# Patient Record
Sex: FEMALE | NOT HISPANIC OR LATINO | Employment: OTHER | URBAN - METROPOLITAN AREA
[De-identification: names, ages, dates, MRNs, and addresses within clinical notes are randomized per-mention and may not be internally consistent; named-entity substitution may affect disease eponyms.]

---

## 2020-05-29 ENCOUNTER — OFFICE VISIT (OUTPATIENT)
Dept: FAMILY MEDICINE | Facility: CLINIC | Age: 70
End: 2020-05-29
Payer: MEDICARE

## 2020-05-29 VITALS
HEART RATE: 73 BPM | HEIGHT: 62 IN | DIASTOLIC BLOOD PRESSURE: 70 MMHG | SYSTOLIC BLOOD PRESSURE: 126 MMHG | WEIGHT: 159.81 LBS | BODY MASS INDEX: 29.41 KG/M2 | OXYGEN SATURATION: 97 % | TEMPERATURE: 99 F

## 2020-05-29 DIAGNOSIS — E03.9 HYPOTHYROIDISM, UNSPECIFIED TYPE: Primary | ICD-10-CM

## 2020-05-29 DIAGNOSIS — Z00.00 WELL WOMAN EXAM WITHOUT GYNECOLOGICAL EXAM: ICD-10-CM

## 2020-05-29 DIAGNOSIS — R73.09 OTHER ABNORMAL GLUCOSE: ICD-10-CM

## 2020-05-29 DIAGNOSIS — Z12.31 ENCOUNTER FOR SCREENING MAMMOGRAM FOR BREAST CANCER: ICD-10-CM

## 2020-05-29 DIAGNOSIS — L29.9 ITCHING: ICD-10-CM

## 2020-05-29 PROCEDURE — 99499 RISK ADDL DX/OHS AUDIT: ICD-10-PCS | Mod: S$GLB,,, | Performed by: NURSE PRACTITIONER

## 2020-05-29 PROCEDURE — 1126F PR PAIN SEVERITY QUANTIFIED, NO PAIN PRESENT: ICD-10-PCS | Mod: HCNC,S$GLB,, | Performed by: NURSE PRACTITIONER

## 2020-05-29 PROCEDURE — 99202 PR OFFICE/OUTPT VISIT, NEW, LEVL II, 15-29 MIN: ICD-10-PCS | Mod: HCNC,S$GLB,, | Performed by: NURSE PRACTITIONER

## 2020-05-29 PROCEDURE — 1159F PR MEDICATION LIST DOCUMENTED IN MEDICAL RECORD: ICD-10-PCS | Mod: HCNC,S$GLB,, | Performed by: NURSE PRACTITIONER

## 2020-05-29 PROCEDURE — 99999 PR PBB SHADOW E&M-NEW PATIENT-LVL IV: CPT | Mod: PBBFAC,HCNC,, | Performed by: NURSE PRACTITIONER

## 2020-05-29 PROCEDURE — 1159F MED LIST DOCD IN RCRD: CPT | Mod: HCNC,S$GLB,, | Performed by: NURSE PRACTITIONER

## 2020-05-29 PROCEDURE — 99499 UNLISTED E&M SERVICE: CPT | Mod: S$GLB,,, | Performed by: NURSE PRACTITIONER

## 2020-05-29 PROCEDURE — 1101F PT FALLS ASSESS-DOCD LE1/YR: CPT | Mod: HCNC,CPTII,S$GLB, | Performed by: NURSE PRACTITIONER

## 2020-05-29 PROCEDURE — 1126F AMNT PAIN NOTED NONE PRSNT: CPT | Mod: HCNC,S$GLB,, | Performed by: NURSE PRACTITIONER

## 2020-05-29 PROCEDURE — 99202 OFFICE O/P NEW SF 15 MIN: CPT | Mod: HCNC,S$GLB,, | Performed by: NURSE PRACTITIONER

## 2020-05-29 PROCEDURE — 99999 PR PBB SHADOW E&M-NEW PATIENT-LVL IV: ICD-10-PCS | Mod: PBBFAC,HCNC,, | Performed by: NURSE PRACTITIONER

## 2020-05-29 PROCEDURE — 1101F PR PT FALLS ASSESS DOC 0-1 FALLS W/OUT INJ PAST YR: ICD-10-PCS | Mod: HCNC,CPTII,S$GLB, | Performed by: NURSE PRACTITIONER

## 2020-05-29 RX ORDER — HYDROXYZINE HYDROCHLORIDE 25 MG/1
25 TABLET, FILM COATED ORAL 3 TIMES DAILY PRN
Qty: 120 TABLET | Refills: 1 | Status: SHIPPED | OUTPATIENT
Start: 2020-05-29 | End: 2021-09-27 | Stop reason: SDUPTHER

## 2020-05-29 RX ORDER — LEVOTHYROXINE SODIUM 100 UG/1
100 TABLET ORAL
Qty: 90 TABLET | Refills: 1 | Status: SHIPPED | OUTPATIENT
Start: 2020-05-29 | End: 2021-01-20 | Stop reason: SDUPTHER

## 2020-05-29 RX ORDER — HYDROXYZINE HYDROCHLORIDE 25 MG/1
25 TABLET, FILM COATED ORAL 3 TIMES DAILY PRN
COMMUNITY
End: 2020-05-29 | Stop reason: SDUPTHER

## 2020-05-29 RX ORDER — LEVOTHYROXINE SODIUM 100 UG/1
100 TABLET ORAL
COMMUNITY
End: 2020-05-29 | Stop reason: SDUPTHER

## 2020-05-29 NOTE — PROGRESS NOTES
This dictation has been generated using Modal Fluency Dictation some phonetic errors may occur. Please contact author for clarification if needed.     Problem List Items Addressed This Visit     Hypothyroidism - Primary    Relevant Orders    CBC auto differential    Hemoglobin A1C    Lipid Panel    TSH      Other Visit Diagnoses     Itching        Well woman exam without gynecological exam        Relevant Orders    CBC auto differential    Comprehensive metabolic panel    Hemoglobin A1C    Lipid Panel    TSH    Encounter for screening mammogram for breast cancer        Relevant Orders    Mammo Digital Screening Bilat    Other abnormal glucose         Relevant Orders    Hemoglobin A1C        Orders Placed This Encounter    Mammo Digital Screening Bilat    CBC auto differential    Comprehensive metabolic panel    Hemoglobin A1C    Lipid Panel    TSH    hydrOXYzine HCL (ATARAX) 25 MG tablet    levothyroxine (SYNTHROID) 100 MCG tablet     Well-woman without update labs as above  Hypothyroidism update labs as above.  I will review all in refer address accordingly  Update mammogram  Obtain records from prior provider office [place], [place]    No follow-ups on file.    ________________________________________________________________  ________________________________________________________________      Chief Complaint   Patient presents with    Establish Care     History of present illness  This 69 y.o. presents today for complaint of establishing care.  Moved from [place] in December.  Has a history of hypothyroidism and itching.  Takes Synthroid without side effects.  Denies constipation change in hair skin or nails.  Weight is stable.  Notes itching did see dermatology allergist in the past without clear diagnoses.  Atarax is helpful.  Uses medicine as needed up to twice a day.  Discussed health maintenance needs record from prior primary care physician to determine going  forward.  Sounds like she will be due for colonoscopy in about 2022 but do not have report to verify.  Reports mammogram was normal last year.  Complete review of systems negative  Past medical and social history reviewed.  Family history reviewed and updated.    Past Medical History:   Diagnosis Date    Hypothyroidism     Stroke 2017    no residual effects.     Urticaria        History reviewed. No pertinent surgical history.    Family History   Problem Relation Age of Onset    Stroke Father 70       Social History     Socioeconomic History    Marital status:      Spouse name: Not on file    Number of children: Not on file    Years of education: Not on file    Highest education level: Not on file   Occupational History    Not on file   Social Needs    Financial resource strain: Not on file    Food insecurity:     Worry: Not on file     Inability: Not on file    Transportation needs:     Medical: Not on file     Non-medical: Not on file   Tobacco Use    Smoking status: Never Smoker    Smokeless tobacco: Never Used   Substance and Sexual Activity    Alcohol use: Not Currently     Alcohol/week: 5.0 standard drinks     Types: 5 Glasses of wine per week    Drug use: Never    Sexual activity: Not Currently   Lifestyle    Physical activity:     Days per week: Not on file     Minutes per session: Not on file    Stress: Not on file   Relationships    Social connections:     Talks on phone: Not on file     Gets together: Not on file     Attends Jew service: Not on file     Active member of club or organization: Not on file     Attends meetings of clubs or organizations: Not on file     Relationship status: Not on file   Other Topics Concern    Not on file   Social History Narrative    Not on file       Current Outpatient Medications   Medication Sig Dispense Refill    hydrOXYzine HCL (ATARAX) 25 MG tablet Take 1 tablet (25 mg total) by mouth 3 (three) times daily as needed for Itching. 120  tablet 1    levothyroxine (SYNTHROID) 100 MCG tablet Take 1 tablet (100 mcg total) by mouth before breakfast. 90 tablet 1     No current facility-administered medications for this visit.        Review of patient's allergies indicates:  No Known Allergies    Physical examination  Vitals Reviewed  Gen. Well-dressed well-nourished   Skin warm dry and intact.  No rashes noted.  Chest.  Respirations are even unlabored.  Lungs are clear to auscultation.  Cardiac regular rate and rhythm.  No chest wall adenopathy noted.  Abdomen is soft and not distended.  Bowel sounds are present.  No tenderness during palpation of the abdomen.      Neuro. Awake alert oriented x4.  Normal judgment and cognition noted.  Extremities no clubbing cyanosis or edema noted.     Call or return to clinic prn if these symptoms worsen or fail to improve as anticipated.

## 2020-06-10 ENCOUNTER — HOSPITAL ENCOUNTER (OUTPATIENT)
Dept: RADIOLOGY | Facility: CLINIC | Age: 70
Discharge: HOME | End: 2020-06-10
Attending: NURSE PRACTITIONER
Payer: MEDICARE

## 2020-06-10 DIAGNOSIS — Z12.31 ENCOUNTER FOR SCREENING MAMMOGRAM FOR BREAST CANCER: ICD-10-CM

## 2020-06-10 PROCEDURE — 77067 SCR MAMMO BI INCL CAD: CPT | Mod: 26,HCNC,, | Performed by: RADIOLOGY

## 2020-06-10 PROCEDURE — 77067 MAMMO DIGITAL SCREENING BILAT WITH TOMOSYNTHESIS_CAD: ICD-10-PCS | Mod: 26,HCNC,, | Performed by: RADIOLOGY

## 2020-06-10 PROCEDURE — 77063 BREAST TOMOSYNTHESIS BI: CPT | Mod: 26,HCNC,, | Performed by: RADIOLOGY

## 2020-06-10 PROCEDURE — 77063 MAMMO DIGITAL SCREENING BILAT WITH TOMOSYNTHESIS_CAD: ICD-10-PCS | Mod: 26,HCNC,, | Performed by: RADIOLOGY

## 2020-06-10 PROCEDURE — 77067 SCR MAMMO BI INCL CAD: CPT | Mod: TC,HCNC,PO

## 2020-06-11 DIAGNOSIS — R92.8 ABNORMAL MAMMOGRAM: Primary | ICD-10-CM

## 2020-06-15 DIAGNOSIS — N28.9 RENAL INSUFFICIENCY: Primary | ICD-10-CM

## 2020-07-01 ENCOUNTER — HOSPITAL ENCOUNTER (OUTPATIENT)
Dept: RADIOLOGY | Facility: HOSPITAL | Age: 70
Discharge: HOME | End: 2020-07-01
Attending: FAMILY MEDICINE
Payer: MEDICARE

## 2020-07-01 ENCOUNTER — TELEPHONE (OUTPATIENT)
Dept: FAMILY MEDICINE | Facility: CLINIC | Age: 70
End: 2020-07-01

## 2020-07-01 DIAGNOSIS — N28.9 RENAL INSUFFICIENCY: Primary | ICD-10-CM

## 2020-07-01 DIAGNOSIS — R92.8 ABNORMAL MAMMOGRAM: ICD-10-CM

## 2020-07-01 PROCEDURE — 76642 US BREAST RIGHT LIMITED: ICD-10-PCS | Mod: 26,HCNC,RT, | Performed by: RADIOLOGY

## 2020-07-01 PROCEDURE — 77065 DX MAMMO INCL CAD UNI: CPT | Mod: 26,HCNC,, | Performed by: RADIOLOGY

## 2020-07-01 PROCEDURE — 77061 BREAST TOMOSYNTHESIS UNI: CPT | Mod: 26,HCNC,, | Performed by: RADIOLOGY

## 2020-07-01 PROCEDURE — 77061 BREAST TOMOSYNTHESIS UNI: CPT | Mod: TC,HCNC

## 2020-07-01 PROCEDURE — 76642 ULTRASOUND BREAST LIMITED: CPT | Mod: TC,HCNC,RT

## 2020-07-01 PROCEDURE — 77061 MAMMO DIGITAL DIAGNOSTIC RIGHT WITH TOMOSYNTHESIS_CAD: ICD-10-PCS | Mod: 26,HCNC,, | Performed by: RADIOLOGY

## 2020-07-01 PROCEDURE — 77065 DX MAMMO INCL CAD UNI: CPT | Mod: TC,HCNC

## 2020-07-01 PROCEDURE — 76642 ULTRASOUND BREAST LIMITED: CPT | Mod: 26,HCNC,RT, | Performed by: RADIOLOGY

## 2020-07-01 PROCEDURE — 77065 MAMMO DIGITAL DIAGNOSTIC RIGHT WITH TOMOSYNTHESIS_CAD: ICD-10-PCS | Mod: 26,HCNC,, | Performed by: RADIOLOGY

## 2020-07-02 ENCOUNTER — TELEPHONE (OUTPATIENT)
Dept: FAMILY MEDICINE | Facility: CLINIC | Age: 70
End: 2020-07-02

## 2020-07-02 NOTE — TELEPHONE ENCOUNTER
Left voicemail asking patient to [facility]. These meds were refilled 5/29 for 90 day with one additional refill.

## 2020-07-02 NOTE — TELEPHONE ENCOUNTER
CONTINUES to have a slide in her renal function. I don't see any meds prescribed that would be a problem. Double check that she is not taking over the counter pain med such as advil, ibuprofen, aleve, naproxen, aspirin. Is she taking vitamins or anything else?    She can take tylenol if she needed it.

## 2020-07-02 NOTE — TELEPHONE ENCOUNTER
----- Message from Reuben Titus sent at 7/2/2020  9:06 AM CDT -----  Regarding: Delayed refill  Contact: Pt  Type:  Patient Returning Call    Who Called:  pt  Does the patient know what this is regarding?:  pt follow up with pt for refills for levothyroxine (SYNTHROID) 100 MCG tablet and hydrOXYzine HCL (ATARAX) 25 MG tablet   [facility]  Best Call Back Number:  [phone]  Additional Information:  Please follow up. Thank you

## 2020-07-07 NOTE — TELEPHONE ENCOUNTER
Spoke with patient.  Takes biotin, fish oil, vit d, multi vitamin.  States that she took ibuprofen for several days prior to visit but does not take all the time.

## 2020-07-08 NOTE — TELEPHONE ENCOUNTER
Tell her do not take any over-the-counter pain medications such as ibuprofen.  She needs to increase her water intake a little bit and repeat her labs in 2 weeks.  BMP 2 weeks

## 2021-01-25 RX ORDER — LEVOTHYROXINE SODIUM 100 UG/1
100 TABLET ORAL
Qty: 90 TABLET | Refills: 1 | Status: SHIPPED | OUTPATIENT
Start: 2021-01-25 | End: 2021-09-27 | Stop reason: SDUPTHER

## 2021-02-04 ENCOUNTER — OFFICE VISIT (OUTPATIENT)
Dept: FAMILY MEDICINE | Facility: CLINIC | Age: 71
End: 2021-02-04
Payer: MEDICARE

## 2021-02-04 ENCOUNTER — TELEPHONE (OUTPATIENT)
Dept: FAMILY MEDICINE | Facility: CLINIC | Age: 71
End: 2021-02-04

## 2021-02-04 VITALS
SYSTOLIC BLOOD PRESSURE: 126 MMHG | TEMPERATURE: 97 F | HEART RATE: 62 BPM | DIASTOLIC BLOOD PRESSURE: 72 MMHG | HEIGHT: 62 IN | WEIGHT: 168 LBS | BODY MASS INDEX: 30.91 KG/M2

## 2021-02-04 DIAGNOSIS — E03.9 HYPOTHYROIDISM, UNSPECIFIED TYPE: Primary | ICD-10-CM

## 2021-02-04 DIAGNOSIS — L29.9 ITCHING: ICD-10-CM

## 2021-02-04 PROCEDURE — 99999 PR PBB SHADOW E&M-EST. PATIENT-LVL III: CPT | Mod: PBBFAC,,, | Performed by: NURSE PRACTITIONER

## 2021-02-04 PROCEDURE — 1159F PR MEDICATION LIST DOCUMENTED IN MEDICAL RECORD: ICD-10-PCS | Mod: S$GLB,,, | Performed by: NURSE PRACTITIONER

## 2021-02-04 PROCEDURE — 3288F PR FALLS RISK ASSESSMENT DOCUMENTED: ICD-10-PCS | Mod: CPTII,S$GLB,, | Performed by: NURSE PRACTITIONER

## 2021-02-04 PROCEDURE — 3008F PR BODY MASS INDEX (BMI) DOCUMENTED: ICD-10-PCS | Mod: CPTII,S$GLB,, | Performed by: NURSE PRACTITIONER

## 2021-02-04 PROCEDURE — 99213 OFFICE O/P EST LOW 20 MIN: CPT | Mod: S$GLB,,, | Performed by: NURSE PRACTITIONER

## 2021-02-04 PROCEDURE — 3008F BODY MASS INDEX DOCD: CPT | Mod: CPTII,S$GLB,, | Performed by: NURSE PRACTITIONER

## 2021-02-04 PROCEDURE — 99213 PR OFFICE/OUTPT VISIT, EST, LEVL III, 20-29 MIN: ICD-10-PCS | Mod: S$GLB,,, | Performed by: NURSE PRACTITIONER

## 2021-02-04 PROCEDURE — 1101F PT FALLS ASSESS-DOCD LE1/YR: CPT | Mod: CPTII,S$GLB,, | Performed by: NURSE PRACTITIONER

## 2021-02-04 PROCEDURE — 1101F PR PT FALLS ASSESS DOC 0-1 FALLS W/OUT INJ PAST YR: ICD-10-PCS | Mod: CPTII,S$GLB,, | Performed by: NURSE PRACTITIONER

## 2021-02-04 PROCEDURE — 1159F MED LIST DOCD IN RCRD: CPT | Mod: S$GLB,,, | Performed by: NURSE PRACTITIONER

## 2021-02-04 PROCEDURE — 99999 PR PBB SHADOW E&M-EST. PATIENT-LVL III: ICD-10-PCS | Mod: PBBFAC,,, | Performed by: NURSE PRACTITIONER

## 2021-02-04 PROCEDURE — 1126F AMNT PAIN NOTED NONE PRSNT: CPT | Mod: S$GLB,,, | Performed by: NURSE PRACTITIONER

## 2021-02-04 PROCEDURE — 3288F FALL RISK ASSESSMENT DOCD: CPT | Mod: CPTII,S$GLB,, | Performed by: NURSE PRACTITIONER

## 2021-02-04 PROCEDURE — 1126F PR PAIN SEVERITY QUANTIFIED, NO PAIN PRESENT: ICD-10-PCS | Mod: S$GLB,,, | Performed by: NURSE PRACTITIONER

## 2021-02-19 ENCOUNTER — IMMUNIZATION (OUTPATIENT)
Dept: FAMILY MEDICINE | Facility: CLINIC | Age: 71
End: 2021-02-19
Payer: MEDICARE

## 2021-02-19 DIAGNOSIS — Z23 NEED FOR VACCINATION: Primary | ICD-10-CM

## 2021-02-19 PROCEDURE — 0001A COVID-19, MRNA, LNP-S, PF, 30 MCG/0.3 ML DOSE VACCINE: ICD-10-PCS | Mod: CV19,,, | Performed by: FAMILY MEDICINE

## 2021-02-19 PROCEDURE — 91300 COVID-19, MRNA, LNP-S, PF, 30 MCG/0.3 ML DOSE VACCINE: CPT | Mod: ,,, | Performed by: FAMILY MEDICINE

## 2021-02-19 PROCEDURE — 0001A COVID-19, MRNA, LNP-S, PF, 30 MCG/0.3 ML DOSE VACCINE: CPT | Mod: CV19,,, | Performed by: FAMILY MEDICINE

## 2021-02-19 PROCEDURE — 91300 COVID-19, MRNA, LNP-S, PF, 30 MCG/0.3 ML DOSE VACCINE: ICD-10-PCS | Mod: ,,, | Performed by: FAMILY MEDICINE

## 2021-03-12 ENCOUNTER — IMMUNIZATION (OUTPATIENT)
Dept: FAMILY MEDICINE | Facility: CLINIC | Age: 71
End: 2021-03-12
Payer: MEDICARE

## 2021-03-12 DIAGNOSIS — Z23 NEED FOR VACCINATION: Primary | ICD-10-CM

## 2021-03-12 PROCEDURE — 0002A COVID-19, MRNA, LNP-S, PF, 30 MCG/0.3 ML DOSE VACCINE: ICD-10-PCS | Mod: CV19,S$GLB,, | Performed by: FAMILY MEDICINE

## 2021-03-12 PROCEDURE — 0002A COVID-19, MRNA, LNP-S, PF, 30 MCG/0.3 ML DOSE VACCINE: CPT | Mod: CV19,S$GLB,, | Performed by: FAMILY MEDICINE

## 2021-03-12 PROCEDURE — 91300 COVID-19, MRNA, LNP-S, PF, 30 MCG/0.3 ML DOSE VACCINE: CPT | Mod: S$GLB,,, | Performed by: FAMILY MEDICINE

## 2021-03-12 PROCEDURE — 91300 COVID-19, MRNA, LNP-S, PF, 30 MCG/0.3 ML DOSE VACCINE: ICD-10-PCS | Mod: S$GLB,,, | Performed by: FAMILY MEDICINE

## 2021-04-18 ENCOUNTER — ANESTHESIA EVENT (OUTPATIENT)
Dept: ENDOSCOPY | Facility: HOSPITAL | Age: 71
DRG: 378 | End: 2021-04-18
Payer: MEDICARE

## 2021-04-18 ENCOUNTER — ANESTHESIA (OUTPATIENT)
Dept: ENDOSCOPY | Facility: HOSPITAL | Age: 71
DRG: 378 | End: 2021-04-18
Payer: MEDICARE

## 2021-04-18 ENCOUNTER — HOSPITAL ENCOUNTER (INPATIENT)
Facility: HOSPITAL | Age: 71
LOS: 3 days | Discharge: HOME | DRG: 378 | End: 2021-04-21
Attending: EMERGENCY MEDICINE | Admitting: STUDENT IN AN ORGANIZED HEALTH CARE EDUCATION/TRAINING PROGRAM
Payer: MEDICARE

## 2021-04-18 DIAGNOSIS — D62 ACUTE BLOOD LOSS ANEMIA: ICD-10-CM

## 2021-04-18 DIAGNOSIS — K62.5 RECTAL BLEEDING: ICD-10-CM

## 2021-04-18 DIAGNOSIS — K92.1 HEMATOCHEZIA: ICD-10-CM

## 2021-04-18 DIAGNOSIS — K27.9 PEPTIC ULCER: Primary | ICD-10-CM

## 2021-04-18 PROBLEM — K92.2 LOWER GI BLEED: Status: ACTIVE | Noted: 2021-04-18

## 2021-04-18 PROBLEM — K92.2 LOWER GI BLEED: Status: RESOLVED | Noted: 2021-04-18 | Resolved: 2021-04-18

## 2021-04-18 LAB
ABO + RH BLD: NORMAL
ALBUMIN SERPL BCP-MCNC: 3.6 G/DL (ref 3.5–5.2)
ALP SERPL-CCNC: 68 U/L (ref 55–135)
ALT SERPL W/O P-5'-P-CCNC: 16 U/L (ref 10–44)
ANION GAP SERPL CALC-SCNC: 15 MMOL/L (ref 8–16)
AST SERPL-CCNC: 22 U/L (ref 10–40)
BASOPHILS # BLD AUTO: 0.01 K/UL (ref 0–0.2)
BASOPHILS # BLD AUTO: 0.02 K/UL (ref 0–0.2)
BASOPHILS # BLD AUTO: 0.03 K/UL (ref 0–0.2)
BASOPHILS # BLD AUTO: 0.05 K/UL (ref 0–0.2)
BASOPHILS NFR BLD: 0.2 % (ref 0–1.9)
BASOPHILS NFR BLD: 0.2 % (ref 0–1.9)
BASOPHILS NFR BLD: 0.5 % (ref 0–1.9)
BASOPHILS NFR BLD: 0.6 % (ref 0–1.9)
BILIRUB SERPL-MCNC: 0.7 MG/DL (ref 0.1–1)
BLD GP AB SCN CELLS X3 SERPL QL: NORMAL
BUN SERPL-MCNC: 62 MG/DL (ref 8–23)
CALCIUM SERPL-MCNC: 9 MG/DL (ref 8.7–10.5)
CHLORIDE SERPL-SCNC: 98 MMOL/L (ref 95–110)
CO2 SERPL-SCNC: 21 MMOL/L (ref 23–29)
CREAT SERPL-MCNC: 1.4 MG/DL (ref 0.5–1.4)
DIFFERENTIAL METHOD: ABNORMAL
EOSINOPHIL # BLD AUTO: 0 K/UL (ref 0–0.5)
EOSINOPHIL # BLD AUTO: 0.2 K/UL (ref 0–0.5)
EOSINOPHIL NFR BLD: 0.2 % (ref 0–8)
EOSINOPHIL NFR BLD: 0.6 % (ref 0–8)
EOSINOPHIL NFR BLD: 0.7 % (ref 0–8)
EOSINOPHIL NFR BLD: 1.8 % (ref 0–8)
ERYTHROCYTE [DISTWIDTH] IN BLOOD BY AUTOMATED COUNT: 16.6 % (ref 11.5–14.5)
ERYTHROCYTE [DISTWIDTH] IN BLOOD BY AUTOMATED COUNT: 16.8 % (ref 11.5–14.5)
ERYTHROCYTE [DISTWIDTH] IN BLOOD BY AUTOMATED COUNT: 16.9 % (ref 11.5–14.5)
ERYTHROCYTE [DISTWIDTH] IN BLOOD BY AUTOMATED COUNT: 17.2 % (ref 11.5–14.5)
EST. GFR  (AFRICAN AMERICAN): 43 ML/MIN/1.73 M^2
EST. GFR  (NON AFRICAN AMERICAN): 38 ML/MIN/1.73 M^2
FERRITIN SERPL-MCNC: 106 NG/ML (ref 20–300)
GLUCOSE SERPL-MCNC: 149 MG/DL (ref 70–110)
HCT VFR BLD AUTO: 20.8 % (ref 37–48.5)
HCT VFR BLD AUTO: 24 % (ref 37–48.5)
HCT VFR BLD AUTO: 24.5 % (ref 37–48.5)
HCT VFR BLD AUTO: 27.4 % (ref 37–48.5)
HGB BLD-MCNC: 7.1 G/DL (ref 12–16)
HGB BLD-MCNC: 8.3 G/DL (ref 12–16)
HGB BLD-MCNC: 8.3 G/DL (ref 12–16)
HGB BLD-MCNC: 9.2 G/DL (ref 12–16)
IMM GRANULOCYTES # BLD AUTO: 0.02 K/UL (ref 0–0.04)
IMM GRANULOCYTES # BLD AUTO: 0.02 K/UL (ref 0–0.04)
IMM GRANULOCYTES # BLD AUTO: 0.03 K/UL (ref 0–0.04)
IMM GRANULOCYTES # BLD AUTO: 0.06 K/UL (ref 0–0.04)
IMM GRANULOCYTES NFR BLD AUTO: 0.2 % (ref 0–0.5)
IMM GRANULOCYTES NFR BLD AUTO: 0.3 % (ref 0–0.5)
IMM GRANULOCYTES NFR BLD AUTO: 0.5 % (ref 0–0.5)
IMM GRANULOCYTES NFR BLD AUTO: 0.7 % (ref 0–0.5)
IRON SERPL-MCNC: 137 UG/DL (ref 30–160)
LYMPHOCYTES # BLD AUTO: 1.1 K/UL (ref 1–4.8)
LYMPHOCYTES # BLD AUTO: 1.1 K/UL (ref 1–4.8)
LYMPHOCYTES # BLD AUTO: 1.6 K/UL (ref 1–4.8)
LYMPHOCYTES # BLD AUTO: 1.7 K/UL (ref 1–4.8)
LYMPHOCYTES NFR BLD: 11.6 % (ref 18–48)
LYMPHOCYTES NFR BLD: 17 % (ref 18–48)
LYMPHOCYTES NFR BLD: 18.6 % (ref 18–48)
LYMPHOCYTES NFR BLD: 27 % (ref 18–48)
MCH RBC QN AUTO: 28 PG (ref 27–31)
MCH RBC QN AUTO: 28.2 PG (ref 27–31)
MCH RBC QN AUTO: 28.5 PG (ref 27–31)
MCH RBC QN AUTO: 32.5 PG (ref 27–31)
MCHC RBC AUTO-ENTMCNC: 33.6 G/DL (ref 32–36)
MCHC RBC AUTO-ENTMCNC: 33.9 G/DL (ref 32–36)
MCHC RBC AUTO-ENTMCNC: 34.1 G/DL (ref 32–36)
MCHC RBC AUTO-ENTMCNC: 34.6 G/DL (ref 32–36)
MCV RBC AUTO: 83 FL (ref 82–98)
MCV RBC AUTO: 97 FL (ref 82–98)
MONOCYTES # BLD AUTO: 0.3 K/UL (ref 0.3–1)
MONOCYTES # BLD AUTO: 0.3 K/UL (ref 0.3–1)
MONOCYTES # BLD AUTO: 0.5 K/UL (ref 0.3–1)
MONOCYTES # BLD AUTO: 0.9 K/UL (ref 0.3–1)
MONOCYTES NFR BLD: 5.1 % (ref 4–15)
MONOCYTES NFR BLD: 5.7 % (ref 4–15)
MONOCYTES NFR BLD: 5.7 % (ref 4–15)
MONOCYTES NFR BLD: 9.5 % (ref 4–15)
NEUTROPHILS # BLD AUTO: 3.8 K/UL (ref 1.8–7.7)
NEUTROPHILS # BLD AUTO: 5.1 K/UL (ref 1.8–7.7)
NEUTROPHILS # BLD AUTO: 6.9 K/UL (ref 1.8–7.7)
NEUTROPHILS # BLD AUTO: 6.9 K/UL (ref 1.8–7.7)
NEUTROPHILS NFR BLD: 66.1 % (ref 38–73)
NEUTROPHILS NFR BLD: 75.1 % (ref 38–73)
NEUTROPHILS NFR BLD: 75.8 % (ref 38–73)
NEUTROPHILS NFR BLD: 76.3 % (ref 38–73)
NRBC BLD-RTO: 0 /100 WBC
PLATELET # BLD AUTO: 101 K/UL (ref 150–450)
PLATELET # BLD AUTO: 105 K/UL (ref 150–450)
PLATELET # BLD AUTO: 230 K/UL (ref 150–450)
PLATELET # BLD AUTO: 88 K/UL (ref 150–450)
PLATELET BLD QL SMEAR: ABNORMAL
PMV BLD AUTO: 10.3 FL (ref 9.2–12.9)
PMV BLD AUTO: 11 FL (ref 9.2–12.9)
PMV BLD AUTO: 11.2 FL (ref 9.2–12.9)
PMV BLD AUTO: 11.6 FL (ref 9.2–12.9)
POTASSIUM SERPL-SCNC: 4.1 MMOL/L (ref 3.5–5.1)
PROT SERPL-MCNC: 6.6 G/DL (ref 6–8.4)
RBC # BLD AUTO: 2.52 M/UL (ref 4–5.4)
RBC # BLD AUTO: 2.83 M/UL (ref 4–5.4)
RBC # BLD AUTO: 2.91 M/UL (ref 4–5.4)
RBC # BLD AUTO: 2.96 M/UL (ref 4–5.4)
SARS-COV-2 RDRP RESP QL NAA+PROBE: NEGATIVE
SATURATED IRON: 38 % (ref 20–50)
SODIUM SERPL-SCNC: 134 MMOL/L (ref 136–145)
TOTAL IRON BINDING CAPACITY: 358 UG/DL (ref 250–450)
TRANSFERRIN SERPL-MCNC: 242 MG/DL (ref 200–375)
WBC # BLD AUTO: 5.77 K/UL (ref 3.9–12.7)
WBC # BLD AUTO: 6.64 K/UL (ref 3.9–12.7)
WBC # BLD AUTO: 9.08 K/UL (ref 3.9–12.7)
WBC # BLD AUTO: 9.15 K/UL (ref 3.9–12.7)

## 2021-04-18 PROCEDURE — 25000003 PHARM REV CODE 250: Performed by: INTERNAL MEDICINE

## 2021-04-18 PROCEDURE — 80053 COMPREHEN METABOLIC PANEL: CPT | Performed by: EMERGENCY MEDICINE

## 2021-04-18 PROCEDURE — 37000009 HC ANESTHESIA EA ADD 15 MINS: Performed by: INTERNAL MEDICINE

## 2021-04-18 PROCEDURE — D9220A PRA ANESTHESIA: ICD-10-PCS | Mod: ANES,,, | Performed by: ANESTHESIOLOGY

## 2021-04-18 PROCEDURE — 43236 UPPR GI SCOPE W/SUBMUC INJ: CPT | Performed by: INTERNAL MEDICINE

## 2021-04-18 PROCEDURE — 83540 ASSAY OF IRON: CPT | Performed by: INTERNAL MEDICINE

## 2021-04-18 PROCEDURE — 63600175 PHARM REV CODE 636 W HCPCS: Performed by: INTERNAL MEDICINE

## 2021-04-18 PROCEDURE — D9220A PRA ANESTHESIA: ICD-10-PCS | Mod: CRNA,,, | Performed by: NURSE ANESTHETIST, CERTIFIED REGISTERED

## 2021-04-18 PROCEDURE — D9220A PRA ANESTHESIA: Mod: ANES,,, | Performed by: ANESTHESIOLOGY

## 2021-04-18 PROCEDURE — 99285 EMERGENCY DEPT VISIT HI MDM: CPT | Mod: 25

## 2021-04-18 PROCEDURE — 99222 1ST HOSP IP/OBS MODERATE 55: CPT | Mod: ,,, | Performed by: INTERNAL MEDICINE

## 2021-04-18 PROCEDURE — 99222 PR INITIAL HOSPITAL CARE,LEVL II: ICD-10-PCS | Mod: ,,, | Performed by: INTERNAL MEDICINE

## 2021-04-18 PROCEDURE — 43255 EGD CONTROL BLEEDING ANY: CPT | Performed by: INTERNAL MEDICINE

## 2021-04-18 PROCEDURE — 36415 COLL VENOUS BLD VENIPUNCTURE: CPT | Performed by: NURSE PRACTITIONER

## 2021-04-18 PROCEDURE — 36415 COLL VENOUS BLD VENIPUNCTURE: CPT | Performed by: EMERGENCY MEDICINE

## 2021-04-18 PROCEDURE — 43255 PR EGD, FLEX, W/CTRL BLEED, ANY METHOD: ICD-10-PCS | Mod: 22,,, | Performed by: INTERNAL MEDICINE

## 2021-04-18 PROCEDURE — 27201038 HC PROBE, BI-POLAR: Performed by: INTERNAL MEDICINE

## 2021-04-18 PROCEDURE — U0002 COVID-19 LAB TEST NON-CDC: HCPCS | Performed by: EMERGENCY MEDICINE

## 2021-04-18 PROCEDURE — 85025 COMPLETE CBC W/AUTO DIFF WBC: CPT | Performed by: EMERGENCY MEDICINE

## 2021-04-18 PROCEDURE — 86920 COMPATIBILITY TEST SPIN: CPT | Performed by: NURSE PRACTITIONER

## 2021-04-18 PROCEDURE — 43255 EGD CONTROL BLEEDING ANY: CPT | Mod: 22,,, | Performed by: INTERNAL MEDICINE

## 2021-04-18 PROCEDURE — 63600175 PHARM REV CODE 636 W HCPCS: Performed by: NURSE ANESTHETIST, CERTIFIED REGISTERED

## 2021-04-18 PROCEDURE — 25000003 PHARM REV CODE 250: Performed by: EMERGENCY MEDICINE

## 2021-04-18 PROCEDURE — 37000008 HC ANESTHESIA 1ST 15 MINUTES: Performed by: INTERNAL MEDICINE

## 2021-04-18 PROCEDURE — 82728 ASSAY OF FERRITIN: CPT | Performed by: INTERNAL MEDICINE

## 2021-04-18 PROCEDURE — 11000001 HC ACUTE MED/SURG PRIVATE ROOM

## 2021-04-18 PROCEDURE — 25000003 PHARM REV CODE 250: Performed by: NURSE PRACTITIONER

## 2021-04-18 PROCEDURE — 25000003 PHARM REV CODE 250: Performed by: NURSE ANESTHETIST, CERTIFIED REGISTERED

## 2021-04-18 PROCEDURE — 85025 COMPLETE CBC W/AUTO DIFF WBC: CPT | Mod: 91 | Performed by: NURSE PRACTITIONER

## 2021-04-18 PROCEDURE — C9113 INJ PANTOPRAZOLE SODIUM, VIA: HCPCS | Performed by: INTERNAL MEDICINE

## 2021-04-18 PROCEDURE — 63600175 PHARM REV CODE 636 W HCPCS: Performed by: NURSE PRACTITIONER

## 2021-04-18 PROCEDURE — D9220A PRA ANESTHESIA: Mod: CRNA,,, | Performed by: NURSE ANESTHETIST, CERTIFIED REGISTERED

## 2021-04-18 PROCEDURE — 86900 BLOOD TYPING SEROLOGIC ABO: CPT | Performed by: EMERGENCY MEDICINE

## 2021-04-18 PROCEDURE — 27201012 HC FORCEPS, HOT/COLD, DISP: Performed by: INTERNAL MEDICINE

## 2021-04-18 RX ORDER — LEVOTHYROXINE SODIUM 100 UG/1
100 TABLET ORAL
Status: DISCONTINUED | OUTPATIENT
Start: 2021-04-18 | End: 2021-04-21 | Stop reason: HOSPADM

## 2021-04-18 RX ORDER — SUCRALFATE 1 G/1
1 TABLET ORAL
Status: DISCONTINUED | OUTPATIENT
Start: 2021-04-18 | End: 2021-04-21 | Stop reason: HOSPADM

## 2021-04-18 RX ORDER — LIDOCAINE HCL/PF 100 MG/5ML
SYRINGE (ML) INTRAVENOUS
Status: DISCONTINUED | OUTPATIENT
Start: 2021-04-18 | End: 2021-04-18

## 2021-04-18 RX ORDER — CHOLECALCIFEROL (VITAMIN D3) 125 MCG
1 CAPSULE ORAL DAILY
COMMUNITY

## 2021-04-18 RX ORDER — SODIUM CHLORIDE 9 MG/ML
INJECTION, SOLUTION INTRAVENOUS ONCE
Status: DISCONTINUED | OUTPATIENT
Start: 2021-04-18 | End: 2021-04-21 | Stop reason: HOSPADM

## 2021-04-18 RX ORDER — ACETAMINOPHEN 500 MG
1000 TABLET ORAL EVERY 6 HOURS PRN
Status: DISCONTINUED | OUTPATIENT
Start: 2021-04-18 | End: 2021-04-21 | Stop reason: HOSPADM

## 2021-04-18 RX ORDER — ONDANSETRON 2 MG/ML
8 INJECTION INTRAMUSCULAR; INTRAVENOUS EVERY 8 HOURS PRN
Status: DISCONTINUED | OUTPATIENT
Start: 2021-04-18 | End: 2021-04-21 | Stop reason: HOSPADM

## 2021-04-18 RX ORDER — TALC
6 POWDER (GRAM) TOPICAL NIGHTLY PRN
Status: DISCONTINUED | OUTPATIENT
Start: 2021-04-18 | End: 2021-04-21 | Stop reason: HOSPADM

## 2021-04-18 RX ORDER — AMOXICILLIN 500 MG
1 CAPSULE ORAL DAILY
COMMUNITY

## 2021-04-18 RX ORDER — PANTOPRAZOLE SODIUM 40 MG/10ML
40 INJECTION, POWDER, LYOPHILIZED, FOR SOLUTION INTRAVENOUS 2 TIMES DAILY
Status: DISCONTINUED | OUTPATIENT
Start: 2021-04-18 | End: 2021-04-18

## 2021-04-18 RX ORDER — PROPOFOL 10 MG/ML
VIAL (ML) INTRAVENOUS
Status: DISCONTINUED | OUTPATIENT
Start: 2021-04-18 | End: 2021-04-18

## 2021-04-18 RX ADMIN — SUCRALFATE 1 G: 1 TABLET ORAL at 04:04

## 2021-04-18 RX ADMIN — GLYCOPYRROLATE 0.2 MG: 0.2 INJECTION, SOLUTION INTRAMUSCULAR; INTRAVITREAL at 09:04

## 2021-04-18 RX ADMIN — PANTOPRAZOLE SODIUM 8 MG/HR: 40 INJECTION, POWDER, FOR SOLUTION INTRAVENOUS at 03:04

## 2021-04-18 RX ADMIN — SUCRALFATE 1 G: 1 TABLET ORAL at 11:04

## 2021-04-18 RX ADMIN — PROPOFOL 50 MG: 10 INJECTION, EMULSION INTRAVENOUS at 09:04

## 2021-04-18 RX ADMIN — ONDANSETRON 8 MG: 2 INJECTION INTRAMUSCULAR; INTRAVENOUS at 11:04

## 2021-04-18 RX ADMIN — LIDOCAINE HYDROCHLORIDE 100 MG: 20 INJECTION INTRAVENOUS at 09:04

## 2021-04-18 RX ADMIN — PANTOPRAZOLE SODIUM 8 MG/HR: 40 INJECTION, POWDER, FOR SOLUTION INTRAVENOUS at 10:04

## 2021-04-18 RX ADMIN — LEVOTHYROXINE SODIUM 100 MCG: 0.1 TABLET ORAL at 11:04

## 2021-04-18 RX ADMIN — SUCRALFATE 1 G: 1 TABLET ORAL at 08:04

## 2021-04-18 RX ADMIN — PROPOFOL 100 MG: 10 INJECTION, EMULSION INTRAVENOUS at 09:04

## 2021-04-18 RX ADMIN — PANTOPRAZOLE SODIUM 8 MG/HR: 40 INJECTION, POWDER, FOR SOLUTION INTRAVENOUS at 08:04

## 2021-04-18 RX ADMIN — SODIUM CHLORIDE 1000 ML: 0.9 INJECTION, SOLUTION INTRAVENOUS at 05:04

## 2021-04-19 LAB
BASOPHILS # BLD AUTO: 0.01 K/UL (ref 0–0.2)
BASOPHILS # BLD AUTO: 0.02 K/UL (ref 0–0.2)
BASOPHILS NFR BLD: 0.2 % (ref 0–1.9)
BASOPHILS NFR BLD: 0.3 % (ref 0–1.9)
BLD PROD TYP BPU: NORMAL
BLD PROD TYP BPU: NORMAL
BLOOD UNIT EXPIRATION DATE: NORMAL
BLOOD UNIT EXPIRATION DATE: NORMAL
BLOOD UNIT TYPE CODE: 9500
BLOOD UNIT TYPE CODE: 9500
BLOOD UNIT TYPE: NORMAL
BLOOD UNIT TYPE: NORMAL
CODING SYSTEM: NORMAL
CODING SYSTEM: NORMAL
DIFFERENTIAL METHOD: ABNORMAL
DIFFERENTIAL METHOD: ABNORMAL
DISPENSE STATUS: NORMAL
DISPENSE STATUS: NORMAL
EOSINOPHIL # BLD AUTO: 0.1 K/UL (ref 0–0.5)
EOSINOPHIL # BLD AUTO: 0.1 K/UL (ref 0–0.5)
EOSINOPHIL NFR BLD: 2.1 % (ref 0–8)
EOSINOPHIL NFR BLD: 3 % (ref 0–8)
ERYTHROCYTE [DISTWIDTH] IN BLOOD BY AUTOMATED COUNT: 15.9 % (ref 11.5–14.5)
ERYTHROCYTE [DISTWIDTH] IN BLOOD BY AUTOMATED COUNT: 17.2 % (ref 11.5–14.5)
HCT VFR BLD AUTO: 19.2 % (ref 37–48.5)
HCT VFR BLD AUTO: 30.5 % (ref 37–48.5)
HGB BLD-MCNC: 10 G/DL (ref 12–16)
HGB BLD-MCNC: 6.5 G/DL (ref 12–16)
IMM GRANULOCYTES # BLD AUTO: 0.01 K/UL (ref 0–0.04)
IMM GRANULOCYTES # BLD AUTO: 0.01 K/UL (ref 0–0.04)
IMM GRANULOCYTES NFR BLD AUTO: 0.2 % (ref 0–0.5)
IMM GRANULOCYTES NFR BLD AUTO: 0.2 % (ref 0–0.5)
LYMPHOCYTES # BLD AUTO: 1.3 K/UL (ref 1–4.8)
LYMPHOCYTES # BLD AUTO: 1.4 K/UL (ref 1–4.8)
LYMPHOCYTES NFR BLD: 23.5 % (ref 18–48)
LYMPHOCYTES NFR BLD: 30.1 % (ref 18–48)
MCH RBC QN AUTO: 28.3 PG (ref 27–31)
MCH RBC QN AUTO: 29 PG (ref 27–31)
MCHC RBC AUTO-ENTMCNC: 32.8 G/DL (ref 32–36)
MCHC RBC AUTO-ENTMCNC: 33.9 G/DL (ref 32–36)
MCV RBC AUTO: 84 FL (ref 82–98)
MCV RBC AUTO: 88 FL (ref 82–98)
MONOCYTES # BLD AUTO: 0.3 K/UL (ref 0.3–1)
MONOCYTES # BLD AUTO: 0.4 K/UL (ref 0.3–1)
MONOCYTES NFR BLD: 7.1 % (ref 4–15)
MONOCYTES NFR BLD: 7.3 % (ref 4–15)
NEUTROPHILS # BLD AUTO: 2.6 K/UL (ref 1.8–7.7)
NEUTROPHILS # BLD AUTO: 3.8 K/UL (ref 1.8–7.7)
NEUTROPHILS NFR BLD: 59.4 % (ref 38–73)
NEUTROPHILS NFR BLD: 66.6 % (ref 38–73)
NRBC BLD-RTO: 0 /100 WBC
NRBC BLD-RTO: 0 /100 WBC
NUM UNITS TRANS PACKED RBC: NORMAL
NUM UNITS TRANS PACKED RBC: NORMAL
PLATELET # BLD AUTO: 80 K/UL (ref 150–450)
PLATELET # BLD AUTO: 82 K/UL (ref 150–450)
PLATELET BLD QL SMEAR: ABNORMAL
PMV BLD AUTO: 11.2 FL (ref 9.2–12.9)
PMV BLD AUTO: 11.4 FL (ref 9.2–12.9)
RBC # BLD AUTO: 2.3 M/UL (ref 4–5.4)
RBC # BLD AUTO: 3.45 M/UL (ref 4–5.4)
WBC # BLD AUTO: 4.38 K/UL (ref 3.9–12.7)
WBC # BLD AUTO: 5.75 K/UL (ref 3.9–12.7)

## 2021-04-19 PROCEDURE — 99232 SBSQ HOSP IP/OBS MODERATE 35: CPT | Mod: ,,, | Performed by: INTERNAL MEDICINE

## 2021-04-19 PROCEDURE — 85025 COMPLETE CBC W/AUTO DIFF WBC: CPT | Performed by: STUDENT IN AN ORGANIZED HEALTH CARE EDUCATION/TRAINING PROGRAM

## 2021-04-19 PROCEDURE — C9113 INJ PANTOPRAZOLE SODIUM, VIA: HCPCS | Performed by: INTERNAL MEDICINE

## 2021-04-19 PROCEDURE — 36430 TRANSFUSION BLD/BLD COMPNT: CPT

## 2021-04-19 PROCEDURE — 11000001 HC ACUTE MED/SURG PRIVATE ROOM

## 2021-04-19 PROCEDURE — 25000003 PHARM REV CODE 250: Performed by: NURSE PRACTITIONER

## 2021-04-19 PROCEDURE — 36415 COLL VENOUS BLD VENIPUNCTURE: CPT | Performed by: STUDENT IN AN ORGANIZED HEALTH CARE EDUCATION/TRAINING PROGRAM

## 2021-04-19 PROCEDURE — 36415 COLL VENOUS BLD VENIPUNCTURE: CPT | Performed by: NURSE PRACTITIONER

## 2021-04-19 PROCEDURE — 99232 PR SUBSEQUENT HOSPITAL CARE,LEVL II: ICD-10-PCS | Mod: ,,, | Performed by: INTERNAL MEDICINE

## 2021-04-19 PROCEDURE — 85025 COMPLETE CBC W/AUTO DIFF WBC: CPT | Mod: 91 | Performed by: NURSE PRACTITIONER

## 2021-04-19 PROCEDURE — P9016 RBC LEUKOCYTES REDUCED: HCPCS | Performed by: NURSE PRACTITIONER

## 2021-04-19 PROCEDURE — 25000003 PHARM REV CODE 250: Performed by: INTERNAL MEDICINE

## 2021-04-19 PROCEDURE — 63600175 PHARM REV CODE 636 W HCPCS: Performed by: INTERNAL MEDICINE

## 2021-04-19 RX ORDER — HYDROCODONE BITARTRATE AND ACETAMINOPHEN 500; 5 MG/1; MG/1
TABLET ORAL
Status: DISCONTINUED | OUTPATIENT
Start: 2021-04-19 | End: 2021-04-21 | Stop reason: HOSPADM

## 2021-04-19 RX ADMIN — PANTOPRAZOLE SODIUM 8 MG/HR: 40 INJECTION, POWDER, FOR SOLUTION INTRAVENOUS at 10:04

## 2021-04-19 RX ADMIN — SUCRALFATE 1 G: 1 TABLET ORAL at 03:04

## 2021-04-19 RX ADMIN — SUCRALFATE 1 G: 1 TABLET ORAL at 11:04

## 2021-04-19 RX ADMIN — SUCRALFATE 1 G: 1 TABLET ORAL at 05:04

## 2021-04-19 RX ADMIN — PANTOPRAZOLE SODIUM 8 MG/HR: 40 INJECTION, POWDER, FOR SOLUTION INTRAVENOUS at 05:04

## 2021-04-19 RX ADMIN — PANTOPRAZOLE SODIUM 8 MG/HR: 40 INJECTION, POWDER, FOR SOLUTION INTRAVENOUS at 01:04

## 2021-04-19 RX ADMIN — LEVOTHYROXINE SODIUM 100 MCG: 0.1 TABLET ORAL at 05:04

## 2021-04-19 RX ADMIN — SUCRALFATE 1 G: 1 TABLET ORAL at 08:04

## 2021-04-20 LAB
ALBUMIN SERPL BCP-MCNC: 3.2 G/DL (ref 3.5–5.2)
ALP SERPL-CCNC: 61 U/L (ref 55–135)
ALT SERPL W/O P-5'-P-CCNC: 13 U/L (ref 10–44)
ANION GAP SERPL CALC-SCNC: 4 MMOL/L (ref 8–16)
AST SERPL-CCNC: 19 U/L (ref 10–40)
BASOPHILS # BLD AUTO: 0.02 K/UL (ref 0–0.2)
BASOPHILS # BLD AUTO: 0.02 K/UL (ref 0–0.2)
BASOPHILS NFR BLD: 0.5 % (ref 0–1.9)
BASOPHILS NFR BLD: 0.5 % (ref 0–1.9)
BILIRUB SERPL-MCNC: 0.5 MG/DL (ref 0.1–1)
BUN SERPL-MCNC: 12 MG/DL (ref 8–23)
CALCIUM SERPL-MCNC: 8.5 MG/DL (ref 8.7–10.5)
CHLORIDE SERPL-SCNC: 110 MMOL/L (ref 95–110)
CO2 SERPL-SCNC: 26 MMOL/L (ref 23–29)
CREAT SERPL-MCNC: 1.2 MG/DL (ref 0.5–1.4)
DIFFERENTIAL METHOD: ABNORMAL
DIFFERENTIAL METHOD: ABNORMAL
EOSINOPHIL # BLD AUTO: 0.1 K/UL (ref 0–0.5)
EOSINOPHIL # BLD AUTO: 0.1 K/UL (ref 0–0.5)
EOSINOPHIL NFR BLD: 1.6 % (ref 0–8)
EOSINOPHIL NFR BLD: 2.5 % (ref 0–8)
ERYTHROCYTE [DISTWIDTH] IN BLOOD BY AUTOMATED COUNT: 16 % (ref 11.5–14.5)
ERYTHROCYTE [DISTWIDTH] IN BLOOD BY AUTOMATED COUNT: 16.3 % (ref 11.5–14.5)
EST. GFR  (AFRICAN AMERICAN): 52 ML/MIN/1.73 M^2
EST. GFR  (NON AFRICAN AMERICAN): 45 ML/MIN/1.73 M^2
GLUCOSE SERPL-MCNC: 108 MG/DL (ref 70–110)
HCT VFR BLD AUTO: 26.8 % (ref 37–48.5)
HCT VFR BLD AUTO: 28.3 % (ref 37–48.5)
HGB BLD-MCNC: 9.2 G/DL (ref 12–16)
HGB BLD-MCNC: 9.7 G/DL (ref 12–16)
IMM GRANULOCYTES # BLD AUTO: 0.01 K/UL (ref 0–0.04)
IMM GRANULOCYTES # BLD AUTO: 0.01 K/UL (ref 0–0.04)
IMM GRANULOCYTES NFR BLD AUTO: 0.2 % (ref 0–0.5)
IMM GRANULOCYTES NFR BLD AUTO: 0.2 % (ref 0–0.5)
LYMPHOCYTES # BLD AUTO: 1 K/UL (ref 1–4.8)
LYMPHOCYTES # BLD AUTO: 1.3 K/UL (ref 1–4.8)
LYMPHOCYTES NFR BLD: 22.8 % (ref 18–48)
LYMPHOCYTES NFR BLD: 29.8 % (ref 18–48)
MCH RBC QN AUTO: 28.8 PG (ref 27–31)
MCH RBC QN AUTO: 29 PG (ref 27–31)
MCHC RBC AUTO-ENTMCNC: 34.3 G/DL (ref 32–36)
MCHC RBC AUTO-ENTMCNC: 34.3 G/DL (ref 32–36)
MCV RBC AUTO: 84 FL (ref 82–98)
MCV RBC AUTO: 85 FL (ref 82–98)
MONOCYTES # BLD AUTO: 0.3 K/UL (ref 0.3–1)
MONOCYTES # BLD AUTO: 0.3 K/UL (ref 0.3–1)
MONOCYTES NFR BLD: 7.3 % (ref 4–15)
MONOCYTES NFR BLD: 7.5 % (ref 4–15)
NEUTROPHILS # BLD AUTO: 2.6 K/UL (ref 1.8–7.7)
NEUTROPHILS # BLD AUTO: 3 K/UL (ref 1.8–7.7)
NEUTROPHILS NFR BLD: 59.5 % (ref 38–73)
NEUTROPHILS NFR BLD: 67.6 % (ref 38–73)
NRBC BLD-RTO: 0 /100 WBC
NRBC BLD-RTO: 0 /100 WBC
PLATELET # BLD AUTO: 74 K/UL (ref 150–450)
PLATELET # BLD AUTO: 87 K/UL (ref 150–450)
PMV BLD AUTO: 10.3 FL (ref 9.2–12.9)
PMV BLD AUTO: 10.9 FL (ref 9.2–12.9)
POTASSIUM SERPL-SCNC: 3.7 MMOL/L (ref 3.5–5.1)
PROT SERPL-MCNC: 6 G/DL (ref 6–8.4)
RBC # BLD AUTO: 3.19 M/UL (ref 4–5.4)
RBC # BLD AUTO: 3.34 M/UL (ref 4–5.4)
SODIUM SERPL-SCNC: 140 MMOL/L (ref 136–145)
WBC # BLD AUTO: 4.38 K/UL (ref 3.9–12.7)
WBC # BLD AUTO: 4.39 K/UL (ref 3.9–12.7)

## 2021-04-20 PROCEDURE — 80053 COMPREHEN METABOLIC PANEL: CPT | Performed by: NURSE PRACTITIONER

## 2021-04-20 PROCEDURE — 85025 COMPLETE CBC W/AUTO DIFF WBC: CPT | Performed by: NURSE PRACTITIONER

## 2021-04-20 PROCEDURE — 99232 PR SUBSEQUENT HOSPITAL CARE,LEVL II: ICD-10-PCS | Mod: ,,, | Performed by: INTERNAL MEDICINE

## 2021-04-20 PROCEDURE — 36415 COLL VENOUS BLD VENIPUNCTURE: CPT | Performed by: INTERNAL MEDICINE

## 2021-04-20 PROCEDURE — 63600175 PHARM REV CODE 636 W HCPCS: Performed by: INTERNAL MEDICINE

## 2021-04-20 PROCEDURE — C9113 INJ PANTOPRAZOLE SODIUM, VIA: HCPCS | Performed by: INTERNAL MEDICINE

## 2021-04-20 PROCEDURE — 11000001 HC ACUTE MED/SURG PRIVATE ROOM

## 2021-04-20 PROCEDURE — 36415 COLL VENOUS BLD VENIPUNCTURE: CPT | Performed by: NURSE PRACTITIONER

## 2021-04-20 PROCEDURE — 99232 SBSQ HOSP IP/OBS MODERATE 35: CPT | Mod: ,,, | Performed by: INTERNAL MEDICINE

## 2021-04-20 PROCEDURE — 86677 HELICOBACTER PYLORI ANTIBODY: CPT | Performed by: INTERNAL MEDICINE

## 2021-04-20 PROCEDURE — 25000003 PHARM REV CODE 250: Performed by: NURSE PRACTITIONER

## 2021-04-20 PROCEDURE — 25000003 PHARM REV CODE 250: Performed by: INTERNAL MEDICINE

## 2021-04-20 RX ORDER — PANTOPRAZOLE SODIUM 40 MG/1
40 TABLET, DELAYED RELEASE ORAL 2 TIMES DAILY
Qty: 180 TABLET | Refills: 0 | Status: ON HOLD | OUTPATIENT
Start: 2021-04-20 | End: 2021-09-02 | Stop reason: SDUPTHER

## 2021-04-20 RX ORDER — SUCRALFATE 1 G/1
1 TABLET ORAL 4 TIMES DAILY
Qty: 32 TABLET | Refills: 0 | Status: SHIPPED | OUTPATIENT
Start: 2021-04-20 | End: 2021-04-28

## 2021-04-20 RX ADMIN — PANTOPRAZOLE SODIUM 8 MG/HR: 40 INJECTION, POWDER, FOR SOLUTION INTRAVENOUS at 09:04

## 2021-04-20 RX ADMIN — PANTOPRAZOLE SODIUM 8 MG/HR: 40 INJECTION, POWDER, FOR SOLUTION INTRAVENOUS at 03:04

## 2021-04-20 RX ADMIN — SUCRALFATE 1 G: 1 TABLET ORAL at 10:04

## 2021-04-20 RX ADMIN — SUCRALFATE 1 G: 1 TABLET ORAL at 09:04

## 2021-04-20 RX ADMIN — LEVOTHYROXINE SODIUM 100 MCG: 0.1 TABLET ORAL at 06:04

## 2021-04-20 RX ADMIN — SUCRALFATE 1 G: 1 TABLET ORAL at 06:04

## 2021-04-20 RX ADMIN — SUCRALFATE 1 G: 1 TABLET ORAL at 05:04

## 2021-04-21 VITALS
OXYGEN SATURATION: 99 % | HEART RATE: 68 BPM | RESPIRATION RATE: 18 BRPM | BODY MASS INDEX: 30.91 KG/M2 | DIASTOLIC BLOOD PRESSURE: 76 MMHG | HEIGHT: 62 IN | SYSTOLIC BLOOD PRESSURE: 176 MMHG | TEMPERATURE: 98 F | WEIGHT: 168 LBS

## 2021-04-21 LAB
ALBUMIN SERPL BCP-MCNC: 3 G/DL (ref 3.5–5.2)
ALP SERPL-CCNC: 60 U/L (ref 55–135)
ALT SERPL W/O P-5'-P-CCNC: 14 U/L (ref 10–44)
ANION GAP SERPL CALC-SCNC: 8 MMOL/L (ref 8–16)
AST SERPL-CCNC: 17 U/L (ref 10–40)
BASOPHILS # BLD AUTO: 0.01 K/UL (ref 0–0.2)
BASOPHILS NFR BLD: 0.2 % (ref 0–1.9)
BILIRUB SERPL-MCNC: 0.4 MG/DL (ref 0.1–1)
BUN SERPL-MCNC: 11 MG/DL (ref 8–23)
CALCIUM SERPL-MCNC: 8.5 MG/DL (ref 8.7–10.5)
CHLORIDE SERPL-SCNC: 108 MMOL/L (ref 95–110)
CO2 SERPL-SCNC: 24 MMOL/L (ref 23–29)
CREAT SERPL-MCNC: 1.1 MG/DL (ref 0.5–1.4)
DIFFERENTIAL METHOD: ABNORMAL
EOSINOPHIL # BLD AUTO: 0.1 K/UL (ref 0–0.5)
EOSINOPHIL NFR BLD: 1.7 % (ref 0–8)
ERYTHROCYTE [DISTWIDTH] IN BLOOD BY AUTOMATED COUNT: 16.5 % (ref 11.5–14.5)
EST. GFR  (AFRICAN AMERICAN): 58 ML/MIN/1.73 M^2
EST. GFR  (NON AFRICAN AMERICAN): 50 ML/MIN/1.73 M^2
GLUCOSE SERPL-MCNC: 100 MG/DL (ref 70–110)
H PYLORI IGG SERPL QL IA: NEGATIVE
HCT VFR BLD AUTO: 28.4 % (ref 37–48.5)
HGB BLD-MCNC: 9.7 G/DL (ref 12–16)
IMM GRANULOCYTES # BLD AUTO: 0.01 K/UL (ref 0–0.04)
IMM GRANULOCYTES NFR BLD AUTO: 0.2 % (ref 0–0.5)
LYMPHOCYTES # BLD AUTO: 1 K/UL (ref 1–4.8)
LYMPHOCYTES NFR BLD: 23.3 % (ref 18–48)
MCH RBC QN AUTO: 29.1 PG (ref 27–31)
MCHC RBC AUTO-ENTMCNC: 34.2 G/DL (ref 32–36)
MCV RBC AUTO: 85 FL (ref 82–98)
MONOCYTES # BLD AUTO: 0.3 K/UL (ref 0.3–1)
MONOCYTES NFR BLD: 7.6 % (ref 4–15)
NEUTROPHILS # BLD AUTO: 2.7 K/UL (ref 1.8–7.7)
NEUTROPHILS NFR BLD: 67 % (ref 38–73)
NRBC BLD-RTO: 0 /100 WBC
PLATELET # BLD AUTO: 117 K/UL (ref 150–450)
PMV BLD AUTO: 10.8 FL (ref 9.2–12.9)
POTASSIUM SERPL-SCNC: 3.6 MMOL/L (ref 3.5–5.1)
PROT SERPL-MCNC: 5.6 G/DL (ref 6–8.4)
RBC # BLD AUTO: 3.33 M/UL (ref 4–5.4)
SODIUM SERPL-SCNC: 140 MMOL/L (ref 136–145)
WBC # BLD AUTO: 4.08 K/UL (ref 3.9–12.7)

## 2021-04-21 PROCEDURE — 85025 COMPLETE CBC W/AUTO DIFF WBC: CPT | Performed by: NURSE PRACTITIONER

## 2021-04-21 PROCEDURE — 25000003 PHARM REV CODE 250: Performed by: NURSE PRACTITIONER

## 2021-04-21 PROCEDURE — C9113 INJ PANTOPRAZOLE SODIUM, VIA: HCPCS | Performed by: INTERNAL MEDICINE

## 2021-04-21 PROCEDURE — 63600175 PHARM REV CODE 636 W HCPCS: Performed by: INTERNAL MEDICINE

## 2021-04-21 PROCEDURE — 80053 COMPREHEN METABOLIC PANEL: CPT | Performed by: NURSE PRACTITIONER

## 2021-04-21 PROCEDURE — 25000003 PHARM REV CODE 250: Performed by: INTERNAL MEDICINE

## 2021-04-21 PROCEDURE — 36415 COLL VENOUS BLD VENIPUNCTURE: CPT | Performed by: NURSE PRACTITIONER

## 2021-04-21 RX ADMIN — SUCRALFATE 1 G: 1 TABLET ORAL at 10:04

## 2021-04-21 RX ADMIN — PANTOPRAZOLE SODIUM 8 MG/HR: 40 INJECTION, POWDER, FOR SOLUTION INTRAVENOUS at 07:04

## 2021-04-21 RX ADMIN — LEVOTHYROXINE SODIUM 100 MCG: 0.1 TABLET ORAL at 05:04

## 2021-04-21 RX ADMIN — SUCRALFATE 1 G: 1 TABLET ORAL at 05:04

## 2021-04-21 RX ADMIN — PANTOPRAZOLE SODIUM 8 MG/HR: 40 INJECTION, POWDER, FOR SOLUTION INTRAVENOUS at 02:04

## 2021-04-22 ENCOUNTER — TELEPHONE (OUTPATIENT)
Dept: MEDSURG UNIT | Facility: HOSPITAL | Age: 71
End: 2021-04-22

## 2021-04-22 ENCOUNTER — OFFICE VISIT (OUTPATIENT)
Dept: FAMILY MEDICINE | Facility: CLINIC | Age: 71
End: 2021-04-22
Payer: MEDICARE

## 2021-04-22 VITALS
TEMPERATURE: 98 F | WEIGHT: 169.06 LBS | OXYGEN SATURATION: 98 % | SYSTOLIC BLOOD PRESSURE: 128 MMHG | HEIGHT: 62 IN | HEART RATE: 84 BPM | DIASTOLIC BLOOD PRESSURE: 70 MMHG | BODY MASS INDEX: 31.11 KG/M2

## 2021-04-22 DIAGNOSIS — K25.9 GASTRIC ULCER, UNSPECIFIED CHRONICITY, UNSPECIFIED WHETHER GASTRIC ULCER HEMORRHAGE OR PERFORATION PRESENT: Primary | ICD-10-CM

## 2021-04-22 PROCEDURE — 1101F PR PT FALLS ASSESS DOC 0-1 FALLS W/OUT INJ PAST YR: ICD-10-PCS | Mod: CPTII,S$GLB,, | Performed by: NURSE PRACTITIONER

## 2021-04-22 PROCEDURE — 99999 PR PBB SHADOW E&M-EST. PATIENT-LVL III: ICD-10-PCS | Mod: PBBFAC,,, | Performed by: NURSE PRACTITIONER

## 2021-04-22 PROCEDURE — 1159F PR MEDICATION LIST DOCUMENTED IN MEDICAL RECORD: ICD-10-PCS | Mod: S$GLB,,, | Performed by: NURSE PRACTITIONER

## 2021-04-22 PROCEDURE — 99214 OFFICE O/P EST MOD 30 MIN: CPT | Mod: S$GLB,,, | Performed by: NURSE PRACTITIONER

## 2021-04-22 PROCEDURE — 99214 PR OFFICE/OUTPT VISIT, EST, LEVL IV, 30-39 MIN: ICD-10-PCS | Mod: S$GLB,,, | Performed by: NURSE PRACTITIONER

## 2021-04-22 PROCEDURE — 3288F FALL RISK ASSESSMENT DOCD: CPT | Mod: CPTII,S$GLB,, | Performed by: NURSE PRACTITIONER

## 2021-04-22 PROCEDURE — 1126F AMNT PAIN NOTED NONE PRSNT: CPT | Mod: S$GLB,,, | Performed by: NURSE PRACTITIONER

## 2021-04-22 PROCEDURE — 3008F BODY MASS INDEX DOCD: CPT | Mod: CPTII,S$GLB,, | Performed by: NURSE PRACTITIONER

## 2021-04-22 PROCEDURE — 3008F PR BODY MASS INDEX (BMI) DOCUMENTED: ICD-10-PCS | Mod: CPTII,S$GLB,, | Performed by: NURSE PRACTITIONER

## 2021-04-22 PROCEDURE — 3288F PR FALLS RISK ASSESSMENT DOCUMENTED: ICD-10-PCS | Mod: CPTII,S$GLB,, | Performed by: NURSE PRACTITIONER

## 2021-04-22 PROCEDURE — 1126F PR PAIN SEVERITY QUANTIFIED, NO PAIN PRESENT: ICD-10-PCS | Mod: S$GLB,,, | Performed by: NURSE PRACTITIONER

## 2021-04-22 PROCEDURE — 99999 PR PBB SHADOW E&M-EST. PATIENT-LVL III: CPT | Mod: PBBFAC,,, | Performed by: NURSE PRACTITIONER

## 2021-04-22 PROCEDURE — 1159F MED LIST DOCD IN RCRD: CPT | Mod: S$GLB,,, | Performed by: NURSE PRACTITIONER

## 2021-04-22 PROCEDURE — 1101F PT FALLS ASSESS-DOCD LE1/YR: CPT | Mod: CPTII,S$GLB,, | Performed by: NURSE PRACTITIONER

## 2021-04-23 ENCOUNTER — PATIENT OUTREACH (OUTPATIENT)
Dept: ADMINISTRATIVE | Facility: CLINIC | Age: 71
End: 2021-04-23

## 2021-08-30 ENCOUNTER — HOSPITAL ENCOUNTER (INPATIENT)
Facility: HOSPITAL | Age: 71
LOS: 3 days | Discharge: HOME | DRG: 378 | End: 2021-09-02
Attending: EMERGENCY MEDICINE | Admitting: HOSPITALIST
Payer: MEDICARE

## 2021-08-30 DIAGNOSIS — D64.9 ANEMIA, UNSPECIFIED TYPE: ICD-10-CM

## 2021-08-30 DIAGNOSIS — N18.31 STAGE 3A CHRONIC KIDNEY DISEASE: ICD-10-CM

## 2021-08-30 DIAGNOSIS — K27.9 PEPTIC ULCER DISEASE: ICD-10-CM

## 2021-08-30 DIAGNOSIS — K92.2 UGIB (UPPER GASTROINTESTINAL BLEED): Primary | ICD-10-CM

## 2021-08-30 DIAGNOSIS — K92.0 HEMATEMESIS WITH NAUSEA: ICD-10-CM

## 2021-08-30 LAB
ALBUMIN SERPL BCP-MCNC: 4.2 G/DL (ref 3.5–5.2)
ALP SERPL-CCNC: 81 U/L (ref 55–135)
ALT SERPL W/O P-5'-P-CCNC: 21 U/L (ref 10–44)
ANION GAP SERPL CALC-SCNC: 19 MMOL/L (ref 8–16)
AST SERPL-CCNC: 30 U/L (ref 10–40)
BACTERIA #/AREA URNS HPF: ABNORMAL /HPF
BASOPHILS # BLD AUTO: 0.05 K/UL (ref 0–0.2)
BASOPHILS NFR BLD: 0.7 % (ref 0–1.9)
BILIRUB SERPL-MCNC: 0.6 MG/DL (ref 0.1–1)
BILIRUB UR QL STRIP: NEGATIVE
BUN SERPL-MCNC: 27 MG/DL (ref 8–23)
CALCIUM SERPL-MCNC: 9.5 MG/DL (ref 8.7–10.5)
CHLORIDE SERPL-SCNC: 94 MMOL/L (ref 95–110)
CLARITY UR: CLEAR
CO2 SERPL-SCNC: 19 MMOL/L (ref 23–29)
COLOR UR: YELLOW
CREAT SERPL-MCNC: 1.1 MG/DL (ref 0.5–1.4)
DIFFERENTIAL METHOD: ABNORMAL
EOSINOPHIL # BLD AUTO: 0 K/UL (ref 0–0.5)
EOSINOPHIL NFR BLD: 0.3 % (ref 0–8)
ERYTHROCYTE [DISTWIDTH] IN BLOOD BY AUTOMATED COUNT: 16 % (ref 11.5–14.5)
EST. GFR  (AFRICAN AMERICAN): 58 ML/MIN/1.73 M^2
EST. GFR  (NON AFRICAN AMERICAN): 50 ML/MIN/1.73 M^2
GLUCOSE SERPL-MCNC: 80 MG/DL (ref 70–110)
GLUCOSE UR QL STRIP: NEGATIVE
HCT VFR BLD AUTO: 25.7 % (ref 37–48.5)
HGB BLD-MCNC: 9.1 G/DL (ref 12–16)
HGB UR QL STRIP: NEGATIVE
IMM GRANULOCYTES # BLD AUTO: 0.03 K/UL (ref 0–0.04)
IMM GRANULOCYTES NFR BLD AUTO: 0.4 % (ref 0–0.5)
KETONES UR QL STRIP: ABNORMAL
LEUKOCYTE ESTERASE UR QL STRIP: ABNORMAL
LIPASE SERPL-CCNC: 47 U/L (ref 4–60)
LYMPHOCYTES # BLD AUTO: 2.3 K/UL (ref 1–4.8)
LYMPHOCYTES NFR BLD: 30.7 % (ref 18–48)
MCH RBC QN AUTO: 27.8 PG (ref 27–31)
MCHC RBC AUTO-ENTMCNC: 35.4 G/DL (ref 32–36)
MCV RBC AUTO: 79 FL (ref 82–98)
MICROSCOPIC COMMENT: ABNORMAL
MONOCYTES # BLD AUTO: 0.4 K/UL (ref 0.3–1)
MONOCYTES NFR BLD: 5.5 % (ref 4–15)
NEUTROPHILS # BLD AUTO: 4.6 K/UL (ref 1.8–7.7)
NEUTROPHILS NFR BLD: 62.4 % (ref 38–73)
NITRITE UR QL STRIP: NEGATIVE
NRBC BLD-RTO: 0 /100 WBC
PH UR STRIP: 6 [PH] (ref 5–8)
PLATELET # BLD AUTO: 119 K/UL (ref 150–450)
PMV BLD AUTO: 10.7 FL (ref 9.2–12.9)
POTASSIUM SERPL-SCNC: 4.6 MMOL/L (ref 3.5–5.1)
PROT SERPL-MCNC: 7.2 G/DL (ref 6–8.4)
PROT UR QL STRIP: NEGATIVE
RBC # BLD AUTO: 3.27 M/UL (ref 4–5.4)
RBC #/AREA URNS HPF: 5 /HPF (ref 0–4)
SARS-COV-2 RDRP RESP QL NAA+PROBE: NEGATIVE
SODIUM SERPL-SCNC: 132 MMOL/L (ref 136–145)
SP GR UR STRIP: 1.02 (ref 1–1.03)
SQUAMOUS #/AREA URNS HPF: 9 /HPF
URATE CRY URNS QL MICRO: ABNORMAL
URN SPEC COLLECT METH UR: ABNORMAL
UROBILINOGEN UR STRIP-ACNC: NEGATIVE EU/DL
WBC # BLD AUTO: 7.42 K/UL (ref 3.9–12.7)
WBC #/AREA URNS HPF: >100 /HPF (ref 0–5)

## 2021-08-30 PROCEDURE — 83690 ASSAY OF LIPASE: CPT | Performed by: EMERGENCY MEDICINE

## 2021-08-30 PROCEDURE — 96374 THER/PROPH/DIAG INJ IV PUSH: CPT

## 2021-08-30 PROCEDURE — 81000 URINALYSIS NONAUTO W/SCOPE: CPT | Performed by: PHYSICIAN ASSISTANT

## 2021-08-30 PROCEDURE — U0002 COVID-19 LAB TEST NON-CDC: HCPCS | Performed by: EMERGENCY MEDICINE

## 2021-08-30 PROCEDURE — 86900 BLOOD TYPING SEROLOGIC ABO: CPT | Performed by: NURSE PRACTITIONER

## 2021-08-30 PROCEDURE — 63600175 PHARM REV CODE 636 W HCPCS: Performed by: EMERGENCY MEDICINE

## 2021-08-30 PROCEDURE — 87086 URINE CULTURE/COLONY COUNT: CPT | Performed by: PHYSICIAN ASSISTANT

## 2021-08-30 PROCEDURE — 36415 COLL VENOUS BLD VENIPUNCTURE: CPT | Performed by: EMERGENCY MEDICINE

## 2021-08-30 PROCEDURE — 25000003 PHARM REV CODE 250: Performed by: PHYSICIAN ASSISTANT

## 2021-08-30 PROCEDURE — 96361 HYDRATE IV INFUSION ADD-ON: CPT

## 2021-08-30 PROCEDURE — 80053 COMPREHEN METABOLIC PANEL: CPT | Performed by: EMERGENCY MEDICINE

## 2021-08-30 PROCEDURE — 12000002 HC ACUTE/MED SURGE SEMI-PRIVATE ROOM

## 2021-08-30 PROCEDURE — 85025 COMPLETE CBC W/AUTO DIFF WBC: CPT | Performed by: EMERGENCY MEDICINE

## 2021-08-30 PROCEDURE — 99285 EMERGENCY DEPT VISIT HI MDM: CPT | Mod: 25

## 2021-08-30 PROCEDURE — C9113 INJ PANTOPRAZOLE SODIUM, VIA: HCPCS | Performed by: EMERGENCY MEDICINE

## 2021-08-30 RX ORDER — PANTOPRAZOLE SODIUM 40 MG/10ML
80 INJECTION, POWDER, LYOPHILIZED, FOR SOLUTION INTRAVENOUS
Status: COMPLETED | OUTPATIENT
Start: 2021-08-30 | End: 2021-08-30

## 2021-08-30 RX ADMIN — SODIUM CHLORIDE 1000 ML: 0.9 INJECTION, SOLUTION INTRAVENOUS at 10:08

## 2021-08-30 RX ADMIN — PANTOPRAZOLE SODIUM 80 MG: 40 INJECTION, POWDER, FOR SOLUTION INTRAVENOUS at 10:08

## 2021-08-31 ENCOUNTER — ANESTHESIA (OUTPATIENT)
Dept: ENDOSCOPY | Facility: HOSPITAL | Age: 71
DRG: 378 | End: 2021-08-31
Payer: MEDICARE

## 2021-08-31 ENCOUNTER — ANESTHESIA EVENT (OUTPATIENT)
Dept: ENDOSCOPY | Facility: HOSPITAL | Age: 71
DRG: 378 | End: 2021-08-31
Payer: MEDICARE

## 2021-08-31 PROBLEM — N39.0 UTI (URINARY TRACT INFECTION): Status: ACTIVE | Noted: 2021-08-31

## 2021-08-31 PROBLEM — F10.90 ALCOHOL USE: Status: ACTIVE | Noted: 2021-08-31

## 2021-08-31 PROBLEM — Z78.9 ALCOHOL USE: Status: ACTIVE | Noted: 2021-08-31

## 2021-08-31 PROBLEM — N18.30 STAGE 3 CHRONIC KIDNEY DISEASE: Status: ACTIVE | Noted: 2021-08-31

## 2021-08-31 PROBLEM — E87.1 HYPONATREMIA: Status: ACTIVE | Noted: 2021-08-31

## 2021-08-31 LAB
ABO + RH BLD: NORMAL
ANION GAP SERPL CALC-SCNC: 18 MMOL/L (ref 8–16)
BASOPHILS # BLD AUTO: 0.02 K/UL (ref 0–0.2)
BASOPHILS # BLD AUTO: 0.03 K/UL (ref 0–0.2)
BASOPHILS NFR BLD: 0.4 % (ref 0–1.9)
BASOPHILS NFR BLD: 0.5 % (ref 0–1.9)
BASOPHILS NFR BLD: 0.5 % (ref 0–1.9)
BASOPHILS NFR BLD: 0.6 % (ref 0–1.9)
BLD GP AB SCN CELLS X3 SERPL QL: NORMAL
BUN SERPL-MCNC: 25 MG/DL (ref 8–23)
CALCIUM SERPL-MCNC: 8.9 MG/DL (ref 8.7–10.5)
CHLORIDE SERPL-SCNC: 98 MMOL/L (ref 95–110)
CO2 SERPL-SCNC: 20 MMOL/L (ref 23–29)
CREAT SERPL-MCNC: 1.2 MG/DL (ref 0.5–1.4)
DIFFERENTIAL METHOD: ABNORMAL
EOSINOPHIL # BLD AUTO: 0 K/UL (ref 0–0.5)
EOSINOPHIL NFR BLD: 0.6 % (ref 0–8)
EOSINOPHIL NFR BLD: 0.7 % (ref 0–8)
ERYTHROCYTE [DISTWIDTH] IN BLOOD BY AUTOMATED COUNT: 16 % (ref 11.5–14.5)
ERYTHROCYTE [DISTWIDTH] IN BLOOD BY AUTOMATED COUNT: 16.2 % (ref 11.5–14.5)
ERYTHROCYTE [DISTWIDTH] IN BLOOD BY AUTOMATED COUNT: 16.2 % (ref 11.5–14.5)
ERYTHROCYTE [DISTWIDTH] IN BLOOD BY AUTOMATED COUNT: 16.5 % (ref 11.5–14.5)
EST. GFR  (AFRICAN AMERICAN): 52 ML/MIN/1.73 M^2
EST. GFR  (NON AFRICAN AMERICAN): 45 ML/MIN/1.73 M^2
FERRITIN SERPL-MCNC: 96 NG/ML (ref 20–300)
FOLATE SERPL-MCNC: 13.9 NG/ML (ref 4–24)
GLUCOSE SERPL-MCNC: 101 MG/DL (ref 70–110)
HCT VFR BLD AUTO: 23.9 % (ref 37–48.5)
HCT VFR BLD AUTO: 24 % (ref 37–48.5)
HCT VFR BLD AUTO: 24.3 % (ref 37–48.5)
HCT VFR BLD AUTO: 24.3 % (ref 37–48.5)
HGB BLD-MCNC: 8.2 G/DL (ref 12–16)
HGB BLD-MCNC: 8.3 G/DL (ref 12–16)
HGB BLD-MCNC: 8.4 G/DL (ref 12–16)
HGB BLD-MCNC: 8.4 G/DL (ref 12–16)
IMM GRANULOCYTES # BLD AUTO: 0.01 K/UL (ref 0–0.04)
IMM GRANULOCYTES # BLD AUTO: 0.02 K/UL (ref 0–0.04)
IMM GRANULOCYTES NFR BLD AUTO: 0.2 % (ref 0–0.5)
IMM GRANULOCYTES NFR BLD AUTO: 0.4 % (ref 0–0.5)
LYMPHOCYTES # BLD AUTO: 1 K/UL (ref 1–4.8)
LYMPHOCYTES # BLD AUTO: 1.3 K/UL (ref 1–4.8)
LYMPHOCYTES # BLD AUTO: 1.3 K/UL (ref 1–4.8)
LYMPHOCYTES # BLD AUTO: 1.7 K/UL (ref 1–4.8)
LYMPHOCYTES NFR BLD: 19.8 % (ref 18–48)
LYMPHOCYTES NFR BLD: 23.6 % (ref 18–48)
LYMPHOCYTES NFR BLD: 24.4 % (ref 18–48)
LYMPHOCYTES NFR BLD: 25.9 % (ref 18–48)
MAGNESIUM SERPL-MCNC: 2 MG/DL (ref 1.6–2.6)
MCH RBC QN AUTO: 27.4 PG (ref 27–31)
MCH RBC QN AUTO: 27.7 PG (ref 27–31)
MCH RBC QN AUTO: 27.8 PG (ref 27–31)
MCH RBC QN AUTO: 27.8 PG (ref 27–31)
MCHC RBC AUTO-ENTMCNC: 34.2 G/DL (ref 32–36)
MCHC RBC AUTO-ENTMCNC: 34.3 G/DL (ref 32–36)
MCHC RBC AUTO-ENTMCNC: 34.6 G/DL (ref 32–36)
MCHC RBC AUTO-ENTMCNC: 35 G/DL (ref 32–36)
MCV RBC AUTO: 80 FL (ref 82–98)
MCV RBC AUTO: 81 FL (ref 82–98)
MONOCYTES # BLD AUTO: 0.3 K/UL (ref 0.3–1)
MONOCYTES # BLD AUTO: 0.4 K/UL (ref 0.3–1)
MONOCYTES NFR BLD: 6 % (ref 4–15)
MONOCYTES NFR BLD: 6.3 % (ref 4–15)
MONOCYTES NFR BLD: 7.6 % (ref 4–15)
MONOCYTES NFR BLD: 7.6 % (ref 4–15)
NEUTROPHILS # BLD AUTO: 3.5 K/UL (ref 1.8–7.7)
NEUTROPHILS # BLD AUTO: 3.6 K/UL (ref 1.8–7.7)
NEUTROPHILS # BLD AUTO: 3.7 K/UL (ref 1.8–7.7)
NEUTROPHILS # BLD AUTO: 4.3 K/UL (ref 1.8–7.7)
NEUTROPHILS NFR BLD: 66.6 % (ref 38–73)
NEUTROPHILS NFR BLD: 66.8 % (ref 38–73)
NEUTROPHILS NFR BLD: 67.2 % (ref 38–73)
NEUTROPHILS NFR BLD: 72.3 % (ref 38–73)
NRBC BLD-RTO: 0 /100 WBC
PLATELET # BLD AUTO: 109 K/UL (ref 150–450)
PLATELET # BLD AUTO: 81 K/UL (ref 150–450)
PLATELET # BLD AUTO: 89 K/UL (ref 150–450)
PLATELET # BLD AUTO: 94 K/UL (ref 150–450)
PMV BLD AUTO: 10.7 FL (ref 9.2–12.9)
PMV BLD AUTO: 11.6 FL (ref 9.2–12.9)
PMV BLD AUTO: 11.8 FL (ref 9.2–12.9)
PMV BLD AUTO: 12.2 FL (ref 9.2–12.9)
POTASSIUM SERPL-SCNC: 4.4 MMOL/L (ref 3.5–5.1)
RBC # BLD AUTO: 2.99 M/UL (ref 4–5.4)
RBC # BLD AUTO: 2.99 M/UL (ref 4–5.4)
RBC # BLD AUTO: 3.02 M/UL (ref 4–5.4)
RBC # BLD AUTO: 3.03 M/UL (ref 4–5.4)
SODIUM SERPL-SCNC: 136 MMOL/L (ref 136–145)
T4 FREE SERPL-MCNC: 0.54 NG/DL (ref 0.71–1.51)
TSH SERPL DL<=0.005 MIU/L-ACNC: 9.57 UIU/ML (ref 0.4–4)
WBC # BLD AUTO: 4.94 K/UL (ref 3.9–12.7)
WBC # BLD AUTO: 5.29 K/UL (ref 3.9–12.7)
WBC # BLD AUTO: 5.51 K/UL (ref 3.9–12.7)
WBC # BLD AUTO: 6.48 K/UL (ref 3.9–12.7)

## 2021-08-31 PROCEDURE — 25000003 PHARM REV CODE 250: Performed by: HOSPITALIST

## 2021-08-31 PROCEDURE — 85025 COMPLETE CBC W/AUTO DIFF WBC: CPT | Mod: 91 | Performed by: NURSE PRACTITIONER

## 2021-08-31 PROCEDURE — D9220A PRA ANESTHESIA: ICD-10-PCS | Mod: ,,, | Performed by: ANESTHESIOLOGY

## 2021-08-31 PROCEDURE — 99284 EMERGENCY DEPT VISIT MOD MDM: CPT | Mod: ,,, | Performed by: INTERNAL MEDICINE

## 2021-08-31 PROCEDURE — 37000008 HC ANESTHESIA 1ST 15 MINUTES: Performed by: INTERNAL MEDICINE

## 2021-08-31 PROCEDURE — 43236 UPPR GI SCOPE W/SUBMUC INJ: CPT | Performed by: INTERNAL MEDICINE

## 2021-08-31 PROCEDURE — 63600175 PHARM REV CODE 636 W HCPCS: Performed by: ANESTHESIOLOGY

## 2021-08-31 PROCEDURE — 63600175 PHARM REV CODE 636 W HCPCS: Performed by: INTERNAL MEDICINE

## 2021-08-31 PROCEDURE — 43255 EGD CONTROL BLEEDING ANY: CPT | Performed by: INTERNAL MEDICINE

## 2021-08-31 PROCEDURE — 25000003 PHARM REV CODE 250: Performed by: NURSE PRACTITIONER

## 2021-08-31 PROCEDURE — 83735 ASSAY OF MAGNESIUM: CPT | Performed by: NURSE PRACTITIONER

## 2021-08-31 PROCEDURE — 27201038 HC PROBE, BI-POLAR: Performed by: INTERNAL MEDICINE

## 2021-08-31 PROCEDURE — 36415 COLL VENOUS BLD VENIPUNCTURE: CPT | Performed by: NURSE PRACTITIONER

## 2021-08-31 PROCEDURE — 84466 ASSAY OF TRANSFERRIN: CPT | Performed by: NURSE PRACTITIONER

## 2021-08-31 PROCEDURE — 63600175 PHARM REV CODE 636 W HCPCS: Performed by: HOSPITALIST

## 2021-08-31 PROCEDURE — 84443 ASSAY THYROID STIM HORMONE: CPT | Performed by: NURSE PRACTITIONER

## 2021-08-31 PROCEDURE — 12000002 HC ACUTE/MED SURGE SEMI-PRIVATE ROOM

## 2021-08-31 PROCEDURE — 99284 PR EMERGENCY DEPT VISIT,LEVEL IV: ICD-10-PCS | Mod: ,,, | Performed by: INTERNAL MEDICINE

## 2021-08-31 PROCEDURE — 37000009 HC ANESTHESIA EA ADD 15 MINS: Performed by: INTERNAL MEDICINE

## 2021-08-31 PROCEDURE — 25000003 PHARM REV CODE 250: Performed by: INTERNAL MEDICINE

## 2021-08-31 PROCEDURE — D9220A PRA ANESTHESIA: Mod: ,,, | Performed by: ANESTHESIOLOGY

## 2021-08-31 PROCEDURE — 63600175 PHARM REV CODE 636 W HCPCS: Performed by: NURSE PRACTITIONER

## 2021-08-31 PROCEDURE — 25000003 PHARM REV CODE 250: Performed by: ANESTHESIOLOGY

## 2021-08-31 PROCEDURE — C9113 INJ PANTOPRAZOLE SODIUM, VIA: HCPCS | Performed by: INTERNAL MEDICINE

## 2021-08-31 PROCEDURE — 80048 BASIC METABOLIC PNL TOTAL CA: CPT | Performed by: NURSE PRACTITIONER

## 2021-08-31 PROCEDURE — 82746 ASSAY OF FOLIC ACID SERUM: CPT | Performed by: NURSE PRACTITIONER

## 2021-08-31 PROCEDURE — 82728 ASSAY OF FERRITIN: CPT | Performed by: NURSE PRACTITIONER

## 2021-08-31 PROCEDURE — 84439 ASSAY OF FREE THYROXINE: CPT | Performed by: NURSE PRACTITIONER

## 2021-08-31 PROCEDURE — C9113 INJ PANTOPRAZOLE SODIUM, VIA: HCPCS | Performed by: HOSPITALIST

## 2021-08-31 RX ORDER — SODIUM CHLORIDE 9 MG/ML
INJECTION, SOLUTION INTRAVENOUS CONTINUOUS
Status: DISCONTINUED | OUTPATIENT
Start: 2021-08-31 | End: 2021-09-01

## 2021-08-31 RX ORDER — ACETAMINOPHEN 325 MG/1
650 TABLET ORAL EVERY 6 HOURS PRN
Status: DISCONTINUED | OUTPATIENT
Start: 2021-08-31 | End: 2021-09-02 | Stop reason: HOSPADM

## 2021-08-31 RX ORDER — PANTOPRAZOLE SODIUM 40 MG/10ML
40 INJECTION, POWDER, LYOPHILIZED, FOR SOLUTION INTRAVENOUS 2 TIMES DAILY
Status: DISCONTINUED | OUTPATIENT
Start: 2021-08-31 | End: 2021-08-31

## 2021-08-31 RX ORDER — SODIUM CHLORIDE 9 MG/ML
INJECTION, SOLUTION INTRAVENOUS CONTINUOUS
Status: DISCONTINUED | OUTPATIENT
Start: 2021-08-31 | End: 2021-09-02 | Stop reason: HOSPADM

## 2021-08-31 RX ORDER — HYDROXYZINE HYDROCHLORIDE 25 MG/1
25 TABLET, FILM COATED ORAL 3 TIMES DAILY PRN
Status: DISCONTINUED | OUTPATIENT
Start: 2021-08-31 | End: 2021-09-02 | Stop reason: HOSPADM

## 2021-08-31 RX ORDER — LIDOCAINE HYDROCHLORIDE 20 MG/ML
INJECTION INTRAVENOUS
Status: DISCONTINUED | OUTPATIENT
Start: 2021-08-31 | End: 2021-08-31

## 2021-08-31 RX ORDER — PROPOFOL 10 MG/ML
VIAL (ML) INTRAVENOUS
Status: DISCONTINUED | OUTPATIENT
Start: 2021-08-31 | End: 2021-08-31

## 2021-08-31 RX ORDER — ONDANSETRON 2 MG/ML
4 INJECTION INTRAMUSCULAR; INTRAVENOUS EVERY 8 HOURS PRN
Status: DISCONTINUED | OUTPATIENT
Start: 2021-08-31 | End: 2021-09-02 | Stop reason: HOSPADM

## 2021-08-31 RX ORDER — SUCRALFATE 1 G/10ML
1 SUSPENSION ORAL EVERY 6 HOURS
Status: DISCONTINUED | OUTPATIENT
Start: 2021-08-31 | End: 2021-09-02 | Stop reason: HOSPADM

## 2021-08-31 RX ORDER — LEVOTHYROXINE SODIUM 100 UG/1
100 TABLET ORAL
Status: DISCONTINUED | OUTPATIENT
Start: 2021-08-31 | End: 2021-08-31

## 2021-08-31 RX ADMIN — PANTOPRAZOLE SODIUM 8 MG/HR: 40 INJECTION, POWDER, FOR SOLUTION INTRAVENOUS at 02:08

## 2021-08-31 RX ADMIN — SUCRALFATE 1 G: 1 SUSPENSION ORAL at 07:08

## 2021-08-31 RX ADMIN — LEVOTHYROXINE SODIUM 100 MCG: 0.1 TABLET ORAL at 07:08

## 2021-08-31 RX ADMIN — ONDANSETRON 4 MG: 2 INJECTION INTRAMUSCULAR; INTRAVENOUS at 05:08

## 2021-08-31 RX ADMIN — PROPOFOL 50 MG: 10 INJECTION, EMULSION INTRAVENOUS at 11:08

## 2021-08-31 RX ADMIN — CEFTRIAXONE 1 G: 1 INJECTION, SOLUTION INTRAVENOUS at 04:08

## 2021-08-31 RX ADMIN — THERA TABS 1 TABLET: TAB at 09:08

## 2021-08-31 RX ADMIN — SUCRALFATE 1 G: 1 SUSPENSION ORAL at 02:08

## 2021-08-31 RX ADMIN — LIDOCAINE HYDROCHLORIDE 100 MG: 20 INJECTION, SOLUTION INTRAVENOUS at 11:08

## 2021-08-31 RX ADMIN — SODIUM CHLORIDE: 0.9 INJECTION, SOLUTION INTRAVENOUS at 04:08

## 2021-08-31 RX ADMIN — PROPOFOL 100 MG: 10 INJECTION, EMULSION INTRAVENOUS at 11:08

## 2021-08-31 RX ADMIN — PANTOPRAZOLE SODIUM 8 MG/HR: 40 INJECTION, POWDER, FOR SOLUTION INTRAVENOUS at 07:08

## 2021-08-31 RX ADMIN — ONDANSETRON 4 MG: 2 INJECTION INTRAMUSCULAR; INTRAVENOUS at 11:08

## 2021-08-31 RX ADMIN — HYDROXYZINE HYDROCHLORIDE 25 MG: 25 TABLET, FILM COATED ORAL at 09:08

## 2021-08-31 RX ADMIN — PANTOPRAZOLE SODIUM 40 MG: 40 INJECTION, POWDER, FOR SOLUTION INTRAVENOUS at 09:08

## 2021-09-01 LAB
ANION GAP SERPL CALC-SCNC: 13 MMOL/L (ref 8–16)
BASOPHILS # BLD AUTO: 0.03 K/UL (ref 0–0.2)
BASOPHILS NFR BLD: 0.7 % (ref 0–1.9)
BUN SERPL-MCNC: 13 MG/DL (ref 8–23)
CALCIUM SERPL-MCNC: 9 MG/DL (ref 8.7–10.5)
CHLORIDE SERPL-SCNC: 103 MMOL/L (ref 95–110)
CO2 SERPL-SCNC: 23 MMOL/L (ref 23–29)
CREAT SERPL-MCNC: 1.2 MG/DL (ref 0.5–1.4)
DIFFERENTIAL METHOD: ABNORMAL
EOSINOPHIL # BLD AUTO: 0.1 K/UL (ref 0–0.5)
EOSINOPHIL NFR BLD: 2.3 % (ref 0–8)
ERYTHROCYTE [DISTWIDTH] IN BLOOD BY AUTOMATED COUNT: 16.4 % (ref 11.5–14.5)
EST. GFR  (AFRICAN AMERICAN): 52 ML/MIN/1.73 M^2
EST. GFR  (NON AFRICAN AMERICAN): 45 ML/MIN/1.73 M^2
GLUCOSE SERPL-MCNC: 96 MG/DL (ref 70–110)
HCT VFR BLD AUTO: 25.3 % (ref 37–48.5)
HGB BLD-MCNC: 8.6 G/DL (ref 12–16)
IMM GRANULOCYTES # BLD AUTO: 0.02 K/UL (ref 0–0.04)
IMM GRANULOCYTES NFR BLD AUTO: 0.5 % (ref 0–0.5)
IRON SERPL-MCNC: 139 UG/DL (ref 30–160)
LYMPHOCYTES # BLD AUTO: 1.4 K/UL (ref 1–4.8)
LYMPHOCYTES NFR BLD: 32.2 % (ref 18–48)
MAGNESIUM SERPL-MCNC: 2 MG/DL (ref 1.6–2.6)
MCH RBC QN AUTO: 27.8 PG (ref 27–31)
MCHC RBC AUTO-ENTMCNC: 34 G/DL (ref 32–36)
MCV RBC AUTO: 82 FL (ref 82–98)
MONOCYTES # BLD AUTO: 0.3 K/UL (ref 0.3–1)
MONOCYTES NFR BLD: 6.1 % (ref 4–15)
NEUTROPHILS # BLD AUTO: 2.5 K/UL (ref 1.8–7.7)
NEUTROPHILS NFR BLD: 58.2 % (ref 38–73)
NRBC BLD-RTO: 0 /100 WBC
PLATELET # BLD AUTO: 86 K/UL (ref 150–450)
PMV BLD AUTO: 11.7 FL (ref 9.2–12.9)
POTASSIUM SERPL-SCNC: 4 MMOL/L (ref 3.5–5.1)
RBC # BLD AUTO: 3.09 M/UL (ref 4–5.4)
SATURATED IRON: 44 % (ref 20–50)
SODIUM SERPL-SCNC: 139 MMOL/L (ref 136–145)
T4 FREE SERPL-MCNC: 0.73 NG/DL (ref 0.71–1.51)
TOTAL IRON BINDING CAPACITY: 318 UG/DL (ref 250–450)
TRANSFERRIN SERPL-MCNC: 215 MG/DL (ref 200–375)
TSH SERPL DL<=0.005 MIU/L-ACNC: 17.22 UIU/ML (ref 0.4–4)
WBC # BLD AUTO: 4.29 K/UL (ref 3.9–12.7)

## 2021-09-01 PROCEDURE — 84443 ASSAY THYROID STIM HORMONE: CPT | Performed by: FAMILY MEDICINE

## 2021-09-01 PROCEDURE — 25000003 PHARM REV CODE 250: Performed by: INTERNAL MEDICINE

## 2021-09-01 PROCEDURE — 25000003 PHARM REV CODE 250: Performed by: HOSPITALIST

## 2021-09-01 PROCEDURE — 83735 ASSAY OF MAGNESIUM: CPT | Performed by: NURSE PRACTITIONER

## 2021-09-01 PROCEDURE — 12000002 HC ACUTE/MED SURGE SEMI-PRIVATE ROOM

## 2021-09-01 PROCEDURE — 84439 ASSAY OF FREE THYROXINE: CPT | Performed by: FAMILY MEDICINE

## 2021-09-01 PROCEDURE — 36415 COLL VENOUS BLD VENIPUNCTURE: CPT | Performed by: NURSE PRACTITIONER

## 2021-09-01 PROCEDURE — 80048 BASIC METABOLIC PNL TOTAL CA: CPT | Performed by: NURSE PRACTITIONER

## 2021-09-01 PROCEDURE — 63600175 PHARM REV CODE 636 W HCPCS: Performed by: HOSPITALIST

## 2021-09-01 PROCEDURE — 25000003 PHARM REV CODE 250: Performed by: NURSE PRACTITIONER

## 2021-09-01 PROCEDURE — C9113 INJ PANTOPRAZOLE SODIUM, VIA: HCPCS | Performed by: HOSPITALIST

## 2021-09-01 PROCEDURE — 36415 COLL VENOUS BLD VENIPUNCTURE: CPT | Performed by: FAMILY MEDICINE

## 2021-09-01 PROCEDURE — 85025 COMPLETE CBC W/AUTO DIFF WBC: CPT | Performed by: NURSE PRACTITIONER

## 2021-09-01 PROCEDURE — 99232 PR SUBSEQUENT HOSPITAL CARE,LEVL II: ICD-10-PCS | Mod: ,,, | Performed by: INTERNAL MEDICINE

## 2021-09-01 PROCEDURE — 99232 SBSQ HOSP IP/OBS MODERATE 35: CPT | Mod: ,,, | Performed by: INTERNAL MEDICINE

## 2021-09-01 PROCEDURE — 63600175 PHARM REV CODE 636 W HCPCS: Performed by: NURSE PRACTITIONER

## 2021-09-01 RX ADMIN — PANTOPRAZOLE SODIUM 8 MG/HR: 40 INJECTION, POWDER, FOR SOLUTION INTRAVENOUS at 09:09

## 2021-09-01 RX ADMIN — SUCRALFATE 1 G: 1 SUSPENSION ORAL at 05:09

## 2021-09-01 RX ADMIN — SODIUM CHLORIDE: 0.9 INJECTION, SOLUTION INTRAVENOUS at 02:09

## 2021-09-01 RX ADMIN — SUCRALFATE 1 G: 1 SUSPENSION ORAL at 11:09

## 2021-09-01 RX ADMIN — SUCRALFATE 1 G: 1 SUSPENSION ORAL at 04:09

## 2021-09-01 RX ADMIN — THERA TABS 1 TABLET: TAB at 09:09

## 2021-09-01 RX ADMIN — PANTOPRAZOLE SODIUM 8 MG/HR: 40 INJECTION, POWDER, FOR SOLUTION INTRAVENOUS at 04:09

## 2021-09-01 RX ADMIN — CEFTRIAXONE 1 G: 1 INJECTION, SOLUTION INTRAVENOUS at 05:09

## 2021-09-01 RX ADMIN — SUCRALFATE 1 G: 1 SUSPENSION ORAL at 02:09

## 2021-09-01 RX ADMIN — LEVOTHYROXINE SODIUM 125 MCG: 0.03 TABLET ORAL at 05:09

## 2021-09-01 RX ADMIN — PANTOPRAZOLE SODIUM 8 MG/HR: 40 INJECTION, POWDER, FOR SOLUTION INTRAVENOUS at 12:09

## 2021-09-02 ENCOUNTER — TELEPHONE (OUTPATIENT)
Dept: GASTROENTEROLOGY | Facility: CLINIC | Age: 71
End: 2021-09-02

## 2021-09-02 VITALS
RESPIRATION RATE: 18 BRPM | HEIGHT: 62 IN | TEMPERATURE: 99 F | HEART RATE: 83 BPM | SYSTOLIC BLOOD PRESSURE: 120 MMHG | OXYGEN SATURATION: 100 % | BODY MASS INDEX: 28.52 KG/M2 | DIASTOLIC BLOOD PRESSURE: 67 MMHG | WEIGHT: 155 LBS

## 2021-09-02 LAB
ANION GAP SERPL CALC-SCNC: 10 MMOL/L (ref 8–16)
BACTERIA UR CULT: NORMAL
BACTERIA UR CULT: NORMAL
BASOPHILS # BLD AUTO: 0.02 K/UL (ref 0–0.2)
BASOPHILS NFR BLD: 0.6 % (ref 0–1.9)
BUN SERPL-MCNC: 11 MG/DL (ref 8–23)
CALCIUM SERPL-MCNC: 8.8 MG/DL (ref 8.7–10.5)
CHLORIDE SERPL-SCNC: 106 MMOL/L (ref 95–110)
CO2 SERPL-SCNC: 23 MMOL/L (ref 23–29)
CREAT SERPL-MCNC: 1.3 MG/DL (ref 0.5–1.4)
DIFFERENTIAL METHOD: ABNORMAL
EOSINOPHIL # BLD AUTO: 0.1 K/UL (ref 0–0.5)
EOSINOPHIL NFR BLD: 2.1 % (ref 0–8)
ERYTHROCYTE [DISTWIDTH] IN BLOOD BY AUTOMATED COUNT: 16.7 % (ref 11.5–14.5)
EST. GFR  (AFRICAN AMERICAN): 47 ML/MIN/1.73 M^2
EST. GFR  (NON AFRICAN AMERICAN): 41 ML/MIN/1.73 M^2
GLUCOSE SERPL-MCNC: 104 MG/DL (ref 70–110)
HCT VFR BLD AUTO: 23.5 % (ref 37–48.5)
HGB BLD-MCNC: 7.9 G/DL (ref 12–16)
IMM GRANULOCYTES # BLD AUTO: 0.01 K/UL (ref 0–0.04)
IMM GRANULOCYTES NFR BLD AUTO: 0.3 % (ref 0–0.5)
LYMPHOCYTES # BLD AUTO: 1.1 K/UL (ref 1–4.8)
LYMPHOCYTES NFR BLD: 31.7 % (ref 18–48)
MAGNESIUM SERPL-MCNC: 1.8 MG/DL (ref 1.6–2.6)
MCH RBC QN AUTO: 27.9 PG (ref 27–31)
MCHC RBC AUTO-ENTMCNC: 33.6 G/DL (ref 32–36)
MCV RBC AUTO: 83 FL (ref 82–98)
MONOCYTES # BLD AUTO: 0.2 K/UL (ref 0.3–1)
MONOCYTES NFR BLD: 6.5 % (ref 4–15)
NEUTROPHILS # BLD AUTO: 2 K/UL (ref 1.8–7.7)
NEUTROPHILS NFR BLD: 58.8 % (ref 38–73)
NRBC BLD-RTO: 0 /100 WBC
PLATELET # BLD AUTO: 89 K/UL (ref 150–450)
PMV BLD AUTO: 12 FL (ref 9.2–12.9)
POCT GLUCOSE: 110 MG/DL (ref 70–110)
POTASSIUM SERPL-SCNC: 3.8 MMOL/L (ref 3.5–5.1)
RBC # BLD AUTO: 2.83 M/UL (ref 4–5.4)
SODIUM SERPL-SCNC: 139 MMOL/L (ref 136–145)
WBC # BLD AUTO: 3.41 K/UL (ref 3.9–12.7)

## 2021-09-02 PROCEDURE — 63600175 PHARM REV CODE 636 W HCPCS: Performed by: NURSE PRACTITIONER

## 2021-09-02 PROCEDURE — 36415 COLL VENOUS BLD VENIPUNCTURE: CPT | Performed by: NURSE PRACTITIONER

## 2021-09-02 PROCEDURE — 99232 PR SUBSEQUENT HOSPITAL CARE,LEVL II: ICD-10-PCS | Mod: ,,, | Performed by: INTERNAL MEDICINE

## 2021-09-02 PROCEDURE — 25000003 PHARM REV CODE 250: Performed by: FAMILY MEDICINE

## 2021-09-02 PROCEDURE — 25000003 PHARM REV CODE 250: Performed by: INTERNAL MEDICINE

## 2021-09-02 PROCEDURE — 85025 COMPLETE CBC W/AUTO DIFF WBC: CPT | Performed by: NURSE PRACTITIONER

## 2021-09-02 PROCEDURE — 80048 BASIC METABOLIC PNL TOTAL CA: CPT | Performed by: NURSE PRACTITIONER

## 2021-09-02 PROCEDURE — C9113 INJ PANTOPRAZOLE SODIUM, VIA: HCPCS | Performed by: HOSPITALIST

## 2021-09-02 PROCEDURE — 63600175 PHARM REV CODE 636 W HCPCS: Performed by: HOSPITALIST

## 2021-09-02 PROCEDURE — 25000003 PHARM REV CODE 250: Performed by: NURSE PRACTITIONER

## 2021-09-02 PROCEDURE — 99232 SBSQ HOSP IP/OBS MODERATE 35: CPT | Mod: ,,, | Performed by: INTERNAL MEDICINE

## 2021-09-02 PROCEDURE — 83735 ASSAY OF MAGNESIUM: CPT | Performed by: NURSE PRACTITIONER

## 2021-09-02 PROCEDURE — 25000003 PHARM REV CODE 250: Performed by: HOSPITALIST

## 2021-09-02 RX ORDER — SUCRALFATE 1 G/10ML
1 SUSPENSION ORAL
Qty: 400 ML | Refills: 0 | Status: SHIPPED | OUTPATIENT
Start: 2021-09-02 | End: 2021-09-12

## 2021-09-02 RX ORDER — PANTOPRAZOLE SODIUM 40 MG/1
40 TABLET, DELAYED RELEASE ORAL 2 TIMES DAILY
Status: DISCONTINUED | OUTPATIENT
Start: 2021-09-02 | End: 2021-09-02 | Stop reason: HOSPADM

## 2021-09-02 RX ORDER — PANTOPRAZOLE SODIUM 40 MG/1
40 TABLET, DELAYED RELEASE ORAL 2 TIMES DAILY
Qty: 60 TABLET | Refills: 0 | Status: SHIPPED | OUTPATIENT
Start: 2021-09-02 | End: 2021-10-02

## 2021-09-02 RX ORDER — ONDANSETRON 2 MG/ML
INJECTION INTRAMUSCULAR; INTRAVENOUS
Status: DISPENSED
Start: 2021-09-02 | End: 2021-09-03

## 2021-09-02 RX ADMIN — PANTOPRAZOLE SODIUM 40 MG: 40 TABLET, DELAYED RELEASE ORAL at 11:09

## 2021-09-02 RX ADMIN — THERA TABS 1 TABLET: TAB at 08:09

## 2021-09-02 RX ADMIN — CEFTRIAXONE 1 G: 1 INJECTION, SOLUTION INTRAVENOUS at 05:09

## 2021-09-02 RX ADMIN — LEVOTHYROXINE SODIUM 125 MCG: 0.03 TABLET ORAL at 05:09

## 2021-09-02 RX ADMIN — SUCRALFATE 1 G: 1 SUSPENSION ORAL at 06:09

## 2021-09-02 RX ADMIN — SUCRALFATE 1 G: 1 SUSPENSION ORAL at 12:09

## 2021-09-02 RX ADMIN — PANTOPRAZOLE SODIUM 8 MG/HR: 40 INJECTION, POWDER, FOR SOLUTION INTRAVENOUS at 01:09

## 2021-09-02 RX ADMIN — PANTOPRAZOLE SODIUM 8 MG/HR: 40 INJECTION, POWDER, FOR SOLUTION INTRAVENOUS at 06:09

## 2021-09-02 RX ADMIN — SUCRALFATE 1 G: 1 SUSPENSION ORAL at 11:09

## 2021-09-07 ENCOUNTER — TELEPHONE (OUTPATIENT)
Dept: MEDSURG UNIT | Facility: HOSPITAL | Age: 71
End: 2021-09-07

## 2021-09-27 RX ORDER — LEVOTHYROXINE SODIUM 100 UG/1
100 TABLET ORAL
Qty: 90 TABLET | Refills: 1 | Status: SHIPPED | OUTPATIENT
Start: 2021-09-27 | End: 2022-05-23 | Stop reason: SDUPTHER

## 2021-09-27 RX ORDER — HYDROXYZINE HYDROCHLORIDE 25 MG/1
25 TABLET, FILM COATED ORAL 3 TIMES DAILY PRN
Qty: 120 TABLET | Refills: 1 | Status: SHIPPED | OUTPATIENT
Start: 2021-09-27 | End: 2021-12-28 | Stop reason: SDUPTHER

## 2021-12-28 RX ORDER — HYDROXYZINE HYDROCHLORIDE 25 MG/1
25 TABLET, FILM COATED ORAL 3 TIMES DAILY PRN
Qty: 120 TABLET | Refills: 1 | Status: SHIPPED | OUTPATIENT
Start: 2021-12-28 | End: 2023-05-19

## 2022-05-23 NOTE — TELEPHONE ENCOUNTER
----- Message from Steffi Hagen sent at 5/23/2022 11:29 AM CDT -----  Type:  RX Refill Request    Who Called:  Nicolasa  Refill or New Rx:  refill  RX Name and Strength:  levothyroxine (SYNTHROID) 100 MCG tablet  How is the patient currently taking it? (ex. 1XDay):  once in the morning  Is this a 30 day or 90 day RX:  90  Preferred Pharmacy with phone number:     [email or web address] telephone# [phone] option 1, fax# [phone]    Local or Mail Order:  mail order  Ordering Provider:  Previously Dr Luis Manuel Pimentel Call Back Number:  [phone]  Additional Information:  Thank you     Area M Indication Text: Tumors in this location are included in Area M (cheek, forehead, scalp, neck, jawline and pretibial skin).  Mohs surgery is indicated for tumors in these anatomic locations.

## 2022-05-26 RX ORDER — LEVOTHYROXINE SODIUM 100 UG/1
100 TABLET ORAL
Qty: 90 TABLET | Refills: 1 | Status: SHIPPED | OUTPATIENT
Start: 2022-05-26 | End: 2023-09-14 | Stop reason: SDUPTHER

## 2022-06-06 ENCOUNTER — TELEPHONE (OUTPATIENT)
Dept: FAMILY MEDICINE | Facility: CLINIC | Age: 72
End: 2022-06-06

## 2022-06-06 NOTE — TELEPHONE ENCOUNTER
Attempted to contact patient in reference to her appointment today to advise that Bettie is out of the office today, he is sick.

## 2023-05-19 RX ORDER — HYDROXYZINE HYDROCHLORIDE 25 MG/1
TABLET, FILM COATED ORAL
Qty: 120 TABLET | Refills: 1 | Status: SHIPPED | OUTPATIENT
Start: 2023-05-19 | End: 2023-09-14 | Stop reason: SDUPTHER

## 2023-09-14 ENCOUNTER — HOSPITAL ENCOUNTER (OUTPATIENT)
Dept: RADIOLOGY | Facility: CLINIC | Age: 73
Discharge: HOME | End: 2023-09-14
Attending: NURSE PRACTITIONER
Payer: MEDICARE

## 2023-09-14 ENCOUNTER — OFFICE VISIT (OUTPATIENT)
Dept: FAMILY MEDICINE | Facility: CLINIC | Age: 73
End: 2023-09-14
Payer: MEDICARE

## 2023-09-14 VITALS
WEIGHT: 170.19 LBS | TEMPERATURE: 99 F | HEIGHT: 62 IN | SYSTOLIC BLOOD PRESSURE: 136 MMHG | DIASTOLIC BLOOD PRESSURE: 82 MMHG | HEART RATE: 83 BPM | OXYGEN SATURATION: 100 % | BODY MASS INDEX: 31.32 KG/M2

## 2023-09-14 DIAGNOSIS — Z78.0 POST-MENOPAUSAL: ICD-10-CM

## 2023-09-14 DIAGNOSIS — Z00.00 ROUTINE MEDICAL EXAM: Primary | ICD-10-CM

## 2023-09-14 DIAGNOSIS — Z12.11 COLON CANCER SCREENING: ICD-10-CM

## 2023-09-14 DIAGNOSIS — Z12.31 ENCOUNTER FOR SCREENING MAMMOGRAM FOR BREAST CANCER: ICD-10-CM

## 2023-09-14 DIAGNOSIS — E03.9 HYPOTHYROIDISM, UNSPECIFIED TYPE: ICD-10-CM

## 2023-09-14 DIAGNOSIS — N18.31 STAGE 3A CHRONIC KIDNEY DISEASE: ICD-10-CM

## 2023-09-14 DIAGNOSIS — B35.1 FUNGAL NAIL INFECTION: ICD-10-CM

## 2023-09-14 PROCEDURE — 99214 OFFICE O/P EST MOD 30 MIN: CPT | Mod: S$GLB,,, | Performed by: NURSE PRACTITIONER

## 2023-09-14 PROCEDURE — 1101F PT FALLS ASSESS-DOCD LE1/YR: CPT | Mod: CPTII,S$GLB,, | Performed by: NURSE PRACTITIONER

## 2023-09-14 PROCEDURE — 99999 PR PBB SHADOW E&M-EST. PATIENT-LVL IV: ICD-10-PCS | Mod: PBBFAC,,, | Performed by: NURSE PRACTITIONER

## 2023-09-14 PROCEDURE — 3079F PR MOST RECENT DIASTOLIC BLOOD PRESSURE 80-89 MM HG: ICD-10-PCS | Mod: CPTII,S$GLB,, | Performed by: NURSE PRACTITIONER

## 2023-09-14 PROCEDURE — 1101F PR PT FALLS ASSESS DOC 0-1 FALLS W/OUT INJ PAST YR: ICD-10-PCS | Mod: CPTII,S$GLB,, | Performed by: NURSE PRACTITIONER

## 2023-09-14 PROCEDURE — 77067 SCR MAMMO BI INCL CAD: CPT | Mod: 26,,, | Performed by: RADIOLOGY

## 2023-09-14 PROCEDURE — 1126F PR PAIN SEVERITY QUANTIFIED, NO PAIN PRESENT: ICD-10-PCS | Mod: CPTII,S$GLB,, | Performed by: NURSE PRACTITIONER

## 2023-09-14 PROCEDURE — 77080 DXA BONE DENSITY AXIAL SKELETON 1 OR MORE SITES: ICD-10-PCS | Mod: 26,,, | Performed by: RADIOLOGY

## 2023-09-14 PROCEDURE — 77067 MAMMO DIGITAL SCREENING BILAT WITH TOMO: ICD-10-PCS | Mod: 26,,, | Performed by: RADIOLOGY

## 2023-09-14 PROCEDURE — 3288F PR FALLS RISK ASSESSMENT DOCUMENTED: ICD-10-PCS | Mod: CPTII,S$GLB,, | Performed by: NURSE PRACTITIONER

## 2023-09-14 PROCEDURE — 77067 SCR MAMMO BI INCL CAD: CPT | Mod: TC,PO

## 2023-09-14 PROCEDURE — 99999 PR PBB SHADOW E&M-EST. PATIENT-LVL IV: CPT | Mod: PBBFAC,,, | Performed by: NURSE PRACTITIONER

## 2023-09-14 PROCEDURE — 77080 DXA BONE DENSITY AXIAL: CPT | Mod: TC,PO

## 2023-09-14 PROCEDURE — 3008F BODY MASS INDEX DOCD: CPT | Mod: CPTII,S$GLB,, | Performed by: NURSE PRACTITIONER

## 2023-09-14 PROCEDURE — 77080 DXA BONE DENSITY AXIAL: CPT | Mod: 26,,, | Performed by: RADIOLOGY

## 2023-09-14 PROCEDURE — 3075F PR MOST RECENT SYSTOLIC BLOOD PRESS GE 130-139MM HG: ICD-10-PCS | Mod: CPTII,S$GLB,, | Performed by: NURSE PRACTITIONER

## 2023-09-14 PROCEDURE — 77063 BREAST TOMOSYNTHESIS BI: CPT | Mod: 26,,, | Performed by: RADIOLOGY

## 2023-09-14 PROCEDURE — 3075F SYST BP GE 130 - 139MM HG: CPT | Mod: CPTII,S$GLB,, | Performed by: NURSE PRACTITIONER

## 2023-09-14 PROCEDURE — 3008F PR BODY MASS INDEX (BMI) DOCUMENTED: ICD-10-PCS | Mod: CPTII,S$GLB,, | Performed by: NURSE PRACTITIONER

## 2023-09-14 PROCEDURE — 3079F DIAST BP 80-89 MM HG: CPT | Mod: CPTII,S$GLB,, | Performed by: NURSE PRACTITIONER

## 2023-09-14 PROCEDURE — 99214 PR OFFICE/OUTPT VISIT, EST, LEVL IV, 30-39 MIN: ICD-10-PCS | Mod: S$GLB,,, | Performed by: NURSE PRACTITIONER

## 2023-09-14 PROCEDURE — 1126F AMNT PAIN NOTED NONE PRSNT: CPT | Mod: CPTII,S$GLB,, | Performed by: NURSE PRACTITIONER

## 2023-09-14 PROCEDURE — 1159F MED LIST DOCD IN RCRD: CPT | Mod: CPTII,S$GLB,, | Performed by: NURSE PRACTITIONER

## 2023-09-14 PROCEDURE — 1159F PR MEDICATION LIST DOCUMENTED IN MEDICAL RECORD: ICD-10-PCS | Mod: CPTII,S$GLB,, | Performed by: NURSE PRACTITIONER

## 2023-09-14 PROCEDURE — 3288F FALL RISK ASSESSMENT DOCD: CPT | Mod: CPTII,S$GLB,, | Performed by: NURSE PRACTITIONER

## 2023-09-14 PROCEDURE — 77063 MAMMO DIGITAL SCREENING BILAT WITH TOMO: ICD-10-PCS | Mod: 26,,, | Performed by: RADIOLOGY

## 2023-09-14 RX ORDER — LEVOTHYROXINE SODIUM 100 UG/1
100 TABLET ORAL
Qty: 90 TABLET | Refills: 3 | Status: SHIPPED | OUTPATIENT
Start: 2023-09-14

## 2023-09-14 RX ORDER — HYDROXYZINE HYDROCHLORIDE 25 MG/1
TABLET, FILM COATED ORAL
Qty: 120 TABLET | Refills: 1 | Status: SHIPPED | OUTPATIENT
Start: 2023-09-14 | End: 2024-03-27

## 2023-09-14 NOTE — PROGRESS NOTES
This dictation has been generated using Modal Fluency Dictation some phonetic errors may occur. Please contact author for clarification if needed.     Problem List Items Addressed This Visit       Hypothyroidism    Relevant Orders    TSH    Stage 3 chronic kidney disease    Relevant Orders    CBC Auto Differential    Lipid Panel     Other Visit Diagnoses       Routine medical exam    -  Primary    Relevant Orders    TSH    CBC Auto Differential    Lipid Panel    Colon cancer screening        Relevant Orders    Case Request Endoscopy: COLONOSCOPY (Completed)    Encounter for screening mammogram for breast cancer        Relevant Orders    Mammo Digital Screening Bilat w/ Jordon    Post-menopausal        Relevant Orders    DXA Bone Density Axial Skeleton 1 or more sites    Fungal nail infection        Relevant Orders    Ambulatory referral/consult to Podiatry            Orders Placed This Encounter    DXA Bone Density Axial Skeleton 1 or more sites    Mammo Digital Screening Bilat w/ Jordon    TSH    CBC Auto Differential    Lipid Panel    Ambulatory referral/consult to Podiatry    levothyroxine (SYNTHROID) 100 MCG tablet    hydrOXYzine HCL (ATARAX) 25 MG tablet    Case Request Endoscopy: COLONOSCOPY     Hypothyroidism update TSH as above.  Out of meds for a year.  Medical noncompliance.     CKD 3 check CBC as above and monitor H&H   Routine medical examination overdue for exam.  Health maintenance discussed shared decision making and patient will move forward with Update of mammogram and colonoscopy.  Fungal nail infection with discomfort and nail trauma.  Refer to Podiatry.      Follow up in about 1 month (around 10/14/2023).    ________________________________________________________________  ________________________________________________________________      Chief Complaint   Patient presents with    Annual Exam     History of present illness  This 72 y.o. presents today for complaint of routine medical examination.   Patient has not been seen in the clinic since 2021.  She notes she is been out of her thyroid medicine for over a year.  She has CKD 3.  Reviewed prior labs and medication doses.  Discussed with her and her friend that we will resume med at prior dosing and reassess labs to ensure adequate response.    Past Medical History:   Diagnosis Date    Encounter for blood transfusion     Hypothyroidism     Peptic ulcer with hemorrhage     Peptic ulcer, site unspecified, unspecified as acute or chronic, without hemorrhage or perforation     Peptic ulcer disease    Stroke 2017    no residual effects.     Urticaria        Past Surgical History:   Procedure Laterality Date    ESOPHAGOGASTRODUODENOSCOPY N/A 4/18/2021    Procedure: EGD (ESOPHAGOGASTRODUODENOSCOPY);  Surgeon: Crow Vigil MD;  Location: [facility] ENDO;  Service: Endoscopy;  Laterality: N/A;    ESOPHAGOGASTRODUODENOSCOPY N/A 8/31/2021    Procedure: EGD (ESOPHAGOGASTRODUODENOSCOPY);  Surgeon: Crow Vigil MD;  Location: [facility] ENDO;  Service: Endoscopy;  Laterality: N/A;       Family History   Problem Relation Age of Onset    Stroke Father 70    Breast cancer Paternal Aunt     Breast cancer Paternal Grandmother        Social History     Socioeconomic History    Marital status:    Tobacco Use    Smoking status: Never    Smokeless tobacco: Never   Substance and Sexual Activity    Alcohol use: Yes     Alcohol/week: 5.0 standard drinks of alcohol     Types: 5 Glasses of wine per week    Drug use: Never    Sexual activity: Not Currently       Current Outpatient Medications   Medication Sig Dispense Refill    biotin 5,000 mcg TbDL Take 1 tablet by mouth once daily.      multivit-min/iron/folic/lutein (CENTRUM SILVER WOMEN ORAL) Take 1 tablet by mouth once daily.      vit B complex no.12/niacin,B3, (VITAMIN B COMPLEX NO.12-NIACIN ORAL) Take 1 tablet by mouth.      hydrOXYzine HCL (ATARAX) 25 MG tablet TAKE 1 TABLET 3 (THREE) TIMES DAILY AS NEEDED FOR ITCHING. 120  tablet 1    levothyroxine (SYNTHROID) 100 MCG tablet Take 1 tablet (100 mcg total) by mouth before breakfast. 90 tablet 3    omega-3 fatty acids/fish oil (FISH OIL-OMEGA-3 FATTY ACIDS) 300-1,000 mg capsule Take 1 capsule by mouth once daily.      pantoprazole (PROTONIX) 40 MG tablet Take 1 tablet (40 mg total) by mouth 2 (two) times daily. 60 tablet 0     No current facility-administered medications for this visit.       Review of patient's allergies indicates:  No Known Allergies    Physical examination  Vitals Reviewed\  Vitals:    09/14/23 0914   BP: 136/82   Pulse: 83   Temp: 98.6 °F (37 °C)     Body mass index is 31.13 kg/m².   Gen. Well-dressed well-nourished   Skin warm dry and intact.  No rashes noted.  Chest.  Respirations are even unlabored.   Neuro. Awake alert oriented x4.  Normal judgment and cognition noted.  Extremities no clubbing cyanosis or edema noted.     Call or return to clinic prn if these symptoms worsen or fail to improve as anticipated.

## 2023-10-02 ENCOUNTER — TELEPHONE (OUTPATIENT)
Dept: GASTROENTEROLOGY | Facility: CLINIC | Age: 73
End: 2023-10-02
Payer: MEDICARE

## 2023-10-10 ENCOUNTER — OFFICE VISIT (OUTPATIENT)
Dept: PODIATRY | Facility: CLINIC | Age: 73
End: 2023-10-10
Payer: MEDICARE

## 2023-10-10 VITALS — WEIGHT: 168.88 LBS | HEIGHT: 62 IN | BODY MASS INDEX: 31.08 KG/M2

## 2023-10-10 DIAGNOSIS — B35.1 FUNGAL NAIL INFECTION: ICD-10-CM

## 2023-10-10 DIAGNOSIS — B35.1 ONYCHOMYCOSIS DUE TO DERMATOPHYTE: Primary | ICD-10-CM

## 2023-10-10 DIAGNOSIS — M79.675 PAIN OF TOE OF LEFT FOOT: ICD-10-CM

## 2023-10-10 PROCEDURE — 99999 PR PBB SHADOW E&M-EST. PATIENT-LVL III: CPT | Mod: PBBFAC,,, | Performed by: PODIATRIST

## 2023-10-10 PROCEDURE — 3288F FALL RISK ASSESSMENT DOCD: CPT | Mod: CPTII,S$GLB,, | Performed by: PODIATRIST

## 2023-10-10 PROCEDURE — 1101F PR PT FALLS ASSESS DOC 0-1 FALLS W/OUT INJ PAST YR: ICD-10-PCS | Mod: CPTII,S$GLB,, | Performed by: PODIATRIST

## 2023-10-10 PROCEDURE — 1160F PR REVIEW ALL MEDS BY PRESCRIBER/CLIN PHARMACIST DOCUMENTED: ICD-10-PCS | Mod: CPTII,S$GLB,, | Performed by: PODIATRIST

## 2023-10-10 PROCEDURE — 1160F RVW MEDS BY RX/DR IN RCRD: CPT | Mod: CPTII,S$GLB,, | Performed by: PODIATRIST

## 2023-10-10 PROCEDURE — 3288F PR FALLS RISK ASSESSMENT DOCUMENTED: ICD-10-PCS | Mod: CPTII,S$GLB,, | Performed by: PODIATRIST

## 2023-10-10 PROCEDURE — 1126F PR PAIN SEVERITY QUANTIFIED, NO PAIN PRESENT: ICD-10-PCS | Mod: CPTII,S$GLB,, | Performed by: PODIATRIST

## 2023-10-10 PROCEDURE — 99203 PR OFFICE/OUTPT VISIT, NEW, LEVL III, 30-44 MIN: ICD-10-PCS | Mod: S$GLB,,, | Performed by: PODIATRIST

## 2023-10-10 PROCEDURE — 1159F MED LIST DOCD IN RCRD: CPT | Mod: CPTII,S$GLB,, | Performed by: PODIATRIST

## 2023-10-10 PROCEDURE — 1101F PT FALLS ASSESS-DOCD LE1/YR: CPT | Mod: CPTII,S$GLB,, | Performed by: PODIATRIST

## 2023-10-10 PROCEDURE — 99203 OFFICE O/P NEW LOW 30 MIN: CPT | Mod: S$GLB,,, | Performed by: PODIATRIST

## 2023-10-10 PROCEDURE — 99999 PR PBB SHADOW E&M-EST. PATIENT-LVL III: ICD-10-PCS | Mod: PBBFAC,,, | Performed by: PODIATRIST

## 2023-10-10 PROCEDURE — 1126F AMNT PAIN NOTED NONE PRSNT: CPT | Mod: CPTII,S$GLB,, | Performed by: PODIATRIST

## 2023-10-10 PROCEDURE — 1159F PR MEDICATION LIST DOCUMENTED IN MEDICAL RECORD: ICD-10-PCS | Mod: CPTII,S$GLB,, | Performed by: PODIATRIST

## 2023-10-10 RX ORDER — FLUCONAZOLE 200 MG/1
200 TABLET ORAL WEEKLY
Qty: 28 TABLET | Refills: 0 | Status: SHIPPED | OUTPATIENT
Start: 2023-10-10 | End: 2024-04-17

## 2023-10-10 NOTE — PROGRESS NOTES
"  [address] [facility] [place] [address]  Phone: [phone]   [phone]    Patient's PCP:Cayla Issa MD  Referring Provider: Norman Moran    Subjective:      Chief Complaint:: Nail Problem (Big toe nail on left foot is cracking and discolored)    Nail Problem  Pertinent negatives include no abdominal pain, arthralgias, chest pain, chills, coughing, fatigue, fever, headaches, joint swelling, myalgias, nausea, neck pain, numbness, rash or weakness.       Nicolasa Madera is a 73 y.o. female who presents today with a complaint of left great toenail cracking and discolored. The current episode started couple of months ago.  The symptoms include had bruising around the nail bed and had shooting pain up to the ankle as well as some itching . Probable cause of complaint possible spider bite .  The symptoms are aggravated by none. The problem has improved. Treatment to date have included bandaids and some neosporin  which provided no relief.         Vitals:    10/10/23 0942   Weight: 76.6 kg (168 lb 14 oz)   Height: 5' 2" (1.575 m)   PainSc: 0-No pain      Shoe Size: 9.5    Past Surgical History:   Procedure Laterality Date    ESOPHAGOGASTRODUODENOSCOPY N/A 4/18/2021    Procedure: EGD (ESOPHAGOGASTRODUODENOSCOPY);  Surgeon: Crow Vigil MD;  Location: [facility];  Service: Endoscopy;  Laterality: N/A;    ESOPHAGOGASTRODUODENOSCOPY N/A 8/31/2021    Procedure: EGD (ESOPHAGOGASTRODUODENOSCOPY);  Surgeon: Crow Vigil MD;  Location: [facility];  Service: Endoscopy;  Laterality: N/A;     Past Medical History:   Diagnosis Date    Encounter for blood transfusion     Hypothyroidism     Peptic ulcer with hemorrhage     Peptic ulcer, site unspecified, unspecified as acute or chronic, without hemorrhage or perforation     Peptic ulcer disease    Stroke 2017    no residual effects.     Urticaria      Family History   Problem Relation Age of Onset    Stroke Father 70    Breast cancer Paternal " Aunt     Breast cancer Paternal Grandmother         Social History:   Marital Status:   Alcohol History:  reports current alcohol use of about 5.0 standard drinks of alcohol per week.  Tobacco History:  reports that she has never smoked. She has never used smokeless tobacco.  Drug History:  reports no history of drug use.    Review of patient's allergies indicates:  No Known Allergies    Current Outpatient Medications   Medication Sig Dispense Refill    biotin 5,000 mcg TbDL Take 1 tablet by mouth once daily.      fluconazole (DIFLUCAN) 200 MG Tab Take 1 tablet (200 mg total) by mouth once a week. for 28 doses 28 tablet 0    hydrOXYzine HCL (ATARAX) 25 MG tablet TAKE 1 TABLET 3 (THREE) TIMES DAILY AS NEEDED FOR ITCHING. 120 tablet 1    levothyroxine (SYNTHROID) 100 MCG tablet Take 1 tablet (100 mcg total) by mouth before breakfast. 90 tablet 3    multivit-min/iron/folic/lutein (CENTRUM SILVER WOMEN ORAL) Take 1 tablet by mouth once daily.      omega-3 fatty acids/fish oil (FISH OIL-OMEGA-3 FATTY ACIDS) 300-1,000 mg capsule Take 1 capsule by mouth once daily.      pantoprazole (PROTONIX) 40 MG tablet Take 1 tablet (40 mg total) by mouth 2 (two) times daily. 60 tablet 0    vit B complex no.12/niacin,B3, (VITAMIN B COMPLEX NO.12-NIACIN ORAL) Take 1 tablet by mouth.       No current facility-administered medications for this visit.       Review of Systems   Constitutional:  Negative for chills, fatigue, fever and unexpected weight change.   HENT:  Negative for hearing loss and trouble swallowing.    Eyes:  Negative for photophobia and visual disturbance.   Respiratory:  Negative for cough, shortness of breath and wheezing.    Cardiovascular:  Negative for chest pain, palpitations and leg swelling.   Gastrointestinal:  Negative for abdominal pain and nausea.   Genitourinary:  Negative for dysuria and frequency.   Musculoskeletal:  Negative for arthralgias, back pain, gait problem, joint swelling, myalgias and neck  pain.   Skin:  Positive for color change. Negative for rash and wound.   Neurological:  Negative for tremors, seizures, weakness, numbness and headaches.   Hematological:  Does not bruise/bleed easily.         Objective:        Physical Exam:   Foot Exam    General  General Appearance: appears stated age and healthy   Orientation: alert and oriented to person, place, and time   Affect: appropriate   Gait: unimpaired       Left Foot/Ankle      Inspection and Palpation  Ecchymosis: none  Tenderness: (Minimal tenderness great toenail)  Swelling: none   Arch: normal  Hammertoes: absent  Claw toes: absent  Hallux valgus: no  Hallux limitus: no  Skin Exam: dry skin; no drainage, no ulcer and no erythema   Fungus Toenails: present    Neurovascular  Dorsalis pedis: 1+  Posterior tibial: 1+  Capillary refill: 3+  Varicose veins: not present  Saphenous nerve sensation: normal  Tibial nerve sensation: normal  Superficial peroneal nerve sensation: normal  Deep peroneal nerve sensation: normal  Sural nerve sensation: normal    Comments  Thickening discoloration and dystrophy of the great toenail.  Majority of the nail has previously avulsed.    Physical Exam  Cardiovascular:      Pulses:           Dorsalis pedis pulses are 1+ on the left side.        Posterior tibial pulses are 1+ on the left side.   Musculoskeletal:      Left foot: No bunion.   Feet:      Left foot:      Skin integrity: Dry skin present. No ulcer or erythema.      Toenail Condition: Fungal disease present.              Left Ankle/Foot Exam     Comments:  Thickening discoloration and dystrophy of the great toenail.  Majority of the nail has previously avulsed.      Vascular Exam       Left Pulses  Dorsalis Pedis:      1+  Posterior Tibial:      1+           Imaging:            Assessment:       1. Onychomycosis due to dermatophyte    2. Fungal nail infection    3. Pain of toe of left foot      Plan:   Onychomycosis due to dermatophyte  -     fluconazole  (DIFLUCAN) 200 MG Tab; Take 1 tablet (200 mg total) by mouth once a week. for 28 doses  Dispense: 28 tablet; Refill: 0    Fungal nail infection  -     Ambulatory referral/consult to Podiatry    Pain of toe of left foot      Follow up if symptoms worsen or fail to improve.    Procedures        Fungal infection of toenails explained. Treatment options including no treatment, periodic debridement, topical medications, oral medications, and removal of the nail were discussed, as well as success rates and risks of recurrence.  I am going to send in oral Diflucan for her.  I also trimmed away the remaining lifted portions of the nail.  Patient tolerated well.    Counseling:     I provided patient education verbally regarding:   Patient diagnosis, treatment options, as well as alternatives, risks, and benefits.     This note was created using Dragon voice recognition software that occasionally misinterpreted phrases or words.

## 2023-10-10 NOTE — PATIENT INSTRUCTIONS
Nail Fungal Infection  A nail fungal infection changes the way fingernails and toenails look. They may thicken, discolor, change shape, or split. This condition is hard to treat because nails grow slowly and have limited blood supply. The infection often comes back after treatment.  There are 2 types of medicines used to treat this condition:  Topical anti-fungal medicines. These are applied to the surface of the skin and nail area. These medicines are not very effective because they cant get deep into the nail.  Oral antifungal medicines. These medicines work better because they go into the nail from the inside out. But the infection may still come back. It may take 9 to 12 months for your nail to look normal again. This means you are cured. You can repeat treatment if needed. Most people take these medicines without any problems. It is rare to stop therapy because of side effects. But your healthcare provider may give you some monitoring tests. Talk about possible side effects with your provider before starting treatment.  If medicines fail, the nail can be removed surgically or chemically. These methods physically remove the fungus from the body. This helps medical treatment be more effective.  Home care  Use medicines exactly as directed for as long as directed. Treating a fungal infection can take longer than other kinds of infections.  Smoking is a risk factor for fungal infection. This is one more reason to quit.  Wear absorbent socks, and shoes that let your feet breathe. Sweaty feet increase your risk of fungal infection. They also make an existing infection harder to treat.  Use footwear when in damp public places like swimming pools, gyms, and shower rooms. This will help you avoid the fungus that grows there.  Don't share nail clippers or scissors with others.  Follow-up care  Follow up with your healthcare provider, or as advised.  When to seek medical advice  Call your healthcare provider right away  if any of these occur:  Skin by the nail becomes red, swollen, painful, or drains pus (a creamy yellow or white liquid)  Side effects from oral anti-fungal medicines  Date Last Reviewed: 8/1/2016  © 7020-1362 [employer]. [address] [place] [address]. All rights reserved. This information is not intended as a substitute for professional medical care. Always follow your healthcare professional's instructions.

## 2023-10-11 ENCOUNTER — LAB VISIT (OUTPATIENT)
Dept: LAB | Facility: HOSPITAL | Age: 73
End: 2023-10-11
Attending: NURSE PRACTITIONER
Payer: MEDICARE

## 2023-10-11 DIAGNOSIS — Z00.00 ROUTINE MEDICAL EXAM: ICD-10-CM

## 2023-10-11 DIAGNOSIS — E03.9 HYPOTHYROIDISM, UNSPECIFIED TYPE: ICD-10-CM

## 2023-10-11 DIAGNOSIS — N18.31 STAGE 3A CHRONIC KIDNEY DISEASE: ICD-10-CM

## 2023-10-11 LAB
CHOLEST SERPL-MCNC: 278 MG/DL (ref 120–199)
CHOLEST/HDLC SERPL: ABNORMAL {RATIO} (ref 2–5)
HDLC SERPL-MCNC: >140 MG/DL (ref 40–75)
HDLC SERPL: ABNORMAL % (ref 20–50)
LDLC SERPL CALC-MCNC: ABNORMAL MG/DL (ref 63–159)
NONHDLC SERPL-MCNC: ABNORMAL MG/DL
TRIGL SERPL-MCNC: 60 MG/DL (ref 30–150)
TSH SERPL DL<=0.005 MIU/L-ACNC: 1.53 UIU/ML (ref 0.4–4)

## 2023-10-11 PROCEDURE — 84443 ASSAY THYROID STIM HORMONE: CPT | Performed by: NURSE PRACTITIONER

## 2023-10-11 PROCEDURE — 36415 COLL VENOUS BLD VENIPUNCTURE: CPT | Mod: PO | Performed by: NURSE PRACTITIONER

## 2023-10-11 PROCEDURE — 85025 COMPLETE CBC W/AUTO DIFF WBC: CPT | Performed by: NURSE PRACTITIONER

## 2023-10-11 PROCEDURE — 80061 LIPID PANEL: CPT | Performed by: NURSE PRACTITIONER

## 2023-10-12 LAB
BASOPHILS # BLD AUTO: 0.01 K/UL (ref 0–0.2)
BASOPHILS NFR BLD: 0.4 % (ref 0–1.9)
DIFFERENTIAL METHOD: ABNORMAL
EOSINOPHIL # BLD AUTO: 0 K/UL (ref 0–0.5)
EOSINOPHIL NFR BLD: 1.1 % (ref 0–8)
ERYTHROCYTE [DISTWIDTH] IN BLOOD BY AUTOMATED COUNT: 14.9 % (ref 11.5–14.5)
HCT VFR BLD AUTO: 32.3 % (ref 37–48.5)
HGB BLD-MCNC: 10.7 G/DL (ref 12–16)
IMM GRANULOCYTES # BLD AUTO: 0.01 K/UL (ref 0–0.04)
IMM GRANULOCYTES NFR BLD AUTO: 0.4 % (ref 0–0.5)
LYMPHOCYTES # BLD AUTO: 0.7 K/UL (ref 1–4.8)
LYMPHOCYTES NFR BLD: 24.5 % (ref 18–48)
MCH RBC QN AUTO: 29.4 PG (ref 27–31)
MCHC RBC AUTO-ENTMCNC: 33.1 G/DL (ref 32–36)
MCV RBC AUTO: 89 FL (ref 82–98)
MONOCYTES # BLD AUTO: 0.2 K/UL (ref 0.3–1)
MONOCYTES NFR BLD: 8.2 % (ref 4–15)
NEUTROPHILS # BLD AUTO: 1.8 K/UL (ref 1.8–7.7)
NEUTROPHILS NFR BLD: 65.4 % (ref 38–73)
NRBC BLD-RTO: 0 /100 WBC
PLATELET # BLD AUTO: 90 K/UL (ref 150–450)
PMV BLD AUTO: 12.6 FL (ref 9.2–12.9)
RBC # BLD AUTO: 3.64 M/UL (ref 4–5.4)
WBC # BLD AUTO: 2.69 K/UL (ref 3.9–12.7)

## 2023-10-13 ENCOUNTER — TELEPHONE (OUTPATIENT)
Dept: FAMILY MEDICINE | Facility: CLINIC | Age: 73
End: 2023-10-13
Payer: MEDICARE

## 2024-01-04 ENCOUNTER — OFFICE VISIT (OUTPATIENT)
Dept: UROLOGY | Facility: CLINIC | Age: 74
End: 2024-01-04
Payer: MEDICARE

## 2024-01-04 VITALS — WEIGHT: 168 LBS | HEIGHT: 62 IN | BODY MASS INDEX: 30.91 KG/M2

## 2024-01-04 DIAGNOSIS — R30.0 DYSURIA: Primary | ICD-10-CM

## 2024-01-04 LAB
BACTERIA #/AREA URNS AUTO: ABNORMAL /HPF
MICROSCOPIC COMMENT: ABNORMAL
RBC #/AREA URNS AUTO: 74 /HPF (ref 0–4)
WBC #/AREA URNS AUTO: >100 /HPF (ref 0–5)
WBC CLUMPS UR QL AUTO: ABNORMAL

## 2024-01-04 PROCEDURE — 3288F FALL RISK ASSESSMENT DOCD: CPT | Mod: CPTII,S$GLB,, | Performed by: NURSE PRACTITIONER

## 2024-01-04 PROCEDURE — 51701 INSERT BLADDER CATHETER: CPT | Mod: S$GLB,,, | Performed by: NURSE PRACTITIONER

## 2024-01-04 PROCEDURE — 1160F RVW MEDS BY RX/DR IN RCRD: CPT | Mod: CPTII,S$GLB,, | Performed by: NURSE PRACTITIONER

## 2024-01-04 PROCEDURE — 99214 OFFICE O/P EST MOD 30 MIN: CPT | Mod: 25,S$GLB,, | Performed by: NURSE PRACTITIONER

## 2024-01-04 PROCEDURE — 1159F MED LIST DOCD IN RCRD: CPT | Mod: CPTII,S$GLB,, | Performed by: NURSE PRACTITIONER

## 2024-01-04 PROCEDURE — 1126F AMNT PAIN NOTED NONE PRSNT: CPT | Mod: CPTII,S$GLB,, | Performed by: NURSE PRACTITIONER

## 2024-01-04 PROCEDURE — 81001 URINALYSIS AUTO W/SCOPE: CPT | Performed by: NURSE PRACTITIONER

## 2024-01-04 PROCEDURE — 87086 URINE CULTURE/COLONY COUNT: CPT | Performed by: NURSE PRACTITIONER

## 2024-01-04 PROCEDURE — 1101F PT FALLS ASSESS-DOCD LE1/YR: CPT | Mod: CPTII,S$GLB,, | Performed by: NURSE PRACTITIONER

## 2024-01-04 PROCEDURE — 87088 URINE BACTERIA CULTURE: CPT | Performed by: NURSE PRACTITIONER

## 2024-01-04 PROCEDURE — 99999 PR PBB SHADOW E&M-EST. PATIENT-LVL III: CPT | Mod: PBBFAC,,, | Performed by: NURSE PRACTITIONER

## 2024-01-04 PROCEDURE — 87147 CULTURE TYPE IMMUNOLOGIC: CPT | Performed by: NURSE PRACTITIONER

## 2024-01-04 RX ORDER — AMOXICILLIN AND CLAVULANATE POTASSIUM 500; 125 MG/1; MG/1
1 TABLET, FILM COATED ORAL 2 TIMES DAILY
Qty: 14 TABLET | Refills: 0 | Status: SHIPPED | OUTPATIENT
Start: 2024-01-04 | End: 2024-01-11

## 2024-01-04 NOTE — PATIENT INSTRUCTIONS
UTI precautions:  Avoid constipation.  Drink lots of water  Void every 2-3 hrs regardless of urge  Void soon after urge arises. Do not hold urine once urge occurs.    OVER THE COUNTER:  Utiva Cranberry supplement- need 36mg of proanthocyanidins (PAC) in supplement- helps to block bacteria from attaching to bladder wall.  D-mannose- 2 grams daily- blocks bacteria receptors  Probiotic with Lactobacillus

## 2024-01-04 NOTE — PROGRESS NOTES
[facility]  Group: Inna/Ro/Sury/Phuc  NPs: Pamela Chappell/Melida Gan    Note today written by:Melida Gan  Date of Service: 01/04/2024      CHIEF COMPLAINT: UTI.    PRESENTING ILLNESS:    Nicolasa Madera is a 73 y.o. female who presents for UTI. This is her initial clinic visit    1/4/24  Pt presents today for UTI. C/o dysuria for the past few days  She has been taking AZO x 2 days with relief.   She does NOT have recurrent UTI. Last UTI >20 years ago  Denies gross hematuria, flank pain, fever, chills, nausea or vomiting associated with UTI.  UA deferred.    No issues voiding as baseline  Voids every 2 hours.    She did have diarrhea recently which she feels caused her UTI.    History of kidney stones: denies  History of recurrent UTI: denies  Personal or family hx of  malignancy: denies  History of  trauma: denies  Smoking history: never smoked    Urine cultures:   Lab Results   Component Value Date    LABURIN  08/30/2021     Multiple organisms isolated. None in predominance.  Repeat if    LABURIN clinically necessary. 08/30/2021         REVIEW OF SYSTEMS: neg unless stated above in hpi    PATIENT HISTORY:  Past Medical History:   Diagnosis Date    Encounter for blood transfusion     Hypothyroidism     Peptic ulcer with hemorrhage     Peptic ulcer, site unspecified, unspecified as acute or chronic, without hemorrhage or perforation     Peptic ulcer disease    Stroke 2017    no residual effects.     Urticaria        Past Surgical History:   Procedure Laterality Date    ESOPHAGOGASTRODUODENOSCOPY N/A 4/18/2021    Procedure: EGD (ESOPHAGOGASTRODUODENOSCOPY);  Surgeon: Crow Vigil MD;  Location: [facility];  Service: Endoscopy;  Laterality: N/A;    ESOPHAGOGASTRODUODENOSCOPY N/A 8/31/2021    Procedure: EGD (ESOPHAGOGASTRODUODENOSCOPY);  Surgeon: Crow Vigil MD;  Location: [facility];  Service: Endoscopy;  Laterality: N/A;        Allergies:  Patient has no known allergies.      PHYSICAL EXAMINATION:  Constitutional: She is oriented to person, place, and time. She appears well-developed and well-nourished.  She is in no apparent distress.  Abdominal:  She exhibits no distension.  There is no CVA tenderness.   Neurological: She is alert and oriented to person, place, and time.   Psych: Cooperative with normal affect.    Genitourinary:  Normal external female genitalia  Urethral meatus is normal  Consent verbally obtained.  Betadine prep was applied to the urethral meatus. An in and out cath was performed after voiding.  The PVR was 90 ml.      Physical Exam    LABS:    Lab Results   Component Value Date    CREATININE 1.3 09/02/2021     Lab Results   Component Value Date    HGBA1C 5.0 06/10/2020         IMPRESSION:    Encounter Diagnoses   Name Primary?    Dysuria Yes       PLAN:  -Catheterized urine sent for micro UA/culture  Start Augmentin based on symptoms  Discussed side effects and indications for Augmentin. Prescription sent to the pharmacy. Pt verbalized understanding.  No issues in the past with Augmentin or PCN  Will adjust antibiotics based on culture results if needed  Will call with results    -no hx of recurrent UTI; no imaging needed at this time    -UTI precautions:  Avoid constipation.  Drink lots of water  Void every 2-3 hrs regardless of urge  Void soon after urge arises. Do not hold urine once urge occurs.    OVER THE COUNTER:  Utiva Cranberry supplement- need 36mg of proanthocyanidins (PAC) in supplement- helps to block bacteria from attaching to bladder wall.  D-mannose- 2 grams daily- blocks bacteria receptors  Probiotic with Lactobacillus    -RTC if symptoms do not improve    I encouraged her or any of her family members to call or email me with questions and/or concerns.      45 minutes of total time spent on the encounter, which includes face to face time and non-face to face time preparing to see the patient  (eg, review of tests), Obtaining and/or reviewing separately obtained history, Documenting clinical information in the electronic or other health record, Independently interpreting results (not separately reported) and communicating results to the patient/family/caregiver, or Care coordination (not separately reported).

## 2024-01-05 LAB — BACTERIA UR CULT: ABNORMAL

## 2024-01-12 ENCOUNTER — OFFICE VISIT (OUTPATIENT)
Dept: UROLOGY | Facility: CLINIC | Age: 74
End: 2024-01-12
Payer: MEDICARE

## 2024-01-12 VITALS — BODY MASS INDEX: 30.91 KG/M2 | WEIGHT: 168 LBS | HEIGHT: 62 IN

## 2024-01-12 DIAGNOSIS — N39.0 BACTERIAL UTI: Primary | ICD-10-CM

## 2024-01-12 DIAGNOSIS — A49.9 BACTERIAL UTI: Primary | ICD-10-CM

## 2024-01-12 LAB
BACTERIA #/AREA URNS AUTO: NORMAL /HPF
HYALINE CASTS UR QL AUTO: 1 /LPF
MICROSCOPIC COMMENT: NORMAL
RBC #/AREA URNS AUTO: 0 /HPF (ref 0–4)
SQUAMOUS #/AREA URNS AUTO: 0 /HPF
WBC #/AREA URNS AUTO: 2 /HPF (ref 0–5)

## 2024-01-12 PROCEDURE — 87086 URINE CULTURE/COLONY COUNT: CPT | Performed by: NURSE PRACTITIONER

## 2024-01-12 PROCEDURE — 99999 PR PBB SHADOW E&M-EST. PATIENT-LVL III: CPT | Mod: PBBFAC,,, | Performed by: NURSE PRACTITIONER

## 2024-01-12 PROCEDURE — 81001 URINALYSIS AUTO W/SCOPE: CPT | Performed by: NURSE PRACTITIONER

## 2024-01-12 PROCEDURE — 99499 UNLISTED E&M SERVICE: CPT | Mod: S$GLB,,, | Performed by: NURSE PRACTITIONER

## 2024-01-12 NOTE — PROGRESS NOTES
Pt presents for nurse visit for IO cath   In and out catheter performed by Sadaf DORADO  Pt tolerated well  Catheterized specimen sent to lab for micro UA/culture  Will call pt with results

## 2024-01-13 LAB — BACTERIA UR CULT: NO GROWTH

## 2024-03-27 RX ORDER — HYDROXYZINE HYDROCHLORIDE 25 MG/1
TABLET, FILM COATED ORAL
Qty: 120 TABLET | Refills: 1 | Status: SHIPPED | OUTPATIENT
Start: 2024-03-27

## 2024-04-12 ENCOUNTER — TELEPHONE (OUTPATIENT)
Dept: FAMILY MEDICINE | Facility: CLINIC | Age: 74
End: 2024-04-12
Payer: MEDICARE

## 2024-04-12 NOTE — TELEPHONE ENCOUNTER
Called patient in regards to needing to schedule office visit  to establish care. No answer, left voicemail to return call.

## 2024-04-12 NOTE — TELEPHONE ENCOUNTER
----- Message from Elisa Bautista sent at 4/12/2024 12:16 PM CDT -----  Regarding: appt request  Contact: [phone]  Type: Needs Medical Advice    Who Called:  Nicolasa Pimentel Call Back Number: [phone]    Additional Information: patient was seeing Norman Moran, wants to get an appt and establish care with new provider but does not want to wait until June because she is out of town for a month. Please call to advise if you can find any earlier appointments.

## 2024-04-15 ENCOUNTER — OFFICE VISIT (OUTPATIENT)
Dept: FAMILY MEDICINE | Facility: CLINIC | Age: 74
End: 2024-04-15
Payer: MEDICARE

## 2024-04-15 VITALS
BODY MASS INDEX: 30.95 KG/M2 | HEART RATE: 90 BPM | HEIGHT: 62 IN | WEIGHT: 168.19 LBS | DIASTOLIC BLOOD PRESSURE: 78 MMHG | OXYGEN SATURATION: 99 % | SYSTOLIC BLOOD PRESSURE: 140 MMHG | TEMPERATURE: 98 F

## 2024-04-15 DIAGNOSIS — R26.89 BALANCE PROBLEM: ICD-10-CM

## 2024-04-15 DIAGNOSIS — Z86.73 HISTORY OF STROKE: ICD-10-CM

## 2024-04-15 DIAGNOSIS — M79.672 LEFT FOOT PAIN: ICD-10-CM

## 2024-04-15 DIAGNOSIS — R42 VERTIGO: Primary | ICD-10-CM

## 2024-04-15 PROCEDURE — 1101F PT FALLS ASSESS-DOCD LE1/YR: CPT | Mod: CPTII,S$GLB,,

## 2024-04-15 PROCEDURE — 99214 OFFICE O/P EST MOD 30 MIN: CPT | Mod: S$GLB,,,

## 2024-04-15 PROCEDURE — 1160F RVW MEDS BY RX/DR IN RCRD: CPT | Mod: CPTII,S$GLB,,

## 2024-04-15 PROCEDURE — 3077F SYST BP >= 140 MM HG: CPT | Mod: CPTII,S$GLB,,

## 2024-04-15 PROCEDURE — 3288F FALL RISK ASSESSMENT DOCD: CPT | Mod: CPTII,S$GLB,,

## 2024-04-15 PROCEDURE — 99999 PR PBB SHADOW E&M-EST. PATIENT-LVL IV: CPT | Mod: PBBFAC,,,

## 2024-04-15 PROCEDURE — 1159F MED LIST DOCD IN RCRD: CPT | Mod: CPTII,S$GLB,,

## 2024-04-15 PROCEDURE — 1125F AMNT PAIN NOTED PAIN PRSNT: CPT | Mod: CPTII,S$GLB,,

## 2024-04-15 PROCEDURE — 3078F DIAST BP <80 MM HG: CPT | Mod: CPTII,S$GLB,,

## 2024-04-15 NOTE — PATIENT INSTRUCTIONS
Inder Baldwin,     If you are due for any health screening(s) below please notify me so we can arrange them to be ordered and scheduled. Most healthy patients at your age complete them, but you are free to accept or refuse.     If you can't do it, I'll definitely understand. If you can, I'd certainly appreciate it!    Tests to Keep You Healthy    Colon Cancer Screening: DUE      Its time for your colon cancer screening     Colorectal cancer is one of the leading causes of cancer death for men and women but it doesnt have to be. Screenings can prevent colorectal cancer or find it early enough to treat and cure the disease.     Our records indicate that you may be overdue for colon cancer screening. A colonoscopy or stool screening test can help identify patients at risk for developing colon cancer. Cancer screenings save lives, so schedule yours today to stay healthy.     A colonoscopy is the preferred test for detecting colon cancer. It is needed only once every 10 years if results are negative. While you are sedated, a flexible, lighted tube with a tiny camera is inserted into the rectum and advanced through the colon to look for cancers.     An alternative screening test that is used at home and returned to the lab may also be used. It detects hidden blood in bowel movements which could indicate cancer in the colon. If results are positive, you will need a colonoscopy to determine if the blood is a sign of cancer. This type of follow up (diagnostic) colonoscopy usually requires additional copays as required by your insurance provider.     If you recently had your colon cancer screening performed outside of [facility], please let your Health care team know so that they can update your health record. Please contact your PCP if you have any questions.    Lets manage your high blood pressure     Your blood pressure was above 140/90 today during your visit. We recommend that you schedule a nurse visit in two  weeks to check your blood pressure and discuss ways to support your health goals.     You can also manage your health and record your blood pressure from the comfort of home by keeping a daily blood pressure log. These results are shared with and reviewed by your provider. Please print this form (Daily Blood Pressure Log) to assist you in keeping track of your blood pressure at home.     Schedule your nurse visit in two weeks to learn more about how to track and manage high blood pressure.    Daily Blood Pressure Log    Name:__________________________________                  Date of Birth:_________    Average Blood Pressure:  __________      Date: Time  (a.m.) Blood  Pressure: Pulse  Rate: Time  (p.m.) Blood  Pressure : Pulse  Rate:   Sample 8:37 127/83 84

## 2024-04-15 NOTE — PROGRESS NOTES
"Subjective:       Patient ID: Nicolasa Madera is a 73 y.o. female.    Chief Complaint: left foot pain, dizziness      Nicolasa Madera is a 73 y.o. female with history of hypothyroidism, stroke who presents to clinic for evaluation of dizziness ongoing for 1.5-2 weeks. Reports dizziness occurs when looking up to apply eye drops, when she is reaching upwards, or when she arises from bed. States it "feels like the room flips." Dizziness lasts for less than 1 minute and only occurs with positional changes. Reports history of vertigo, which was treated by balance specialist per patient. States she has had history with balance difficulties for several years, even prior to her stroke. States it feels like she is on a ship.     Also reports left foot pain ongoing for several years. Reports the pain originates in the dorsum of the foot and radiates down to the toes. Denies injury. Pain is worse with wearing shoes with heel.         Review of Systems   Eyes:  Negative for visual disturbance.   Respiratory:  Negative for shortness of breath.    Cardiovascular:  Negative for chest pain and palpitations.   Musculoskeletal:         Left foot pain   Neurological:  Positive for dizziness. Negative for light-headedness and headaches.         Past Medical History:   Diagnosis Date    Encounter for blood transfusion     Hypothyroidism     Peptic ulcer with hemorrhage     Peptic ulcer, site unspecified, unspecified as acute or chronic, without hemorrhage or perforation     Peptic ulcer disease    Stroke 2017    no residual effects.     Urticaria        Review of patient's allergies indicates:  No Known Allergies      Current Outpatient Medications:     biotin 5,000 mcg TbDL, Take 1 tablet by mouth once daily., Disp: , Rfl:     fluconazole (DIFLUCAN) 200 MG Tab, Take 1 tablet (200 mg total) by mouth once a week. for 28 doses, Disp: 28 tablet, Rfl: 0    hydrOXYzine HCL (ATARAX) 25 MG tablet, TAKE 1 TABLET THREE TIMES DAILY AS " "NEEDED FOR ITCHING, Disp: 120 tablet, Rfl: 1    levothyroxine (SYNTHROID) 100 MCG tablet, Take 1 tablet (100 mcg total) by mouth before breakfast., Disp: 90 tablet, Rfl: 3    multivit-min/iron/folic/lutein (CENTRUM SILVER WOMEN ORAL), Take 1 tablet by mouth once daily., Disp: , Rfl:     omega-3 fatty acids/fish oil (FISH OIL-OMEGA-3 FATTY ACIDS) 300-1,000 mg capsule, Take 1 capsule by mouth once daily., Disp: , Rfl:     vit B complex no.12/niacin,B3, (VITAMIN B COMPLEX NO.12-NIACIN ORAL), Take 1 tablet by mouth., Disp: , Rfl:     pantoprazole (PROTONIX) 40 MG tablet, Take 1 tablet (40 mg total) by mouth 2 (two) times daily., Disp: 60 tablet, Rfl: 0    Objective:        Physical Exam  Vitals reviewed.   Constitutional:       Appearance: Normal appearance.   HENT:      Head: Normocephalic and atraumatic.   Eyes:      Extraocular Movements: Extraocular movements intact.      Conjunctiva/sclera: Conjunctivae normal.      Pupils: Pupils are equal, round, and reactive to light.   Cardiovascular:      Rate and Rhythm: Normal rate and regular rhythm.      Heart sounds: Normal heart sounds.   Pulmonary:      Effort: Pulmonary effort is normal.      Breath sounds: Normal breath sounds.   Musculoskeletal:         General: Normal range of motion.      Cervical back: Normal range of motion and neck supple.        Feet:    Feet:      Comments: Reported area of pt's pain. Non-tender to palpation. 2+ PT pulse.   Skin:     General: Skin is warm and dry.   Neurological:      Mental Status: She is alert and oriented to person, place, and time.      Cranial Nerves: No cranial nerve deficit.      Coordination: Finger-Nose-Finger Test normal.           Visit Vitals  BP (!) 140/78 (BP Location: Right arm, Patient Position: Sitting, BP Method: Small (Manual))   Pulse 90   Temp 98.2 °F (36.8 °C) (Oral)   Ht 5' 2" (1.575 m)   Wt 76.3 kg (168 lb 3.4 oz)   SpO2 99%   BMI 30.77 kg/m²      Assessment:         1. Vertigo    2. History of stroke  "   3. Balance problem    4. Left foot pain        Plan:         Diagnoses and all orders for this visit:    Vertigo      -    Recommend Epley maneuver. Exercises printed and given to patient.       -    Consider follow up with ENT if symptoms do not improve.    History of stroke       -    Consider follow up with neurology pending above.     Balance problem        -    Recommend PT. Pt states she would like to hold off for now, as she is about to leave for a trip.     Left foot pain  -     X-Ray Foot Complete Left; Future       Follow up if symptoms worsen or fail to improve as anticipated.          Family Medicine Physician Assistant            Tests to Keep You Healthy    Colon Cancer Screening: DUE       I spent a total of 30 minutes on the day of the visit.This includes face to face time and non-face to face time preparing to see the patient (eg, review of tests), obtaining and/or reviewing separately obtained history, documenting clinical information in the electronic or other health record, independently interpreting results and communicating results to the patient/family/caregiver, or care coordinator.    We have addressed [3] Low: 2 or more self-limited or minor problems / 1 stable chronic illness / 1 acute, uncomplicated illness or injury  The complexity of the data reviewed and analyzed for this visit was [3] Limited (Reviewed prior external note, ordered unique testing or reviewed the results of each unique test)   The risk of complications and/or morbidity or mortality are [3] Low risk   The level of Medical Decision Making for this visit is [3] Low

## 2024-04-16 ENCOUNTER — HOSPITAL ENCOUNTER (OUTPATIENT)
Dept: RADIOLOGY | Facility: CLINIC | Age: 74
Discharge: HOME | End: 2024-04-16
Payer: MEDICARE

## 2024-04-16 DIAGNOSIS — M79.672 LEFT FOOT PAIN: ICD-10-CM

## 2024-04-16 PROCEDURE — 73630 X-RAY EXAM OF FOOT: CPT | Mod: 26,LT,S$GLB, | Performed by: RADIOLOGY

## 2024-04-16 PROCEDURE — 73630 X-RAY EXAM OF FOOT: CPT | Mod: TC,FY,PO,LT

## 2024-04-17 ENCOUNTER — TELEPHONE (OUTPATIENT)
Dept: FAMILY MEDICINE | Facility: CLINIC | Age: 74
End: 2024-04-17
Payer: MEDICARE

## 2024-04-17 DIAGNOSIS — M79.672 LEFT FOOT PAIN: Primary | ICD-10-CM

## 2024-04-22 ENCOUNTER — TELEPHONE (OUTPATIENT)
Dept: FAMILY MEDICINE | Facility: CLINIC | Age: 74
End: 2024-04-22
Payer: MEDICARE

## 2024-04-22 NOTE — TELEPHONE ENCOUNTER
----- Message from Loly Narayan sent at 4/22/2024  2:29 PM CDT -----  Regarding: advise  Contact: pt  Type: Needs Medical Advice  Who Called:  patient   Symptoms (please be specific):    How long has patient had these symptoms:    Pharmacy name and phone #:    Best Call Back Number: [phone]    Additional Information:  hey pt is returning ur call are u available MRN: [id number] MAT MARTIN

## 2024-10-13 ENCOUNTER — HOSPITAL ENCOUNTER (INPATIENT)
Facility: HOSPITAL | Age: 74
LOS: 2 days | Discharge: HOME HEALTH | DRG: 641 | End: 2024-10-16
Attending: EMERGENCY MEDICINE | Admitting: HOSPITALIST
Payer: MEDICARE
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
## 2024-10-13 DIAGNOSIS — E87.29 ALCOHOLIC KETOACIDOSIS: Primary | ICD-10-CM

## 2024-10-13 PROBLEM — D69.6 THROMBOCYTOPENIA: Status: ACTIVE | Noted: 2024-10-13

## 2024-10-13 LAB
ALBUMIN SERPL BCP-MCNC: 4.7 G/DL (ref 3.5–5.2)
ALLENS TEST: ABNORMAL
ALP SERPL-CCNC: 84 U/L (ref 55–135)
ALT SERPL W/O P-5'-P-CCNC: 44 U/L (ref 10–44)
ANION GAP SERPL CALC-SCNC: 27 MMOL/L (ref 8–16)
ANISOCYTOSIS BLD QL SMEAR: SLIGHT
AST SERPL-CCNC: 76 U/L (ref 10–40)
B-OH-BUTYR BLD STRIP-SCNC: 5.9 MMOL/L (ref 0–0.5)
BASOPHILS # BLD AUTO: 0.03 K/UL (ref 0–0.2)
BASOPHILS NFR BLD: 1.1 % (ref 0–1.9)
BILIRUB SERPL-MCNC: 0.6 MG/DL (ref 0.1–1)
BILIRUB UR QL STRIP: NEGATIVE
BUN SERPL-MCNC: 14 MG/DL (ref 8–23)
CALCIUM SERPL-MCNC: 9.9 MG/DL (ref 8.7–10.5)
CHLORIDE SERPL-SCNC: 87 MMOL/L (ref 95–110)
CLARITY UR: CLEAR
CO2 SERPL-SCNC: 15 MMOL/L (ref 23–29)
COLOR UR: COLORLESS
CREAT SERPL-MCNC: 1.2 MG/DL (ref 0.5–1.4)
DELSYS: ABNORMAL
DIFFERENTIAL METHOD BLD: ABNORMAL
EOSINOPHIL # BLD AUTO: 0 K/UL (ref 0–0.5)
EOSINOPHIL NFR BLD: 1.4 % (ref 0–8)
ERYTHROCYTE [DISTWIDTH] IN BLOOD BY AUTOMATED COUNT: 24.2 % (ref 11.5–14.5)
ERYTHROCYTE [SEDIMENTATION RATE] IN BLOOD BY WESTERGREN METHOD: 18 MM/H
EST. GFR  (NO RACE VARIABLE): 47 ML/MIN/1.73 M^2
ETHANOL SERPL-MCNC: 276 MG/DL
GLUCOSE SERPL-MCNC: 66 MG/DL (ref 70–110)
GLUCOSE UR QL STRIP: NEGATIVE
HCO3 UR-SCNC: 19.1 MMOL/L (ref 24–28)
HCT VFR BLD AUTO: 32.3 % (ref 37–48.5)
HGB BLD-MCNC: 11.3 G/DL (ref 12–16)
HGB UR QL STRIP: NEGATIVE
HYPOCHROMIA BLD QL SMEAR: ABNORMAL
IMM GRANULOCYTES # BLD AUTO: 0.01 K/UL (ref 0–0.04)
IMM GRANULOCYTES NFR BLD AUTO: 0.4 % (ref 0–0.5)
KETONES UR QL STRIP: ABNORMAL
LEUKOCYTE ESTERASE UR QL STRIP: NEGATIVE
LYMPHOCYTES # BLD AUTO: 0.6 K/UL (ref 1–4.8)
LYMPHOCYTES NFR BLD: 20.6 % (ref 18–48)
MAGNESIUM SERPL-MCNC: 2 MG/DL (ref 1.6–2.6)
MCH RBC QN AUTO: 27.9 PG (ref 27–31)
MCHC RBC AUTO-ENTMCNC: 35 G/DL (ref 32–36)
MCV RBC AUTO: 80 FL (ref 82–98)
MODE: ABNORMAL
MONOCYTES # BLD AUTO: 0.3 K/UL (ref 0.3–1)
MONOCYTES NFR BLD: 11.7 % (ref 4–15)
NEUTROPHILS # BLD AUTO: 1.8 K/UL (ref 1.8–7.7)
NEUTROPHILS NFR BLD: 64.8 % (ref 38–73)
NITRITE UR QL STRIP: NEGATIVE
NRBC BLD-RTO: 0 /100 WBC
PCO2 BLDA: 35.4 MMHG (ref 35–45)
PH SMN: 7.34 [PH] (ref 7.35–7.45)
PH UR STRIP: 5 [PH] (ref 5–8)
PLATELET # BLD AUTO: 95 K/UL (ref 150–450)
PLATELET BLD QL SMEAR: ABNORMAL
PMV BLD AUTO: 9.9 FL (ref 9.2–12.9)
PO2 BLDA: 33 MMHG (ref 40–60)
POC BE: -7 MMOL/L
POC SATURATED O2: 60 % (ref 95–100)
POC TCO2: 20 MMOL/L (ref 24–29)
POCT GLUCOSE: 182 MG/DL (ref 70–110)
POCT GLUCOSE: 53 MG/DL (ref 70–110)
POTASSIUM SERPL-SCNC: 5.1 MMOL/L (ref 3.5–5.1)
PROT SERPL-MCNC: 8.3 G/DL (ref 6–8.4)
PROT UR QL STRIP: NEGATIVE
RBC # BLD AUTO: 4.05 M/UL (ref 4–5.4)
SAMPLE: ABNORMAL
SITE: ABNORMAL
SODIUM SERPL-SCNC: 129 MMOL/L (ref 136–145)
SP GR UR STRIP: 1.01 (ref 1–1.03)
SP02: 99
TROPONIN I SERPL DL<=0.01 NG/ML-MCNC: 0.01 NG/ML (ref 0–0.03)
URN SPEC COLLECT METH UR: ABNORMAL
UROBILINOGEN UR STRIP-ACNC: NEGATIVE EU/DL
WBC # BLD AUTO: 2.82 K/UL (ref 3.9–12.7)

## 2024-10-13 PROCEDURE — 63600175 PHARM REV CODE 636 W HCPCS

## 2024-10-13 PROCEDURE — 25000003 PHARM REV CODE 250

## 2024-10-13 PROCEDURE — G0378 HOSPITAL OBSERVATION PER HR: HCPCS

## 2024-10-13 PROCEDURE — 96375 TX/PRO/DX INJ NEW DRUG ADDON: CPT

## 2024-10-13 PROCEDURE — 93010 ELECTROCARDIOGRAM REPORT: CPT | Mod: ,,, | Performed by: INTERNAL MEDICINE

## 2024-10-13 PROCEDURE — 96361 HYDRATE IV INFUSION ADD-ON: CPT

## 2024-10-13 PROCEDURE — 85025 COMPLETE CBC W/AUTO DIFF WBC: CPT | Performed by: EMERGENCY MEDICINE

## 2024-10-13 PROCEDURE — 99900035 HC TECH TIME PER 15 MIN (STAT)

## 2024-10-13 PROCEDURE — 94760 N-INVAS EAR/PLS OXIMETRY 1: CPT | Mod: XB

## 2024-10-13 PROCEDURE — 93005 ELECTROCARDIOGRAM TRACING: CPT

## 2024-10-13 PROCEDURE — 63600175 PHARM REV CODE 636 W HCPCS: Performed by: EMERGENCY MEDICINE

## 2024-10-13 PROCEDURE — 99291 CRITICAL CARE FIRST HOUR: CPT

## 2024-10-13 PROCEDURE — 96367 TX/PROPH/DG ADDL SEQ IV INF: CPT

## 2024-10-13 PROCEDURE — 82077 ASSAY SPEC XCP UR&BREATH IA: CPT | Performed by: EMERGENCY MEDICINE

## 2024-10-13 PROCEDURE — 96365 THER/PROPH/DIAG IV INF INIT: CPT

## 2024-10-13 PROCEDURE — 99285 EMERGENCY DEPT VISIT HI MDM: CPT | Mod: 25

## 2024-10-13 PROCEDURE — 82010 KETONE BODYS QUAN: CPT | Performed by: EMERGENCY MEDICINE

## 2024-10-13 PROCEDURE — 82803 BLOOD GASES ANY COMBINATION: CPT

## 2024-10-13 PROCEDURE — 83735 ASSAY OF MAGNESIUM: CPT | Performed by: EMERGENCY MEDICINE

## 2024-10-13 PROCEDURE — 36415 COLL VENOUS BLD VENIPUNCTURE: CPT | Performed by: EMERGENCY MEDICINE

## 2024-10-13 PROCEDURE — 80053 COMPREHEN METABOLIC PANEL: CPT | Performed by: EMERGENCY MEDICINE

## 2024-10-13 PROCEDURE — 25000003 PHARM REV CODE 250: Performed by: EMERGENCY MEDICINE

## 2024-10-13 PROCEDURE — 81003 URINALYSIS AUTO W/O SCOPE: CPT | Performed by: EMERGENCY MEDICINE

## 2024-10-13 PROCEDURE — 82962 GLUCOSE BLOOD TEST: CPT

## 2024-10-13 PROCEDURE — 84484 ASSAY OF TROPONIN QUANT: CPT | Performed by: EMERGENCY MEDICINE

## 2024-10-13 RX ORDER — GLUCAGON 1 MG
1 KIT INJECTION
Status: DISCONTINUED | OUTPATIENT
Start: 2024-10-13 | End: 2024-10-16 | Stop reason: HOSPADM

## 2024-10-13 RX ORDER — NALOXONE HCL 0.4 MG/ML
0.02 VIAL (ML) INJECTION
Status: DISCONTINUED | OUTPATIENT
Start: 2024-10-13 | End: 2024-10-16 | Stop reason: HOSPADM

## 2024-10-13 RX ORDER — ACETAMINOPHEN 325 MG/1
650 TABLET ORAL EVERY 4 HOURS PRN
Status: DISCONTINUED | OUTPATIENT
Start: 2024-10-13 | End: 2024-10-16 | Stop reason: HOSPADM

## 2024-10-13 RX ORDER — LEVOTHYROXINE SODIUM 100 UG/1
100 TABLET ORAL
Status: DISCONTINUED | OUTPATIENT
Start: 2024-10-14 | End: 2024-10-16 | Stop reason: HOSPADM

## 2024-10-13 RX ORDER — PROCHLORPERAZINE EDISYLATE 5 MG/ML
5 INJECTION INTRAMUSCULAR; INTRAVENOUS EVERY 6 HOURS PRN
Status: DISCONTINUED | OUTPATIENT
Start: 2024-10-13 | End: 2024-10-16 | Stop reason: HOSPADM

## 2024-10-13 RX ORDER — ONDANSETRON HYDROCHLORIDE 2 MG/ML
4 INJECTION, SOLUTION INTRAVENOUS
Status: COMPLETED | OUTPATIENT
Start: 2024-10-13 | End: 2024-10-13

## 2024-10-13 RX ORDER — PANTOPRAZOLE SODIUM 40 MG/10ML
40 INJECTION, POWDER, LYOPHILIZED, FOR SOLUTION INTRAVENOUS 2 TIMES DAILY
Status: DISCONTINUED | OUTPATIENT
Start: 2024-10-13 | End: 2024-10-15

## 2024-10-13 RX ORDER — ALUMINUM HYDROXIDE, MAGNESIUM HYDROXIDE, AND SIMETHICONE 1200; 120; 1200 MG/30ML; MG/30ML; MG/30ML
30 SUSPENSION ORAL 4 TIMES DAILY PRN
Status: DISCONTINUED | OUTPATIENT
Start: 2024-10-13 | End: 2024-10-16 | Stop reason: HOSPADM

## 2024-10-13 RX ORDER — IBUPROFEN 200 MG
24 TABLET ORAL
Status: DISCONTINUED | OUTPATIENT
Start: 2024-10-13 | End: 2024-10-16 | Stop reason: HOSPADM

## 2024-10-13 RX ORDER — AMOXICILLIN 250 MG
1 CAPSULE ORAL 2 TIMES DAILY PRN
Status: DISCONTINUED | OUTPATIENT
Start: 2024-10-13 | End: 2024-10-16 | Stop reason: HOSPADM

## 2024-10-13 RX ORDER — SODIUM CHLORIDE 0.9 % (FLUSH) 0.9 %
10 SYRINGE (ML) INJECTION EVERY 12 HOURS PRN
Status: DISCONTINUED | OUTPATIENT
Start: 2024-10-13 | End: 2024-10-16 | Stop reason: HOSPADM

## 2024-10-13 RX ORDER — DEXTROSE MONOHYDRATE AND SODIUM CHLORIDE 5; .9 G/100ML; G/100ML
INJECTION, SOLUTION INTRAVENOUS CONTINUOUS
Status: DISCONTINUED | OUTPATIENT
Start: 2024-10-13 | End: 2024-10-15

## 2024-10-13 RX ORDER — LORAZEPAM 2 MG/ML
1 INJECTION INTRAMUSCULAR
Status: DISCONTINUED | OUTPATIENT
Start: 2024-10-13 | End: 2024-10-16 | Stop reason: HOSPADM

## 2024-10-13 RX ORDER — THIAMINE HCL 100 MG
100 TABLET ORAL 3 TIMES DAILY
Status: DISCONTINUED | OUTPATIENT
Start: 2024-10-14 | End: 2024-10-16 | Stop reason: HOSPADM

## 2024-10-13 RX ORDER — FOLIC ACID 1 MG/1
1 TABLET ORAL DAILY
Status: DISCONTINUED | OUTPATIENT
Start: 2024-10-14 | End: 2024-10-16 | Stop reason: HOSPADM

## 2024-10-13 RX ORDER — MORPHINE SULFATE 2 MG/ML
2 INJECTION, SOLUTION INTRAMUSCULAR; INTRAVENOUS EVERY 4 HOURS PRN
Status: DISCONTINUED | OUTPATIENT
Start: 2024-10-13 | End: 2024-10-16 | Stop reason: HOSPADM

## 2024-10-13 RX ORDER — IBUPROFEN 200 MG
16 TABLET ORAL
Status: DISCONTINUED | OUTPATIENT
Start: 2024-10-13 | End: 2024-10-16 | Stop reason: HOSPADM

## 2024-10-13 RX ORDER — LANOLIN ALCOHOL/MO/W.PET/CERES
800 CREAM (GRAM) TOPICAL
Status: DISCONTINUED | OUTPATIENT
Start: 2024-10-13 | End: 2024-10-16 | Stop reason: HOSPADM

## 2024-10-13 RX ORDER — TALC
9 POWDER (GRAM) TOPICAL NIGHTLY PRN
Status: DISCONTINUED | OUTPATIENT
Start: 2024-10-13 | End: 2024-10-16 | Stop reason: HOSPADM

## 2024-10-13 RX ORDER — ONDANSETRON HYDROCHLORIDE 2 MG/ML
4 INJECTION, SOLUTION INTRAVENOUS EVERY 6 HOURS PRN
Status: DISCONTINUED | OUTPATIENT
Start: 2024-10-13 | End: 2024-10-16 | Stop reason: HOSPADM

## 2024-10-13 RX ORDER — CHLORDIAZEPOXIDE HYDROCHLORIDE 25 MG/1
25 CAPSULE, GELATIN COATED ORAL 3 TIMES DAILY
Status: DISCONTINUED | OUTPATIENT
Start: 2024-10-13 | End: 2024-10-16 | Stop reason: HOSPADM

## 2024-10-13 RX ADMIN — DEXTROSE AND SODIUM CHLORIDE: 5; 900 INJECTION, SOLUTION INTRAVENOUS at 11:10

## 2024-10-13 RX ADMIN — SODIUM CHLORIDE 1000 ML: 9 INJECTION, SOLUTION INTRAVENOUS at 09:10

## 2024-10-13 RX ADMIN — CHLORDIAZEPOXIDE HYDROCHLORIDE 25 MG: 25 CAPSULE ORAL at 11:10

## 2024-10-13 RX ADMIN — DEXTROSE MONOHYDRATE 250 ML: 100 INJECTION, SOLUTION INTRAVENOUS at 10:10

## 2024-10-13 RX ADMIN — PANTOPRAZOLE SODIUM 40 MG: 40 INJECTION, POWDER, LYOPHILIZED, FOR SOLUTION INTRAVENOUS at 11:10

## 2024-10-13 RX ADMIN — THIAMINE HYDROCHLORIDE 500 MG: 100 INJECTION, SOLUTION INTRAMUSCULAR; INTRAVENOUS at 09:10

## 2024-10-13 RX ADMIN — ONDANSETRON 4 MG: 2 INJECTION INTRAMUSCULAR; INTRAVENOUS at 10:10

## 2024-10-13 RX ADMIN — FOLIC ACID 1 MG: 5 INJECTION, SOLUTION INTRAMUSCULAR; INTRAVENOUS; SUBCUTANEOUS at 10:10

## 2024-10-14 LAB
ALBUMIN SERPL BCP-MCNC: 4.2 G/DL (ref 3.5–5.2)
ALP SERPL-CCNC: 78 U/L (ref 55–135)
ALT SERPL W/O P-5'-P-CCNC: 38 U/L (ref 10–44)
ANION GAP SERPL CALC-SCNC: 22 MMOL/L (ref 8–16)
AST SERPL-CCNC: 62 U/L (ref 10–40)
BILIRUB DIRECT SERPL-MCNC: 0.3 MG/DL (ref 0.1–0.3)
BILIRUB SERPL-MCNC: 0.7 MG/DL (ref 0.1–1)
BUN SERPL-MCNC: 11 MG/DL (ref 8–23)
C DIFF GDH STL QL: NEGATIVE
C DIFF TOX A+B STL QL IA: NEGATIVE
CALCIUM SERPL-MCNC: 9.4 MG/DL (ref 8.7–10.5)
CHLORIDE SERPL-SCNC: 94 MMOL/L (ref 95–110)
CO2 SERPL-SCNC: 17 MMOL/L (ref 23–29)
CREAT SERPL-MCNC: 1.1 MG/DL (ref 0.5–1.4)
EST. GFR  (NO RACE VARIABLE): 52 ML/MIN/1.73 M^2
GLUCOSE SERPL-MCNC: 74 MG/DL (ref 70–110)
MAGNESIUM SERPL-MCNC: 1.7 MG/DL (ref 1.6–2.6)
PHOSPHATE SERPL-MCNC: 2.9 MG/DL (ref 2.7–4.5)
POCT GLUCOSE: 116 MG/DL (ref 70–110)
POCT GLUCOSE: 140 MG/DL (ref 70–110)
POCT GLUCOSE: 161 MG/DL (ref 70–110)
POTASSIUM SERPL-SCNC: 4.8 MMOL/L (ref 3.5–5.1)
PROT SERPL-MCNC: 7.5 G/DL (ref 6–8.4)
SODIUM SERPL-SCNC: 133 MMOL/L (ref 136–145)

## 2024-10-14 PROCEDURE — 11000001 HC ACUTE MED/SURG PRIVATE ROOM

## 2024-10-14 PROCEDURE — 96361 HYDRATE IV INFUSION ADD-ON: CPT

## 2024-10-14 PROCEDURE — 25000003 PHARM REV CODE 250

## 2024-10-14 PROCEDURE — 97161 PT EVAL LOW COMPLEX 20 MIN: CPT

## 2024-10-14 PROCEDURE — 84100 ASSAY OF PHOSPHORUS: CPT

## 2024-10-14 PROCEDURE — 80053 COMPREHEN METABOLIC PANEL: CPT

## 2024-10-14 PROCEDURE — 83735 ASSAY OF MAGNESIUM: CPT

## 2024-10-14 PROCEDURE — 82248 BILIRUBIN DIRECT: CPT

## 2024-10-14 PROCEDURE — 63600175 PHARM REV CODE 636 W HCPCS

## 2024-10-14 PROCEDURE — 87324 CLOSTRIDIUM AG IA: CPT | Performed by: HOSPITALIST

## 2024-10-14 PROCEDURE — 36415 COLL VENOUS BLD VENIPUNCTURE: CPT

## 2024-10-14 PROCEDURE — 97116 GAIT TRAINING THERAPY: CPT

## 2024-10-14 PROCEDURE — 96375 TX/PRO/DX INJ NEW DRUG ADDON: CPT

## 2024-10-14 PROCEDURE — 27000207 HC ISOLATION

## 2024-10-14 PROCEDURE — 87449 NOS EACH ORGANISM AG IA: CPT | Performed by: HOSPITALIST

## 2024-10-14 PROCEDURE — 96376 TX/PRO/DX INJ SAME DRUG ADON: CPT

## 2024-10-14 RX ADMIN — CHLORDIAZEPOXIDE HYDROCHLORIDE 25 MG: 25 CAPSULE ORAL at 08:10

## 2024-10-14 RX ADMIN — PANTOPRAZOLE SODIUM 40 MG: 40 INJECTION, POWDER, LYOPHILIZED, FOR SOLUTION INTRAVENOUS at 08:10

## 2024-10-14 RX ADMIN — ONDANSETRON 4 MG: 2 INJECTION INTRAMUSCULAR; INTRAVENOUS at 08:10

## 2024-10-14 RX ADMIN — ONDANSETRON 4 MG: 2 INJECTION INTRAMUSCULAR; INTRAVENOUS at 07:10

## 2024-10-14 RX ADMIN — THIAMINE HCL TAB 100 MG 100 MG: 100 TAB at 10:10

## 2024-10-14 RX ADMIN — THIAMINE HCL TAB 100 MG 100 MG: 100 TAB at 03:10

## 2024-10-14 RX ADMIN — THERA TABS 1 TABLET: TAB at 10:10

## 2024-10-14 RX ADMIN — PANTOPRAZOLE SODIUM 40 MG: 40 INJECTION, POWDER, LYOPHILIZED, FOR SOLUTION INTRAVENOUS at 09:10

## 2024-10-14 RX ADMIN — LEVOTHYROXINE SODIUM 100 MCG: 0.1 TABLET ORAL at 05:10

## 2024-10-14 RX ADMIN — FOLIC ACID 1 MG: 1 TABLET ORAL at 10:10

## 2024-10-14 RX ADMIN — DEXTROSE AND SODIUM CHLORIDE: 5; 900 INJECTION, SOLUTION INTRAVENOUS at 10:10

## 2024-10-14 RX ADMIN — CHLORDIAZEPOXIDE HYDROCHLORIDE 25 MG: 25 CAPSULE ORAL at 03:10

## 2024-10-14 RX ADMIN — DEXTROSE AND SODIUM CHLORIDE: 5; 900 INJECTION, SOLUTION INTRAVENOUS at 07:10

## 2024-10-14 RX ADMIN — MELATONIN TAB 3 MG 9 MG: 3 TAB at 08:10

## 2024-10-14 RX ADMIN — CHLORDIAZEPOXIDE HYDROCHLORIDE 25 MG: 25 CAPSULE ORAL at 10:10

## 2024-10-14 RX ADMIN — PROCHLORPERAZINE EDISYLATE 5 MG: 5 INJECTION INTRAMUSCULAR; INTRAVENOUS at 12:10

## 2024-10-14 RX ADMIN — THIAMINE HCL TAB 100 MG 100 MG: 100 TAB at 08:10

## 2024-10-14 NOTE — MEDICAL/APP STUDENT
Progress Note  Hospital Medicine                                                              Team: Networked reference to record PCT      Patient Name: Nicolasa Madera   YOB: 1950 Age 74 y.o.  Location: [address] [place] [address] A    Admit Date: 10/13/2024                     LOS: 0    SUBJECTIVE:     HPI/Interval hx: 75 y/o F with a hx of alcohol abuse, hypothyroidism, and stroke was seen in the ED s/p fall. Patient felt imbalance after stepping out of her car and fell and hit her head. She endorsed binge drinking earlier in the day. Denies any LOC, hallucinations, anxiety, vomiting, and HA. CBC consistent with anemia. Hyponatremia and hypochloremia on CMP.  Initial anion gap of 27. LFT consistent with alcohol abuse. Metabolic acidosis confirmed on ABG.      Patient reports she is starting to feel better since being admitted. However, she has been very nauseous and had 1 episode of foul smelling diarrhea.    Hospital Course: 75 y/o admitted for alcoholic ketoacidosis. Patient was given NaCl bolus. 1 time dose of IV thiamine and folic acid. Started on continuous dextrose NaCl, daily thiamine, daily, folic acid. Stool culture ordered.          FOLLOW-UP FOR: Alcoholic ketoacidosis      MEDICATIONS:  Scheduled:   chlordiazepoxide  25 mg Oral TID    folic acid  1 mg Oral Daily    levothyroxine  100 mcg Oral Before breakfast    multivitamin  1 tablet Oral Daily    pantoprazole  40 mg Intravenous BID    thiamine  100 mg Oral TID     Continuous:   D5 and 0.9% NaCl   Intravenous Continuous 125 mL/hr at 10/14/24 1021 New Bag at 10/14/24 1021     PRN:    Current Facility-Administered Medications:     acetaminophen, 650 mg, Oral, Q4H PRN    aluminum-magnesium hydroxide-simethicone, 30 mL, Oral, QID PRN    dextrose 10%, 12.5 g, Intravenous, PRN    dextrose 10%, 25 g, Intravenous, PRN    glucagon (human recombinant), 1 mg, Intramuscular, PRN    glucose, 16 g, Oral, PRN    glucose, 24 g, Oral, PRN    lorazepam, 1 mg,  "Intravenous, Q2H PRN    magnesium oxide, 800 mg, Oral, PRN    magnesium oxide, 800 mg, Oral, PRN    melatonin, 9 mg, Oral, Nightly PRN    morphine, 2 mg, Intravenous, Q4H PRN    naloxone, 0.02 mg, Intravenous, PRN    ondansetron, 4 mg, Intravenous, Q6H PRN    potassium bicarbonate, 35 mEq, Oral, PRN    potassium bicarbonate, 50 mEq, Oral, PRN    potassium bicarbonate, 60 mEq, Oral, PRN    prochlorperazine, 5 mg, Intravenous, Q6H PRN    senna-docusate 8.6-50 mg, 1 tablet, Oral, BID PRN    sodium chloride 0.9%, 10 mL, Intravenous, Q12H PRN    ALLERGIES:  Review of patient's allergies indicates:  No Known Allergies    Review of Systems   Constitutional:  Negative for chills and fever.   Respiratory:  Negative for shortness of breath.    Cardiovascular:  Negative for chest pain.   Gastrointestinal:  Positive for nausea. Negative for vomiting.   Neurological:  Positive for tremors and weakness. Negative for dizziness and headaches.   Psychiatric/Behavioral:  Negative for hallucinations. The patient is not nervous/anxious.    All other systems reviewed and are negative.      OBJECTIVE:     VITALS  Most Recent Range (Last 24H)   /76 (BP Location: Right arm, Patient Position: Lying)   Pulse 103   Temp 98.1 °F (36.7 °C) (Oral)   Resp 18   Ht 5' 2" (1.575 m)   Wt 74.6 kg (164 lb 7.4 oz)   SpO2 98%   BMI 30.08 kg/m²   Intake and Output     Intake/Output Summary (Last 24 hours) at 10/14/2024 1035  Last data filed at 10/13/2024 2347  Gross per 24 hour   Intake 1450 ml   Output --   Net 1450 ml    Temp:  [97.9 °F (36.6 °C)-98.4 °F (36.9 °C)]   Pulse:  []   Resp:  [18-20]   BP: (107-158)/()   SpO2:  [98 %-100 %]      Physical Exam  Vitals and nursing note reviewed.   Constitutional:       General: She is not in acute distress.  Eyes:      Conjunctiva/sclera: Conjunctivae normal.      Pupils: Pupils are equal, round, and reactive to light.   Cardiovascular:      Rate and Rhythm: Normal rate and regular " rhythm.      Pulses: Normal pulses.      Heart sounds: Normal heart sounds.   Pulmonary:      Effort: Pulmonary effort is normal.      Breath sounds: Normal breath sounds.   Abdominal:      General: Abdomen is flat. Bowel sounds are normal.   Musculoskeletal:      Cervical back: Normal range of motion.   Skin:     Capillary Refill: Capillary refill takes less than 2 seconds.   Neurological:      General: No focal deficit present.      Mental Status: She is alert and oriented to person, place, and time. Mental status is at baseline.      Motor: Tremor present.   Psychiatric:         Mood and Affect: Mood normal.         Behavior: Behavior normal.         Thought Content: Thought content normal.         Judgment: Judgment normal.          LABS  CBC CMP   Recent Labs   Lab 10/13/24  1950   WBC 2.82*   HGB 11.3*   HCT 32.3*   MCV 80*   PLT 95*   RDW 24.2*    Recent Labs   Lab 10/13/24  1950 10/14/24  0420   * 133*   K 5.1 4.8   CL 87* 94*   CO2 15* 17*   BUN 14 11   CREATININE 1.2 1.1   GLU 66* 74   CALCIUM 9.9 9.4          LFTs  Recent Labs   Lab 10/13/24  1950 10/14/24  0420   PROT 8.3 7.5   BILITOT 0.6 0.7   ALKPHOS 84 78   AST 76* 62*   ALT 44 38      Urine  Recent Labs   Lab 10/13/24  2257   COLORU Colorless*   SPECGRAV 1.010   PHUR 5.0   PROTEINUA Negative   NITRITE Negative   LEUKOCYTESUR Negative   UROBILINOGEN Negative         Mag & Phos  Recent Labs   Lab 10/13/24  1950 10/14/24  0420   MG 2.0 1.7   PHOS  --  2.9        Cardiac Enzyme  Recent Labs   Lab 10/13/24  1950   TROPONINI 0.015     ABG  Recent Labs   Lab 10/13/24  2110   PH 7.339*   PO2 33*   PCO2 35.4   HCO3 19.1*   BE -7*       Radiology:      Imaging Results              CT Cervical Spine Without Contrast (Final result)  Result time 10/13/24 20:55:15      Final result by Avril Leary MD (10/13/24 20:55:15)                   Impression:      There is no acute fracture.    Retrolisthesis of C5 relative to C6 is likely related to  spondylosis.    Spondylosis and stenosis as detailed above.      Electronically signed by: Avril Leary  Date:    10/13/2024  Time:    20:55               Narrative:    EXAMINATION:  CT CERVICAL SPINE WITHOUT CONTRAST    CLINICAL HISTORY:  Neck trauma, intoxicated or obtunded (Age >= 16y);    TECHNIQUE:  Low dose axial images, sagittal and coronal reformations were performed though the cervical spine.  Contrast was not administered.    COMPARISON:  None    FINDINGS:  There is retrolisthesis of C5 relative to C6.  There is no acute fracture.    C2-C3:There is no stenosis.  Small disc protrusion noted.    C3-C4:There is no disc protrusion or canal or neural foraminal stenosis.    C4-C5:Dorsal disc osteophyte complex contributes to moderate canal stenosis.  Uncovertebral joint facet hypertrophic changes contribute to moderate bilateral neural foraminal stenosis.    C5-C6:Dorsal disc osteophyte complex formation contributes to mild canal stenosis and moderate bilateral neural foraminal stenosis.    C6-C7:There is a small central disc protrusion.  Uncovertebral joint facet hypertrophic changes contribute to severe right neural foraminal stenosis.    C7-T1:There is no canal or neural foraminal stenosis.    The soft tissue structures visualized in the neck are unremarkable.    The airway is patent and the lung apices are unremarkable.                                       CT Head Without Contrast (Final result)  Result time 10/13/24 20:41:13      Final result by Avril Leary MD (10/13/24 20:41:13)                   Impression:      No acute intracranial abnormality or fracture.    Microvascular chronic ischemic changes.      Electronically signed by: Avril Leary  Date:    10/13/2024  Time:    20:41               Narrative:    EXAMINATION:  CT HEAD WITHOUT CONTRAST    CLINICAL HISTORY:  Head trauma, moderate-severe;    TECHNIQUE:  Low dose axial images were obtained through the head.  Coronal and sagittal  reformations were also performed. Contrast was not administered.  Rapid AI software was utilized to evaluate for intracranial hemorrhage.    COMPARISON:  None.    FINDINGS:  There is no acute intracranial hemorrhage.  The gray-white matter junction differentiation is intact.  There is prominence of the ventricles, cisterns and sulci.  Patchy deep white matter low density as seen with microvascular chronic ischemic changes.  There is no mass or mass effect.  Posterior fossa structures pituitary gland are unremarkable as imaged.  Paranasal sinuses, mastoid air cells and middle ears are clear.  The bony calvarium is intact.                                     CT HEAD WITHOUT CONTRAST  CT CERVICAL SPINE WITHOUT CONTRAST    Microbiology:    Microbiology Results (last 7 days)       Procedure Component Value Units Date/Time    Clostridium difficile EIA [[id number]]     Order Status: No result Specimen: Stool             ASSESSMENT/PLAN:     Current Problems List:  Active Hospital Problems    Diagnosis  POA    *Alcoholic ketoacidosis [E87.29]  Yes    Thrombocytopenia [D69.6]  Yes    Stage 3 chronic kidney disease [N18.30]  Yes    Hyponatremia [E87.1]  Yes      Resolved Hospital Problems   No resolved problems to display.       ASSESSMENT/PLAN:  Alcoholic ketoacidosis  -Daily CMP  -Continuous 5% dextrose and .9% NaCl infusion  -Folic acid 1mg TID  -Thiamine 100 mg TID  -chlordiazepoxide 25 mg TID  -lorazepam injection PRN     Thrombocytopenia    Stage 3 chronic kidney disease    Hyponatremia        Diet: Regular Diet  DVT Prophylaxis:SCD placed  Dispo: Pending hospital course   Code Status: Full code     Babak JACKSON

## 2024-10-14 NOTE — SUBJECTIVE & OBJECTIVE
Past Medical History:   Diagnosis Date    Encounter for blood transfusion     Hypothyroidism     Peptic ulcer with hemorrhage     Peptic ulcer, site unspecified, unspecified as acute or chronic, without hemorrhage or perforation     Peptic ulcer disease    Stroke 2017    no residual effects.     Urticaria        Past Surgical History:   Procedure Laterality Date    ESOPHAGOGASTRODUODENOSCOPY N/A 4/18/2021    Procedure: EGD (ESOPHAGOGASTRODUODENOSCOPY);  Surgeon: Crow Vigil MD;  Location: [facility] ENDO;  Service: Endoscopy;  Laterality: N/A;    ESOPHAGOGASTRODUODENOSCOPY N/A 8/31/2021    Procedure: EGD (ESOPHAGOGASTRODUODENOSCOPY);  Surgeon: Crow Vigil MD;  Location: [facility] ENDO;  Service: Endoscopy;  Laterality: N/A;       Review of patient's allergies indicates:  No Known Allergies    No current facility-administered medications on file prior to encounter.     Current Outpatient Medications on File Prior to Encounter   Medication Sig    biotin 5,000 mcg TbDL Take 1 tablet by mouth once daily.    hydrOXYzine HCL (ATARAX) 25 MG tablet TAKE 1 TABLET THREE TIMES DAILY AS NEEDED FOR ITCHING    levothyroxine (SYNTHROID) 100 MCG tablet Take 1 tablet (100 mcg total) by mouth before breakfast.    multivit-min/iron/folic/lutein (CENTRUM SILVER WOMEN ORAL) Take 1 tablet by mouth once daily.    omega-3 fatty acids/fish oil (FISH OIL-OMEGA-3 FATTY ACIDS) 300-1,000 mg capsule Take 1 capsule by mouth once daily.    pantoprazole (PROTONIX) 40 MG tablet Take 1 tablet (40 mg total) by mouth 2 (two) times daily.    vit B complex no.12/niacin,B3, (VITAMIN B COMPLEX NO.12-NIACIN ORAL) Take 1 tablet by mouth.     Family History       Problem Relation (Age of Onset)    Breast cancer Paternal Aunt, Paternal Grandmother    Stroke Father (70)          Tobacco Use    Smoking status: Never    Smokeless tobacco: Never   Substance and Sexual Activity    Alcohol use: Yes     Alcohol/week: 5.0 standard drinks of alcohol     Types: 5  Glasses of wine per week    Drug use: Never    Sexual activity: Not Currently     Review of Systems   Constitutional:  Positive for activity change. Negative for appetite change, chills, diaphoresis, fatigue and fever.   Eyes:  Negative for visual disturbance.   Respiratory:  Negative for cough and shortness of breath.    Cardiovascular:  Negative for chest pain and leg swelling.   Gastrointestinal:  Negative for abdominal distention, abdominal pain, blood in stool, nausea and vomiting.   Genitourinary:  Negative for difficulty urinating and hematuria.   Musculoskeletal:  Negative for back pain.   Neurological:  Positive for weakness. Negative for dizziness, syncope, facial asymmetry, speech difficulty, light-headedness, numbness and headaches.     Objective:     Vital Signs (Most Recent):  Temp: 97.9 °F (36.6 °C) (10/13/24 1915)  Pulse: 95 (10/13/24 2300)  Resp: 18 (10/13/24 2300)  BP: 139/74 (10/13/24 2300)  SpO2: 99 % (10/13/24 2300) Vital Signs (24h Range):  Temp:  [97.9 °F (36.6 °C)] 97.9 °F (36.6 °C)  Pulse:  [80-95] 95  Resp:  [18-20] 18  SpO2:  [98 %-100 %] 99 %  BP: (139-158)/() 139/74     Weight: 74.8 kg (165 lb)  Body mass index is 30.18 kg/m².     Physical Exam  Vitals and nursing note reviewed.   Constitutional:       Appearance: She is not ill-appearing.   Eyes:      Pupils: Pupils are equal, round, and reactive to light.   Cardiovascular:      Rate and Rhythm: Normal rate and regular rhythm.      Pulses: Normal pulses.      Heart sounds: Normal heart sounds.   Pulmonary:      Effort: Pulmonary effort is normal. No respiratory distress.      Breath sounds: Normal breath sounds. No wheezing.   Abdominal:      General: Bowel sounds are normal. There is no distension.      Palpations: Abdomen is soft.      Tenderness: There is no abdominal tenderness.   Musculoskeletal:      Right lower leg: No edema.      Left lower leg: No edema.   Skin:     General: Skin is warm and dry.      Capillary Refill:  Capillary refill takes less than 2 seconds.   Neurological:      Mental Status: She is alert and oriented to person, place, and time.      GCS: GCS eye subscore is 4. GCS verbal subscore is 5. GCS motor subscore is 6.   Psychiatric:         Mood and Affect: Mood normal.              CRANIAL NERVES     CN III, IV, VI   Pupils are equal, round, and reactive to light.       Significant Labs: All pertinent labs within the past 24 hours have been reviewed.    ABGs:   Recent Labs   Lab 10/13/24  2110   PH 7.339*   PCO2 35.4   HCO3 19.1*   POCSATURATED 60   BE -7*   PO2 33*     Bilirubin:   Recent Labs   Lab 10/13/24  1950   BILITOT 0.6       BMP:   Recent Labs   Lab 10/13/24  1950   GLU 66*   *   K 5.1   CL 87*   CO2 15*   BUN 14   CREATININE 1.2   CALCIUM 9.9   MG 2.0     CBC:   Recent Labs   Lab 10/13/24  1950   WBC 2.82*   HGB 11.3*   HCT 32.3*   PLT 95*     CMP:   Recent Labs   Lab 10/13/24  1950   *   K 5.1   CL 87*   CO2 15*   GLU 66*   BUN 14   CREATININE 1.2   CALCIUM 9.9   PROT 8.3   ALBUMIN 4.7   BILITOT 0.6   ALKPHOS 84   AST 76*   ALT 44   ANIONGAP 27*       Magnesium:   Recent Labs   Lab 10/13/24  1950   MG 2.0     POCT Glucose:   Recent Labs   Lab 10/13/24  2144 10/13/24  2301   POCTGLUCOSE 53* 182*       Troponin:   Recent Labs   Lab 10/13/24  1950   TROPONINI 0.015       Urine Studies:   Recent Labs   Lab 10/13/24  2257   COLORU Colorless*   APPEARANCEUA Clear   PHUR 5.0   SPECGRAV 1.010   PROTEINUA Negative   GLUCUA Negative   KETONESU 1+*   BILIRUBINUA Negative   OCCULTUA Negative   NITRITE Negative   UROBILINOGEN Negative   LEUKOCYTESUR Negative       Significant Imaging: I have reviewed all pertinent imaging results/findings within the past 24 hours.    CT Cervical Spine Without Contrast  Order: [id number]  Status: Final result       Visible to patient: No (inaccessible in MyChart)       Next appt: None    0 Result Notes  Details    Reading Physician Reading Date Result Priority    Avril Leary MD  [phone] 10/13/2024 STAT     Narrative & Impression  EXAMINATION:  CT CERVICAL SPINE WITHOUT CONTRAST     CLINICAL HISTORY:  Neck trauma, intoxicated or obtunded (Age >= 16y);     TECHNIQUE:  Low dose axial images, sagittal and coronal reformations were performed though the cervical spine.  Contrast was not administered.     COMPARISON:  None     FINDINGS:  There is retrolisthesis of C5 relative to C6.  There is no acute fracture.     C2-C3:There is no stenosis.  Small disc protrusion noted.     C3-C4:There is no disc protrusion or canal or neural foraminal stenosis.     C4-C5:Dorsal disc osteophyte complex contributes to moderate canal stenosis.  Uncovertebral joint facet hypertrophic changes contribute to moderate bilateral neural foraminal stenosis.     C5-C6:Dorsal disc osteophyte complex formation contributes to mild canal stenosis and moderate bilateral neural foraminal stenosis.     C6-C7:There is a small central disc protrusion.  Uncovertebral joint facet hypertrophic changes contribute to severe right neural foraminal stenosis.     C7-T1:There is no canal or neural foraminal stenosis.     The soft tissue structures visualized in the neck are unremarkable.     The airway is patent and the lung apices are unremarkable.     Impression:     There is no acute fracture.     Retrolisthesis of C5 relative to C6 is likely related to spondylosis.     Spondylosis and stenosis as detailed above.        Electronically signed by:Avril Leary  Date:                                            10/13/2024  Time:                                           20:55        Exam Ended: 10/13/24 20:29 CDT Last Resulted: 10/13/24 20:55 CDT     CT Head Without Contrast  Order: [id number]  Status: Final result       Visible to patient: No (inaccessible in MyChart)       Next appt: None    0 Result Notes  Details    Reading Physician Reading Date Result Priority   Avril Leary MD  [phone]  10/13/2024 STAT     Narrative & Impression  EXAMINATION:  CT HEAD WITHOUT CONTRAST     CLINICAL HISTORY:  Head trauma, moderate-severe;     TECHNIQUE:  Low dose axial images were obtained through the head.  Coronal and sagittal reformations were also performed. Contrast was not administered.  Rapid [employer] software was utilized to evaluate for intracranial hemorrhage.     COMPARISON:  None.     FINDINGS:  There is no acute intracranial hemorrhage.  The gray-white matter junction differentiation is intact.  There is prominence of the ventricles, cisterns and sulci.  Patchy deep white matter low density as seen with microvascular chronic ischemic changes.  There is no mass or mass effect.  Posterior fossa structures pituitary gland are unremarkable as imaged.  Paranasal sinuses, mastoid air cells and middle ears are clear.  The bony calvarium is intact.     Impression:     No acute intracranial abnormality or fracture.     Microvascular chronic ischemic changes.        Electronically signed by:Avril Leary  Date:                                            10/13/2024  Time:                                           20:41        Exam Ended: 10/13/24 20:28 CDT Last Resulted: 10/13/24 20:41 CDT

## 2024-10-14 NOTE — HPI
"Nicolasa Madera is a 74 year old female with a past medical history of EtOH abuse, peptic ulcer disease, GI bleed, hypothyroidism, stroke, and CKD 3 presents with complaints of falling in a convenience store and hitting head several hours prior to arrival.  She reports getting out of the car, tripping, fell, and hit the back of her head.  She reports a history of drinking glasses of wine "more than she should." She reports binge drinking earlier today.  She denies chest pain, shortness of breath, fever, chills, dizziness, lightheadedness, headaches, vision changes, numbness/tingling, abdominal pain, nausea, or vomiting.  ED workup reveals:  CT head with no acute intracranial abnormality or fracture.  CT cervical spine with no acute fractures but with retrolisthesis of C5 relative to C6 of chronic spondylosis and stenosis.  CBC with chronic white count 2.8, thrombocytopenia 95, and stable H&H.  BMP reflective of acidosis, hyponatremia 129, hypochloremia 87, bicarb 15, anion gap 27, creatinine 1.2, and glucose 66.  Elevated Beta hydroxybutyrate 5.9, and elevated alcohol levels.  LFTs unremarkable other than AST 76.  VBG with pH 7.33/pCO2 35.2/PO2 33/bicarb 19.1 and base excess -7.  She received amp of D50, folic and thiamine p.o., and fluids in ED. Started on CIWA precautions.  Admitted to hospital medicine for further management and treatment.                 "

## 2024-10-14 NOTE — HOSPITAL COURSE
74 year old female admitted for alcohol ketoacidosis.  She was started on IV fluids.  CT head and CT c-spine negative for acute findings.  Patient placed on CIWA precautions with scheduled librium and PRN ativan for alcohol withdrawal symptoms.  Labs were trended.  PT/OT consulted. C diff stool studies were obtained and were negative. Patient with improvement of ion gap.  IV fluids transitioned to oral hydration.  CBC and BMP trended.  She was noted to have pancytopenia which has been seen on labs since 2021.  Patient tolerated diet and CO2 and anion gap normalized.  Electrolytes were replaced. The patient was seen on day of discharge.  Patient was eager for discharge.  Librium taper was ordered on discharge and instructed importance of alcohol cessation with the patient was voiced understanding.  Outpatient referral to addiction medicine was sent.  Patient to follow up with PCP.  Home health was ordered at discharge.  Return precautions discussed and patient voiced understanding.

## 2024-10-14 NOTE — NURSING
Awake, alert and oriented x4. Reviewed room/unit assigned. Questions answered and encouraged. 20g to lt upper arm with site free of s/s of infiltration. /66, 91 HR, 99% RA.

## 2024-10-14 NOTE — ASSESSMENT & PLAN NOTE
-continuous IV fluids  -cardiac monitoring  -CIWA precaution  -electrolyte replacements p.r.n.  -fall precautions  -schedule and p.r.n. benzos  -PPI b.i.d.  -antiemetics p.r.n.  -administer mvi, folic acid, and thiamine  -hepatic panel pending

## 2024-10-14 NOTE — PLAN OF CARE
Problem: Physical Therapy  Goal: Physical Therapy Goal  Description: Goals to be met by: 10/28/24     Patient will increase functional independence with mobility by performin. Supine to sit with [place]  2. Sit to supine with [place]  3. Sit to stand transfer with Stand-by Assistance  4. Bed to chair transfer with Stand-by Assistance using No Assistive Device  5. Gait  x 150 feet with Stand-by Assistance using No Assistive Device.     Outcome: Progressing

## 2024-10-14 NOTE — PROGRESS NOTES
"[facility] Medicine  Progress Note    Patient Name: Nicolasa Madera  MRN: [id number]  Patient Class: IP- Inpatient   Admission Date: 10/13/2024  Length of Stay: 0 days  Attending Physician: Anel Marina MD  Primary Care Provider: Page Garcia PA-C        Subjective:     Principal Problem:Alcoholic ketoacidosis        HPI:  Nicolasa Madera is a 74 year old female with a past medical history of EtOH abuse, peptic ulcer disease, GI bleed, hypothyroidism, stroke, and CKD 3 presents with complaints of falling in a convenience store and hitting head several hours prior to arrival.  She reports getting out of the car, tripping, fell, and hit the back of her head.  She reports a history of drinking glasses of wine "more than she should." She reports binge drinking earlier today.  She denies chest pain, shortness of breath, fever, chills, dizziness, lightheadedness, headaches, vision changes, numbness/tingling, abdominal pain, nausea, or vomiting.  ED workup reveals:  CT head with no acute intracranial abnormality or fracture.  CT cervical spine with no acute fractures but with retrolisthesis of C5 relative to C6 of chronic spondylosis and stenosis.  CBC with chronic white count 2.8, thrombocytopenia 95, and stable H&H.  BMP reflective of acidosis, hyponatremia 129, hypochloremia 87, bicarb 15, anion gap 27, creatinine 1.2, and glucose 66.  Elevated Beta hydroxybutyrate 5.9, and elevated alcohol levels.  LFTs unremarkable other than AST 76.  VBG with pH 7.33/pCO2 35.2/PO2 33/bicarb 19.1 and base excess -7.  She received amp of D50, folic and thiamine p.o., and fluids in ED. Started on CIWA precautions.  Admitted to hospital medicine for further management and treatment.                   Overview/Hospital Course:  74 year old female admitted for alcohol ketoacidosis.  She was started on IV fluids.  CT head and CT c-spine negative for acute findings.  Patient placed on CIWA " "precautions with scheduled librium and PRN ativan for alcohol withdrawal symptoms.  Labs were trended.  PT/OT consulted.     Interval History: Patient seen in bed.  NAD.  Alert and oriented.  Complains of nausea, no abd pain or vomiting.  Denies further complaints.    Acidosis slowly improving, continue fluids.      Review of Systems   Respiratory:  Negative for shortness of breath.    Cardiovascular:  Negative for chest pain.   Gastrointestinal:  Positive for nausea. Negative for abdominal pain and vomiting.   Neurological:  Positive for weakness (generalized).     Objective:     Vital Signs (Most Recent):  Temp: 98.3 °F (36.8 °C) (10/14/24 1132)  Pulse: 95 (10/14/24 1132)  Resp: 18 (10/14/24 1132)  BP: (!) 146/65 (10/14/24 1132)  SpO2: 97 % (10/14/24 1132) Vital Signs (24h Range):  Temp:  [97.9 °F (36.6 °C)-98.4 °F (36.9 °C)] 98.3 °F (36.8 °C)  Pulse:  [] 95  Resp:  [18-20] 18  SpO2:  [97 %-100 %] 97 %  BP: (107-158)/() 146/65     Weight: 74.6 kg (164 lb 7.4 oz)  Body mass index is 30.08 kg/m².    Intake/Output Summary (Last 24 hours) at 10/14/2024 1640  Last data filed at 10/13/2024 2347  Gross per 24 hour   Intake 1450 ml   Output --   Net 1450 ml         Physical Exam  Vitals and nursing note reviewed.   Constitutional:       General: She is not in acute distress.  Cardiovascular:      Rate and Rhythm: Normal rate and regular rhythm.   Pulmonary:      Effort: Pulmonary effort is normal. No respiratory distress.      Breath sounds: Normal breath sounds.   Abdominal:      Palpations: Abdomen is soft.      Tenderness: There is no abdominal tenderness.   Skin:     General: Skin is warm and dry.   Neurological:      Mental Status: She is alert and oriented to person, place, and time.             Significant Labs: All pertinent labs within the past 24 hours have been reviewed.  Blood Culture: No results for input(s): "LABBLOO" in the last 48 hours.  CBC:   Recent Labs   Lab 10/13/24  1950   WBC 2.82* "   HGB 11.3*   HCT 32.3*   PLT 95*     CMP:   Recent Labs   Lab 10/13/24  1950 10/14/24  0420   * 133*   K 5.1 4.8   CL 87* 94*   CO2 15* 17*   GLU 66* 74   BUN 14 11   CREATININE 1.2 1.1   CALCIUM 9.9 9.4   PROT 8.3 7.5   ALBUMIN 4.7 4.2   BILITOT 0.6 0.7   ALKPHOS 84 78   AST 76* 62*   ALT 44 38   ANIONGAP 27* 22*     Magnesium:   Recent Labs   Lab 10/13/24  1950 10/14/24  0420   MG 2.0 1.7     Urine Studies:   Recent Labs   Lab 10/13/24  2257   COLORU Colorless*   APPEARANCEUA Clear   PHUR 5.0   SPECGRAV 1.010   PROTEINUA Negative   GLUCUA Negative   KETONESU 1+*   BILIRUBINUA Negative   OCCULTUA Negative   NITRITE Negative   UROBILINOGEN Negative   LEUKOCYTESUR Negative       Significant Imaging: I have reviewed all pertinent imaging results/findings within the past 24 hours.    Assessment/Plan:      * Alcoholic ketoacidosis  -continuous IV fluids  -cardiac monitoring  -CIWA precaution  -electrolyte replacements p.r.n.  -fall precautions  -schedule and p.r.n. benzos  -PPI b.i.d.  -antiemetics p.r.n.  -administer mvi, folic acid, and thiamine  -hepatic panel pending      Thrombocytopenia  The likely etiology of thrombocytopenia is  unknown . The patients 3 most recent labs are listed below.  Recent Labs     10/13/24  1950   PLT 95*       Plan  - Will transfuse if platelet count is <50k (if undergoing surgical procedure or have active bleeding).        Hyponatremia  Hyponatremia is likely due to Dehydration/hypovolemia. The patient's most recent sodium results are listed below.  Recent Labs     10/13/24  1950 10/14/24  0420   * 133*       Plan  - Correct the sodium by 4-6mEq in 24 hours.   - Obtain the following studies: Urine sodium, urine osmolality, serum osmolality.  - Will treat the hyponatremia with IV fluids as follows:  D5 normal saline  - Monitor sodium Daily.   - Patient hyponatremia is stable      Stage 3 chronic kidney disease  Creatine stable for now. BMP reviewed- noted Estimated  Creatinine Clearance: 41.1 mL/min (based on SCr of 1.1 mg/dL). according to latest data. Based on current GFR, CKD stage is stage 3 - GFR 30-59.  Monitor UOP and serial BMP and adjust therapy as needed. Renally dose meds. Avoid nephrotoxic medications and procedures.      VTE Risk Mitigation (From admission, onward)           Ordered     IP VTE HIGH RISK PATIENT  Once         10/13/24 2252     Place sequential compression device  Until discontinued         10/13/24 2252                    Discharge Planning   SYLWIA: 10/16/2024     Code Status: Full Code   Is the patient medically ready for discharge?:     Reason for patient still in hospital (select all that apply): Patient trending condition, Laboratory test, and Treatment  Discharge Plan A: Home                  Elisa Nicholas NP  Department of Hospital Medicine   [facility]/Surg

## 2024-10-14 NOTE — PT/OT/SLP PROGRESS
Occupational Therapy      Patient Name:  Nicolasa Madera   MRN:  [id number]    Patient not seen today secondary to patient was unavailable and was with PT at 11:00 and patient declined at 1425 due to fatigue. Patient stated she did not sleep well last night. Will follow-up 10/15/2024.    10/14/2024

## 2024-10-14 NOTE — ASSESSMENT & PLAN NOTE
The likely etiology of thrombocytopenia is  unknown . The patients 3 most recent labs are listed below.  Recent Labs     10/13/24  1950   PLT 95*       Plan  - Will transfuse if platelet count is <50k (if undergoing surgical procedure or have active bleeding).

## 2024-10-14 NOTE — SUBJECTIVE & OBJECTIVE
"Interval History: Patient seen in bed.  NAD.  Alert and oriented.  Complains of nausea, no abd pain or vomiting.  Denies further complaints.    Acidosis slowly improving, continue fluids.      Review of Systems   Respiratory:  Negative for shortness of breath.    Cardiovascular:  Negative for chest pain.   Gastrointestinal:  Positive for nausea. Negative for abdominal pain and vomiting.   Neurological:  Positive for weakness (generalized).     Objective:     Vital Signs (Most Recent):  Temp: 98.3 °F (36.8 °C) (10/14/24 1132)  Pulse: 95 (10/14/24 1132)  Resp: 18 (10/14/24 1132)  BP: (!) 146/65 (10/14/24 1132)  SpO2: 97 % (10/14/24 1132) Vital Signs (24h Range):  Temp:  [97.9 °F (36.6 °C)-98.4 °F (36.9 °C)] 98.3 °F (36.8 °C)  Pulse:  [] 95  Resp:  [18-20] 18  SpO2:  [97 %-100 %] 97 %  BP: (107-158)/() 146/65     Weight: 74.6 kg (164 lb 7.4 oz)  Body mass index is 30.08 kg/m².    Intake/Output Summary (Last 24 hours) at 10/14/2024 1640  Last data filed at 10/13/2024 2347  Gross per 24 hour   Intake 1450 ml   Output --   Net 1450 ml         Physical Exam  Vitals and nursing note reviewed.   Constitutional:       General: She is not in acute distress.  Cardiovascular:      Rate and Rhythm: Normal rate and regular rhythm.   Pulmonary:      Effort: Pulmonary effort is normal. No respiratory distress.      Breath sounds: Normal breath sounds.   Abdominal:      Palpations: Abdomen is soft.      Tenderness: There is no abdominal tenderness.   Skin:     General: Skin is warm and dry.   Neurological:      Mental Status: She is alert and oriented to person, place, and time.             Significant Labs: All pertinent labs within the past 24 hours have been reviewed.  Blood Culture: No results for input(s): "LABBLOO" in the last 48 hours.  CBC:   Recent Labs   Lab 10/13/24  1950   WBC 2.82*   HGB 11.3*   HCT 32.3*   PLT 95*     CMP:   Recent Labs   Lab 10/13/24  1950 10/14/24  0420   * 133*   K 5.1 4.8   CL 87* " 94*   CO2 15* 17*   GLU 66* 74   BUN 14 11   CREATININE 1.2 1.1   CALCIUM 9.9 9.4   PROT 8.3 7.5   ALBUMIN 4.7 4.2   BILITOT 0.6 0.7   ALKPHOS 84 78   AST 76* 62*   ALT 44 38   ANIONGAP 27* 22*     Magnesium:   Recent Labs   Lab 10/13/24  1950 10/14/24  0420   MG 2.0 1.7     Urine Studies:   Recent Labs   Lab 10/13/24  2257   COLORU Colorless*   APPEARANCEUA Clear   PHUR 5.0   SPECGRAV 1.010   PROTEINUA Negative   GLUCUA Negative   KETONESU 1+*   BILIRUBINUA Negative   OCCULTUA Negative   NITRITE Negative   UROBILINOGEN Negative   LEUKOCYTESUR Negative       Significant Imaging: I have reviewed all pertinent imaging results/findings within the past 24 hours.

## 2024-10-14 NOTE — ASSESSMENT & PLAN NOTE
Hyponatremia is likely due to Dehydration/hypovolemia. The patient's most recent sodium results are listed below.  Recent Labs     10/13/24  1950   *     Plan  - Correct the sodium by 4-6mEq in 24 hours.   - Obtain the following studies: Urine sodium, urine osmolality, serum osmolality.  - Will treat the hyponatremia with IV fluids as follows:  D5 normal saline  - Monitor sodium Daily.   - Patient hyponatremia is stable

## 2024-10-14 NOTE — PLAN OF CARE
"Plan of care review with pt, pt states "understanding," IVF infusing without difficulty, no redness/swelling noted to IV site, place on contact isolation to r/o c.diff, d/t multiple diarrhea episodes, pt noted to have light tremors and slight agitation, will continue to monitor, observe and note any changes, safety maintain    "

## 2024-10-14 NOTE — ED PROVIDER NOTES
Encounter Date: 10/13/2024       History     Chief Complaint   Patient presents with    Alcohol Intoxication     Fell at home, no c/o pain       74-year-old female brought in by EMS after a witnessed fall at a local convenience store.  Per EMS, the fall was witnessed by staff, says that she lost her balance and fell backwards, hitting her head.  There was no reported LOC. patient has a large occipital hematoma but denies any pain anywhere.  She does admit to heavy alcohol use the last several days.  She lives by herself, had taken an Uber to the store.    The history is provided by the patient.     Review of patient's allergies indicates:  No Known Allergies  Past Medical History:   Diagnosis Date    Encounter for blood transfusion     Hypothyroidism     Peptic ulcer with hemorrhage     Peptic ulcer, site unspecified, unspecified as acute or chronic, without hemorrhage or perforation     Peptic ulcer disease    Stroke 2017    no residual effects.     Urticaria      Past Surgical History:   Procedure Laterality Date    ESOPHAGOGASTRODUODENOSCOPY N/A 4/18/2021    Procedure: EGD (ESOPHAGOGASTRODUODENOSCOPY);  Surgeon: Crow Vigil MD;  Location: [facility];  Service: Endoscopy;  Laterality: N/A;    ESOPHAGOGASTRODUODENOSCOPY N/A 8/31/2021    Procedure: EGD (ESOPHAGOGASTRODUODENOSCOPY);  Surgeon: Crow Vigil MD;  Location: [facility];  Service: Endoscopy;  Laterality: N/A;     Family History   Problem Relation Name Age of Onset    Stroke Father  70    Breast cancer Paternal Aunt      Breast cancer Paternal Grandmother       Social History     Tobacco Use    Smoking status: Never    Smokeless tobacco: Never   Substance Use Topics    Alcohol use: Yes     Alcohol/week: 5.0 standard drinks of alcohol     Types: 5 Glasses of wine per week    Drug use: Never     Review of Systems   Neurological:  Negative for syncope.   All other systems reviewed and are negative.      Physical Exam     Initial Vitals [10/13/24 1915]    BP Pulse Resp Temp SpO2   (!) 158/100 88 20 97.9 °F (36.6 °C) 98 %      MAP       --         Physical Exam    Nursing note and vitals reviewed.  Constitutional: She appears well-developed and well-nourished. She is not diaphoretic. No distress.   HENT:   Head: Normocephalic.   Occipital hematoma   Eyes: Conjunctivae are normal.   Neck: Neck supple.   No bony cervical tenderness   Normal range of motion.  Cardiovascular:  Normal rate.           Pulmonary/Chest: No respiratory distress.   Abdominal: She exhibits no distension.   Musculoskeletal:         General: No edema.      Cervical back: Normal range of motion and neck supple.     Neurological: She is alert. She has normal strength.   Oriented to person and place, slurred speech, moves all extremities, no focal deficits   Skin: Skin is warm and dry.   Psychiatric: She has a normal mood and affect.         ED Course   Procedures  Labs Reviewed   CBC W/ AUTO DIFFERENTIAL - Abnormal       Result Value    WBC 2.82 (*)     RBC 4.05      Hemoglobin 11.3 (*)     Hematocrit 32.3 (*)     MCV 80 (*)     MCH 27.9      MCHC 35.0      RDW 24.2 (*)     Platelets 95 (*)     MPV 9.9      Immature Granulocytes 0.4      Gran # (ANC) 1.8      Immature Grans (Abs) 0.01      Lymph # 0.6 (*)     Mono # 0.3      Eos # 0.0      Baso # 0.03      nRBC 0      Gran % 64.8      Lymph % 20.6      Mono % 11.7      Eosinophil % 1.4      Basophil % 1.1      Platelet Estimate Decreased (*)     Aniso Slight      Hypo Occasional      Differential Method Automated     COMPREHENSIVE METABOLIC PANEL - Abnormal    Sodium 129 (*)     Potassium 5.1      Chloride 87 (*)     CO2 15 (*)     Glucose 66 (*)     BUN 14      Creatinine 1.2      Calcium 9.9      Total Protein 8.3      Albumin 4.7      Total Bilirubin 0.6      Alkaline Phosphatase 84      AST 76 (*)     ALT 44      eGFR 47 (*)     Anion Gap 27 (*)    ALCOHOL,MEDICAL (ETHANOL) - Abnormal    Alcohol, Serum 276 (*)    BETA - HYDROXYBUTYRATE, SERUM  - Abnormal    Beta-Hydroxybutyrate 5.9 (*)    ISTAT PROCEDURE - Abnormal    POC PH 7.339 (*)     POC PCO2 35.4      POC PO2 33 (*)     POC HCO3 19.1 (*)     POC BE -7 (*)     POC SATURATED O2 60      POC TCO2 20 (*)     Rate 18      Sample VENOUS      Site Other      Allens Test N/A      DelSys Room Air      Mode SPONT      Sp02 99     POCT GLUCOSE - Abnormal    POCT Glucose 53 (*)    POCT GLUCOSE - Abnormal    POCT Glucose 182 (*)    TROPONIN I    Troponin I 0.015     MAGNESIUM    Magnesium 2.0     URINALYSIS, REFLEX TO URINE CULTURE          Imaging Results              CT Cervical Spine Without Contrast (Final result)  Result time 10/13/24 20:55:15      Final result by Avril Leary MD (10/13/24 20:55:15)                   Impression:      There is no acute fracture.    Retrolisthesis of C5 relative to C6 is likely related to spondylosis.    Spondylosis and stenosis as detailed above.      Electronically signed by: Avril Leary  Date:    10/13/2024  Time:    20:55               Narrative:    EXAMINATION:  CT CERVICAL SPINE WITHOUT CONTRAST    CLINICAL HISTORY:  Neck trauma, intoxicated or obtunded (Age >= 16y);    TECHNIQUE:  Low dose axial images, sagittal and coronal reformations were performed though the cervical spine.  Contrast was not administered.    COMPARISON:  None    FINDINGS:  There is retrolisthesis of C5 relative to C6.  There is no acute fracture.    C2-C3:There is no stenosis.  Small disc protrusion noted.    C3-C4:There is no disc protrusion or canal or neural foraminal stenosis.    C4-C5:Dorsal disc osteophyte complex contributes to moderate canal stenosis.  Uncovertebral joint facet hypertrophic changes contribute to moderate bilateral neural foraminal stenosis.    C5-C6:Dorsal disc osteophyte complex formation contributes to mild canal stenosis and moderate bilateral neural foraminal stenosis.    C6-C7:There is a small central disc protrusion.  Uncovertebral joint facet hypertrophic  changes contribute to severe right neural foraminal stenosis.    C7-T1:There is no canal or neural foraminal stenosis.    The soft tissue structures visualized in the neck are unremarkable.    The airway is patent and the lung apices are unremarkable.                                       CT Head Without Contrast (Final result)  Result time 10/13/24 20:41:13      Final result by Avril Leary MD (10/13/24 20:41:13)                   Impression:      No acute intracranial abnormality or fracture.    Microvascular chronic ischemic changes.      Electronically signed by: Avril Leary  Date:    10/13/2024  Time:    20:41               Narrative:    EXAMINATION:  CT HEAD WITHOUT CONTRAST    CLINICAL HISTORY:  Head trauma, moderate-severe;    TECHNIQUE:  Low dose axial images were obtained through the head.  Coronal and sagittal reformations were also performed. Contrast was not administered.  Rapid AI software was utilized to evaluate for intracranial hemorrhage.    COMPARISON:  None.    FINDINGS:  There is no acute intracranial hemorrhage.  The gray-white matter junction differentiation is intact.  There is prominence of the ventricles, cisterns and sulci.  Patchy deep white matter low density as seen with microvascular chronic ischemic changes.  There is no mass or mass effect.  Posterior fossa structures pituitary gland are unremarkable as imaged.  Paranasal sinuses, mastoid air cells and middle ears are clear.  The bony calvarium is intact.                                       Medications   levothyroxine tablet 100 mcg (has no administration in time range)   sodium chloride 0.9% flush 10 mL (has no administration in time range)   melatonin tablet 9 mg (has no administration in time range)   senna-docusate 8.6-50 mg per tablet 1 tablet (has no administration in time range)   naloxone 0.4 mg/mL injection 0.02 mg (has no administration in time range)   potassium bicarbonate disintegrating tablet 50 mEq (has  no administration in time range)   potassium bicarbonate disintegrating tablet 35 mEq (has no administration in time range)   potassium bicarbonate disintegrating tablet 60 mEq (has no administration in time range)   magnesium oxide tablet 800 mg (has no administration in time range)   magnesium oxide tablet 800 mg (has no administration in time range)   glucose chewable tablet 16 g (has no administration in time range)   glucose chewable tablet 24 g (has no administration in time range)   glucagon (human recombinant) injection 1 mg (has no administration in time range)   acetaminophen tablet 650 mg (has no administration in time range)   morphine injection 2 mg (has no administration in time range)   ondansetron injection 4 mg (has no administration in time range)   prochlorperazine injection Soln 5 mg (has no administration in time range)   aluminum-magnesium hydroxide-simethicone 200-200-20 mg/5 mL suspension 30 mL (has no administration in time range)   dextrose 10% bolus 125 mL 125 mL (has no administration in time range)   dextrose 10% bolus 250 mL 250 mL (has no administration in time range)   LORazepam injection 1 mg (has no administration in time range)   chlordiazepoxide capsule 25 mg (25 mg Oral Given 10/13/24 2303)   dextrose 5 % and 0.9 % NaCl infusion (has no administration in time range)   pantoprazole injection 40 mg (40 mg Intravenous Given 10/13/24 2340)   thiamine (B-1) 500 mg in D5W 100 mL IVPB (0 mg Intravenous Stopped 10/13/24 2219)   folic acid 1 mg in D5W 100 mL IVPB (1 mg Intravenous New Bag 10/13/24 2234)   sodium chloride 0.9% bolus 1,000 mL 1,000 mL (0 mLs Intravenous Stopped 10/13/24 2219)   dextrose 10% bolus 250 mL 250 mL (0 mLs Intravenous Stopped 10/13/24 2233)   ondansetron injection 4 mg (4 mg Intravenous Given 10/13/24 2213)     Medical Decision Making  74-year-old female with alcohol intoxication and head injury after a fall.  Patient has no focal neurologic deficits, it was  awake and alert and oriented.  CT head is negative for intracranial hemorrhage.  Leukopenia appears chronic.  CO2 is significantly decreased at 15 with elevated anion gap.  Hypoglycemic with glucose of 66.  Alcohol level is 270.  Elevated serum came tones, mildly decreased pH, all consistent with alcoholic ketoacidosis.  Patient was given IV thiamine and folate and IV fluid bolus was started.  She was given oral glucose however repeat Accu-Chek has decreased into the 50s.  She was then given an amp of D50.  She remained awake and alert.  She is agreeable to admission for further IV hydration.      Amount and/or Complexity of Data Reviewed  Labs: ordered.  Radiology: ordered.    Risk  Prescription drug management.              Attending Attestation:         Attending Critical Care:   Critical Care Times:   Direct Patient Care (initial evaluation, reassessments, and time considering the case)................................................................16 minutes.   Additional History from reviewing old medical records or taking additional history from the family, EMS, PCP, etc.......................5 minutes.   Ordering, Reviewing, and Interpreting Diagnostic Studies...............................................................................................................5 minutes.   Documentation..................................................................................................................................................................................5 minutes.   Consultation with other Physicians. .................................................................................................................................................5 minutes.   ==============================================================  Total Critical Care Time - exclusive of procedural time: 36 minutes.  ==============================================================  Critical care was necessary to treat or  prevent imminent or life-threatening deterioration of the following conditions: endocrine crisis.   Critical care was time spent personally by me on the following activities: obtaining history from patient or relative, examination of patient, review of old charts, ordering lab, x-rays, and/or EKG, development of treatment plan with patient or relative, ordering and performing treatments and interventions, evaluation of patient's response to treatment, discussion with consultants and re-evaluation of patient's conition.   Critical Care Condition: potentially life-threatening                                  Clinical Impression:  Final diagnoses:  [W19.XXXA] Fall          ED Disposition Condition    Observation Stable                Jv Matamoros MD  10/13/24 [id number]

## 2024-10-14 NOTE — H&P
"[facility]  History & Physical    Patient Name: Nicolasa Madera  MRN: [id number]  Patient Class: OP- Observation  Admission Date: 10/13/2024  Attending Physician: Anel Marina MD   Primary Care Provider: Cayla Issa MD         Patient information was obtained from patient, past medical records, and ER records.     Subjective:     Principal Problem:Alcoholic ketoacidosis    Chief Complaint:   Chief Complaint   Patient presents with    Alcohol Intoxication     Fell at home, no c/o pain          HPI: Nicolasa Madera is a 74 year old female with a past medical history of EtOH abuse, peptic ulcer disease, GI bleed, hypothyroidism, stroke, and CKD 3 presents with complaints of falling in a convenience store and hitting head several hours prior to arrival.  She reports getting out of the car, tripping, fell, and hit the back of her head.  She reports a history of drinking glasses of wine "more than she should." She reports binge drinking earlier today.  She denies chest pain, shortness of breath, fever, chills, dizziness, lightheadedness, headaches, vision changes, numbness/tingling, abdominal pain, nausea, or vomiting.  ED workup reveals:  CT head with no acute intracranial abnormality or fracture.  CT cervical spine with no acute fractures but with retrolisthesis of C5 relative to C6 of chronic spondylosis and stenosis.  CBC with chronic white count 2.8, thrombocytopenia 95, and stable H&H.  BMP reflective of acidosis, hyponatremia 129, hypochloremia 87, bicarb 15, anion gap 27, creatinine 1.2, and glucose 66.  Elevated Beta hydroxybutyrate 5.9, and elevated alcohol levels.  LFTs unremarkable other than AST 76.  VBG with pH 7.33/pCO2 35.2/PO2 33/bicarb 19.1 and base excess -7.  She received amp of D50, folic and thiamine p.o., and fluids in ED. Started on CIWA precautions.  Admitted to hospital medicine for further management and treatment.                   Past " Medical History:   Diagnosis Date    Encounter for blood transfusion     Hypothyroidism     Peptic ulcer with hemorrhage     Peptic ulcer, site unspecified, unspecified as acute or chronic, without hemorrhage or perforation     Peptic ulcer disease    Stroke 2017    no residual effects.     Urticaria        Past Surgical History:   Procedure Laterality Date    ESOPHAGOGASTRODUODENOSCOPY N/A 4/18/2021    Procedure: EGD (ESOPHAGOGASTRODUODENOSCOPY);  Surgeon: Crow Vigil MD;  Location: [facility] ENDO;  Service: Endoscopy;  Laterality: N/A;    ESOPHAGOGASTRODUODENOSCOPY N/A 8/31/2021    Procedure: EGD (ESOPHAGOGASTRODUODENOSCOPY);  Surgeon: Crow Vigil MD;  Location: [facility] ENDO;  Service: Endoscopy;  Laterality: N/A;       Review of patient's allergies indicates:  No Known Allergies    No current facility-administered medications on file prior to encounter.     Current Outpatient Medications on File Prior to Encounter   Medication Sig    biotin 5,000 mcg TbDL Take 1 tablet by mouth once daily.    hydrOXYzine HCL (ATARAX) 25 MG tablet TAKE 1 TABLET THREE TIMES DAILY AS NEEDED FOR ITCHING    levothyroxine (SYNTHROID) 100 MCG tablet Take 1 tablet (100 mcg total) by mouth before breakfast.    multivit-min/iron/folic/lutein (CENTRUM SILVER WOMEN ORAL) Take 1 tablet by mouth once daily.    omega-3 fatty acids/fish oil (FISH OIL-OMEGA-3 FATTY ACIDS) 300-1,000 mg capsule Take 1 capsule by mouth once daily.    pantoprazole (PROTONIX) 40 MG tablet Take 1 tablet (40 mg total) by mouth 2 (two) times daily.    vit B complex no.12/niacin,B3, (VITAMIN B COMPLEX NO.12-NIACIN ORAL) Take 1 tablet by mouth.     Family History       Problem Relation (Age of Onset)    Breast cancer Paternal Aunt, Paternal Grandmother    Stroke Father (70)          Tobacco Use    Smoking status: Never    Smokeless tobacco: Never   Substance and Sexual Activity    Alcohol use: Yes     Alcohol/week: 5.0 standard drinks of alcohol     Types: 5 Glasses of  wine per week    Drug use: Never    Sexual activity: Not Currently     Review of Systems   Constitutional:  Positive for activity change. Negative for appetite change, chills, diaphoresis, fatigue and fever.   Eyes:  Negative for visual disturbance.   Respiratory:  Negative for cough and shortness of breath.    Cardiovascular:  Negative for chest pain and leg swelling.   Gastrointestinal:  Negative for abdominal distention, abdominal pain, blood in stool, nausea and vomiting.   Genitourinary:  Negative for difficulty urinating and hematuria.   Musculoskeletal:  Negative for back pain.   Neurological:  Positive for weakness. Negative for dizziness, syncope, facial asymmetry, speech difficulty, light-headedness, numbness and headaches.     Objective:     Vital Signs (Most Recent):  Temp: 97.9 °F (36.6 °C) (10/13/24 1915)  Pulse: 95 (10/13/24 2300)  Resp: 18 (10/13/24 2300)  BP: 139/74 (10/13/24 2300)  SpO2: 99 % (10/13/24 2300) Vital Signs (24h Range):  Temp:  [97.9 °F (36.6 °C)] 97.9 °F (36.6 °C)  Pulse:  [80-95] 95  Resp:  [18-20] 18  SpO2:  [98 %-100 %] 99 %  BP: (139-158)/() 139/74     Weight: 74.8 kg (165 lb)  Body mass index is 30.18 kg/m².     Physical Exam  Vitals and nursing note reviewed.   Constitutional:       Appearance: She is not ill-appearing.   Eyes:      Pupils: Pupils are equal, round, and reactive to light.   Cardiovascular:      Rate and Rhythm: Normal rate and regular rhythm.      Pulses: Normal pulses.      Heart sounds: Normal heart sounds.   Pulmonary:      Effort: Pulmonary effort is normal. No respiratory distress.      Breath sounds: Normal breath sounds. No wheezing.   Abdominal:      General: Bowel sounds are normal. There is no distension.      Palpations: Abdomen is soft.      Tenderness: There is no abdominal tenderness.   Musculoskeletal:      Right lower leg: No edema.      Left lower leg: No edema.   Skin:     General: Skin is warm and dry.      Capillary Refill: Capillary  refill takes less than 2 seconds.   Neurological:      Mental Status: She is alert and oriented to person, place, and time.      GCS: GCS eye subscore is 4. GCS verbal subscore is 5. GCS motor subscore is 6.   Psychiatric:         Mood and Affect: Mood normal.              CRANIAL NERVES     CN III, IV, VI   Pupils are equal, round, and reactive to light.       Significant Labs: All pertinent labs within the past 24 hours have been reviewed.    ABGs:   Recent Labs   Lab 10/13/24  2110   PH 7.339*   PCO2 35.4   HCO3 19.1*   POCSATURATED 60   BE -7*   PO2 33*     Bilirubin:   Recent Labs   Lab 10/13/24  1950   BILITOT 0.6       BMP:   Recent Labs   Lab 10/13/24  1950   GLU 66*   *   K 5.1   CL 87*   CO2 15*   BUN 14   CREATININE 1.2   CALCIUM 9.9   MG 2.0     CBC:   Recent Labs   Lab 10/13/24  1950   WBC 2.82*   HGB 11.3*   HCT 32.3*   PLT 95*     CMP:   Recent Labs   Lab 10/13/24  1950   *   K 5.1   CL 87*   CO2 15*   GLU 66*   BUN 14   CREATININE 1.2   CALCIUM 9.9   PROT 8.3   ALBUMIN 4.7   BILITOT 0.6   ALKPHOS 84   AST 76*   ALT 44   ANIONGAP 27*       Magnesium:   Recent Labs   Lab 10/13/24  1950   MG 2.0     POCT Glucose:   Recent Labs   Lab 10/13/24  2144 10/13/24  2301   POCTGLUCOSE 53* 182*       Troponin:   Recent Labs   Lab 10/13/24  1950   TROPONINI 0.015       Urine Studies:   Recent Labs   Lab 10/13/24  2257   COLORU Colorless*   APPEARANCEUA Clear   PHUR 5.0   SPECGRAV 1.010   PROTEINUA Negative   GLUCUA Negative   KETONESU 1+*   BILIRUBINUA Negative   OCCULTUA Negative   NITRITE Negative   UROBILINOGEN Negative   LEUKOCYTESUR Negative       Significant Imaging: I have reviewed all pertinent imaging results/findings within the past 24 hours.    CT Cervical Spine Without Contrast  Order: [id number]  Status: Final result       Visible to patient: No (inaccessible in MyChart)       Next appt: None    0 Result Notes  Details    Reading Physician Reading Date Result Priority   Avril Leary  MD OMI  [phone] 10/13/2024 STAT     Narrative & Impression  EXAMINATION:  CT CERVICAL SPINE WITHOUT CONTRAST     CLINICAL HISTORY:  Neck trauma, intoxicated or obtunded (Age >= 16y);     TECHNIQUE:  Low dose axial images, sagittal and coronal reformations were performed though the cervical spine.  Contrast was not administered.     COMPARISON:  None     FINDINGS:  There is retrolisthesis of C5 relative to C6.  There is no acute fracture.     C2-C3:There is no stenosis.  Small disc protrusion noted.     C3-C4:There is no disc protrusion or canal or neural foraminal stenosis.     C4-C5:Dorsal disc osteophyte complex contributes to moderate canal stenosis.  Uncovertebral joint facet hypertrophic changes contribute to moderate bilateral neural foraminal stenosis.     C5-C6:Dorsal disc osteophyte complex formation contributes to mild canal stenosis and moderate bilateral neural foraminal stenosis.     C6-C7:There is a small central disc protrusion.  Uncovertebral joint facet hypertrophic changes contribute to severe right neural foraminal stenosis.     C7-T1:There is no canal or neural foraminal stenosis.     The soft tissue structures visualized in the neck are unremarkable.     The airway is patent and the lung apices are unremarkable.     Impression:     There is no acute fracture.     Retrolisthesis of C5 relative to C6 is likely related to spondylosis.     Spondylosis and stenosis as detailed above.        Electronically signed by:Avril Leary  Date:                                            10/13/2024  Time:                                           20:55        Exam Ended: 10/13/24 20:29 CDT Last Resulted: 10/13/24 20:55 CDT     CT Head Without Contrast  Order: [id number]  Status: Final result       Visible to patient: No (inaccessible in MyChart)       Next appt: None    0 Result Notes  Details    Reading Physician Reading Date Result Priority   Avril Leary MD  [phone] 10/13/2024 STAT      Narrative & Impression  EXAMINATION:  CT HEAD WITHOUT CONTRAST     CLINICAL HISTORY:  Head trauma, moderate-severe;     TECHNIQUE:  Low dose axial images were obtained through the head.  Coronal and sagittal reformations were also performed. Contrast was not administered.  Rapid AI software was utilized to evaluate for intracranial hemorrhage.     COMPARISON:  None.     FINDINGS:  There is no acute intracranial hemorrhage.  The gray-white matter junction differentiation is intact.  There is prominence of the ventricles, cisterns and sulci.  Patchy deep white matter low density as seen with microvascular chronic ischemic changes.  There is no mass or mass effect.  Posterior fossa structures pituitary gland are unremarkable as imaged.  Paranasal sinuses, mastoid air cells and middle ears are clear.  The bony calvarium is intact.     Impression:     No acute intracranial abnormality or fracture.     Microvascular chronic ischemic changes.        Electronically signed by:Avril Leary  Date:                                            10/13/2024  Time:                                           20:41        Exam Ended: 10/13/24 20:28 CDT Last Resulted: 10/13/24 20:41 CDT     Assessment/Plan:     * Alcoholic ketoacidosis  -continuous IV fluids  -cardiac monitoring  -CIWA precaution  -electrolyte replacements p.r.n.  -fall precautions  -schedule and p.r.n. benzos  -PPI b.i.d.  -antiemetics p.r.n.  -administer mvi, folic acid, and thiamine  -hepatic panel pending      Thrombocytopenia  The likely etiology of thrombocytopenia is  unknown . The patients 3 most recent labs are listed below.  Recent Labs     10/13/24  1950   PLT 95*     Plan  - Will transfuse if platelet count is <50k (if undergoing surgical procedure or have active bleeding).        Hyponatremia  Hyponatremia is likely due to Dehydration/hypovolemia. The patient's most recent sodium results are listed below.  Recent Labs     10/13/24  1950   *      Plan  - Correct the sodium by 4-6mEq in 24 hours.   - Obtain the following studies: Urine sodium, urine osmolality, serum osmolality.  - Will treat the hyponatremia with IV fluids as follows:  D5 normal saline  - Monitor sodium Daily.   - Patient hyponatremia is stable      Stage 3 chronic kidney disease  Creatine stable for now. BMP reviewed- noted Estimated Creatinine Clearance: 37.8 mL/min (based on SCr of 1.2 mg/dL). according to latest data. Based on current GFR, CKD stage is stage 3 - GFR 30-59.  Monitor UOP and serial BMP and adjust therapy as needed. Renally dose meds. Avoid nephrotoxic medications and procedures.      VTE Risk Mitigation (From admission, onward)           Ordered     IP VTE HIGH RISK PATIENT  Once         10/13/24 2252     Place sequential compression device  Until discontinued         10/13/24 2252                                  Susan Mariscal DNP  Department of Hospital Medicine  [facility]

## 2024-10-14 NOTE — MED STUDENT ASSESSMENT & PLAN
Alcoholic ketoacidosis  -Daily CMP  -Continuous 5% dextrose and .9% NaCl infusion  -Folic acid 1mg TID  -Thiamine 100 mg TID  -chlordiazepoxide 25 mg TID  -lorazepam injection PRN     Thrombocytopenia    Stage 3 chronic kidney disease    Hyponatremia

## 2024-10-14 NOTE — PT/OT/SLP EVAL
Physical Therapy Evaluation    Patient Name:  Nicolasa Madera   MRN:  [id number]    Recommendations:     Discharge Recommendations: Low Intensity Therapy   Discharge Equipment Recommendations: none   Barriers to discharge: None    Assessment:     Nicolasa Madera is a 74 y.o. female admitted with a medical diagnosis of Alcoholic ketoacidosis.  She presents with the following impairments/functional limitations: weakness, impaired endurance, impaired functional mobility, gait instability, impaired balance, decreased lower extremity function, decreased ROM, edema .  Patient agreeable to PT evaluation this morning.  Patient presented supine in bed and was able to transfer to sitting with SBA and stood with CGA.  Patient then ambulated x 40 feet RW CGA and x 40 feet no AD CGA.      Rehab Prognosis: Good; patient would benefit from acute skilled PT services to address these deficits and reach maximum level of function.    Recent Surgery: * No surgery found *      Plan:     During this hospitalization, patient to be seen 6 x/week to address the identified rehab impairments via gait training, therapeutic activities, therapeutic exercises and progress toward the following goals:    Plan of Care Expires:  11/11/24    Subjective     Chief Complaint: fatigue  Patient/Family Comments/goals: get stronger  Pain/Comfort:       Patients cultural, spiritual, Advent conflicts given the current situation:      Living Environment:  Currently lives alone in 2 story home.  Prior to admission, patients level of function was independent.  Equipment used at home: none.  DME owned (not currently used): none.  Upon discharge, patient will have assistance from unclear.    Objective:     Communicated with nurse prior to session.  Patient found supine with bed alarm  upon PT entry to room.    General Precautions: Standard, special contact (ruling out D-diff)  Orthopedic Precautions:N/A   Braces:    Respiratory Status: Room  air    Exams:  RLE ROM: WFL  RLE Strength: Deficits: 4/5 overall  LLE ROM: WFL  LLE Strength: WNL    Functional Mobility:  Bed Mobility:     Supine to Sit: stand by assistance  Transfers:     Sit to Stand:  contact guard assistance with no AD  Gait: x 40 feet no aD CGA, x 40 feet rw CGA      AM-PAC 6 CLICK MOBILITY  Total Score:20       Treatment & Education:  Gait training x 40 feet RW CGA, x 40 feet no AD CGA    Patient left up in chair with call button in reach, chair alarm on, and nurse notified.    GOALS:   Multidisciplinary Problems       Physical Therapy Goals          Problem: Physical Therapy    Goal Priority Disciplines Outcome Interventions   Physical Therapy Goal     PT, PT/OT Progressing    Description: Goals to be met by: 10/28/24     Patient will increase functional independence with mobility by performin. Supine to sit with Pooler  2. Sit to supine with Pooler  3. Sit to stand transfer with Stand-by Assistance  4. Bed to chair transfer with Stand-by Assistance using No Assistive Device  5. Gait  x 150 feet with Stand-by Assistance using No Assistive Device.                          History:     Past Medical History:   Diagnosis Date    Encounter for blood transfusion     Hypothyroidism     Peptic ulcer with hemorrhage     Peptic ulcer, site unspecified, unspecified as acute or chronic, without hemorrhage or perforation     Peptic ulcer disease    Stroke 2017    no residual effects.     Urticaria        Past Surgical History:   Procedure Laterality Date    ESOPHAGOGASTRODUODENOSCOPY N/A 2021    Procedure: EGD (ESOPHAGOGASTRODUODENOSCOPY);  Surgeon: Crow Vigil MD;  Location: [facility];  Service: Endoscopy;  Laterality: N/A;    ESOPHAGOGASTRODUODENOSCOPY N/A 2021    Procedure: EGD (ESOPHAGOGASTRODUODENOSCOPY);  Surgeon: Crow Vigil MD;  Location: [facility];  Service: Endoscopy;  Laterality: N/A;       Time Tracking:     PT Received On: 10/14/24  PT Start Time:  1100     PT Stop Time: 1125  PT Total Time (min): 25 min     Billable Minutes: Evaluation 15 and Gait Training 10      10/14/2024

## 2024-10-14 NOTE — ASSESSMENT & PLAN NOTE
Hyponatremia is likely due to Dehydration/hypovolemia. The patient's most recent sodium results are listed below.  Recent Labs     10/13/24  1950 10/14/24  0420   * 133*       Plan  - Correct the sodium by 4-6mEq in 24 hours.   - Obtain the following studies: Urine sodium, urine osmolality, serum osmolality.  - Will treat the hyponatremia with IV fluids as follows:  D5 normal saline  - Monitor sodium Daily.   - Patient hyponatremia is stable

## 2024-10-14 NOTE — PLAN OF CARE
Problem: Adult Inpatient Plan of Care  Goal: Plan of Care Review  Outcome: Progressing  Goal: Patient-Specific Goal (Individualized)  Outcome: Progressing  Goal: Absence of Hospital-Acquired Illness or Injury  Outcome: Progressing  Goal: Optimal Comfort and Wellbeing  Outcome: Progressing  Goal: Readiness for Transition of Care  Outcome: Progressing   Plan of care reviewed with patient. Patient verbalized understanding. All fall precautions maintained. Bed in lowest position, call light within reach, slip resistant socks maintained. Bed alarm on.

## 2024-10-14 NOTE — PLAN OF CARE
Nicolasa Kamlaes has warranted treatment spanning two or more midnights of hospital level care for the management of  Alcoholic ketoacidosis, Thrombocytopenia, Hyponatremia . She continues to require IV fluids, daily labs, monitoring of vital signs, and IV antiemetics . Her condition is also complicated by the following comorbidities:  EtOH abuse, peptic ulcer disease, GI bleed, hypothyroidism, stroke, and CKD 3  .

## 2024-10-14 NOTE — PLAN OF CARE
10/14/24 1230   BAGLEY Message   Medicare Outpatient and Observation Notification regarding financial responsibility Explained to patient/caregiver;Signed/date by patient/caregiver   Date BAGLEY was signed 10/14/24   Time BAGLEY was signed 1005     Form given to April to scan.

## 2024-10-14 NOTE — ASSESSMENT & PLAN NOTE
Creatine stable for now. BMP reviewed- noted Estimated Creatinine Clearance: 37.8 mL/min (based on SCr of 1.2 mg/dL). according to latest data. Based on current GFR, CKD stage is stage 3 - GFR 30-59.  Monitor UOP and serial BMP and adjust therapy as needed. Renally dose meds. Avoid nephrotoxic medications and procedures.

## 2024-10-14 NOTE — PLAN OF CARE
[facility] Med/Surg  Initial Discharge Assessment       Primary Care Provider: Cayla Issa MD    Admission Diagnosis: Fall [W19.XXXA]    Admission Date: 10/13/2024  Expected Discharge Date: 10/16/2024    DC assessment completed with patient at bedside. Verified information on facesheet as correct. Pt lives at listed address alone.  Reports she doesn't really have help, has been fully independent.  Reports she has a living will, but no copy on file.  TEJAL , has 2 daughters but neither live close, they are out of state. Dwain Holcomb is one of her daughters but she doesn't have her phone available to give me her number.   PCP was Cayla Issa, but states she will need another one, because she is no longer in the area. Reports last apt was 2 months ago, but unsure who it was with. [facility], [facility] any discharge meds she would need. Denies hh/hd/outpt services or DME. Reports being independent with activities.  Doesn't Drives, is working on getting her license, but has several people to take her places.  Reports her brother is her 1st option for home transport. She has friends she can call if he is unavailable. Reports taking home medications as prescribed and can currently afford them. Admits she didn't take her thyroid medication yesterday.  Verified insurance on file. Denies recent inpt stay in last 30 days.  DC plan is DC home.         Transition of Care Barriers: None    Payor: [employer] MANAGED MEDICARE / Plan: [employer] MEDICARE PPO / Product Type: Medicare Advantage /     Extended Emergency Contact Information  Primary Emergency Contact: Nora Valente  Mobile Phone: [phone]  Relation: Friend   needed? No    Discharge Plan A: Home  Discharge Plan B: Home with [facility] - [place], [place] - [address]  [address] [address]  Phone: [phone] Fax:  [phone]    [id number] - [place] - [address]  [address]  [place] [address]  Phone: [phone] Fax: [phone]    [facility] - [place] - [address]  [address]  [place] [address]  Phone: [phone] Fax: [phone]      Initial Assessment (most recent)       Adult Discharge Assessment - 10/14/24 1231          Discharge Assessment    Assessment Type Discharge Planning Assessment     Confirmed/corrected address, phone number and insurance Yes     Confirmed Demographics Correct on Facesheet     Source of Information patient     When was your last doctors appointment? --   approx 2 mo ago, but unsure who it was with.    Does patient/caregiver understand observation status Yes     Communicated SYLWIA with patient/caregiver Yes     Reason For Admission fall, alcoholic ketoacidosis     People in Home alone     Do you expect to return to your current living situation? Yes     Do you have help at home or someone to help you manage your care at home? No     Prior to hospitilization cognitive status: Alert/Oriented     Current cognitive status: Alert/Oriented     Walking or Climbing Stairs Difficulty no     Dressing/Bathing Difficulty no     Home Accessibility not wheelchair accessible     Home Layout Able to live on 1st floor     Equipment Currently Used at Home none     Readmission within 30 days? No     Patient currently being followed by outpatient case management? No     Do you currently have service(s) that help you manage your care at home? No     Do you take prescription medications? Yes     Do you have prescription coverage? Yes     Coverage pt doesn't know the name of the plan, she said she gets her medciation for free.     Do you have any problems affording any of your prescribed medications? No     Is the patient taking medications as prescribed? no     If no, which medications is patient not taking? States she missed her thyroid  medciation yesterday. But tries to take them as scheduled.     Who is going to help you get home at discharge? Brother, Ephraim Valente [phone]     How do you get to doctors appointments? family or friend will provide     Are you on dialysis? No     Do you take coumadin? No     Discharge Plan A Home     Discharge Plan B Home with family     DME Needed Upon Discharge  none     Discharge Plan discussed with: Patient     Transition of Care Barriers None        Physical Activity    On average, how many days per week do you engage in moderate to strenuous exercise (like a brisk walk)? 6 days     On average, how many minutes do you engage in exercise at this level? 60 min        Financial Resource Strain    How hard is it for you to pay for the very basics like food, housing, medical care, and heating? Not hard at all        Housing Stability    In the last 12 months, was there a time when you were not able to pay the mortgage or rent on time? No     At any time in the past 12 months, were you homeless or living in a shelter (including now)? No        Transportation Needs    Has the lack of transportation kept you from medical appointments, meetings, work or from getting things needed for daily living? No        Food Insecurity    Within the past 12 months, you worried that your food would run out before you got the money to buy more. Never true     Within the past 12 months, the food you bought just didn't last and you didn't have money to get more. Never true        Stress    Do you feel stress - tense, restless, nervous, or anxious, or unable to sleep at night because your mind is troubled all the time - these days? To some extent        Social Isolation    How often do you feel lonely or isolated from those around you?  Never        Alcohol Use    Q1: How often do you have a drink containing alcohol? 2-4 times a month     Q2: How many drinks containing alcohol do you have on a typical day when you are drinking? --   " " it depends" States she doesn't drink routinely but when she does she binges.    Q3: How often do you have six or more drinks on one occasion? Monthly   States she doesn't drink consisantly, but occiaonally she will and has several.       Utilities    In the past 12 months has the electric, gas, oil, or water company threatened to shut off services in your home? No        Health Literacy    How often do you need to have someone help you when you read instructions, pamphlets, or other written material from your doctor or pharmacy? Never                                "

## 2024-10-14 NOTE — ASSESSMENT & PLAN NOTE
Creatine stable for now. BMP reviewed- noted Estimated Creatinine Clearance: 41.1 mL/min (based on SCr of 1.1 mg/dL). according to latest data. Based on current GFR, CKD stage is stage 3 - GFR 30-59.  Monitor UOP and serial BMP and adjust therapy as needed. Renally dose meds. Avoid nephrotoxic medications and procedures.

## 2024-10-15 PROBLEM — E83.42 HYPOMAGNESEMIA: Status: ACTIVE | Noted: 2024-10-15

## 2024-10-15 PROBLEM — F10.24 ALCOHOL DEPENDENCE WITH ALCOHOL-INDUCED MOOD DISORDER: Status: ACTIVE | Noted: 2024-10-15

## 2024-10-15 LAB
ALBUMIN SERPL BCP-MCNC: 3.4 G/DL (ref 3.5–5.2)
ALP SERPL-CCNC: 60 U/L (ref 55–135)
ALT SERPL W/O P-5'-P-CCNC: 27 U/L (ref 10–44)
ANION GAP SERPL CALC-SCNC: 10 MMOL/L (ref 8–16)
ANISOCYTOSIS BLD QL SMEAR: SLIGHT
AST SERPL-CCNC: 37 U/L (ref 10–40)
BASOPHILS NFR BLD: 0 % (ref 0–1.9)
BILIRUB SERPL-MCNC: 0.6 MG/DL (ref 0.1–1)
BUN SERPL-MCNC: 8 MG/DL (ref 8–23)
CALCIUM SERPL-MCNC: 8.9 MG/DL (ref 8.7–10.5)
CHLORIDE SERPL-SCNC: 104 MMOL/L (ref 95–110)
CO2 SERPL-SCNC: 22 MMOL/L (ref 23–29)
CREAT SERPL-MCNC: 1.3 MG/DL (ref 0.5–1.4)
DIFFERENTIAL METHOD BLD: ABNORMAL
EOSINOPHIL NFR BLD: 4 % (ref 0–8)
ERYTHROCYTE [DISTWIDTH] IN BLOOD BY AUTOMATED COUNT: 24.5 % (ref 11.5–14.5)
EST. GFR  (NO RACE VARIABLE): 43 ML/MIN/1.73 M^2
GLUCOSE SERPL-MCNC: 130 MG/DL (ref 70–110)
HCT VFR BLD AUTO: 27.2 % (ref 37–48.5)
HGB BLD-MCNC: 9.5 G/DL (ref 12–16)
HYPOCHROMIA BLD QL SMEAR: ABNORMAL
IMM GRANULOCYTES # BLD AUTO: ABNORMAL K/UL
IMM GRANULOCYTES NFR BLD AUTO: ABNORMAL %
LYMPHOCYTES NFR BLD: 32 % (ref 18–48)
MAGNESIUM SERPL-MCNC: 1.4 MG/DL (ref 1.6–2.6)
MCH RBC QN AUTO: 28.5 PG (ref 27–31)
MCHC RBC AUTO-ENTMCNC: 34.9 G/DL (ref 32–36)
MCV RBC AUTO: 82 FL (ref 82–98)
MONOCYTES NFR BLD: 11 % (ref 4–15)
NEUTROPHILS NFR BLD: 53 % (ref 38–73)
NRBC BLD-RTO: 0 /100 WBC
PLATELET # BLD AUTO: 57 K/UL (ref 150–450)
PLATELET BLD QL SMEAR: ABNORMAL
PMV BLD AUTO: ABNORMAL FL (ref 9.2–12.9)
POCT GLUCOSE: 114 MG/DL (ref 70–110)
POCT GLUCOSE: 120 MG/DL (ref 70–110)
POCT GLUCOSE: 131 MG/DL (ref 70–110)
POCT GLUCOSE: 138 MG/DL (ref 70–110)
POTASSIUM SERPL-SCNC: 3.6 MMOL/L (ref 3.5–5.1)
PROT SERPL-MCNC: 6 G/DL (ref 6–8.4)
RBC # BLD AUTO: 3.33 M/UL (ref 4–5.4)
SODIUM SERPL-SCNC: 136 MMOL/L (ref 136–145)
WBC # BLD AUTO: 1.69 K/UL (ref 3.9–12.7)

## 2024-10-15 PROCEDURE — 36415 COLL VENOUS BLD VENIPUNCTURE: CPT | Performed by: NURSE PRACTITIONER

## 2024-10-15 PROCEDURE — 85007 BL SMEAR W/DIFF WBC COUNT: CPT | Performed by: NURSE PRACTITIONER

## 2024-10-15 PROCEDURE — 80053 COMPREHEN METABOLIC PANEL: CPT

## 2024-10-15 PROCEDURE — 97165 OT EVAL LOW COMPLEX 30 MIN: CPT

## 2024-10-15 PROCEDURE — 25000003 PHARM REV CODE 250: Performed by: NURSE PRACTITIONER

## 2024-10-15 PROCEDURE — 63600175 PHARM REV CODE 636 W HCPCS

## 2024-10-15 PROCEDURE — 25000003 PHARM REV CODE 250

## 2024-10-15 PROCEDURE — 11000001 HC ACUTE MED/SURG PRIVATE ROOM

## 2024-10-15 PROCEDURE — 83735 ASSAY OF MAGNESIUM: CPT | Performed by: NURSE PRACTITIONER

## 2024-10-15 PROCEDURE — 85027 COMPLETE CBC AUTOMATED: CPT | Performed by: NURSE PRACTITIONER

## 2024-10-15 PROCEDURE — 25000003 PHARM REV CODE 250: Performed by: HOSPITALIST

## 2024-10-15 PROCEDURE — 97116 GAIT TRAINING THERAPY: CPT

## 2024-10-15 RX ORDER — LANOLIN ALCOHOL/MO/W.PET/CERES
400 CREAM (GRAM) TOPICAL ONCE
Status: DISCONTINUED | OUTPATIENT
Start: 2024-10-15 | End: 2024-10-15

## 2024-10-15 RX ORDER — DOCUSATE SODIUM 100 MG
300 CAPSULE ORAL
Status: DISCONTINUED | OUTPATIENT
Start: 2024-10-15 | End: 2024-10-16 | Stop reason: HOSPADM

## 2024-10-15 RX ORDER — PANTOPRAZOLE SODIUM 40 MG/1
40 TABLET, DELAYED RELEASE ORAL 2 TIMES DAILY
Status: DISCONTINUED | OUTPATIENT
Start: 2024-10-15 | End: 2024-10-16 | Stop reason: HOSPADM

## 2024-10-15 RX ORDER — LANOLIN ALCOHOL/MO/W.PET/CERES
800 CREAM (GRAM) TOPICAL ONCE
Status: COMPLETED | OUTPATIENT
Start: 2024-10-15 | End: 2024-10-15

## 2024-10-15 RX ORDER — LANOLIN ALCOHOL/MO/W.PET/CERES
400 CREAM (GRAM) TOPICAL ONCE
Status: DISCONTINUED | OUTPATIENT
Start: 2024-10-15 | End: 2024-10-16 | Stop reason: HOSPADM

## 2024-10-15 RX ORDER — LOPERAMIDE HYDROCHLORIDE 2 MG/1
2 CAPSULE ORAL ONCE
Status: COMPLETED | OUTPATIENT
Start: 2024-10-15 | End: 2024-10-15

## 2024-10-15 RX ADMIN — THIAMINE HCL TAB 100 MG 100 MG: 100 TAB at 08:10

## 2024-10-15 RX ADMIN — LOPERAMIDE HYDROCHLORIDE 2 MG: 2 CAPSULE ORAL at 07:10

## 2024-10-15 RX ADMIN — FOLIC ACID 1 MG: 1 TABLET ORAL at 08:10

## 2024-10-15 RX ADMIN — THERA TABS 1 TABLET: TAB at 08:10

## 2024-10-15 RX ADMIN — Medication 800 MG: at 10:10

## 2024-10-15 RX ADMIN — CHLORDIAZEPOXIDE HYDROCHLORIDE 25 MG: 25 CAPSULE ORAL at 08:10

## 2024-10-15 RX ADMIN — Medication 800 MG: at 02:10

## 2024-10-15 RX ADMIN — Medication 800 MG: at 06:10

## 2024-10-15 RX ADMIN — PANTOPRAZOLE SODIUM 40 MG: 40 INJECTION, POWDER, LYOPHILIZED, FOR SOLUTION INTRAVENOUS at 08:10

## 2024-10-15 RX ADMIN — THIAMINE HCL TAB 100 MG 100 MG: 100 TAB at 02:10

## 2024-10-15 RX ADMIN — CHLORDIAZEPOXIDE HYDROCHLORIDE 25 MG: 25 CAPSULE ORAL at 02:10

## 2024-10-15 RX ADMIN — LEVOTHYROXINE SODIUM 100 MCG: 0.1 TABLET ORAL at 06:10

## 2024-10-15 RX ADMIN — PANTOPRAZOLE SODIUM 40 MG: 40 TABLET, DELAYED RELEASE ORAL at 08:10

## 2024-10-15 RX ADMIN — ACETAMINOPHEN 650 MG: 325 TABLET ORAL at 08:10

## 2024-10-15 NOTE — ASSESSMENT & PLAN NOTE
Educated on alcohol sensation.  Patient states she is willing to decrease the amount of alcohol intake daily.  Refer to Psychiatry for treatment of anxiety and depression.

## 2024-10-15 NOTE — PLAN OF CARE
Problem: Adult Inpatient Plan of Care  Goal: Plan of Care Review  10/14/2024 2304 by Oralia Fox RN  Outcome: Progressing  10/14/2024 2301 by Oralia Fox RN  Outcome: Progressing  Goal: Patient-Specific Goal (Individualized)  10/14/2024 2304 by Oralia Fox RN  Outcome: Progressing  10/14/2024 2301 by Oralia Fox RN  Outcome: Progressing  Goal: Absence of Hospital-Acquired Illness or Injury  10/14/2024 2304 by Oralia Fox RN  Outcome: Progressing  10/14/2024 2301 by Oralia Fox RN  Outcome: Progressing  Goal: Optimal Comfort and Wellbeing  10/14/2024 2304 by Oralia Fox RN  Outcome: Progressing  10/14/2024 2301 by Oralia Fox RN  Outcome: Progressing  Goal: Readiness for Transition of Care  10/14/2024 2304 by Oralia Fox RN  Outcome: Progressing  10/14/2024 2301 by Oralia Fox RN  Outcome: Progressing     Problem: Fall Injury Risk  Goal: Absence of Fall and Fall-Related Injury  Outcome: Progressing     Problem: Diabetes  Goal: Optimal Coping  Outcome: Progressing  Goal: Optimal Functional Ability  Outcome: Progressing  Goal: Blood Glucose Level Within Target Range  Outcome: Progressing  Goal: Minimize Risk of Hypoglycemia  Outcome: Progressing     Problem: Nausea and Vomiting  Goal: Nausea and Vomiting Relief  Outcome: Progressing

## 2024-10-15 NOTE — PROGRESS NOTES
"[facility] Medicine  Progress Note    Patient Name: Nicolasa Madera  MRN: [id number]  Patient Class: IP- Inpatient   Admission Date: 10/13/2024  Length of Stay: 1 days  Attending Physician: Anel Marina MD  Primary Care Provider: Page Garcia PA-C        Subjective:     Principal Problem:Alcoholic ketoacidosis        HPI:  Nicolasa Madera is a 74 year old female with a past medical history of EtOH abuse, peptic ulcer disease, GI bleed, hypothyroidism, stroke, and CKD 3 presents with complaints of falling in a convenience store and hitting head several hours prior to arrival.  She reports getting out of the car, tripping, fell, and hit the back of her head.  She reports a history of drinking glasses of wine "more than she should." She reports binge drinking earlier today.  She denies chest pain, shortness of breath, fever, chills, dizziness, lightheadedness, headaches, vision changes, numbness/tingling, abdominal pain, nausea, or vomiting.  ED workup reveals:  CT head with no acute intracranial abnormality or fracture.  CT cervical spine with no acute fractures but with retrolisthesis of C5 relative to C6 of chronic spondylosis and stenosis.  CBC with chronic white count 2.8, thrombocytopenia 95, and stable H&H.  BMP reflective of acidosis, hyponatremia 129, hypochloremia 87, bicarb 15, anion gap 27, creatinine 1.2, and glucose 66.  Elevated Beta hydroxybutyrate 5.9, and elevated alcohol levels.  LFTs unremarkable other than AST 76.  VBG with pH 7.33/pCO2 35.2/PO2 33/bicarb 19.1 and base excess -7.  She received amp of D50, folic and thiamine p.o., and fluids in ED. Started on CIWA precautions.  Admitted to hospital medicine for further management and treatment.                   Overview/Hospital Course:  74 year old female admitted for alcohol ketoacidosis.  She was started on IV fluids.  CT head and CT c-spine negative for acute findings.  Patient placed on CIWA " precautions with scheduled librium and PRN ativan for alcohol withdrawal symptoms.  Labs were trended.  PT/OT consulted.  Patient with improvement of ion gap.  IV fluids transitioned to oral hydration.  CBC and BMP trended.  She was noted to have pancytopenia.    Interval History: Patient seen in bed.  NAD.  Alert and oriented.  Patient tolerated breakfast without any nausea vomiting or abdominal pain.  Acidosis resolved  Labs reviewed WBCs 1.6 hemoglobin 9.5 platelet 57, sodium 136 K 3.6 creatinine 1.3 glucose 130 and Mag 1.4    No signs of active bleeding therefore will continue to monitor platelets.  Transfuse for less than 50,000.    Review of Systems   Respiratory:  Negative for shortness of breath.    Cardiovascular:  Negative for chest pain.   Gastrointestinal:  Positive for nausea. Negative for abdominal pain and vomiting.   Neurological:  Positive for weakness (generalized).     Objective:     Vital Signs (Most Recent):  Temp: 98.1 °F (36.7 °C) (10/15/24 1110)  Pulse: 89 (10/15/24 1110)  Resp: 18 (10/15/24 1110)  BP: (!) 140/74 (10/15/24 1110)  SpO2: 98 % (10/15/24 1110) Vital Signs (24h Range):  Temp:  [98.1 °F (36.7 °C)-98.5 °F (36.9 °C)] 98.1 °F (36.7 °C)  Pulse:  [] 89  Resp:  [17-18] 18  SpO2:  [96 %-98 %] 98 %  BP: (138-173)/(73-89) 140/74     Weight: 75 kg (165 lb 5.5 oz)  Body mass index is 30.24 kg/m².  No intake or output data in the 24 hours ending 10/15/24 1232        Physical Exam  Vitals and nursing note reviewed.   Constitutional:       General: She is not in acute distress.  Cardiovascular:      Rate and Rhythm: Normal rate and regular rhythm.   Pulmonary:      Effort: Pulmonary effort is normal. No respiratory distress.      Breath sounds: Normal breath sounds.   Abdominal:      Palpations: Abdomen is soft.      Tenderness: There is no abdominal tenderness.   Skin:     General: Skin is warm and dry.   Neurological:      Mental Status: She is alert and oriented to person, place, and  "time.      Comments: Positive tremor to bilateral hand             Significant Labs: All pertinent labs within the past 24 hours have been reviewed.  Blood Culture: No results for input(s): "LABBLOO" in the last 48 hours.  CBC:   Recent Labs   Lab 10/13/24  1950 10/15/24  0410   WBC 2.82* 1.69*   HGB 11.3* 9.5*   HCT 32.3* 27.2*   PLT 95* 57*     CMP:   Recent Labs   Lab 10/13/24  1950 10/14/24  0420 10/15/24  0409   * 133* 136   K 5.1 4.8 3.6   CL 87* 94* 104   CO2 15* 17* 22*   GLU 66* 74 130*   BUN 14 11 8   CREATININE 1.2 1.1 1.3   CALCIUM 9.9 9.4 8.9   PROT 8.3 7.5 6.0   ALBUMIN 4.7 4.2 3.4*   BILITOT 0.6 0.7 0.6   ALKPHOS 84 78 60   AST 76* 62* 37   ALT 44 38 27   ANIONGAP 27* 22* 10     Magnesium:   Recent Labs   Lab 10/13/24  1950 10/14/24  0420 10/15/24  0409   MG 2.0 1.7 1.4*     Urine Studies:   Recent Labs   Lab 10/13/24  2257   COLORU Colorless*   APPEARANCEUA Clear   PHUR 5.0   SPECGRAV 1.010   PROTEINUA Negative   GLUCUA Negative   KETONESU 1+*   BILIRUBINUA Negative   OCCULTUA Negative   NITRITE Negative   UROBILINOGEN Negative   LEUKOCYTESUR Negative       Significant Imaging: I have reviewed all pertinent imaging results/findings within the past 24 hours.    Assessment/Plan:      * Alcoholic ketoacidosis  -continuous IV fluids  -cardiac monitoring  -CIWA precaution  -electrolyte replacements p.r.n.  -fall precautions  -schedule and p.r.n. benzos  -PPI b.i.d.  -antiemetics p.r.n.  -administer mvi, folic acid, and thiamine  -hepatic panel pending  Resolving change IV fluids to oral hydration      Hypomagnesemia  Patient has Abnormal Magnesium: hypomagnesemia. Will continue to monitor electrolytes closely. Will replace the affected electrolytes and repeat labs to be done after interventions completed. The patient's magnesium results have been reviewed and are listed below.  Recent Labs   Lab 10/15/24  0409   MG 1.4*        Alcohol dependence with alcohol-induced mood disorder    Educated on " alcohol sensation.  Patient states she is willing to decrease the amount of alcohol intake daily.  Refer to Psychiatry for treatment of anxiety and depression.    Thrombocytopenia  The likely etiology of thrombocytopenia is  unknown . The patients 3 most recent labs are listed below.  Recent Labs     10/13/24  1950 10/15/24  0410   PLT 95* 57*       Plan  - Will transfuse if platelet count is <50k (if undergoing surgical procedure or have active bleeding).        Hyponatremia  Hyponatremia is likely due to Dehydration/hypovolemia. The patient's most recent sodium results are listed below.  Recent Labs     10/13/24  1950 10/14/24  0420 10/15/24  0409   * 133* 136       Plan  - Correct the sodium by 4-6mEq in 24 hours.   - Obtain the following studies: Urine sodium, urine osmolality, serum osmolality.  - Will treat the hyponatremia with IV fluids as follows:  D5 normal saline  - Monitor sodium Daily.   - Patient hyponatremia is stable  Resolved      Stage 3 chronic kidney disease  Creatine stable for now. BMP reviewed- noted Estimated Creatinine Clearance: 41.1 mL/min (based on SCr of 1.1 mg/dL). according to latest data. Based on current GFR, CKD stage is stage 3 - GFR 30-59.  Monitor UOP and serial BMP and adjust therapy as needed. Renally dose meds. Avoid nephrotoxic medications and procedures.      VTE Risk Mitigation (From admission, onward)           Ordered     IP VTE HIGH RISK PATIENT  Once         10/13/24 2252     Place sequential compression device  Until discontinued         10/13/24 2252                    Discharge Planning   SYLWIA: 10/16/2024     Code Status: Full Code   Is the patient medically ready for discharge?:     Reason for patient still in hospital (select all that apply): Patient trending condition, Laboratory test, and Treatment  Discharge Plan A: Home                  Angelia Bertrand NP  Department of Hospital Medicine   [facility]/Surg

## 2024-10-15 NOTE — MEDICAL/APP STUDENT
[facility] - Med/Surg  Progress Note    Patient Name: Nicolasa Madera  MRN: [id number]  Patient Class: IP- Inpatient   Admission Date: 10/13/2024  Length of Stay: 1 days  Attending Physician: Anel Marina MD  Primary Care Provider: Page Garcia PA-C    Subjective:     HPI/Interval hx: 73 y/o F with a hx of alcohol abuse, hypothyroidism, and stroke was seen in the ED s/p fall. Patient felt imbalance after stepping out of her car and fell and hit her head. She endorsed binge drinking earlier in the day. Denies any LOC, hallucinations, anxiety, vomiting, and HA. CBC consistent with anemia. Hyponatremia and hypochloremia on CMP.  Initial anion gap of 27. LFT consistent with alcohol abuse. Metabolic acidosis confirmed on ABG.       Patient reports she is starting to feel better since being admitted. However, she has been very nauseous and had 1 episode of foul smelling diarrhea.     Hospital Course: 73 y/o admitted for alcoholic ketoacidosis. Patient was given NaCl bolus. 1 time dose of IV thiamine and folic acid. Patient is afebrile. Leukopenia, anemia, thrombocytopenia noted on CBC. CMP is improved . IVF switched to electrolyte oral solution. Daily thiamine and daily folic acid. 1 time dose of Mg. Stool culture negative for C. Dif.      INTERVAL HISTORY:    MEDICATIONS:  Scheduled:   chlordiazepoxide  25 mg Oral TID    electrolytes-dextrose  300 mL Oral Q4H    folic acid  1 mg Oral Daily    levothyroxine  100 mcg Oral Before breakfast    multivitamin  1 tablet Oral Daily    pantoprazole  40 mg Intravenous BID    thiamine  100 mg Oral TID     Continuous:    PRN:    Current Facility-Administered Medications:     acetaminophen, 650 mg, Oral, Q4H PRN    aluminum-magnesium hydroxide-simethicone, 30 mL, Oral, QID PRN    dextrose 10%, 12.5 g, Intravenous, PRN    dextrose 10%, 25 g, Intravenous, PRN    glucagon (human recombinant), 1 mg, Intramuscular, PRN    glucose, 16 g, Oral, PRN    glucose, 24 g,  "Oral, PRN    lorazepam, 1 mg, Intravenous, Q2H PRN    magnesium oxide, 800 mg, Oral, PRN    magnesium oxide, 800 mg, Oral, PRN    melatonin, 9 mg, Oral, Nightly PRN    morphine, 2 mg, Intravenous, Q4H PRN    naloxone, 0.02 mg, Intravenous, PRN    ondansetron, 4 mg, Intravenous, Q6H PRN    potassium bicarbonate, 35 mEq, Oral, PRN    potassium bicarbonate, 50 mEq, Oral, PRN    potassium bicarbonate, 60 mEq, Oral, PRN    prochlorperazine, 5 mg, Intravenous, Q6H PRN    senna-docusate 8.6-50 mg, 1 tablet, Oral, BID PRN    sodium chloride 0.9%, 10 mL, Intravenous, Q12H PRN    ALLERGIES:  Review of patient's allergies indicates:  No Known Allergies    Review of Systems   Constitutional:  Negative for diaphoresis and fever.   Respiratory:  Negative for cough and shortness of breath.    Cardiovascular:  Negative for chest pain.   Gastrointestinal:  Positive for abdominal pain, diarrhea and nausea. Negative for vomiting.   Genitourinary:  Negative for dysuria.   Neurological:  Positive for tremors. Negative for dizziness.   Psychiatric/Behavioral:  Negative for depression and hallucinations. The patient is not nervous/anxious.    All other systems reviewed and are negative.         OBJECTIVE:     VITALS  Most Recent Range (Last 24H)   BP (!) 140/74 (BP Location: Right arm, Patient Position: Sitting)   Pulse 89   Temp 98.1 °F (36.7 °C) (Oral)   Resp 18   Ht 5' 2" (1.575 m)   Wt 75 kg (165 lb 5.5 oz)   SpO2 98%   BMI 30.24 kg/m²   Intake and Output   No intake or output data in the 24 hours ending 10/15/24 1112 Temp:  [98.1 °F (36.7 °C)-98.5 °F (36.9 °C)]   Pulse:  []   Resp:  [17-18]   BP: (138-173)/(65-89)   SpO2:  [96 %-98 %]      Physical Exam  Vitals and nursing note reviewed.   Constitutional:       General: She is not in acute distress.  HENT:      Head: Normocephalic and atraumatic.      Mouth/Throat:      Mouth: Mucous membranes are moist.   Eyes:      Conjunctiva/sclera: Conjunctivae normal.      Pupils: " Pupils are equal, round, and reactive to light.   Cardiovascular:      Rate and Rhythm: Normal rate and regular rhythm.      Pulses: Normal pulses.      Heart sounds: Normal heart sounds.   Pulmonary:      Effort: Pulmonary effort is normal.      Breath sounds: Normal breath sounds.   Abdominal:      General: Abdomen is flat. Bowel sounds are normal.   Skin:     Capillary Refill: Capillary refill takes less than 2 seconds.   Neurological:      Mental Status: She is alert and oriented to person, place, and time. Mental status is at baseline.      Motor: Tremor present.   Psychiatric:         Mood and Affect: Mood normal.         Behavior: Behavior normal.         Thought Content: Thought content normal.         Judgment: Judgment normal.          LABS  CBC CMP   Recent Labs   Lab 10/13/24  1950 10/15/24  0410   WBC 2.82* 1.69*   HGB 11.3* 9.5*   HCT 32.3* 27.2*   MCV 80* 82   PLT 95* 57*   RDW 24.2* 24.5*    Recent Labs   Lab 10/14/24  0420 10/15/24  0409   * 136   K 4.8 3.6   CL 94* 104   CO2 17* 22*   BUN 11 8   CREATININE 1.1 1.3   GLU 74 130*   CALCIUM 9.4 8.9          LFTs  Recent Labs   Lab 10/14/24  0420 10/15/24  0409   PROT 7.5 6.0   BILITOT 0.7 0.6   ALKPHOS 78 60   AST 62* 37   ALT 38 27      Urine  Recent Labs   Lab 10/13/24  2257   COLORU Colorless*   SPECGRAV 1.010   PHUR 5.0   PROTEINUA Negative   NITRITE Negative   LEUKOCYTESUR Negative   UROBILINOGEN Negative         Mag & Phos  Recent Labs   Lab 10/14/24  0420 10/15/24  0409   MG 1.7 1.4*   PHOS 2.9  --         Cardiac Enzyme  Recent Labs   Lab 10/13/24  1950   TROPONINI 0.015       ABG  Recent Labs   Lab 10/13/24  2110   PH 7.339*   PO2 33*   PCO2 35.4   HCO3 19.1*   BE -7*           Radiology:      Imaging Results              CT Cervical Spine Without Contrast (Final result)  Result time 10/13/24 20:55:15      Final result by Avril Leary MD (10/13/24 20:55:15)                   Impression:      There is no acute  fracture.    Retrolisthesis of C5 relative to C6 is likely related to spondylosis.    Spondylosis and stenosis as detailed above.      Electronically signed by: Avril Leary  Date:    10/13/2024  Time:    20:55               Narrative:    EXAMINATION:  CT CERVICAL SPINE WITHOUT CONTRAST    CLINICAL HISTORY:  Neck trauma, intoxicated or obtunded (Age >= 16y);    TECHNIQUE:  Low dose axial images, sagittal and coronal reformations were performed though the cervical spine.  Contrast was not administered.    COMPARISON:  None    FINDINGS:  There is retrolisthesis of C5 relative to C6.  There is no acute fracture.    C2-C3:There is no stenosis.  Small disc protrusion noted.    C3-C4:There is no disc protrusion or canal or neural foraminal stenosis.    C4-C5:Dorsal disc osteophyte complex contributes to moderate canal stenosis.  Uncovertebral joint facet hypertrophic changes contribute to moderate bilateral neural foraminal stenosis.    C5-C6:Dorsal disc osteophyte complex formation contributes to mild canal stenosis and moderate bilateral neural foraminal stenosis.    C6-C7:There is a small central disc protrusion.  Uncovertebral joint facet hypertrophic changes contribute to severe right neural foraminal stenosis.    C7-T1:There is no canal or neural foraminal stenosis.    The soft tissue structures visualized in the neck are unremarkable.    The airway is patent and the lung apices are unremarkable.                                       CT Head Without Contrast (Final result)  Result time 10/13/24 20:41:13      Final result by Avril Leary MD (10/13/24 20:41:13)                   Impression:      No acute intracranial abnormality or fracture.    Microvascular chronic ischemic changes.      Electronically signed by: Avril Leary  Date:    10/13/2024  Time:    20:41               Narrative:    EXAMINATION:  CT HEAD WITHOUT CONTRAST    CLINICAL HISTORY:  Head trauma, moderate-severe;    TECHNIQUE:  Low dose  axial images were obtained through the head.  Coronal and sagittal reformations were also performed. Contrast was not administered.  Rapid AI software was utilized to evaluate for intracranial hemorrhage.    COMPARISON:  None.    FINDINGS:  There is no acute intracranial hemorrhage.  The gray-white matter junction differentiation is intact.  There is prominence of the ventricles, cisterns and sulci.  Patchy deep white matter low density as seen with microvascular chronic ischemic changes.  There is no mass or mass effect.  Posterior fossa structures pituitary gland are unremarkable as imaged.  Paranasal sinuses, mastoid air cells and middle ears are clear.  The bony calvarium is intact.                                     CT HEAD WITHOUT CONTRAST  CT CERVICAL SPINE WITHOUT CONTRAST    Microbiology:     Microbiology Results (last 7 days)       Procedure Component Value Units Date/Time    Clostridium difficile EIA [[id number]] Collected: 10/14/24 1240    Order Status: Completed Specimen: Stool Updated: 10/14/24 8629     C. diff Antigen Negative     C difficile Toxins A+B, EIA Negative     Comment: Testing not recommended for children <24 months old.               Assessment/Plan:      Alcoholic ketoacidosis  -Daily CMP  -Continuous 5% dextrose and .9% NaCl infusion  -Folic acid 1mg TID  -Thiamine 100 mg TID  -chlordiazepoxide 25 mg TID  -lorazepam injection PRN     Thrombocytopenia    Stage 3 chronic kidney disease    Hyponatremia        Active Diagnoses:    Diagnosis Date Noted POA    PRINCIPAL PROBLEM:  Alcoholic ketoacidosis [E87.29] 10/13/2024 Yes    Thrombocytopenia [D69.6] 10/13/2024 Yes    Stage 3 chronic kidney disease [N18.30] 08/31/2021 Yes    Hyponatremia [E87.1] 08/31/2021 Yes      Problems Resolved During this Admission:     VTE Risk Mitigation (From admission, onward)           Ordered     IP VTE HIGH RISK PATIENT  Once         10/13/24 2252     Place sequential compression device  Until discontinued          10/13/24 225                       Babak JACKSON  [facility]/Surg

## 2024-10-15 NOTE — ASSESSMENT & PLAN NOTE
Hyponatremia is likely due to Dehydration/hypovolemia. The patient's most recent sodium results are listed below.  Recent Labs     10/13/24  1950 10/14/24  0420 10/15/24  0409   * 133* 136       Plan  - Correct the sodium by 4-6mEq in 24 hours.   - Obtain the following studies: Urine sodium, urine osmolality, serum osmolality.  - Will treat the hyponatremia with IV fluids as follows:  D5 normal saline  - Monitor sodium Daily.   - Patient hyponatremia is stable  Resolved

## 2024-10-15 NOTE — ASSESSMENT & PLAN NOTE
-continuous IV fluids  -cardiac monitoring  -CIWA precaution  -electrolyte replacements p.r.n.  -fall precautions  -schedule and p.r.n. benzos  -PPI b.i.d.  -antiemetics p.r.n.  -administer mvi, folic acid, and thiamine  -hepatic panel pending  Resolving change IV fluids to oral hydration

## 2024-10-15 NOTE — ASSESSMENT & PLAN NOTE
The likely etiology of thrombocytopenia is  unknown . The patients 3 most recent labs are listed below.  Recent Labs     10/13/24  1950 10/15/24  0410   PLT 95* 57*       Plan  - Will transfuse if platelet count is <50k (if undergoing surgical procedure or have active bleeding).

## 2024-10-15 NOTE — SUBJECTIVE & OBJECTIVE
"Interval History: Patient seen in bed.  NAD.  Alert and oriented.  Patient tolerated breakfast without any nausea vomiting or abdominal pain.  Acidosis resolved  Labs reviewed WBCs 1.6 hemoglobin 9.5 platelet 57, sodium 136 K 3.6 creatinine 1.3 glucose 130 and Mag 1.4    No signs of active bleeding therefore will continue to monitor platelets.  Transfuse for less than 50,000.    Review of Systems   Respiratory:  Negative for shortness of breath.    Cardiovascular:  Negative for chest pain.   Gastrointestinal:  Positive for nausea. Negative for abdominal pain and vomiting.   Neurological:  Positive for weakness (generalized).     Objective:     Vital Signs (Most Recent):  Temp: 98.1 °F (36.7 °C) (10/15/24 1110)  Pulse: 89 (10/15/24 1110)  Resp: 18 (10/15/24 1110)  BP: (!) 140/74 (10/15/24 1110)  SpO2: 98 % (10/15/24 1110) Vital Signs (24h Range):  Temp:  [98.1 °F (36.7 °C)-98.5 °F (36.9 °C)] 98.1 °F (36.7 °C)  Pulse:  [] 89  Resp:  [17-18] 18  SpO2:  [96 %-98 %] 98 %  BP: (138-173)/(73-89) 140/74     Weight: 75 kg (165 lb 5.5 oz)  Body mass index is 30.24 kg/m².  No intake or output data in the 24 hours ending 10/15/24 1232        Physical Exam  Vitals and nursing note reviewed.   Constitutional:       General: She is not in acute distress.  Cardiovascular:      Rate and Rhythm: Normal rate and regular rhythm.   Pulmonary:      Effort: Pulmonary effort is normal. No respiratory distress.      Breath sounds: Normal breath sounds.   Abdominal:      Palpations: Abdomen is soft.      Tenderness: There is no abdominal tenderness.   Skin:     General: Skin is warm and dry.   Neurological:      Mental Status: She is alert and oriented to person, place, and time.      Comments: Positive tremor to bilateral hand             Significant Labs: All pertinent labs within the past 24 hours have been reviewed.  Blood Culture: No results for input(s): "LABBLOO" in the last 48 hours.  CBC:   Recent Labs   Lab 10/13/24  1950 " 10/15/24  0410   WBC 2.82* 1.69*   HGB 11.3* 9.5*   HCT 32.3* 27.2*   PLT 95* 57*     CMP:   Recent Labs   Lab 10/13/24  1950 10/14/24  0420 10/15/24  0409   * 133* 136   K 5.1 4.8 3.6   CL 87* 94* 104   CO2 15* 17* 22*   GLU 66* 74 130*   BUN 14 11 8   CREATININE 1.2 1.1 1.3   CALCIUM 9.9 9.4 8.9   PROT 8.3 7.5 6.0   ALBUMIN 4.7 4.2 3.4*   BILITOT 0.6 0.7 0.6   ALKPHOS 84 78 60   AST 76* 62* 37   ALT 44 38 27   ANIONGAP 27* 22* 10     Magnesium:   Recent Labs   Lab 10/13/24  1950 10/14/24  0420 10/15/24  0409   MG 2.0 1.7 1.4*     Urine Studies:   Recent Labs   Lab 10/13/24  2257   COLORU Colorless*   APPEARANCEUA Clear   PHUR 5.0   SPECGRAV 1.010   PROTEINUA Negative   GLUCUA Negative   KETONESU 1+*   BILIRUBINUA Negative   OCCULTUA Negative   NITRITE Negative   UROBILINOGEN Negative   LEUKOCYTESUR Negative       Significant Imaging: I have reviewed all pertinent imaging results/findings within the past 24 hours.

## 2024-10-15 NOTE — PT/OT/SLP EVAL
Occupational Therapy   Evaluation    Name: Nicolasa Madera  MRN: [id number]  Admitting Diagnosis: Alcoholic ketoacidosis  Recent Surgery: * No surgery found *      Recommendations:     Discharge Recommendations: Low Intensity Therapy  Discharge Equipment Recommendations:  other (see comments) (TBD)  Barriers to discharge:       Assessment:     Nicolasa Madera is a 74 y.o. female with a medical diagnosis of Alcoholic ketoacidosis.  She presents with the following performance deficits affecting function: weakness, impaired endurance, impaired self care skills, impaired functional mobility, gait instability, impaired balance.  Pt was up in chair and agreeable to OT.     Rehab Prognosis: Good; patient would benefit from acute skilled OT services to address these deficits and reach maximum level of function.       Plan:     Patient to be seen 5 x/week to address the above listed problems via therapeutic activities, therapeutic exercises, self-care/home management  Plan of Care Expires: 11/12/24  Plan of Care Reviewed with: patient    Subjective     Chief Complaint: weakness  Patient/Family Comments/goals: To get better    Occupational Profile:  Living Environment: Pt lives alone in a 1 story home with no HENRIETTA. Pt has a tub/shower and raised toilet.   Previous level of function: Independent  Roles and Routines: Sister  Equipment Used at Home: none  Assistance upon Discharge: Pt has a brother    Pain/Comfort:  Pain Rating 1: 0/10    Patients cultural, spiritual, Druze conflicts given the current situation:      Objective:     Communicated with: Nurse Perez prior to session.  Patient found up in chair with chair check, peripheral IV upon OT entry to room.    General Precautions: Standard, fall  Orthopedic Precautions: N/A  Braces: N/A  Respiratory Status: Room air    Occupational Performance:      Functional Mobility/Transfers:  Patient completed Sit <> Stand Transfer with contact guard assistance  with  hand-held  assist   Patient completed Toilet Transfer Step Transfer technique with contact guard assistance with  hand-held assist    Activities of Daily Living:  Feeding:  independence    Grooming: stand by assistance set up in sitting  Upper Body Dressing: stand by assistance set up in sitting  Lower Body Dressing: contact guard assistance    Toileting: contact guard assistance      Cognitive/Visual Perceptual:  Pt alert and followed commands for OT Eval    Physical Exam:  Upper Extremity Strength:    -       Right Upper Extremity: WFL  -       Left Upper Extremity: WFL    AMPAC 6 Click ADL:  AMPAC Total Score: 19    Treatment & Education:  OT provided education in role of OT. Patient verbalized understanding and participated in OT.  OT provided instruction in home safety with ADL/IADL including review of home set up and DME/AE. Patient verbalized understanding.  OT provided education in calling for assist. Patient verbalized understanding.        Patient left up in chair with all lines intact, call button in reach, and chair alarm on    GOALS:   Multidisciplinary Problems       Occupational Therapy Goals          Problem: Occupational Therapy    Goal Priority Disciplines Outcome Interventions   Occupational Therapy Goal     OT, PT/OT     Description: Goals to be met by: 11/12/24     Patient will increase functional independence with ADLs by performing:    UE Dressing with Modified [place].  LE Dressing with Modified [place].  Grooming while standing at sink with Modified [place].  Toileting from toilet with Modified [place] for hygiene and clothing management.   Bathing from  shower chair/bench with Modified [place].  Toilet transfer to toilet with Modified [place].  Increased strength and functional activity tolerance for ADL's/IADL's                         History:     Past Medical History:   Diagnosis Date    Encounter for blood transfusion     Hypothyroidism     Peptic ulcer with  hemorrhage     Peptic ulcer, site unspecified, unspecified as acute or chronic, without hemorrhage or perforation     Peptic ulcer disease    Stroke 2017    no residual effects.     Urticaria          Past Surgical History:   Procedure Laterality Date    ESOPHAGOGASTRODUODENOSCOPY N/A 4/18/2021    Procedure: EGD (ESOPHAGOGASTRODUODENOSCOPY);  Surgeon: Crow Vigil MD;  Location: [facility];  Service: Endoscopy;  Laterality: N/A;    ESOPHAGOGASTRODUODENOSCOPY N/A 8/31/2021    Procedure: EGD (ESOPHAGOGASTRODUODENOSCOPY);  Surgeon: Crow Vigil MD;  Location: [facility];  Service: Endoscopy;  Laterality: N/A;       Time Tracking:     OT Date of Treatment: 10/15/24  OT Start Time: 1039  OT Stop Time: 1049  OT Total Time (min): 10 min    Billable Minutes:Evaluation 10    10/15/2024

## 2024-10-15 NOTE — PT/OT/SLP PROGRESS
Physical Therapy Treatment    Patient Name:  Nicolasa Madera   MRN:  [id number]    Recommendations:     Discharge Recommendations: Low Intensity Therapy  Discharge Equipment Recommendations: none  Barriers to discharge: None    Assessment:     Nicolasa Madera is a 74 y.o. female admitted with a medical diagnosis of Alcoholic ketoacidosis.  She presents with the following impairments/functional limitations: weakness, impaired endurance, impaired functional mobility, gait instability, impaired balance, decreased lower extremity function, decreased ROM, edema. Patient presents supine in bed and agreeable to PT this morning. She is SBA with bed mobility and CGA for STS transfer. Able to ambulate x 250 with CGA and no AD, demonstrating improved gait stability in today's session.    Rehab Prognosis: Good; patient would benefit from acute skilled PT services to address these deficits and reach maximum level of function.    Recent Surgery: * No surgery found *      Plan:     During this hospitalization, patient to be seen 6 x/week to address the identified rehab impairments via gait training, therapeutic activities, therapeutic exercises and progress toward the following goals:    Plan of Care Expires:  11/11/24    Subjective     Chief Complaint: none given  Patient/Family Comments/goals: go home  Pain/Comfort:  Pain Rating 1: 0/10      Objective:     Communicated with CLAUDIO Perez prior to session.  Patient found supine with bed alarm, peripheral IV upon PT entry to room.     General Precautions: Standard, fall  Orthopedic Precautions: N/A  Braces:    Respiratory Status: Room air     Functional Mobility:  Bed Mobility:     Supine to Sit: stand by assistance  Transfers:     Sit to Stand:  contact guard assistance with no AD  Gait: x 250 ft CGA no AD      AM-PAC 6 CLICK MOBILITY          Treatment & Education:  Gait training: x 250 ft CGA no AD    Patient left up in chair with all lines intact, call button in reach, chair  alarm on, and nurse notified..    GOALS:   Multidisciplinary Problems       Physical Therapy Goals          Problem: Physical Therapy    Goal Priority Disciplines Outcome Interventions   Physical Therapy Goal     PT, PT/OT Progressing    Description: Goals to be met by: 10/28/24     Patient will increase functional independence with mobility by performin. Supine to sit with [place]  2. Sit to supine with [place]  3. Sit to stand transfer with Stand-by Assistance  4. Bed to chair transfer with Stand-by Assistance using No Assistive Device  5. Gait  x 150 feet with Stand-by Assistance using No Assistive Device.                          Time Tracking:     PT Received On: 10/15/24  PT Start Time: 1025     PT Stop Time: 1036  PT Total Time (min): 11 min     Billable Minutes: Gait Training 11    Treatment Type: Treatment  PT/PTA: PT     Number of PTA visits since last PT visit: 0     10/15/2024

## 2024-10-15 NOTE — PLAN OF CARE
PCP apt scheduled and added to AVS       10/15/24 7367   Post-Acute Status   Hospital Resources/Appts/Education Provided Appointments scheduled and added to AVS

## 2024-10-15 NOTE — PLAN OF CARE
"Plan of care review with pt, pt states "understanding," IVF completed, encouraging Pedialyte and water, pt is alert, very slow to respond,  no redness/swelling noted to IV site, pt continues to have diarrhea episodes, electrolyte replacement in progress, pt noted to have light tremors and slight agitation, will continue to monitor, observe and note any changes, safety maintain   "

## 2024-10-15 NOTE — ASSESSMENT & PLAN NOTE
Patient has Abnormal Magnesium: hypomagnesemia. Will continue to monitor electrolytes closely. Will replace the affected electrolytes and repeat labs to be done after interventions completed. The patient's magnesium results have been reviewed and are listed below.  Recent Labs   Lab 10/15/24  0409   MG 1.4*

## 2024-10-15 NOTE — PLAN OF CARE
Goals to be met by: 11/12/24     Patient will increase functional independence with ADLs by performing:    UE Dressing with Modified [place].  LE Dressing with Modified [place].  Grooming while standing at sink with Modified [place].  Toileting from toilet with Modified [place] for hygiene and clothing management.   Bathing from  shower chair/bench with Modified [place].  Toilet transfer to toilet with Modified [place].  Increased strength and functional activity tolerance for ADL's/IADL's

## 2024-10-16 VITALS
WEIGHT: 165.38 LBS | OXYGEN SATURATION: 97 % | RESPIRATION RATE: 18 BRPM | HEART RATE: 91 BPM | SYSTOLIC BLOOD PRESSURE: 132 MMHG | HEIGHT: 62 IN | DIASTOLIC BLOOD PRESSURE: 72 MMHG | BODY MASS INDEX: 30.44 KG/M2 | TEMPERATURE: 99 F

## 2024-10-16 LAB
ALBUMIN SERPL BCP-MCNC: 3.5 G/DL (ref 3.5–5.2)
ALP SERPL-CCNC: 66 U/L (ref 55–135)
ALT SERPL W/O P-5'-P-CCNC: 29 U/L (ref 10–44)
ANION GAP SERPL CALC-SCNC: 8 MMOL/L (ref 8–16)
AST SERPL-CCNC: 43 U/L (ref 10–40)
BASOPHILS # BLD AUTO: 0.02 K/UL (ref 0–0.2)
BASOPHILS NFR BLD: 0.8 % (ref 0–1.9)
BILIRUB SERPL-MCNC: 0.5 MG/DL (ref 0.1–1)
BUN SERPL-MCNC: 6 MG/DL (ref 8–23)
CALCIUM SERPL-MCNC: 9.4 MG/DL (ref 8.7–10.5)
CHLORIDE SERPL-SCNC: 105 MMOL/L (ref 95–110)
CO2 SERPL-SCNC: 23 MMOL/L (ref 23–29)
CREAT SERPL-MCNC: 1.3 MG/DL (ref 0.5–1.4)
DIFFERENTIAL METHOD BLD: ABNORMAL
EOSINOPHIL # BLD AUTO: 0.1 K/UL (ref 0–0.5)
EOSINOPHIL NFR BLD: 2.7 % (ref 0–8)
ERYTHROCYTE [DISTWIDTH] IN BLOOD BY AUTOMATED COUNT: 24.2 % (ref 11.5–14.5)
EST. GFR  (NO RACE VARIABLE): 43 ML/MIN/1.73 M^2
GLUCOSE SERPL-MCNC: 93 MG/DL (ref 70–110)
HCT VFR BLD AUTO: 26.7 % (ref 37–48.5)
HGB BLD-MCNC: 9.4 G/DL (ref 12–16)
IMM GRANULOCYTES # BLD AUTO: 0.01 K/UL (ref 0–0.04)
IMM GRANULOCYTES NFR BLD AUTO: 0.4 % (ref 0–0.5)
LYMPHOCYTES # BLD AUTO: 0.6 K/UL (ref 1–4.8)
LYMPHOCYTES NFR BLD: 24.2 % (ref 18–48)
MAGNESIUM SERPL-MCNC: 1.5 MG/DL (ref 1.6–2.6)
MCH RBC QN AUTO: 28.4 PG (ref 27–31)
MCHC RBC AUTO-ENTMCNC: 35.2 G/DL (ref 32–36)
MCV RBC AUTO: 81 FL (ref 82–98)
MONOCYTES # BLD AUTO: 0.3 K/UL (ref 0.3–1)
MONOCYTES NFR BLD: 10.4 % (ref 4–15)
NEUTROPHILS # BLD AUTO: 1.6 K/UL (ref 1.8–7.7)
NEUTROPHILS NFR BLD: 61.5 % (ref 38–73)
NRBC BLD-RTO: 0 /100 WBC
PLATELET # BLD AUTO: 56 K/UL (ref 150–450)
PMV BLD AUTO: ABNORMAL FL (ref 9.2–12.9)
POTASSIUM SERPL-SCNC: 3.5 MMOL/L (ref 3.5–5.1)
PROT SERPL-MCNC: 6.4 G/DL (ref 6–8.4)
RBC # BLD AUTO: 3.31 M/UL (ref 4–5.4)
SODIUM SERPL-SCNC: 136 MMOL/L (ref 136–145)
WBC # BLD AUTO: 2.6 K/UL (ref 3.9–12.7)

## 2024-10-16 PROCEDURE — 25000003 PHARM REV CODE 250

## 2024-10-16 PROCEDURE — 97535 SELF CARE MNGMENT TRAINING: CPT

## 2024-10-16 PROCEDURE — 80053 COMPREHEN METABOLIC PANEL: CPT

## 2024-10-16 PROCEDURE — 85025 COMPLETE CBC W/AUTO DIFF WBC: CPT | Performed by: NURSE PRACTITIONER

## 2024-10-16 PROCEDURE — 36415 COLL VENOUS BLD VENIPUNCTURE: CPT

## 2024-10-16 PROCEDURE — 25000003 PHARM REV CODE 250: Performed by: NURSE PRACTITIONER

## 2024-10-16 PROCEDURE — 83735 ASSAY OF MAGNESIUM: CPT | Performed by: NURSE PRACTITIONER

## 2024-10-16 PROCEDURE — 97116 GAIT TRAINING THERAPY: CPT | Mod: CQ

## 2024-10-16 RX ORDER — FOLIC ACID 1 MG/1
1 TABLET ORAL DAILY
Qty: 30 TABLET | Refills: 0 | Status: ON HOLD | OUTPATIENT
Start: 2024-10-16 | End: 2024-11-15

## 2024-10-16 RX ORDER — LANOLIN ALCOHOL/MO/W.PET/CERES
100 CREAM (GRAM) TOPICAL 3 TIMES DAILY
Qty: 90 TABLET | Refills: 0 | Status: ON HOLD | OUTPATIENT
Start: 2024-10-16 | End: 2024-11-15

## 2024-10-16 RX ORDER — CHLORDIAZEPOXIDE HYDROCHLORIDE 5 MG/1
CAPSULE, GELATIN COATED ORAL
Qty: 51 CAPSULE | Refills: 0 | Status: ON HOLD | OUTPATIENT
Start: 2024-10-16 | End: 2024-10-23

## 2024-10-16 RX ORDER — CHLORDIAZEPOXIDE HYDROCHLORIDE 5 MG/1
CAPSULE, GELATIN COATED ORAL
Qty: 51 CAPSULE | Refills: 0 | Status: SHIPPED | OUTPATIENT
Start: 2024-10-16 | End: 2024-10-16

## 2024-10-16 RX ORDER — FOLIC ACID 1 MG/1
1 TABLET ORAL DAILY
Qty: 30 TABLET | Refills: 0 | Status: SHIPPED | OUTPATIENT
Start: 2024-10-16 | End: 2024-10-16

## 2024-10-16 RX ADMIN — Medication 800 MG: at 09:10

## 2024-10-16 RX ADMIN — THERA TABS 1 TABLET: TAB at 09:10

## 2024-10-16 RX ADMIN — PANTOPRAZOLE SODIUM 40 MG: 40 TABLET, DELAYED RELEASE ORAL at 09:10

## 2024-10-16 RX ADMIN — Medication 300 ML: at 09:10

## 2024-10-16 RX ADMIN — THIAMINE HCL TAB 100 MG 100 MG: 100 TAB at 02:10

## 2024-10-16 RX ADMIN — LEVOTHYROXINE SODIUM 100 MCG: 0.1 TABLET ORAL at 05:10

## 2024-10-16 RX ADMIN — FOLIC ACID 1 MG: 1 TABLET ORAL at 09:10

## 2024-10-16 RX ADMIN — Medication 300 ML: at 12:10

## 2024-10-16 RX ADMIN — THIAMINE HCL TAB 100 MG 100 MG: 100 TAB at 09:10

## 2024-10-16 RX ADMIN — Medication 300 ML: at 05:10

## 2024-10-16 RX ADMIN — CHLORDIAZEPOXIDE HYDROCHLORIDE 25 MG: 25 CAPSULE ORAL at 09:10

## 2024-10-16 RX ADMIN — CHLORDIAZEPOXIDE HYDROCHLORIDE 25 MG: 25 CAPSULE ORAL at 02:10

## 2024-10-16 NOTE — PLAN OF CARE
Problem: Adult Inpatient Plan of Care  Goal: Plan of Care Review  Outcome: Met  Goal: Patient-Specific Goal (Individualized)  Outcome: Met  Goal: Absence of Hospital-Acquired Illness or Injury  Outcome: Met  Goal: Optimal Comfort and Wellbeing  Outcome: Met  Goal: Readiness for Transition of Care  Outcome: Met     Problem: Infection  Goal: Absence of Infection Signs and Symptoms  Outcome: Met     Problem: Fall Injury Risk  Goal: Absence of Fall and Fall-Related Injury  Outcome: Met     Problem: Diabetes  Goal: Optimal Coping  Outcome: Met  Goal: Optimal Functional Ability  Outcome: Met  Goal: Blood Glucose Level Within Target Range  Outcome: Met  Goal: Minimize Risk of Hypoglycemia  Outcome: Met     Problem: Nausea and Vomiting  Goal: Nausea and Vomiting Relief  Outcome: Met     Problem: Skin Injury Risk Increased  Goal: Skin Health and Integrity  Outcome: Met

## 2024-10-16 NOTE — PLAN OF CARE
Problem: Physical Therapy  Goal: Physical Therapy Goal  Description: Goals to be met by: 10/28/24     Patient will increase functional independence with mobility by performin. Supine to sit with [place]  2. Sit to supine with [place]  3. Sit to stand transfer with Stand-by Assistance  4. Bed to chair transfer with Stand-by Assistance using No Assistive Device  5. Gait  x 150 feet with Stand-by Assistance using No Assistive Device.     Outcome: Progressing   Ambulate with assistance for safety.

## 2024-10-16 NOTE — PLAN OF CARE
Problem: Occupational Therapy  Goal: Occupational Therapy Goal  Description: Goals to be met by: 11/12/24     Patient will increase functional independence with ADLs by performing:    UE Dressing with Modified [place].  LE Dressing with Modified [place].  Grooming while standing at sink with Modified [place].  Toileting from toilet with Modified [place] for hygiene and clothing management.   Bathing from  shower chair/bench with Modified [place].  Toilet transfer to toilet with Modified [place].  Increased strength and functional activity tolerance for ADL's/IADL's    Outcome: Progressing  CGA with ambulation to/from bathroom and sink.  Min assist with clothing management/diaper; Pt completed hygiene after BM with Min assist.   Pt completed washing hands and brushing teeth in standing at sink with CGA/SBA.   Continue with POC

## 2024-10-16 NOTE — PLAN OF CARE
HH discussed during rounds.  List provided for HH.  States would like [facility].  Referral sent to [facility] via [employer].  SOC 10/17/24.  Pt states will be taking an Uber home at discharge.  Requested medication sent to [facility], provider notified.     10/16/24 1153   Post-Acute Status   Post-Acute Authorization Home Health   Home Health Status Set-up Complete/Auth obtained

## 2024-10-16 NOTE — PLAN OF CARE
Pt clear for DC from case management standpoint. Discharging to home.  HH: [facility] SOC 10/17/24  States will get Uber home at discharge.       10/16/24 1257   Final Note   Assessment Type Final Discharge Note   Anticipated Discharge Disposition Home-Health

## 2024-10-16 NOTE — DISCHARGE SUMMARY
"[facility]  Discharge Summary      Patient Name: Nicolasa Madera  MRN: [id number]  JESUS: [id number]  Patient Class: IP- Inpatient  Admission Date: 10/13/2024  Hospital Length of Stay: 2 days  Discharge Date and Time:  10/16/2024 3:00 PM  Attending Physician: Anel Marina MD   Discharging Provider: Melina Sheikh PA-C  Primary Care Provider: Page Garcia PA-C    Primary Care Team: Networked reference to record PCT     HPI:   Nicolasa Madera is a 74 year old female with a past medical history of EtOH abuse, peptic ulcer disease, GI bleed, hypothyroidism, stroke, and CKD 3 presents with complaints of falling in a convenience store and hitting head several hours prior to arrival.  She reports getting out of the car, tripping, fell, and hit the back of her head.  She reports a history of drinking glasses of wine "more than she should." She reports binge drinking earlier today.  She denies chest pain, shortness of breath, fever, chills, dizziness, lightheadedness, headaches, vision changes, numbness/tingling, abdominal pain, nausea, or vomiting.  ED workup reveals:  CT head with no acute intracranial abnormality or fracture.  CT cervical spine with no acute fractures but with retrolisthesis of C5 relative to C6 of chronic spondylosis and stenosis.  CBC with chronic white count 2.8, thrombocytopenia 95, and stable H&H.  BMP reflective of acidosis, hyponatremia 129, hypochloremia 87, bicarb 15, anion gap 27, creatinine 1.2, and glucose 66.  Elevated Beta hydroxybutyrate 5.9, and elevated alcohol levels.  LFTs unremarkable other than AST 76.  VBG with pH 7.33/pCO2 35.2/PO2 33/bicarb 19.1 and base excess -7.  She received amp of D50, folic and thiamine p.o., and fluids in ED. Started on CIWA precautions.  Admitted to hospital medicine for further management and treatment.                   * No surgery found *      Hospital Course:   74 year old female admitted for " alcohol ketoacidosis.  She was started on IV fluids.  CT head and CT c-spine negative for acute findings.  Patient placed on CIWA precautions with scheduled librium and PRN ativan for alcohol withdrawal symptoms.  Labs were trended.  PT/OT consulted. C diff stool studies were obtained and were negative. Patient with improvement of ion gap.  IV fluids transitioned to oral hydration.  CBC and BMP trended.  She was noted to have pancytopenia which has been seen on labs since 2021.  Patient tolerated diet and CO2 and anion gap normalized.  Electrolytes were replaced. The patient was seen on day of discharge.  Patient was eager for discharge.  Librium taper was ordered on discharge and instructed importance of alcohol cessation with the patient was voiced understanding.  Outpatient referral to addiction medicine was sent.  Patient to follow up with PCP.  Home health was ordered at discharge.  Return precautions discussed and patient voiced understanding.     Goals of Care Treatment Preferences:  Code Status: Full Code      SDOH Screening:  The patient was screened for utility difficulties, food insecurity, transport difficulties, housing insecurity, and interpersonal safety and there were no concerns identified this admission.     Consults:     No new Assessment & Plan notes have been filed under this hospital service since the last note was generated.  Service: Hospital Medicine    Final Active Diagnoses:    Diagnosis Date Noted POA    PRINCIPAL PROBLEM:  Alcoholic ketoacidosis [E87.29] 10/13/2024 Yes    Alcohol dependence with alcohol-induced mood disorder [F10.24] 10/15/2024 Yes    Hypomagnesemia [E83.42] 10/15/2024 Yes    Thrombocytopenia [D69.6] 10/13/2024 Yes    Stage 3 chronic kidney disease [N18.30] 08/31/2021 Yes    Hyponatremia [E87.1] 08/31/2021 Yes      Problems Resolved During this Admission:       Discharged Condition: good    Disposition: Home-Health Care [facility]    Follow Up:   Follow-up Information        Page Garcia PA-C. Go on 10/25/2024.    Specialty: Family Medicine  Why: at 3:30 PM for hospital follow up  Contact information:  [place] [address]  [phone]                           Patient Instructions:      Ambulatory referral/consult to Addiction Specialist   Standing Status: Future   Referral Priority: Routine Referral Type: Psychiatric   Referral Reason: Specialty Services Required   Requested Specialty: Addiction Medicine   Number of Visits Requested: 1     Ambulatory referral/consult to Home Health   Standing Status: Future   Referral Priority: Routine Referral Type: Home Health   Referral Reason: Specialty Services Required   Requested Specialty: Home Health Services   Number of Visits Requested: 1     Notify your health care provider if you experience any of the following:  temperature >100.4     Notify your health care provider if you experience any of the following:  persistent nausea and vomiting or diarrhea     Notify your health care provider if you experience any of the following:  severe uncontrolled pain     Notify your health care provider if you experience any of the following:  redness, tenderness, or signs of infection (pain, swelling, redness, odor or green/yellow discharge around incision site)     Notify your health care provider if you experience any of the following:  difficulty breathing or increased cough     Notify your health care provider if you experience any of the following:  increased confusion or weakness     Activity as tolerated       Significant Diagnostic Studies: Labs: CMP   Recent Labs   Lab 10/15/24  0409 10/16/24  0415    136   K 3.6 3.5    105   CO2 22* 23   * 93   BUN 8 6*   CREATININE 1.3 1.3   CALCIUM 8.9 9.4   PROT 6.0 6.4   ALBUMIN 3.4* 3.5   BILITOT 0.6 0.5   ALKPHOS 60 66   AST 37 43*   ALT 27 29   ANIONGAP 10 8    and CBC   Recent Labs   Lab 10/15/24  0410 10/16/24  0415   WBC 1.69* 2.60*   HGB 9.5* 9.4*   HCT 27.2*  26.7*   PLT 57* 56*       Pending Diagnostic Studies:       None           Medications:  Reconciled Home Medications:      Medication List        START taking these medications      chlordiazepoxide 5 MG capsule  Commonly known as: LIBRIUM  Take 5 capsules (25 mg total) by mouth 3 (three) times daily for 1 day, THEN 4 capsules (20 mg total) 3 (three) times daily for 1 day, THEN 3 capsules (15 mg total) 3 (three) times daily for 1 day, THEN 2 capsules (10 mg total) 3 (three) times daily for 1 day, THEN 2 capsules (10 mg total) 2 (two) times a day for 1 day, THEN 1 capsule (5 mg total) 3 (three) times daily for 1 day, THEN 1 capsule (5 mg total) 2 (two) times a day for 1 day.  Start taking on: October 16, 2024     folic acid 1 MG tablet  Commonly known as: FOLVITE  Take 1 tablet (1 mg total) by mouth once daily.     multivitamin Tab  Take 1 tablet by mouth once daily.     thiamine 100 MG tablet  Take 1 tablet (100 mg total) by mouth 3 (three) times daily.            CONTINUE taking these medications      biotin 5,000 mcg Tbdl  Take 1 tablet by mouth once daily.     CENTRUM SILVER WOMEN ORAL  Take 1 tablet by mouth once daily.     fish oil-omega-3 fatty acids 300-1,000 mg capsule  Take 1 capsule by mouth once daily.     hydrOXYzine HCL 25 MG tablet  Commonly known as: ATARAX  TAKE 1 TABLET THREE TIMES DAILY AS NEEDED FOR ITCHING     levothyroxine 100 MCG tablet  Commonly known as: SYNTHROID  Take 1 tablet (100 mcg total) by mouth before breakfast.     pantoprazole 40 MG tablet  Commonly known as: PROTONIX  Take 1 tablet (40 mg total) by mouth 2 (two) times daily.     VITAMIN B COMPLEX NO.12-NIACIN ORAL  Take 1 tablet by mouth.              Indwelling Lines/Drains at time of discharge:   Lines/Drains/Airways       None                   Time spent on the discharge of patient: 35 minutes         Melina Sheikh PA-C  Department of Hospital Medicine  [facility]/Surg

## 2024-10-16 NOTE — NURSING
Patient discharged to [employer] .   U[employer] 's information:   [employer]   License plate #: [id number]  [employer]

## 2024-10-16 NOTE — PT/OT/SLP PROGRESS
Occupational Therapy   Treatment    Name: Nicolasa Madera  MRN: [id number]  Admitting Diagnosis:  Alcoholic ketoacidosis       Recommendations:     Discharge Recommendations: Low Intensity Therapy  Discharge Equipment Recommendations:  other (see comments) (TBD)  Barriers to discharge:       Assessment:     Nicolasa Madera is a 74 y.o. female with a medical diagnosis of Alcoholic ketoacidosis.  She presents with the following performance deficits affecting function are weakness, impaired endurance, impaired self care skills, impaired functional mobility, gait instability, impaired balance, decreased lower extremity function, decreased upper extremity function. Pt up in chair and requested toileting. Pt ambulated from bedside chair to toilet with CGA/HHA. Pt required Min assist with clothing management/diaper. Pt completed hygiene after BM with Min assist. Pt ambulated to sink with CGA/HHA. Pt completed washing hands and brushing teeth in standing at sink with CGA/SBA. Pt ambulated back to bedside chair with CGA/HHA. OT provided education in calling for assist. Pt verbalized understanding.    Rehab Prognosis:  Good; patient would benefit from acute skilled OT services to address these deficits and reach maximum level of function.       Plan:     Patient to be seen 5 x/week to address the above listed problems via self-care/home management, therapeutic activities, therapeutic exercises  Plan of Care Expires: 11/12/24  Plan of Care Reviewed with: patient    Subjective     Chief Complaint: no complaints verbalized   Patient/Family Comments/goals: To go home  Pain/Comfort:       Objective:     Communicated with: Nurse Bryan prior to session.  Patient found up in chair with chair check, peripheral IV upon OT entry to room.    General Precautions: Standard, fall    Orthopedic Precautions:N/A  Braces: N/A  Respiratory Status: Room air     Occupational Performance:       Functional Mobility/Transfers:  Patient  completed Sit <> Stand Transfer with contact guard assistance  with  hand-held assist   Patient completed Toilet Transfer Step Transfer technique with contact guard assistance with  hand-held assist  Functional Mobility: Pt was able to ambulate to/from bathroom and sink with CGA/HHA    Activities of Daily Living:  Grooming: stand by assistance and contact guard assistance in standing at sink.  Toileting: minimum assistance        [facility] 6 Click ADL:      Treatment & Education:  OT provided education in role of OT. Patient verbalized understanding and participated in OT.  OT provided education in calling for assist. Patient verbalized understanding.      Patient left up in chair with all lines intact, call button in reach, and chair alarm on    GOALS:   Multidisciplinary Problems       Occupational Therapy Goals          Problem: Occupational Therapy    Goal Priority Disciplines Outcome Interventions   Occupational Therapy Goal     OT, PT/OT Progressing    Description: Goals to be met by: 11/12/24     Patient will increase functional independence with ADLs by performing:    UE Dressing with Modified [place].  LE Dressing with Modified [place].  Grooming while standing at sink with Modified [place].  Toileting from toilet with Modified [place] for hygiene and clothing management.   Bathing from  shower chair/bench with Modified [place].  Toilet transfer to toilet with Modified [place].  Increased strength and functional activity tolerance for ADL's/IADL's                         Time Tracking:     OT Date of Treatment: 10/16/24  OT Start Time: 1311  OT Stop Time: 1343  OT Total Time (min): 32 min    Billable Minutes:Self Care/Home Management 32    OT/SCOT: OT          10/16/2024

## 2024-10-16 NOTE — DISCHARGE INSTRUCTIONS
Discharge Instructions, [facility] Medicine    Thank you for choosing [facility] for your medical care. The primary doctor who is taking care of you at the time of your discharge is Anel Marina MD.     You were admitted to the hospital with Alcoholic ketoacidosis.     Please note your discharge instructions, including diet/activity restrictions, follow-up appointments, and medication changes.  If you have any questions about your medical issues, prescriptions, or any other questions, please feel free to contact the [facility] Medicine Dept at [phone] and we will help.    If you are previously with Home health, outpatient PT/OT or under a therapy program, you are cleared to return to those programs.    Please direct all long term medication refills and follow up to your primary care provider, Page Garcia PA-C. Thank you again for letting us take care of your health care needs.    Please note the following discharge instructions per your discharging physician-  Melina Sheikh PA-C    Refills: 0   Take 5 capsules (25 mg total) by mouth 3 (three) times daily for 1 day, THEN 4 capsules (20 mg total) 3 (three) times daily for 1 day, THEN 3 capsules (15 mg total) 3 (three) times daily for 1 day, THEN 2 capsules (10 mg total) 3 (three) times daily for 1 day, THEN 2 capsules (10 mg total) 2 (two) times a day for 1 day, THEN 1 capsule (5 mg total) 3 (three) times daily for 1 day, THEN 1 capsule (5 mg total) 2 (two) times a day for 1 day.  DO NOT CONSUME ALCOHOL WHILE TAKING LIBRIUM

## 2024-10-17 ENCOUNTER — HOSPITAL ENCOUNTER (OUTPATIENT)
Facility: HOSPITAL | Age: 74
Discharge: HOME | End: 2024-10-18
Attending: EMERGENCY MEDICINE
Payer: MEDICARE

## 2024-10-17 DIAGNOSIS — R53.1 GENERALIZED WEAKNESS: ICD-10-CM

## 2024-10-17 DIAGNOSIS — R07.9 CHEST PAIN: ICD-10-CM

## 2024-10-17 DIAGNOSIS — R29.6 RECURRENT FALLS: Primary | ICD-10-CM

## 2024-10-17 DIAGNOSIS — T79.6XXA TRAUMATIC RHABDOMYOLYSIS, INITIAL ENCOUNTER: ICD-10-CM

## 2024-10-17 PROBLEM — R53.81 DEBILITY: Status: ACTIVE | Noted: 2024-10-17

## 2024-10-17 PROBLEM — M62.82 RHABDOMYOLYSIS: Status: ACTIVE | Noted: 2024-10-17

## 2024-10-17 LAB
ALBUMIN SERPL BCP-MCNC: 4.1 G/DL (ref 3.5–5.2)
ALP SERPL-CCNC: 56 U/L (ref 55–135)
ALT SERPL W/O P-5'-P-CCNC: 43 U/L (ref 10–44)
ANION GAP SERPL CALC-SCNC: 10 MMOL/L (ref 8–16)
AST SERPL-CCNC: 103 U/L (ref 10–40)
BACTERIA #/AREA URNS HPF: NORMAL /HPF
BASOPHILS # BLD AUTO: 0.01 K/UL (ref 0–0.2)
BASOPHILS NFR BLD: 0.2 % (ref 0–1.9)
BILIRUB SERPL-MCNC: 0.8 MG/DL (ref 0.1–1)
BILIRUB UR QL STRIP: NEGATIVE
BUN SERPL-MCNC: 7 MG/DL (ref 8–23)
CALCIUM SERPL-MCNC: 9.7 MG/DL (ref 8.7–10.5)
CHLORIDE SERPL-SCNC: 102 MMOL/L (ref 95–110)
CK SERPL-CCNC: 2323 U/L (ref 20–180)
CLARITY UR: CLEAR
CO2 SERPL-SCNC: 24 MMOL/L (ref 23–29)
COLOR UR: YELLOW
CREAT SERPL-MCNC: 1.1 MG/DL (ref 0.5–1.4)
DIFFERENTIAL METHOD BLD: ABNORMAL
EOSINOPHIL # BLD AUTO: 0 K/UL (ref 0–0.5)
EOSINOPHIL NFR BLD: 0 % (ref 0–8)
ERYTHROCYTE [DISTWIDTH] IN BLOOD BY AUTOMATED COUNT: 24.4 % (ref 11.5–14.5)
EST. GFR  (NO RACE VARIABLE): 52.1 ML/MIN/1.73 M^2
ETHANOL SERPL-MCNC: <10 MG/DL
GLUCOSE SERPL-MCNC: 101 MG/DL (ref 70–110)
GLUCOSE UR QL STRIP: ABNORMAL
HCT VFR BLD AUTO: 28.8 % (ref 37–48.5)
HGB BLD-MCNC: 9.9 G/DL (ref 12–16)
HGB UR QL STRIP: ABNORMAL
IMM GRANULOCYTES # BLD AUTO: 0.02 K/UL (ref 0–0.04)
IMM GRANULOCYTES NFR BLD AUTO: 0.5 % (ref 0–0.5)
KETONES UR QL STRIP: NEGATIVE
LEUKOCYTE ESTERASE UR QL STRIP: NEGATIVE
LYMPHOCYTES # BLD AUTO: 0.4 K/UL (ref 1–4.8)
LYMPHOCYTES NFR BLD: 10.3 % (ref 18–48)
MCH RBC QN AUTO: 27.9 PG (ref 27–31)
MCHC RBC AUTO-ENTMCNC: 34.4 G/DL (ref 32–36)
MCV RBC AUTO: 81 FL (ref 82–98)
MICROSCOPIC COMMENT: NORMAL
MONOCYTES # BLD AUTO: 0.4 K/UL (ref 0.3–1)
MONOCYTES NFR BLD: 9.3 % (ref 4–15)
NEUTROPHILS # BLD AUTO: 3.4 K/UL (ref 1.8–7.7)
NEUTROPHILS NFR BLD: 79.7 % (ref 38–73)
NITRITE UR QL STRIP: NEGATIVE
NRBC BLD-RTO: 0 /100 WBC
OHS QRS DURATION: 82 MS
OHS QRS DURATION: 84 MS
OHS QTC CALCULATION: 429 MS
OHS QTC CALCULATION: 433 MS
PH UR STRIP: 7 [PH] (ref 5–8)
PLATELET # BLD AUTO: 66 K/UL (ref 150–450)
PMV BLD AUTO: ABNORMAL FL (ref 9.2–12.9)
POTASSIUM SERPL-SCNC: 4.3 MMOL/L (ref 3.5–5.1)
PROT SERPL-MCNC: 7 G/DL (ref 6–8.4)
PROT UR QL STRIP: NEGATIVE
RBC # BLD AUTO: 3.55 M/UL (ref 4–5.4)
RBC #/AREA URNS HPF: 0 /HPF (ref 0–4)
SODIUM SERPL-SCNC: 136 MMOL/L (ref 136–145)
SP GR UR STRIP: 1.01 (ref 1–1.03)
SQUAMOUS #/AREA URNS HPF: 0 /HPF
URN SPEC COLLECT METH UR: ABNORMAL
UROBILINOGEN UR STRIP-ACNC: NEGATIVE EU/DL
WBC # BLD AUTO: 4.28 K/UL (ref 3.9–12.7)

## 2024-10-17 PROCEDURE — 96365 THER/PROPH/DIAG IV INF INIT: CPT

## 2024-10-17 PROCEDURE — 99285 EMERGENCY DEPT VISIT HI MDM: CPT | Mod: 25

## 2024-10-17 PROCEDURE — 96361 HYDRATE IV INFUSION ADD-ON: CPT

## 2024-10-17 PROCEDURE — G0378 HOSPITAL OBSERVATION PER HR: HCPCS

## 2024-10-17 PROCEDURE — 63600175 PHARM REV CODE 636 W HCPCS: Performed by: EMERGENCY MEDICINE

## 2024-10-17 PROCEDURE — 82550 ASSAY OF CK (CPK): CPT | Performed by: EMERGENCY MEDICINE

## 2024-10-17 PROCEDURE — 85025 COMPLETE CBC W/AUTO DIFF WBC: CPT | Performed by: EMERGENCY MEDICINE

## 2024-10-17 PROCEDURE — 96366 THER/PROPH/DIAG IV INF ADDON: CPT

## 2024-10-17 PROCEDURE — 82077 ASSAY SPEC XCP UR&BREATH IA: CPT | Performed by: EMERGENCY MEDICINE

## 2024-10-17 PROCEDURE — 80053 COMPREHEN METABOLIC PANEL: CPT | Performed by: EMERGENCY MEDICINE

## 2024-10-17 PROCEDURE — 36415 COLL VENOUS BLD VENIPUNCTURE: CPT | Performed by: EMERGENCY MEDICINE

## 2024-10-17 PROCEDURE — 25000003 PHARM REV CODE 250: Performed by: INTERNAL MEDICINE

## 2024-10-17 PROCEDURE — 63600175 PHARM REV CODE 636 W HCPCS: Performed by: INTERNAL MEDICINE

## 2024-10-17 PROCEDURE — 96367 TX/PROPH/DG ADDL SEQ IV INF: CPT

## 2024-10-17 PROCEDURE — 25000003 PHARM REV CODE 250: Performed by: EMERGENCY MEDICINE

## 2024-10-17 PROCEDURE — 81001 URINALYSIS AUTO W/SCOPE: CPT | Mod: XB | Performed by: EMERGENCY MEDICINE

## 2024-10-17 RX ORDER — SODIUM,POTASSIUM PHOSPHATES 280-250MG
2 POWDER IN PACKET (EA) ORAL
Status: DISCONTINUED | OUTPATIENT
Start: 2024-10-17 | End: 2024-10-18 | Stop reason: HOSPADM

## 2024-10-17 RX ORDER — TALC
6 POWDER (GRAM) TOPICAL NIGHTLY PRN
Status: DISCONTINUED | OUTPATIENT
Start: 2024-10-17 | End: 2024-10-18 | Stop reason: HOSPADM

## 2024-10-17 RX ORDER — THIAMINE HCL 100 MG
100 TABLET ORAL 3 TIMES DAILY
Status: DISCONTINUED | OUTPATIENT
Start: 2024-10-17 | End: 2024-10-18 | Stop reason: HOSPADM

## 2024-10-17 RX ORDER — HYDROCODONE BITARTRATE AND ACETAMINOPHEN 5; 325 MG/1; MG/1
1 TABLET ORAL EVERY 6 HOURS PRN
Status: DISCONTINUED | OUTPATIENT
Start: 2024-10-17 | End: 2024-10-18 | Stop reason: HOSPADM

## 2024-10-17 RX ORDER — ACETAMINOPHEN 325 MG/1
650 TABLET ORAL EVERY 8 HOURS PRN
Status: DISCONTINUED | OUTPATIENT
Start: 2024-10-17 | End: 2024-10-18 | Stop reason: HOSPADM

## 2024-10-17 RX ORDER — ALUMINUM HYDROXIDE, MAGNESIUM HYDROXIDE, AND SIMETHICONE 1200; 120; 1200 MG/30ML; MG/30ML; MG/30ML
30 SUSPENSION ORAL 4 TIMES DAILY PRN
Status: DISCONTINUED | OUTPATIENT
Start: 2024-10-17 | End: 2024-10-18 | Stop reason: HOSPADM

## 2024-10-17 RX ORDER — LORAZEPAM 2 MG/ML
0.5 INJECTION INTRAMUSCULAR
Status: DISCONTINUED | OUTPATIENT
Start: 2024-10-17 | End: 2024-10-18 | Stop reason: HOSPADM

## 2024-10-17 RX ORDER — ACETAMINOPHEN 325 MG/1
650 TABLET ORAL EVERY 4 HOURS PRN
Status: DISCONTINUED | OUTPATIENT
Start: 2024-10-17 | End: 2024-10-18 | Stop reason: HOSPADM

## 2024-10-17 RX ORDER — FOLIC ACID 1 MG/1
1 TABLET ORAL DAILY
Status: DISCONTINUED | OUTPATIENT
Start: 2024-10-17 | End: 2024-10-18 | Stop reason: HOSPADM

## 2024-10-17 RX ORDER — CHLORDIAZEPOXIDE HYDROCHLORIDE 5 MG/1
5 CAPSULE, GELATIN COATED ORAL 3 TIMES DAILY
Status: DISCONTINUED | OUTPATIENT
Start: 2024-10-17 | End: 2024-10-18 | Stop reason: HOSPADM

## 2024-10-17 RX ORDER — NALOXONE HCL 0.4 MG/ML
0.02 VIAL (ML) INJECTION
Status: DISCONTINUED | OUTPATIENT
Start: 2024-10-17 | End: 2024-10-18 | Stop reason: HOSPADM

## 2024-10-17 RX ORDER — LANOLIN ALCOHOL/MO/W.PET/CERES
800 CREAM (GRAM) TOPICAL
Status: DISCONTINUED | OUTPATIENT
Start: 2024-10-17 | End: 2024-10-18 | Stop reason: HOSPADM

## 2024-10-17 RX ORDER — ONDANSETRON HYDROCHLORIDE 2 MG/ML
4 INJECTION, SOLUTION INTRAVENOUS EVERY 6 HOURS PRN
Status: DISCONTINUED | OUTPATIENT
Start: 2024-10-17 | End: 2024-10-18 | Stop reason: HOSPADM

## 2024-10-17 RX ORDER — PANTOPRAZOLE SODIUM 40 MG/1
40 TABLET, DELAYED RELEASE ORAL 2 TIMES DAILY
Status: DISCONTINUED | OUTPATIENT
Start: 2024-10-17 | End: 2024-10-18 | Stop reason: HOSPADM

## 2024-10-17 RX ORDER — SODIUM CHLORIDE 9 MG/ML
INJECTION, SOLUTION INTRAVENOUS CONTINUOUS
Status: DISCONTINUED | OUTPATIENT
Start: 2024-10-17 | End: 2024-10-18 | Stop reason: HOSPADM

## 2024-10-17 RX ADMIN — SODIUM CHLORIDE: 9 INJECTION, SOLUTION INTRAVENOUS at 03:10

## 2024-10-17 RX ADMIN — HYDROCODONE BITARTRATE AND ACETAMINOPHEN 1 TABLET: 5; 325 TABLET ORAL at 09:10

## 2024-10-17 RX ADMIN — THIAMINE HYDROCHLORIDE 500 MG: 100 INJECTION, SOLUTION INTRAMUSCULAR; INTRAVENOUS at 02:10

## 2024-10-17 RX ADMIN — FOLIC ACID 1 MG: 1 TABLET ORAL at 03:10

## 2024-10-17 RX ADMIN — THIAMINE HYDROCHLORIDE: 100 INJECTION, SOLUTION INTRAMUSCULAR; INTRAVENOUS at 07:10

## 2024-10-17 RX ADMIN — PANTOPRAZOLE SODIUM 40 MG: 40 TABLET, DELAYED RELEASE ORAL at 09:10

## 2024-10-17 RX ADMIN — THIAMINE HCL TAB 100 MG 100 MG: 100 TAB at 09:10

## 2024-10-17 RX ADMIN — SODIUM CHLORIDE 1000 ML: 9 INJECTION, SOLUTION INTRAVENOUS at 01:10

## 2024-10-17 RX ADMIN — THIAMINE HCL TAB 100 MG 100 MG: 100 TAB at 03:10

## 2024-10-17 RX ADMIN — CHLORDIAZEPOXIDE HYDROCHLORIDE 5 MG: 5 CAPSULE ORAL at 03:10

## 2024-10-17 NOTE — ASSESSMENT & PLAN NOTE
The likely etiology of thrombocytopenia is  ETOH abuse  . The patients 3 most recent labs are listed below.  Recent Labs     10/15/24  0410 10/16/24  0415 10/17/24  1140   PLT 57* 56* 66*

## 2024-10-17 NOTE — ASSESSMENT & PLAN NOTE
Creatine stable for now. BMP reviewed- noted Estimated Creatinine Clearance: 41.3 mL/min (based on SCr of 1.1 mg/dL). according to latest data. Based on current GFR, CKD stage is stage 3 - GFR 30-59.  Monitor UOP and serial BMP and adjust therapy as needed. Renally dose meds. Avoid nephrotoxic medications and procedures.

## 2024-10-17 NOTE — ED NOTES
Pt comes into ER per EMS with complaints of generalized weakness. Pt states that she was [place]c from [place] last night and was at home. Per EMS her home health nurse found her on the floor this morning. Pt states she fell about 1000 last night. Pt is stating she is tired and fatigued and very weak. Pt is AAOx3, PERRL, denies any chest pain or SOB.

## 2024-10-17 NOTE — ASSESSMENT & PLAN NOTE
H/o aware  Maintain scheduled librium  CIWA score  Iv ativan PRN basis  Maintain banana bag every 24 hrs  Already received 0.5 g thiamine/maintain MVT&Folic acid

## 2024-10-17 NOTE — HPI
74 year old lady getting admitted with debility/falls/mild rhabdomyolysis  Pt was admitted recently with AKA/ETOH withdrawal and was discharged yesterday  She did fine after discharge but didn't picked up Librium  from pharmacy  Today she was very weak and had episodes of falls  Home health nurse found pt on the floor   She was escorted to hospital and got admitted  Denied any other issues  Appeared little confused when saw in ER   Pt said her last ETOH intake was 6 days ago

## 2024-10-17 NOTE — ED PROVIDER NOTES
Chief complaint:  Fatigue ([place]. Pts home health nurse found her on the floor this morning. She states she fell around 10 pm last night. Complaining of generalized weakness and fatigue. )      HPI:  Nicolasa Madera is a 74 y.o. female with hx AUD, PUD, hypothyroidism, CVA, CKD presenting with fall and generalized weakness.  Patient was seen four days ago for ground level fall in the setting of alcohol intoxication.  She was admitted for concern of AKA with normalization of labs with discharge yesterday on chlordiazepoxide taper.  Patient reports ability to ambulate when she was discharged yesterday.  She denies recidivism with alcohol.  She fell sometime overnight and was unable to arise.  She had no other complaints but was convinced to come to the hospital when she could not walk.  She typically walks independently without assistance of cane or walker.  She denies injury.  She denies other new symptoms since discharge.    ROS: As per HPI and below:  No headache, visual change, focal numbness or weakness, loss of consciousness, seizure, vomiting, diarrhea, chest pain, abdominal pain, dyspnea, extremity injury, swelling, blood in the stools, dark stools, fever, dysuria.    Review of patient's allergies indicates:  No Known Allergies    Patient's Medications   New Prescriptions    No medications on file   Previous Medications    BIOTIN 5,000 MCG TBDL    Take 1 tablet by mouth once daily.    CHLORDIAZEPOXIDE (LIBRIUM) 5 MG CAPSULE    Take 5 capsules (25 mg total) by mouth 3 (three) times daily for 1 day, THEN 4 capsules (20 mg total) 3 (three) times daily for 1 day, THEN 3 capsules (15 mg total) 3 (three) times daily for 1 day, THEN 2 capsules (10 mg total) 3 (three) times daily for 1 day, THEN 2 capsules (10 mg total) 2 (two) times a day for 1 day, THEN 1 capsule (5 mg total) 3 (three) times daily for 1 day, THEN 1 capsule (5 mg total) 2 (two) times a day for 1 day.    FOLIC ACID (FOLVITE)  1 MG TABLET    Take 1 tablet (1 mg total) by mouth once daily.    HYDROXYZINE HCL (ATARAX) 25 MG TABLET    TAKE 1 TABLET THREE TIMES DAILY AS NEEDED FOR ITCHING    LEVOTHYROXINE (SYNTHROID) 100 MCG TABLET    Take 1 tablet (100 mcg total) by mouth before breakfast.    MULTIVIT-MIN/IRON/FOLIC/LUTEIN (CENTRUM SILVER WOMEN ORAL)    Take 1 tablet by mouth once daily.    MULTIVITAMIN TAB    Take 1 tablet by mouth once daily.    OMEGA-3 FATTY ACIDS/FISH OIL (FISH OIL-OMEGA-3 FATTY ACIDS) 300-1,000 MG CAPSULE    Take 1 capsule by mouth once daily.    PANTOPRAZOLE (PROTONIX) 40 MG TABLET    Take 1 tablet (40 mg total) by mouth 2 (two) times daily.    THIAMINE 100 MG TABLET    Take 1 tablet (100 mg total) by mouth 3 (three) times daily.    VIT B COMPLEX NO.12/NIACIN,B3, (VITAMIN B COMPLEX NO.12-NIACIN ORAL)    Take 1 tablet by mouth.   Modified Medications    No medications on file   Discontinued Medications    No medications on file       PMH:  As per HPI and below:  Past Medical History:   Diagnosis Date    Encounter for blood transfusion     Hypothyroidism     Peptic ulcer with hemorrhage     Peptic ulcer, site unspecified, unspecified as acute or chronic, without hemorrhage or perforation     Peptic ulcer disease    Stroke 2017    no residual effects.     Urticaria      Past Surgical History:   Procedure Laterality Date    ESOPHAGOGASTRODUODENOSCOPY N/A 4/18/2021    Procedure: EGD (ESOPHAGOGASTRODUODENOSCOPY);  Surgeon: Crow Vigil MD;  Location: [facility];  Service: Endoscopy;  Laterality: N/A;    ESOPHAGOGASTRODUODENOSCOPY N/A 8/31/2021    Procedure: EGD (ESOPHAGOGASTRODUODENOSCOPY);  Surgeon: Crow Vigil MD;  Location: [facility];  Service: Endoscopy;  Laterality: N/A;       Social History     Socioeconomic History    Marital status:    Tobacco Use    Smoking status: Never    Smokeless tobacco: Never   Substance and Sexual Activity    Alcohol use: Yes     Alcohol/week: 5.0 standard drinks of alcohol      Types: 5 Glasses of wine per week    Drug use: Never    Sexual activity: Not Currently     Social Drivers of Health     Financial Resource Strain: Low Risk  (10/16/2024)    Overall Financial Resource Strain (CARDIA)     Difficulty of Paying Living Expenses: Not hard at all   Food Insecurity: No Food Insecurity (10/16/2024)    Hunger Vital Sign     Worried About Running Out of Food in the Last Year: Never true     Ran Out of Food in the Last Year: Never true   Transportation Needs: No Transportation Needs (10/16/2024)    TRANSPORTATION NEEDS     Transportation : No   Physical Activity: Sufficiently Active (10/14/2024)    Exercise Vital Sign     Days of Exercise per Week: 6 days     Minutes of Exercise per Session: 60 min   Stress: Stress Concern Present (10/16/2024)    [employer] [place] [employer] - Occupational Stress Questionnaire     Feeling of Stress : To some extent   Housing Stability: Low Risk  (10/16/2024)    Housing Stability Vital Sign     Unable to Pay for Housing in the Last Year: No     Homeless in the Last Year: No       Family History   Problem Relation Name Age of Onset    Stroke Father  70    Breast cancer Paternal Aunt      Breast cancer Paternal Grandmother         Physical Exam:    Vitals:    10/17/24 1109   BP: 106/63   Pulse: 85   Resp: 18   Temp: 97.9 °F (36.6 °C)     GENERAL:  No apparent distress.  Alert.  Disheveled.  HEENT:  Moist mucous membranes.  Normocephalic and atraumatic.  Horizontal gaze evoked nystagmus is present with lateral gaze.  NECK:  No swelling.  Midline trachea. No posterior midline tenderness to palpation.    CARDIOVASCULAR:  Regular rate and rhythm.  2+ radial pulses.    PULMONARY:  Lungs clear to auscultation bilaterally.  No wheezes, rales, or rhonci.    ABDOMEN:  Non-tender and non-distended.    EXTREMITIES:  Warm and well perfused.  Brisk capillary refill.  Atraumatic.  NEUROLOGICAL:  Normal mental status.  Appropriate and conversant.  5/5 strength  with equal sensation to light touch in the upper lower extremities bilaterally.  Cranial nerves 3-12 intact.  SKIN:  No rashes or ecchymoses.    BACK:  Atraumatic.  No CVA or vertebral tenderness to palpation.      Labs Reviewed   CBC W/ AUTO DIFFERENTIAL - Abnormal       Result Value    WBC 4.28      RBC 3.55 (*)     Hemoglobin 9.9 (*)     Hematocrit 28.8 (*)     MCV 81 (*)     MCH 27.9      MCHC 34.4      RDW 24.4 (*)     Platelets 66 (*)     MPV SEE COMMENT      Immature Granulocytes 0.5      Gran # (ANC) 3.4      Immature Grans (Abs) 0.02      Lymph # 0.4 (*)     Mono # 0.4      Eos # 0.0      Baso # 0.01      nRBC 0      Gran % 79.7 (*)     Lymph % 10.3 (*)     Mono % 9.3      Eosinophil % 0.0      Basophil % 0.2      Differential Method Automated     COMPREHENSIVE METABOLIC PANEL - Abnormal    Sodium 136      Potassium 4.3      Chloride 102      CO2 24      Glucose 101      BUN 7 (*)     Creatinine 1.1      Calcium 9.7      Total Protein 7.0      Albumin 4.1      Total Bilirubin 0.8      Alkaline Phosphatase 56       (*)     ALT 43      eGFR 52.1 (*)     Anion Gap 10     CK - Abnormal    CPK 2323 (*)    URINALYSIS, REFLEX TO URINE CULTURE - Abnormal    Specimen UA Urine, Clean Catch      Color, UA Yellow      Appearance, UA Clear      pH, UA 7.0      Specific Gravity, UA 1.010      Protein, UA Negative      Glucose, UA 1+ (*)     Ketones, UA Negative      Bilirubin (UA) Negative      Occult Blood UA 1+ (*)     Nitrite, UA Negative      Urobilinogen, UA Negative      Leukocytes, UA Negative      Narrative:     Specimen Source->Urine   ALCOHOL,MEDICAL (ETHANOL)   ALCOHOL,MEDICAL (ETHANOL)    Alcohol, Serum <10     URINALYSIS MICROSCOPIC    RBC, UA 0      Bacteria Rare      Squam Epithel, UA 0      Microscopic Comment SEE COMMENT      Narrative:     Specimen Source->Urine       Current Discharge Medication List        CONTINUE these medications which have NOT CHANGED    Details   biotin 5,000 mcg TbDL Take  1 tablet by mouth once daily.      chlordiazepoxide (LIBRIUM) 5 MG capsule Take 5 capsules (25 mg total) by mouth 3 (three) times daily for 1 day, THEN 4 capsules (20 mg total) 3 (three) times daily for 1 day, THEN 3 capsules (15 mg total) 3 (three) times daily for 1 day, THEN 2 capsules (10 mg total) 3 (three) times daily for 1 day, THEN 2 capsules (10 mg total) 2 (two) times a day for 1 day, THEN 1 capsule (5 mg total) 3 (three) times daily for 1 day, THEN 1 capsule (5 mg total) 2 (two) times a day for 1 day.  Qty: 51 capsule, Refills: 0      folic acid (FOLVITE) 1 MG tablet Take 1 tablet (1 mg total) by mouth once daily.  Qty: 30 tablet, Refills: 0      hydrOXYzine HCL (ATARAX) 25 MG tablet TAKE 1 TABLET THREE TIMES DAILY AS NEEDED FOR ITCHING  Qty: 120 tablet, Refills: 1      levothyroxine (SYNTHROID) 100 MCG tablet Take 1 tablet (100 mcg total) by mouth before breakfast.  Qty: 90 tablet, Refills: 3      multivit-min/iron/folic/lutein (CENTRUM SILVER WOMEN ORAL) Take 1 tablet by mouth once daily.      multivitamin Tab Take 1 tablet by mouth once daily.  Qty: 30 tablet, Refills: 0      omega-3 fatty acids/fish oil (FISH OIL-OMEGA-3 FATTY ACIDS) 300-1,000 mg capsule Take 1 capsule by mouth once daily.      pantoprazole (PROTONIX) 40 MG tablet Take 1 tablet (40 mg total) by mouth 2 (two) times daily.  Qty: 60 tablet, Refills: 0      thiamine 100 MG tablet Take 1 tablet (100 mg total) by mouth 3 (three) times daily.  Qty: 90 tablet, Refills: 0      vit B complex no.12/niacin,B3, (VITAMIN B COMPLEX NO.12-NIACIN ORAL) Take 1 tablet by mouth.             Orders Placed This Encounter   Procedures    CT Head Without Contrast    X-Ray Chest 1 View    CBC auto differential    Comprehensive metabolic panel    CPK    Urinalysis, Reflex to Urine Culture Urine, Clean Catch    Ethanol    Ethanol    Urinalysis Microscopic    Comprehensive Metabolic Panel (CMP)    Magnesium    CBC with Automated Differential    Diet Adult Regular     Vital signs    Bladder scan    Notify Physician    Place sequential compression device    Perform CIWA AR Scale    Full code    OT evaluate and treat    PT evaluate and treat    EKG 12-lead    EKG 12-lead    Insert Saline lock IV    Insert peripheral IV    Insert peripheral IV    Saline lock IV    Possible Hospitalization    Place in Observation    Fall precautions       Imaging Results              X-Ray Chest 1 View (Final result)  Result time 10/17/24 13:39:15      Final result by Dennis Garrett MD (10/17/24 13:39:15)                   Impression:      No acute pulmonary process.      Electronically signed by: Dennis Garrett  Date:    10/17/2024  Time:    13:39               Narrative:    EXAMINATION:  XR CHEST 1 VIEW    CLINICAL HISTORY:  Weakness;    COMPARISON:  None available    FINDINGS:  Cardiac silhouette size is within normal limits.  Lungs are clear with no large pleural effusion or pneumothorax evident.  No acute osseous abnormality.                                       CT Head Without Contrast (Final result)  Result time 10/17/24 12:41:47      Final result by Michael Beard MD (10/17/24 12:41:47)                   Impression:      No acute intracranial process      Electronically signed by: Michael Beard  Date:    10/17/2024  Time:    12:41               Narrative:    CLINICAL HISTORY:  ([id number])73 y/o  (1950) F    Head trauma, minor (Age >= 65y);    TECHNIQUE:  (A#[id number], exam time 10/17/2024 12:39)    CT HEAD WITHOUT CONTRAST POD740    Axial CT of the brain without contrast using soft tissue and bone algorithm. Please note in the acute setting if there is a clinical concern for an acute stroke MRI would be more sensitive/specific for evaluation of ischemia.    CMS MANDATED QUALITY DATA - CT RADIATION - 436    All CT scans at this facility utilize dose modulation, iterative reconstruction, and/or weight based dosing when appropriate to reduce radiation dose to as low as  reasonably achievable.    COMPARISON:  10.13.24    FINDINGS:  No acute intracranial hemorrhage, edema or mass effect, and no acute parenchymal abnormality. There is no hydrocephalus, herniation or midline shift, and the basal and suprasellar cisterns are within normal limits. The osseous structures show no acute skull fracture.  Mild diffuse cerebral and cerebellar atrophy for age with periventricular deep cerebral white matter low attenuation  nonspecific findings which can be seen in any diffuse white matter process but most commonly associated with chronic microvascular ischemic disease. There are scattered atheromatous calcifications in the intracranial internal carotid arteries. Orbital contents appear within normal limits. External auditory canals are unremarkable. The visualized paranasal sinuses and mastoid air cells are essentially clear.                                  (radiology reading, HCT visualized by me)      ED Course as of 10/17/24 1402   Thu Oct 17, 2024   1130 EKG:  Sinus rhythm with PACs, rate of 68, normal intervals and axis.  There are no acute ST or T wave changes suggestive of acute ischemia or infarction.  (Independently interpreted by me) [MR]      ED Course User Index  [MR] Christian Bautista MD       MDM:    74 y.o. female with fall in the setting of recent discharge for alcohol intoxication and concern for alcoholic ketoacidosis.  Patient denies any recurrent preceding symptoms to fall.  She was unable to arise on her own or walk with the assistance per EMS.  Repeat workup ordered secondary to increase of generalized weakness and recurrent fall.  At the bedside she is unable to sit up without assistance or walk.  There is no obvious focal deficit.  There is a broad differential.  I do not think stroke activation is indicated.  I have reordered brain imaging given fall to exclude obvious acute intracranial process such as hemorrhage.  There are no other neurological complaints.   Additional etiologies of weakness explored with a additional studies including EKG, laboratories, and urinalysis ordered.      Workup reviewed with overall concern given gait instability and some nystagmus on exam for Wernicke's.  I have discussed with hospital medicine who will admit for further workup and observation.  Initial high-dose thiamine ordered here pending further workup.    Diagnoses:    1. Fall  2. Weakness, generalized  3. Inability to walk       Christian Bautista MD  10/17/24 0266

## 2024-10-17 NOTE — SUBJECTIVE & OBJECTIVE
Past Medical History:   Diagnosis Date    Encounter for blood transfusion     Hypothyroidism     Peptic ulcer with hemorrhage     Peptic ulcer, site unspecified, unspecified as acute or chronic, without hemorrhage or perforation     Peptic ulcer disease    Stroke 2017    no residual effects.     Urticaria        Past Surgical History:   Procedure Laterality Date    ESOPHAGOGASTRODUODENOSCOPY N/A 4/18/2021    Procedure: EGD (ESOPHAGOGASTRODUODENOSCOPY);  Surgeon: Crow Vigil MD;  Location: [facility] ENDO;  Service: Endoscopy;  Laterality: N/A;    ESOPHAGOGASTRODUODENOSCOPY N/A 8/31/2021    Procedure: EGD (ESOPHAGOGASTRODUODENOSCOPY);  Surgeon: Crow Vigil MD;  Location: [facility];  Service: Endoscopy;  Laterality: N/A;       Review of patient's allergies indicates:  No Known Allergies    Current Facility-Administered Medications on File Prior to Encounter   Medication    [DISCONTINUED] acetaminophen tablet 650 mg    [DISCONTINUED] aluminum-magnesium hydroxide-simethicone 200-200-20 mg/5 mL suspension 30 mL    [DISCONTINUED] chlordiazepoxide capsule 25 mg    [DISCONTINUED] dextrose 10% bolus 125 mL 125 mL    [DISCONTINUED] dextrose 10% bolus 250 mL 250 mL    [DISCONTINUED] electrolytes-dextrose (Pedialyte) oral solution 300 mL    [DISCONTINUED] folic acid tablet 1 mg    [DISCONTINUED] glucagon (human recombinant) injection 1 mg    [DISCONTINUED] glucose chewable tablet 16 g    [DISCONTINUED] glucose chewable tablet 24 g    [DISCONTINUED] levothyroxine tablet 100 mcg    [DISCONTINUED] LORazepam injection 1 mg    [DISCONTINUED] magnesium oxide tablet 400 mg    [DISCONTINUED] magnesium oxide tablet 800 mg    [DISCONTINUED] magnesium oxide tablet 800 mg    [DISCONTINUED] melatonin tablet 9 mg    [DISCONTINUED] morphine injection 2 mg    [DISCONTINUED] multivitamin tablet    [DISCONTINUED] naloxone 0.4 mg/mL injection 0.02 mg    [DISCONTINUED] ondansetron injection 4 mg    [DISCONTINUED] pantoprazole EC tablet 40 mg     [DISCONTINUED] potassium bicarbonate disintegrating tablet 35 mEq    [DISCONTINUED] potassium bicarbonate disintegrating tablet 50 mEq    [DISCONTINUED] potassium bicarbonate disintegrating tablet 60 mEq    [DISCONTINUED] prochlorperazine injection Soln 5 mg    [DISCONTINUED] senna-docusate 8.6-50 mg per tablet 1 tablet    [DISCONTINUED] sodium chloride 0.9% flush 10 mL    [DISCONTINUED] thiamine tablet 100 mg     Current Outpatient Medications on File Prior to Encounter   Medication Sig    biotin 5,000 mcg TbDL Take 1 tablet by mouth once daily.    chlordiazepoxide (LIBRIUM) 5 MG capsule Take 5 capsules (25 mg total) by mouth 3 (three) times daily for 1 day, THEN 4 capsules (20 mg total) 3 (three) times daily for 1 day, THEN 3 capsules (15 mg total) 3 (three) times daily for 1 day, THEN 2 capsules (10 mg total) 3 (three) times daily for 1 day, THEN 2 capsules (10 mg total) 2 (two) times a day for 1 day, THEN 1 capsule (5 mg total) 3 (three) times daily for 1 day, THEN 1 capsule (5 mg total) 2 (two) times a day for 1 day.    folic acid (FOLVITE) 1 MG tablet Take 1 tablet (1 mg total) by mouth once daily.    hydrOXYzine HCL (ATARAX) 25 MG tablet TAKE 1 TABLET THREE TIMES DAILY AS NEEDED FOR ITCHING    levothyroxine (SYNTHROID) 100 MCG tablet Take 1 tablet (100 mcg total) by mouth before breakfast.    multivit-min/iron/folic/lutein (CENTRUM SILVER WOMEN ORAL) Take 1 tablet by mouth once daily.    multivitamin Tab Take 1 tablet by mouth once daily.    omega-3 fatty acids/fish oil (FISH OIL-OMEGA-3 FATTY ACIDS) 300-1,000 mg capsule Take 1 capsule by mouth once daily.    pantoprazole (PROTONIX) 40 MG tablet Take 1 tablet (40 mg total) by mouth 2 (two) times daily.    thiamine 100 MG tablet Take 1 tablet (100 mg total) by mouth 3 (three) times daily.    vit B complex no.12/niacin,B3, (VITAMIN B COMPLEX NO.12-NIACIN ORAL) Take 1 tablet by mouth.     Family History       Problem Relation (Age of Onset)    Breast cancer  Paternal Aunt, Paternal Grandmother    Stroke Father (70)          Tobacco Use    Smoking status: Never    Smokeless tobacco: Never   Substance and Sexual Activity    Alcohol use: Yes     Alcohol/week: 5.0 standard drinks of alcohol     Types: 5 Glasses of wine per week    Drug use: Never    Sexual activity: Not Currently     Review of Systems   Unable to perform ROS: Mental status change     Objective:     Vital Signs (Most Recent):  Temp: 97.9 °F (36.6 °C) (10/17/24 1109)  Pulse: 87 (10/17/24 1559)  Resp: 15 (10/17/24 1559)  BP: 101/62 (10/17/24 1559)  SpO2: 100 % (10/17/24 1559) Vital Signs (24h Range):  Temp:  [97.9 °F (36.6 °C)] 97.9 °F (36.6 °C)  Pulse:  [64-92] 87  Resp:  [11-20] 15  SpO2:  [97 %-100 %] 100 %  BP: (101-123)/(54-72) 101/62     Weight: 75 kg (165 lb 5.5 oz)  Body mass index is 30.24 kg/m².     Physical Exam  Vitals and nursing note reviewed.   Constitutional:       General: She is not in acute distress.     Appearance: Normal appearance.   HENT:      Head: Atraumatic.      Right Ear: External ear normal.      Left Ear: External ear normal.      Nose: Nose normal.      Mouth/Throat:      Mouth: Mucous membranes are dry.   Eyes:      Extraocular Movements: Extraocular movements intact.   Cardiovascular:      Rate and Rhythm: Normal rate.   Pulmonary:      Effort: Pulmonary effort is normal.   Abdominal:      Palpations: Abdomen is soft.   Musculoskeletal:         General: Normal range of motion.      Cervical back: Normal range of motion.   Skin:     General: Skin is warm.   Neurological:      General: No focal deficit present.                Significant Labs: All pertinent labs within the past 24 hours have been reviewed.  CBC:   Recent Labs   Lab 10/16/24  0415 10/17/24  1140   WBC 2.60* 4.28   HGB 9.4* 9.9*   HCT 26.7* 28.8*   PLT 56* 66*     CMP:   Recent Labs   Lab 10/16/24  0415 10/17/24  1140    136   K 3.5 4.3    102   CO2 23 24   GLU 93 101   BUN 6* 7*   CREATININE 1.3 1.1    CALCIUM 9.4 9.7   PROT 6.4 7.0   ALBUMIN 3.5 4.1   BILITOT 0.5 0.8   ALKPHOS 66 56   AST 43* 103*   ALT 29 43   ANIONGAP 8 10       Significant Imaging: I have reviewed all pertinent imaging results/findings within the past 24 hours.

## 2024-10-17 NOTE — H&P
[facility] - Emergency Dept  Hospital Medicine  History & Physical    Patient Name: Nicolasa Madrea  MRN: [id number]  Patient Class: OP- Observation  Admission Date: 10/17/2024  Attending Physician: Fei Jett MD   Primary Care Provider: Page Garcia PA-C         Patient information was obtained from patient, past medical records, and ER records.     Subjective:     Principal Problem:Debility    Chief Complaint:   Chief Complaint   Patient presents with    Fatigue     [place]. Pts home health nurse found her on the floor this morning. She states she fell around 10 pm last night. Complaining of generalized weakness and fatigue.         HPI: 74 year old lady getting admitted with debility/falls/mild rhabdomyolysis  Pt was admitted recently with AKA/ETOH withdrawal and was discharged yesterday  She did fine after discharge but didn't picked up Librium  from pharmacy  Today she was very weak and had episodes of falls  Home health nurse found pt on the floor   She was escorted to hospital and got admitted  Denied any other issues  Appeared little confused when saw in ER   Pt said her last ETOH intake was 6 days ago     Past Medical History:   Diagnosis Date    Encounter for blood transfusion     Hypothyroidism     Peptic ulcer with hemorrhage     Peptic ulcer, site unspecified, unspecified as acute or chronic, without hemorrhage or perforation     Peptic ulcer disease    Stroke 2017    no residual effects.     Urticaria        Past Surgical History:   Procedure Laterality Date    ESOPHAGOGASTRODUODENOSCOPY N/A 4/18/2021    Procedure: EGD (ESOPHAGOGASTRODUODENOSCOPY);  Surgeon: Crow Vigil MD;  Location: [facility];  Service: Endoscopy;  Laterality: N/A;    ESOPHAGOGASTRODUODENOSCOPY N/A 8/31/2021    Procedure: EGD (ESOPHAGOGASTRODUODENOSCOPY);  Surgeon: Crow Vigil MD;  Location: [facility];  Service: Endoscopy;  Laterality: N/A;       Review of patient's allergies  indicates:  No Known Allergies    Current Facility-Administered Medications on File Prior to Encounter   Medication    [DISCONTINUED] acetaminophen tablet 650 mg    [DISCONTINUED] aluminum-magnesium hydroxide-simethicone 200-200-20 mg/5 mL suspension 30 mL    [DISCONTINUED] chlordiazepoxide capsule 25 mg    [DISCONTINUED] dextrose 10% bolus 125 mL 125 mL    [DISCONTINUED] dextrose 10% bolus 250 mL 250 mL    [DISCONTINUED] electrolytes-dextrose (Pedialyte) oral solution 300 mL    [DISCONTINUED] folic acid tablet 1 mg    [DISCONTINUED] glucagon (human recombinant) injection 1 mg    [DISCONTINUED] glucose chewable tablet 16 g    [DISCONTINUED] glucose chewable tablet 24 g    [DISCONTINUED] levothyroxine tablet 100 mcg    [DISCONTINUED] LORazepam injection 1 mg    [DISCONTINUED] magnesium oxide tablet 400 mg    [DISCONTINUED] magnesium oxide tablet 800 mg    [DISCONTINUED] magnesium oxide tablet 800 mg    [DISCONTINUED] melatonin tablet 9 mg    [DISCONTINUED] morphine injection 2 mg    [DISCONTINUED] multivitamin tablet    [DISCONTINUED] naloxone 0.4 mg/mL injection 0.02 mg    [DISCONTINUED] ondansetron injection 4 mg    [DISCONTINUED] pantoprazole EC tablet 40 mg    [DISCONTINUED] potassium bicarbonate disintegrating tablet 35 mEq    [DISCONTINUED] potassium bicarbonate disintegrating tablet 50 mEq    [DISCONTINUED] potassium bicarbonate disintegrating tablet 60 mEq    [DISCONTINUED] prochlorperazine injection Soln 5 mg    [DISCONTINUED] senna-docusate 8.6-50 mg per tablet 1 tablet    [DISCONTINUED] sodium chloride 0.9% flush 10 mL    [DISCONTINUED] thiamine tablet 100 mg     Current Outpatient Medications on File Prior to Encounter   Medication Sig    biotin 5,000 mcg TbDL Take 1 tablet by mouth once daily.    chlordiazepoxide (LIBRIUM) 5 MG capsule Take 5 capsules (25 mg total) by mouth 3 (three) times daily for 1 day, THEN 4 capsules (20 mg total) 3 (three) times daily for 1 day, THEN 3 capsules (15 mg total) 3  (three) times daily for 1 day, THEN 2 capsules (10 mg total) 3 (three) times daily for 1 day, THEN 2 capsules (10 mg total) 2 (two) times a day for 1 day, THEN 1 capsule (5 mg total) 3 (three) times daily for 1 day, THEN 1 capsule (5 mg total) 2 (two) times a day for 1 day.    folic acid (FOLVITE) 1 MG tablet Take 1 tablet (1 mg total) by mouth once daily.    hydrOXYzine HCL (ATARAX) 25 MG tablet TAKE 1 TABLET THREE TIMES DAILY AS NEEDED FOR ITCHING    levothyroxine (SYNTHROID) 100 MCG tablet Take 1 tablet (100 mcg total) by mouth before breakfast.    multivit-min/iron/folic/lutein (CENTRUM SILVER WOMEN ORAL) Take 1 tablet by mouth once daily.    multivitamin Tab Take 1 tablet by mouth once daily.    omega-3 fatty acids/fish oil (FISH OIL-OMEGA-3 FATTY ACIDS) 300-1,000 mg capsule Take 1 capsule by mouth once daily.    pantoprazole (PROTONIX) 40 MG tablet Take 1 tablet (40 mg total) by mouth 2 (two) times daily.    thiamine 100 MG tablet Take 1 tablet (100 mg total) by mouth 3 (three) times daily.    vit B complex no.12/niacin,B3, (VITAMIN B COMPLEX NO.12-NIACIN ORAL) Take 1 tablet by mouth.     Family History       Problem Relation (Age of Onset)    Breast cancer Paternal Aunt, Paternal Grandmother    Stroke Father (70)          Tobacco Use    Smoking status: Never    Smokeless tobacco: Never   Substance and Sexual Activity    Alcohol use: Yes     Alcohol/week: 5.0 standard drinks of alcohol     Types: 5 Glasses of wine per week    Drug use: Never    Sexual activity: Not Currently     Review of Systems   Unable to perform ROS: Mental status change     Objective:     Vital Signs (Most Recent):  Temp: 97.9 °F (36.6 °C) (10/17/24 1109)  Pulse: 87 (10/17/24 1559)  Resp: 15 (10/17/24 1559)  BP: 101/62 (10/17/24 1559)  SpO2: 100 % (10/17/24 1559) Vital Signs (24h Range):  Temp:  [97.9 °F (36.6 °C)] 97.9 °F (36.6 °C)  Pulse:  [64-92] 87  Resp:  [11-20] 15  SpO2:  [97 %-100 %] 100 %  BP: (101-123)/(54-72) 101/62     Weight:  75 kg (165 lb 5.5 oz)  Body mass index is 30.24 kg/m².     Physical Exam  Vitals and nursing note reviewed.   Constitutional:       General: She is not in acute distress.     Appearance: Normal appearance.   HENT:      Head: Atraumatic.      Right Ear: External ear normal.      Left Ear: External ear normal.      Nose: Nose normal.      Mouth/Throat:      Mouth: Mucous membranes are dry.   Eyes:      Extraocular Movements: Extraocular movements intact.   Cardiovascular:      Rate and Rhythm: Normal rate.   Pulmonary:      Effort: Pulmonary effort is normal.   Abdominal:      Palpations: Abdomen is soft.   Musculoskeletal:         General: Normal range of motion.      Cervical back: Normal range of motion.   Skin:     General: Skin is warm.   Neurological:      General: No focal deficit present.                Significant Labs: All pertinent labs within the past 24 hours have been reviewed.  CBC:   Recent Labs   Lab 10/16/24  0415 10/17/24  1140   WBC 2.60* 4.28   HGB 9.4* 9.9*   HCT 26.7* 28.8*   PLT 56* 66*     CMP:   Recent Labs   Lab 10/16/24  0415 10/17/24  1140    136   K 3.5 4.3    102   CO2 23 24   GLU 93 101   BUN 6* 7*   CREATININE 1.3 1.1   CALCIUM 9.4 9.7   PROT 6.4 7.0   ALBUMIN 3.5 4.1   BILITOT 0.5 0.8   ALKPHOS 66 56   AST 43* 103*   ALT 29 43   ANIONGAP 8 10       Significant Imaging: I have reviewed all pertinent imaging results/findings within the past 24 hours.    Assessment/Plan:     * Debility  PT/OT consulted   May need rehab placement       Falls  H/o aware  PT/OT consulted  May need rehab placement       Rhabdomyolysis  Maintain iv fluids       Thrombocytopenia  The likely etiology of thrombocytopenia is  ETOH abuse  . The patients 3 most recent labs are listed below.  Recent Labs     10/15/24  0410 10/16/24  0415 10/17/24  1140   PLT 57* 56* 66*           Stage 3 chronic kidney disease  Creatine stable for now. BMP reviewed- noted Estimated Creatinine Clearance: 41.3 mL/min  (based on SCr of 1.1 mg/dL). according to latest data. Based on current GFR, CKD stage is stage 3 - GFR 30-59.  Monitor UOP and serial BMP and adjust therapy as needed. Renally dose meds. Avoid nephrotoxic medications and procedures.    Alcohol use  H/o aware  Maintain scheduled librium  CIWA score  Iv ativan PRN basis  Maintain banana bag every 24 hrs  Already received 0.5 g thiamine/maintain MVT&Folic acid         VTE Risk Mitigation (From admission, onward)           Ordered     IP VTE HIGH RISK PATIENT  Once         10/17/24 1336     Place sequential compression device  Until discontinued         10/17/24 1336                       On 10/17/2024, patient should be placed in hospital observation services under my care.             Fei Jett MD  Department of Hospital Medicine  [facility] - Emergency Dept

## 2024-10-18 VITALS
RESPIRATION RATE: 18 BRPM | OXYGEN SATURATION: 100 % | WEIGHT: 168.44 LBS | HEART RATE: 82 BPM | BODY MASS INDEX: 31 KG/M2 | SYSTOLIC BLOOD PRESSURE: 125 MMHG | TEMPERATURE: 98 F | HEIGHT: 62 IN | DIASTOLIC BLOOD PRESSURE: 59 MMHG

## 2024-10-18 LAB
ALBUMIN SERPL BCP-MCNC: 3.7 G/DL (ref 3.5–5.2)
ALP SERPL-CCNC: 56 U/L (ref 55–135)
ALT SERPL W/O P-5'-P-CCNC: 45 U/L (ref 10–44)
ANION GAP SERPL CALC-SCNC: 10 MMOL/L (ref 8–16)
ANISOCYTOSIS BLD QL SMEAR: ABNORMAL
AST SERPL-CCNC: 96 U/L (ref 10–40)
BASOPHILS # BLD AUTO: 0.03 K/UL (ref 0–0.2)
BASOPHILS NFR BLD: 0.9 % (ref 0–1.9)
BILIRUB SERPL-MCNC: 0.7 MG/DL (ref 0.1–1)
BUN SERPL-MCNC: 9 MG/DL (ref 8–23)
CALCIUM SERPL-MCNC: 9 MG/DL (ref 8.7–10.5)
CHLORIDE SERPL-SCNC: 108 MMOL/L (ref 95–110)
CK SERPL-CCNC: 1838 U/L (ref 20–180)
CO2 SERPL-SCNC: 22 MMOL/L (ref 23–29)
CREAT SERPL-MCNC: 1.1 MG/DL (ref 0.5–1.4)
DIFFERENTIAL METHOD BLD: ABNORMAL
EOSINOPHIL # BLD AUTO: 0.1 K/UL (ref 0–0.5)
EOSINOPHIL NFR BLD: 2.3 % (ref 0–8)
ERYTHROCYTE [DISTWIDTH] IN BLOOD BY AUTOMATED COUNT: 25.5 % (ref 11.5–14.5)
EST. GFR  (NO RACE VARIABLE): 52.1 ML/MIN/1.73 M^2
GLUCOSE SERPL-MCNC: 85 MG/DL (ref 70–110)
HCT VFR BLD AUTO: 31.6 % (ref 37–48.5)
HGB BLD-MCNC: 10.6 G/DL (ref 12–16)
HYPOCHROMIA BLD QL SMEAR: ABNORMAL
IMM GRANULOCYTES # BLD AUTO: 0.01 K/UL (ref 0–0.04)
IMM GRANULOCYTES NFR BLD AUTO: 0.3 % (ref 0–0.5)
LYMPHOCYTES # BLD AUTO: 1 K/UL (ref 1–4.8)
LYMPHOCYTES NFR BLD: 28.9 % (ref 18–48)
MAGNESIUM SERPL-MCNC: 1.4 MG/DL (ref 1.6–2.6)
MCH RBC QN AUTO: 28.7 PG (ref 27–31)
MCHC RBC AUTO-ENTMCNC: 33.5 G/DL (ref 32–36)
MCV RBC AUTO: 86 FL (ref 82–98)
MONOCYTES # BLD AUTO: 0.4 K/UL (ref 0.3–1)
MONOCYTES NFR BLD: 12.4 % (ref 4–15)
NEUTROPHILS # BLD AUTO: 1.9 K/UL (ref 1.8–7.7)
NEUTROPHILS NFR BLD: 55.2 % (ref 38–73)
NRBC BLD-RTO: 0 /100 WBC
PLATELET # BLD AUTO: 91 K/UL (ref 150–450)
PLATELET BLD QL SMEAR: ABNORMAL
PMV BLD AUTO: ABNORMAL FL (ref 9.2–12.9)
POTASSIUM SERPL-SCNC: 3.4 MMOL/L (ref 3.5–5.1)
PROT SERPL-MCNC: 6.5 G/DL (ref 6–8.4)
RBC # BLD AUTO: 3.69 M/UL (ref 4–5.4)
SODIUM SERPL-SCNC: 140 MMOL/L (ref 136–145)
TARGETS BLD QL SMEAR: ABNORMAL
WBC # BLD AUTO: 3.46 K/UL (ref 3.9–12.7)

## 2024-10-18 PROCEDURE — 82550 ASSAY OF CK (CPK): CPT | Performed by: INTERNAL MEDICINE

## 2024-10-18 PROCEDURE — G0378 HOSPITAL OBSERVATION PER HR: HCPCS

## 2024-10-18 PROCEDURE — 25000003 PHARM REV CODE 250: Performed by: INTERNAL MEDICINE

## 2024-10-18 PROCEDURE — 85025 COMPLETE CBC W/AUTO DIFF WBC: CPT | Performed by: INTERNAL MEDICINE

## 2024-10-18 PROCEDURE — 83735 ASSAY OF MAGNESIUM: CPT | Performed by: INTERNAL MEDICINE

## 2024-10-18 PROCEDURE — 97165 OT EVAL LOW COMPLEX 30 MIN: CPT

## 2024-10-18 PROCEDURE — 97161 PT EVAL LOW COMPLEX 20 MIN: CPT

## 2024-10-18 PROCEDURE — 96361 HYDRATE IV INFUSION ADD-ON: CPT

## 2024-10-18 PROCEDURE — 36415 COLL VENOUS BLD VENIPUNCTURE: CPT | Performed by: INTERNAL MEDICINE

## 2024-10-18 PROCEDURE — 80053 COMPREHEN METABOLIC PANEL: CPT | Performed by: INTERNAL MEDICINE

## 2024-10-18 PROCEDURE — 97530 THERAPEUTIC ACTIVITIES: CPT

## 2024-10-18 PROCEDURE — 96366 THER/PROPH/DIAG IV INF ADDON: CPT

## 2024-10-18 RX ADMIN — CHLORDIAZEPOXIDE HYDROCHLORIDE 5 MG: 5 CAPSULE ORAL at 01:10

## 2024-10-18 RX ADMIN — PANTOPRAZOLE SODIUM 40 MG: 40 TABLET, DELAYED RELEASE ORAL at 06:10

## 2024-10-18 RX ADMIN — THIAMINE HCL TAB 100 MG 100 MG: 100 TAB at 08:10

## 2024-10-18 RX ADMIN — CHLORDIAZEPOXIDE HYDROCHLORIDE 5 MG: 5 CAPSULE ORAL at 08:10

## 2024-10-18 RX ADMIN — THIAMINE HCL TAB 100 MG 100 MG: 100 TAB at 01:10

## 2024-10-18 RX ADMIN — FOLIC ACID 1 MG: 1 TABLET ORAL at 08:10

## 2024-10-18 NOTE — PT/OT/SLP EVAL
"Occupational Therapy   Evaluation    Name: Nicolasa Madera  MRN: [id number]  Admitting Diagnosis: Debility  Recent Surgery: * No surgery found *      Recommendations:     Discharge Recommendations: Moderate Intensity Therapy  Discharge Equipment Recommendations:  walker, rolling  Barriers to discharge:  Inaccessible home environment, Decreased caregiver support, high change of readmittance     Irvins mobility limitation cannot be sufficiently resolved by the use of a cane. Her functional mobility deficit can be sufficiently resolved with the use of a Rolling Walker. Patient's mobility limitation significantly impairs their ability to participate in one of more activities of daily living.  The use of a RW will significantly improve the patient's ability to participate in MRADLS and the patient will use it on regular basis in the home.   Assessment:     Nicolasa Madera is a 74 y.o. female with a medical diagnosis of Debility.  She participated in self-feeding, LE dressing, and STS transfer . Performance deficits affecting function: weakness, impaired endurance, impaired self care skills, impaired functional mobility, gait instability, decreased coordination, decreased upper extremity function, decreased lower extremity function, decreased safety awareness, pain.      Rehab Prognosis: Good; patient would benefit from acute skilled OT services to address these deficits and reach maximum level of function.       Plan:     Patient to be seen 5 x/week to address the above listed problems via self-care/home management, therapeutic activities, therapeutic exercises  Plan of Care Expires: 11/18/24  Plan of Care Reviewed with: patient    Subjective     Chief Complaint: " I have really bad balance"   Patient/Family Comments/goals: Increase strength and endurance to decrease falls     Occupational Profile:  Living Environment: Pt lives by herself in a [facility] with no H[place]RIET[place]. She has a tub/shower combo and a raised toilet seat. "   Previous level of function: She was MI with ADLs and IADLs  Roles and Routines: Sister   Equipment Used at Home: none  Assistance upon Discharge: Brother - however, he just had a stent placed    Pain/Comfort:  Pain Rating 1: 0/10  Pain Rating Post-Intervention 1: 0/10    Patients cultural, spiritual, Catholic conflicts given the current situation: no    Objective:     Communicated with: Nurse prior to session.  Patient found HOB elevated with peripheral IV upon OT entry to room.    General Precautions: Standard, fall  Orthopedic Precautions: N/A  Braces: N/A  Respiratory Status: Room air    Occupational Performance:    Functional Mobility/Transfers:  Patient completed Sit <> Stand Transfer with contact guard assistance  with  rolling walker       Activities of Daily Living:  Feeding:  modified independence with self feeding  Lower Body Dressing: supervision to don socks while seated  Toileting: Michaelle      Cognitive/Visual Perceptual:  Cognitive/Psychosocial Skills:     -       Oriented to: Person, Place, Time, and Situation   -       Follows Commands/attention:Follows multistep  commands  -       Communication: clear/fluent  -       Memory: No Deficits noted  -       Safety awareness/insight to disability: intact   -       Mood/Affect/Coping skills/emotional control: Appropriate to situation    Physical Exam:  Balance: -       Sitting: G, Standing: F  Upper Extremity Range of Motion:     -       Right Upper Extremity: WNL  -       Left Upper Extremity: WNL  Upper Extremity Strength:    -       Right Upper Extremity: WNL  -       Left Upper Extremity: WNL   Strength:    -       Right Upper Extremity: WNL  -       Left Upper Extremity: WNL    AMPAC 6 Click ADL:  AMPAC Total Score: 21    Treatment & Education:  Pt was educated on the role of occupational therapy and the importance of completing ADLs and functional mobility.     Patient left HOB elevated with all lines intact, call button in reach, and chair  alarm on    GOALS:   Multidisciplinary Problems       Occupational Therapy Goals          Problem: Occupational Therapy    Goal Priority Disciplines Outcome Interventions   Occupational Therapy Goal     OT, PT/OT     Description: Goals to be met by: 11/18/24     Patient will increase functional independence with ADLs by performing:    UE Dressing with Alba.  LE Dressing with Modified Alba.  Grooming while standing at sink with Modified Alba.  Toileting from toilet with Modified Alba for hygiene and clothing management.   Toilet transfer to toilet with Modified Alba with RW.                         History:     Past Medical History:   Diagnosis Date    Encounter for blood transfusion     Hypothyroidism     Peptic ulcer with hemorrhage     Peptic ulcer, site unspecified, unspecified as acute or chronic, without hemorrhage or perforation     Peptic ulcer disease    Stroke 2017    no residual effects.     Urticaria          Past Surgical History:   Procedure Laterality Date    ESOPHAGOGASTRODUODENOSCOPY N/A 4/18/2021    Procedure: EGD (ESOPHAGOGASTRODUODENOSCOPY);  Surgeon: Crow Vigil MD;  Location: [facility];  Service: Endoscopy;  Laterality: N/A;    ESOPHAGOGASTRODUODENOSCOPY N/A 8/31/2021    Procedure: EGD (ESOPHAGOGASTRODUODENOSCOPY);  Surgeon: Crow Vigil MD;  Location: [facility];  Service: Endoscopy;  Laterality: N/A;       Time Tracking:     OT Date of Treatment: 10/18/24  OT Start Time: 1130  OT Stop Time: 1145  OT Total Time (min): 15 min    Billable Minutes:Evaluation 7  Therapeutic Activity 8    10/18/2024

## 2024-10-18 NOTE — NURSING
Patient D/C via MD order. Telemetry and PIV removed. Discharge instructions reviewed with patient. Patient transported to [facility] via W/C.

## 2024-10-18 NOTE — HOSPITAL COURSE
74 year old lady was admitted with debility/falls/mild rhabdomyolysis.  Pt was admitted recently with AKA/ETOH withdrawal and was discharged yesterday  She did fine after discharge but didn't picked up Librium  from pharmacy  Today she was very weak and had episodes of falls but no LOC or head injury  and Home health nurse found pt on the floor   She was given IV hydration and banana bags and no signs of tremors and EOTH withdrawal signs and vitals are stable and CPK decreasing and discharged in stable condition to follow up with PCP as OP . D/w patient and along with  , she declined placement . She had DUI in the past and lost  license and advised not to drive . HH resumed .

## 2024-10-18 NOTE — DISCHARGE SUMMARY
[facility] Medicine  Discharge Summary      Patient Name: Nicolasa Madera  MRN: [id number]  JESUS: [id number]  Patient Class: OP- Observation  Admission Date: 10/17/2024  Hospital Length of Stay: 0 days  Discharge Date and Time:  10/18/2024 12:43 PM  Attending Physician: Tony Pinzon MD   Discharging Provider: Tony Pinzon MD  Primary Care Provider: Page Garcia PA-C    Primary Care Team: Networked reference to record PCT     HPI:   74 year old lady getting admitted with debility/falls/mild rhabdomyolysis  Pt was admitted recently with AKA/ETOH withdrawal and was discharged yesterday  She did fine after discharge but didn't picked up Librium  from pharmacy  Today she was very weak and had episodes of falls  Home health nurse found pt on the floor   She was escorted to hospital and got admitted  Denied any other issues  Appeared little confused when saw in ER   Pt said her last ETOH intake was 6 days ago     * No surgery found *      Hospital Course:   74 year old lady was admitted with debility/falls/mild rhabdomyolysis.  Pt was admitted recently with AKA/ETOH withdrawal and was discharged yesterday  She did fine after discharge but didn't picked up Librium  from pharmacy  Today she was very weak and had episodes of falls but no LOC or head injury  and Home health nurse found pt on the floor   She was given IV hydration and banana bags and no signs of tremors and EOTH withdrawal signs and vitals are stable and CPK decreasing and discharged in stable condition to follow up with PCP as OP . D/w patient and along with  , she declined placement . She had DUI in the past and lost  license and advised not to drive . HH resumed .     Goals of Care Treatment Preferences:  Code Status: Full Code      SDOH Screening:  The patient was screened for utility difficulties, food insecurity, transport difficulties, housing insecurity, and interpersonal safety and there were no  concerns identified this admission.     Consults:   Consults (From admission, onward)          Status Ordering Provider     Inpatient consult to   Once        Provider:  (Not yet assigned)    Ordered JACKIE SHAH     Inpatient consult to Registered Dietitian/Nutritionist  Once        Provider:  (Not yet assigned)    Acknowledged FELIX MICHAELS            No new Assessment & Plan notes have been filed under this hospital service since the last note was generated.  Service: Hospital Medicine    Final Active Diagnoses:    Diagnosis Date Noted POA    PRINCIPAL PROBLEM:  Debility [R53.81] 10/17/2024 Yes    Falls [R29.6] 10/17/2024 Not Applicable    Rhabdomyolysis [M62.82] 10/17/2024 Yes    Thrombocytopenia [D69.6] 10/13/2024 Yes    Alcohol use [Z78.9] 08/31/2021 Yes    Stage 3 chronic kidney disease [N18.30] 08/31/2021 Yes      Problems Resolved During this Admission:       Discharged Condition: good    Disposition: Home or Self Care    Follow Up:   Follow-up Information       Page Garcia PA-C. Go on 10/25/2024.    Specialty: Family Medicine  Why: please go to hospital follow up appointment at 3:30PM  Contact information:  [address] [facility] [address]  [phone]                           Patient Instructions:      Ambulatory referral/consult to Home Health   Standing Status: Future   Referral Priority: Routine Referral Type: Home Health   Referral Reason: Specialty Services Required   Requested Specialty: Home Health Services   Number of Visits Requested: 1     Diet Adult Regular     Activity as tolerated       Significant Diagnostic Studies: Labs: CMP   Recent Labs   Lab 10/17/24  1140 10/18/24  0718    140   K 4.3 3.4*    108   CO2 24 22*    85   BUN 7* 9   CREATININE 1.1 1.1   CALCIUM 9.7 9.0   PROT 7.0 6.5   ALBUMIN 4.1 3.7   BILITOT 0.8 0.7   ALKPHOS 56 56   * 96*   ALT 43 45*   ANIONGAP 10 10    and CBC   Recent Labs   Lab 10/17/24  1140 10/18/24  0718   WBC 4.28  3.46*   HGB 9.9* 10.6*   HCT 28.8* 31.6*   PLT 66* 91*       Pending Diagnostic Studies:       None           Medications:  Reconciled Home Medications:      Medication List        CONTINUE taking these medications      biotin 5,000 mcg Tbdl  Take 1 tablet by mouth once daily.     CENTRUM SILVER WOMEN ORAL  Take 1 tablet by mouth once daily.     chlordiazepoxide 5 MG capsule  Commonly known as: LIBRIUM  Take 5 capsules (25 mg total) by mouth 3 (three) times daily for 1 day, THEN 4 capsules (20 mg total) 3 (three) times daily for 1 day, THEN 3 capsules (15 mg total) 3 (three) times daily for 1 day, THEN 2 capsules (10 mg total) 3 (three) times daily for 1 day, THEN 2 capsules (10 mg total) 2 (two) times a day for 1 day, THEN 1 capsule (5 mg total) 3 (three) times daily for 1 day, THEN 1 capsule (5 mg total) 2 (two) times a day for 1 day.  Start taking on: October 16, 2024     fish oil-omega-3 fatty acids 300-1,000 mg capsule  Take 1 capsule by mouth once daily.     folic acid 1 MG tablet  Commonly known as: FOLVITE  Take 1 tablet (1 mg total) by mouth once daily.     levothyroxine 100 MCG tablet  Commonly known as: SYNTHROID  Take 1 tablet (100 mcg total) by mouth before breakfast.     multivitamin Tab  Take 1 tablet by mouth once daily.     pantoprazole 40 MG tablet  Commonly known as: PROTONIX  Take 1 tablet (40 mg total) by mouth 2 (two) times daily.     thiamine 100 MG tablet  Take 1 tablet (100 mg total) by mouth 3 (three) times daily.     VITAMIN B COMPLEX NO.12-NIACIN ORAL  Take 1 tablet by mouth.            STOP taking these medications      hydrOXYzine HCL 25 MG tablet  Commonly known as: ATARAX              Indwelling Lines/Drains at time of discharge:   Lines/Drains/Airways       Drain  Duration             Female External Urinary Catheter w/ Suction 10/17/24 1 day                  General: Patient resting comfortably in no acute distress. Appears as stated age. Calm  Eyes: EOM intact. No conjunctivae  injection. No scleral icterus.  ENT: Hearing grossly intact. No discharge from ears. No nasal discharge.   CVS: RRR. No LE edema BL.  Lungs: CTA BL, no wheezing or crackles. Good breath sounds. No accessory muscle use. No acute respiratory distress  Neuro: non focal , Follows commands. Responds appropriately   Time spent on the discharge of patient: 22 minutes         Tony Pinzon MD  Department of Hospital Medicine  [facility]

## 2024-10-18 NOTE — PLAN OF CARE
[facility]  Initial Discharge Assessment       Primary Care Provider: Page Garcia PA-C    Admission Diagnosis: Recurrent falls [R29.6]    Admission Date: 10/17/2024  Expected Discharge Date: 10/19/2024    Transition of Care Barriers: (P) Transportation     met with Pt at bedside to complete an initial discharge assessment. Pt AAOx4s.  Demographics, PCP, and insurance verified. Pt has No dialysis. Pt reports an inability to complete ADLs without assistance. However, pt reported that she does not use any DME. Pt verbalized that she will need transportation at discharge.  She left her phone at home and her brother is not available due to a recent medical procedure. SW to inform pt's floor nurse    Pt has no other needs to be addressed at this time.     Payor: [employer] MEDICARE / Plan: [employer] MEDICARE PPO / Product Type: Medicare Advantage /     Extended Emergency Contact Information  Primary Emergency Contact: Nora Valente  Mobile Phone: [phone]  Relation: Friend   needed? No    Discharge Plan A: (P) Home Health  Discharge Plan B: (P) Home Health      [id number] - [place] - [address]  [address]  [place] [address]  Phone: [phone] Fax: [phone]    [facility] - [facility] [address]  [address] [address]  Phone: [phone] Fax: [phone]    [facility] - [place] - [address]  [address]  [place] [address]  Phone: [phone] Fax: [phone]      Initial Assessment (most recent)       Adult Discharge Assessment - 10/18/24 1009          Discharge Assessment    Assessment Type Discharge Planning Assessment     Confirmed/corrected address, phone number and insurance Yes     Confirmed Demographics Correct on Facesheet     Source of Information patient     When was your last doctors appointment? --   unsure    Does patient/caregiver  understand observation status Yes     Communicated SYLWIA with patient/caregiver Yes     Reason For Admission Debility     People in Home alone     Facility Arrived From: Home     Do you expect to return to your current living situation? Yes     Do you have help at home or someone to help you manage your care at home? No     Prior to hospitilization cognitive status: Unable to Assess     Current cognitive status: Alert/Oriented     Walking or Climbing Stairs Difficulty yes     Dressing/Bathing Difficulty yes     Home Accessibility not wheelchair accessible     Home Layout Able to live on 1st floor     Equipment Currently Used at Home none     Readmission within 30 days? Yes     Patient currently being followed by outpatient case management? No     Do you currently have service(s) that help you manage your care at home? Yes (P)      Name and Contact number of agency [facility] (P)      Is the pt/caregiver preference to resume services with current agency Yes (P)      Do you take prescription medications? Yes (P)      Do you have prescription coverage? Yes (P)      Coverage [employer] MANAGED MEDICARE - [employer] MEDICARE PPO - (P)      Do you have any problems affording any of your prescribed medications? No (P)      Is the patient taking medications as prescribed? yes (P)      Who is going to help you get home at discharge? Pt needs transportation (P)      How do you get to doctors appointments? family or friend will provide (P)      Are you on dialysis? No (P)      Do you take coumadin? No (P)      Discharge Plan A Home Health (P)      Discharge Plan B Home Health (P)      DME Needed Upon Discharge  none (P)      Discharge Plan discussed with: Patient (P)      Transition of Care Barriers Transportation (P)         Physical Activity    On average, how many days per week do you engage in moderate to strenuous exercise (like a brisk walk)? 6 days (P)      On average, how many minutes do you engage in exercise at this level?  60 min (P)         Financial Resource Strain    How hard is it for you to pay for the very basics like food, housing, medical care, and heating? Not very hard (P)         Housing Stability    In the last 12 months, was there a time when you were not able to pay the mortgage or rent on time? No (P)      At any time in the past 12 months, were you homeless or living in a shelter (including now)? No (P)         Transportation Needs    Has the lack of transportation kept you from medical appointments, meetings, work or from getting things needed for daily living? No (P)    Family is usually available, not available today       Food Insecurity    Within the past 12 months, you worried that your food would run out before you got the money to buy more. Never true (P)      Within the past 12 months, the food you bought just didn't last and you didn't have money to get more. Never true (P)         Stress    Do you feel stress - tense, restless, nervous, or anxious, or unable to sleep at night because your mind is troubled all the time - these days? Not at all (P)         Social Isolation    How often do you feel lonely or isolated from those around you?  Never (P)         Alcohol Use    Q1: How often do you have a drink containing alcohol? 4 or more times a week (P)      Q2: How many drinks containing alcohol do you have on a typical day when you are drinking? 5 or 6 (P)      Q3: How often do you have six or more drinks on one occasion? -- (P)    Depends. Pt stated that she binge drinks at times but could not define it       Utilities    In the past 12 months has the electric, gas, oil, or water company threatened to shut off services in your home? No (P)         Health Literacy    How often do you need to have someone help you when you read instructions, pamphlets, or other written material from your doctor or pharmacy? Never (P)

## 2024-10-18 NOTE — PLAN OF CARE
Follow up appointment with PCP made and added to AVS. Case management following.      10/18/24 1240   Discharge Reassessment   Assessment Type Discharge Planning Reassessment   Did the patient's condition or plan change since previous assessment? No   Discharge Plan discussed with: Patient   Communicated SYLWIA with patient/caregiver Yes   Discharge Plan A Home Health

## 2024-10-18 NOTE — PLAN OF CARE
Pt cleared from  to [facility] agency.    RN nurse to set up taxi.    No other needs to be addressed at this time.       10/18/24 0748   Final Note   Assessment Type Final Discharge Note   Anticipated Discharge Disposition Home-Health   Post-Acute Status   Post-Acute Authorization Home Health   Home Health Status Set-up Complete/Auth obtained   Discharge Delays (!) Taxi Service Set-up

## 2024-10-18 NOTE — PT/OT/SLP EVAL
Physical Therapy Evaluation    Patient Name:  Nicolasa Madera   MRN:  [id number]    Recommendations:     Discharge Recommendations: Low Intensity Therapy (vs moderate)   Discharge Equipment Recommendations: walker, rolling   Barriers to discharge:  multiple rehospitalization, high fall risk, multiple falls, decrease caregiver support    Assessment:     Nicolasa Madera is a 74 y.o. female admitted with a medical diagnosis of Debility.  She presents with the following impairments/functional limitations: weakness, impaired endurance, impaired self care skills, impaired functional mobility, gait instability, impaired balance, decreased lower extremity function, decreased safety awareness, pain, impaired cardiopulmonary response to activity.    Pt found up in chair.Pt agreeable too visit. Pt requires CGA to standing. Pt initailly attempted to ambulate with no AD but only able to ambulate 5 ft with mod A due to poor balance and reaching out for objects to self steady. Agreeable to trial RW with significant improvement in gait pattern and safety able to ambulate 200 ft with RW and CGA.    The mobility limitation cannot be sufficiently resolved by the use of a cane. The patient's functional mobility deficit can be sufficiently resolved with the use of a rolling walker. The patient's mobility limitation significantly impairs their ability to participate in one or more activities of daily living. The use of a rolling walker will significantly improve the patient's ability to participate in mobility-related activities of daily living and the patient will use it on a regular basis in the home.       Rehab Prognosis: Fair; patient would benefit from acute skilled PT services to address these deficits and reach maximum level of function.    Recent Surgery: * No surgery found *      Plan:     During this hospitalization, patient to be seen 6 x/week to address the identified rehab impairments via gait training, therapeutic  activities, therapeutic exercises, neuromuscular re-education and progress toward the following goals:    Plan of Care Expires:  10/19/24    Subjective     Chief Complaint: falling at home  Patient/Family Comments/goals: get better  Pain/Comfort:  Pain Rating 1: 0/10    Patients cultural, spiritual, Taoist conflicts given the current situation: no    Living Environment:  Pt lives alone in a H with no HENRIET[place]. (+)   Prior to admission, patients level of function was Independent no AD.  Equipment used at home: none.  DME owned (not currently used): none.  Upon discharge, patient will have assistance from TBD.    Objective:     Communicated with RN prior to session.  Patient found up in chair with peripheral IV, telemetry  upon PT entry to room.    General Precautions: Standard, fall  Orthopedic Precautions:N/A   Braces: N/A  Respiratory Status: Room air    Exams:  RLE ROM: WFL  RLE Strength: WFL  LLE ROM: WFL  LLE Strength: WFL    Functional Mobility:  Transfers:     Sit to Stand:  contact guard assistance with no AD  Gait: 5 ft with HHA and mod A, 200 ft with RW and CGA      AM-PAC 6 CLICK MOBILITY  Total Score:18       Treatment & Education:  Pt educated on POC, discharge recommendation, importance of time OOB, pacing/energy conservation, benefit of RW, need for assist with mobility, use of call bell to seek assistance as needed and fall prevention      Patient left up in chair with all lines intact and call button in reach.    GOALS:   Multidisciplinary Problems       Physical Therapy Goals          Problem: Physical Therapy    Goal Priority Disciplines Outcome Interventions   Physical Therapy Goal     PT, PT/OT Progressing    Description: Goals to be met by: 24     Patient will increase functional independence with mobility by performin. Supine to sit with Supervision  2. Sit to stand transfer with Supervision  3. Bed to chair transfer with Supervision using Rolling Walker  4. Gait  x 200  feet with Supervision using Rolling Walker.   5. Gait  x 150 feet with Contact Guard Assist using no AD                             History:     Past Medical History:   Diagnosis Date    Encounter for blood transfusion     Hypothyroidism     Peptic ulcer with hemorrhage     Peptic ulcer, site unspecified, unspecified as acute or chronic, without hemorrhage or perforation     Peptic ulcer disease    Stroke 2017    no residual effects.     Urticaria        Past Surgical History:   Procedure Laterality Date    ESOPHAGOGASTRODUODENOSCOPY N/A 4/18/2021    Procedure: EGD (ESOPHAGOGASTRODUODENOSCOPY);  Surgeon: Crow Vigil MD;  Location: [facility];  Service: Endoscopy;  Laterality: N/A;    ESOPHAGOGASTRODUODENOSCOPY N/A 8/31/2021    Procedure: EGD (ESOPHAGOGASTRODUODENOSCOPY);  Surgeon: Crow Vigil MD;  Location: [facility];  Service: Endoscopy;  Laterality: N/A;       Time Tracking:     PT Received On: 10/18/24  PT Start Time: 0859     PT Stop Time: 0908  PT Total Time (min): 9 min     Billable Minutes: Evaluation 9      10/18/2024

## 2024-10-18 NOTE — PLAN OF CARE
SW informed by [facility]  that they can accept pt and offer all services requested.  Pt informed of  agency name and acceptance.  Pt agreeable.   agency informed and submitting for  auth.     10/18/24 1446   Post-Acute Status   Post-Acute Authorization Home Health   Home Health Status Set-up Complete/Auth obtained   Discharge Delays (!) Taxi Service Set-up   Discharge Plan   Discharge Plan A Home Health   Discharge Plan B Home Health

## 2024-10-18 NOTE — PLAN OF CARE
[facility] never admitted pt and states that that pt is not appropriate for HH at their agency. With that SW sent referrals to [facility], [facility], [facility], [facility], and [facility] .  SW requesting nurse, PT, OT, and  ().     10/18/24 1246   Post-Acute Status   Post-Acute Authorization Home Health   Home Health Status Referrals Sent   Discharge Delays None known at this time   Discharge Plan   Discharge Plan A Home Health   Discharge Plan B Home Health

## 2024-10-18 NOTE — PLAN OF CARE
Pt was explained BAGLEY. Pt verbalized understanding of BAGLEY and signed. BAGLEY scanned to .       10/18/24 0955   BAGLEY Message   Medicare Outpatient and Observation Notification regarding financial responsibility Explained to patient/caregiver;Signed/date by patient/caregiver   Date BAGLEY was signed 10/18/24   Time BAGLEY was signed 0955

## 2024-10-18 NOTE — NURSING
10/17/24 2115:PT was aao. Respirations even and unlabored. No c/o sob; hx of mild right arm soreness; generalized bruising to bue and ble. Bed low, locked. Fall precautions in place. Nad was noted further.

## 2024-10-21 ENCOUNTER — TELEPHONE (OUTPATIENT)
Dept: UROLOGY | Facility: CLINIC | Age: 74
End: 2024-10-21
Payer: MEDICARE

## 2024-10-21 NOTE — TELEPHONE ENCOUNTER
----- Message from Saturnino sent at 10/21/2024  3:25 PM CDT -----  Contact: Self  Type: Needs Medical Advice    Who Called:  Patient    Symptoms (please be specific):  UTI/Burning sensation when urinating  How long has patient had these symptoms:  Within last 3 days    Pharmacy name and phone #:    [facility] - [place] - [address]  [place] [address]  Phone: [phone] Fax: [phone]    Best Call Back Number: [phone]  Additional Information: Patient states that she has a UTI and is requesting antibiotics. She says that she fell and is unable to come into the clinic. She is asking to see if the antibiotics can be delivered to her as she cannot leave due to her fall.

## 2024-10-21 NOTE — TELEPHONE ENCOUNTER
Spoke with patient thinks she may have a uti.  Requesting antibiotics be called in to the pharmacy. Informed patient she will need a appointment to be evaluated unable to come to a appointment. Informed patient can call primary care patient states pcp no longer is there.  Patient declined scheduling appointment.

## 2024-10-22 ENCOUNTER — HOSPITAL ENCOUNTER (INPATIENT)
Facility: HOSPITAL | Age: 74
LOS: 5 days | Discharge: NURSING, REHAB OR LONG-TERM CARE | DRG: 690 | End: 2024-10-28
Attending: EMERGENCY MEDICINE | Admitting: STUDENT IN AN ORGANIZED HEALTH CARE EDUCATION/TRAINING PROGRAM
Payer: MEDICARE

## 2024-10-22 DIAGNOSIS — B96.20 E COLI BACTEREMIA: ICD-10-CM

## 2024-10-22 DIAGNOSIS — F10.24 ALCOHOL DEPENDENCE WITH ALCOHOL-INDUCED MOOD DISORDER: ICD-10-CM

## 2024-10-22 DIAGNOSIS — N39.0 URINARY TRACT INFECTION WITHOUT HEMATURIA, SITE UNSPECIFIED: Primary | ICD-10-CM

## 2024-10-22 DIAGNOSIS — R07.9 CHEST PAIN: ICD-10-CM

## 2024-10-22 DIAGNOSIS — E87.6 HYPOKALEMIA: ICD-10-CM

## 2024-10-22 DIAGNOSIS — R53.1 WEAK: ICD-10-CM

## 2024-10-22 DIAGNOSIS — R78.81 E COLI BACTEREMIA: ICD-10-CM

## 2024-10-22 PROBLEM — E44.0 MALNUTRITION OF MODERATE DEGREE: Status: ACTIVE | Noted: 2024-10-22

## 2024-10-22 LAB
ACINETOBACTER CALCOACETICUS/BAUMANNII COMPLEX: NOT DETECTED
ALBUMIN SERPL BCP-MCNC: 3.2 G/DL (ref 3.5–5.2)
ALP SERPL-CCNC: 52 U/L (ref 40–150)
ALT SERPL W/O P-5'-P-CCNC: 39 U/L (ref 10–44)
ANION GAP SERPL CALC-SCNC: 10 MMOL/L (ref 8–16)
AST SERPL-CCNC: 39 U/L (ref 10–40)
BACTERIA #/AREA URNS HPF: ABNORMAL /HPF
BACTEROIDES FRAGILIS: NOT DETECTED
BASOPHILS # BLD AUTO: 0.03 K/UL (ref 0–0.2)
BASOPHILS NFR BLD: 0.5 % (ref 0–1.9)
BILIRUB SERPL-MCNC: 0.7 MG/DL (ref 0.1–1)
BILIRUB UR QL STRIP: NEGATIVE
BUN SERPL-MCNC: 12 MG/DL (ref 8–23)
CALCIUM SERPL-MCNC: 8.9 MG/DL (ref 8.7–10.5)
CANDIDA ALBICANS: NOT DETECTED
CANDIDA AURIS: NOT DETECTED
CANDIDA GLABRATA: NOT DETECTED
CANDIDA KRUSEI: NOT DETECTED
CANDIDA PARAPSILOSIS: NOT DETECTED
CANDIDA TROPICALIS: NOT DETECTED
CHLORIDE SERPL-SCNC: 108 MMOL/L (ref 95–110)
CLARITY UR: ABNORMAL
CO2 SERPL-SCNC: 19 MMOL/L (ref 23–29)
COLOR UR: YELLOW
CREAT SERPL-MCNC: 1 MG/DL (ref 0.5–1.4)
CRYPTOCOCCUS NEOFORMANS/GATTII: NOT DETECTED
CTX-M GENE (ESBL PRODUCER): NOT DETECTED
DIFFERENTIAL METHOD BLD: ABNORMAL
ENTEROBACTER CLOACAE COMPLEX: NOT DETECTED
ENTEROBACTERALES: ABNORMAL
ENTEROCOCCUS FAECALIS: NOT DETECTED
ENTEROCOCCUS FAECIUM: NOT DETECTED
EOSINOPHIL # BLD AUTO: 0 K/UL (ref 0–0.5)
EOSINOPHIL NFR BLD: 0.2 % (ref 0–8)
ERYTHROCYTE [DISTWIDTH] IN BLOOD BY AUTOMATED COUNT: 24.9 % (ref 11.5–14.5)
ESCHERICHIA COLI: DETECTED
EST. GFR  (NO RACE VARIABLE): 58 ML/MIN/1.73 M^2
FOLATE SERPL-MCNC: 13.8 NG/ML (ref 4–24)
GLUCOSE SERPL-MCNC: 125 MG/DL (ref 70–110)
GLUCOSE UR QL STRIP: NEGATIVE
HAEMOPHILUS INFLUENZAE: NOT DETECTED
HCT VFR BLD AUTO: 24.8 % (ref 37–48.5)
HGB BLD-MCNC: 8.7 G/DL (ref 12–16)
HGB UR QL STRIP: ABNORMAL
HYALINE CASTS #/AREA URNS LPF: 0 /LPF
IMM GRANULOCYTES # BLD AUTO: 0.02 K/UL (ref 0–0.04)
IMM GRANULOCYTES NFR BLD AUTO: 0.3 % (ref 0–0.5)
IMP GENE (CARBAPENEM RESISTANT): NOT DETECTED
KETONES UR QL STRIP: ABNORMAL
KLEBSIELLA AEROGENES: NOT DETECTED
KLEBSIELLA OXYTOCA: NOT DETECTED
KLEBSIELLA PNEUMONIAE GROUP: NOT DETECTED
KPC RESISTANCE GENE (CARBAPENEM): NOT DETECTED
LACTATE SERPL-SCNC: 0.6 MMOL/L (ref 0.5–2.2)
LACTATE SERPL-SCNC: 1.8 MMOL/L (ref 0.5–2.2)
LEUKOCYTE ESTERASE UR QL STRIP: ABNORMAL
LISTERIA MONOCYTOGENES: NOT DETECTED
LYMPHOCYTES # BLD AUTO: 0.5 K/UL (ref 1–4.8)
LYMPHOCYTES NFR BLD: 8.3 % (ref 18–48)
MCH RBC QN AUTO: 28.8 PG (ref 27–31)
MCHC RBC AUTO-ENTMCNC: 35.1 G/DL (ref 32–36)
MCR-1: NOT DETECTED
MCV RBC AUTO: 82 FL (ref 82–98)
MEC A/C AND MREJ (MRSA): ABNORMAL
MEC A/C: ABNORMAL
MICROSCOPIC COMMENT: ABNORMAL
MONOCYTES # BLD AUTO: 0.9 K/UL (ref 0.3–1)
MONOCYTES NFR BLD: 14.3 % (ref 4–15)
NDM GENE (CARBAPENEM RESISTANT): NOT DETECTED
NEISSERIA MENINGITIDIS: NOT DETECTED
NEUTROPHILS # BLD AUTO: 4.8 K/UL (ref 1.8–7.7)
NEUTROPHILS NFR BLD: 76.4 % (ref 38–73)
NITRITE UR QL STRIP: POSITIVE
NRBC BLD-RTO: 0 /100 WBC
OXA-48-LIKE (CARBAPENEM RESISTANT): NOT DETECTED
PH UR STRIP: 6 [PH] (ref 5–8)
PLATELET # BLD AUTO: 212 K/UL (ref 150–450)
PMV BLD AUTO: 9.3 FL (ref 9.2–12.9)
POTASSIUM SERPL-SCNC: 2.8 MMOL/L (ref 3.5–5.1)
POTASSIUM SERPL-SCNC: 3.1 MMOL/L (ref 3.5–5.1)
PROT SERPL-MCNC: 6.2 G/DL (ref 6–8.4)
PROT UR QL STRIP: ABNORMAL
PROTEUS SPECIES: NOT DETECTED
PSEUDOMONAS AERUGINOSA: NOT DETECTED
RBC # BLD AUTO: 3.02 M/UL (ref 4–5.4)
RBC #/AREA URNS HPF: 8 /HPF (ref 0–4)
SALMONELLA SP: NOT DETECTED
SERRATIA MARCESCENS: NOT DETECTED
SODIUM SERPL-SCNC: 137 MMOL/L (ref 136–145)
SP GR UR STRIP: 1.01 (ref 1–1.03)
SQUAMOUS #/AREA URNS HPF: 1 /HPF
STAPHYLOCOCCUS AUREUS: NOT DETECTED
STAPHYLOCOCCUS EPIDERMIDIS: NOT DETECTED
STAPHYLOCOCCUS LUGDUNESIS: NOT DETECTED
STAPHYLOCOCCUS SPECIES: NOT DETECTED
STENOTROPHOMONAS MALTOPHILIA: NOT DETECTED
STREPTOCOCCUS AGALACTIAE: NOT DETECTED
STREPTOCOCCUS PNEUMONIAE: NOT DETECTED
STREPTOCOCCUS PYOGENES: NOT DETECTED
STREPTOCOCCUS SPECIES: NOT DETECTED
TSH SERPL DL<=0.005 MIU/L-ACNC: 1.76 UIU/ML (ref 0.4–4)
UNIDENT CRYS URNS QL MICRO: 1
URN SPEC COLLECT METH UR: ABNORMAL
UROBILINOGEN UR STRIP-ACNC: NEGATIVE EU/DL
VAN A/B (VRE GENE): ABNORMAL
VIM GENE (CARBAPENEM RESISTANT): NOT DETECTED
VIT B12 SERPL-MCNC: 1609 PG/ML (ref 210–950)
WBC # BLD AUTO: 6.3 K/UL (ref 3.9–12.7)
WBC #/AREA URNS HPF: >100 /HPF (ref 0–5)
WBC CLUMPS URNS QL MICRO: ABNORMAL

## 2024-10-22 PROCEDURE — 36415 COLL VENOUS BLD VENIPUNCTURE: CPT | Performed by: STUDENT IN AN ORGANIZED HEALTH CARE EDUCATION/TRAINING PROGRAM

## 2024-10-22 PROCEDURE — 97530 THERAPEUTIC ACTIVITIES: CPT

## 2024-10-22 PROCEDURE — 85025 COMPLETE CBC W/AUTO DIFF WBC: CPT | Performed by: EMERGENCY MEDICINE

## 2024-10-22 PROCEDURE — 96375 TX/PRO/DX INJ NEW DRUG ADDON: CPT

## 2024-10-22 PROCEDURE — 87040 BLOOD CULTURE FOR BACTERIA: CPT | Mod: 59 | Performed by: STUDENT IN AN ORGANIZED HEALTH CARE EDUCATION/TRAINING PROGRAM

## 2024-10-22 PROCEDURE — 63600175 PHARM REV CODE 636 W HCPCS: Performed by: STUDENT IN AN ORGANIZED HEALTH CARE EDUCATION/TRAINING PROGRAM

## 2024-10-22 PROCEDURE — G0378 HOSPITAL OBSERVATION PER HR: HCPCS

## 2024-10-22 PROCEDURE — 97161 PT EVAL LOW COMPLEX 20 MIN: CPT

## 2024-10-22 PROCEDURE — 63600175 PHARM REV CODE 636 W HCPCS

## 2024-10-22 PROCEDURE — 99285 EMERGENCY DEPT VISIT HI MDM: CPT | Mod: 25

## 2024-10-22 PROCEDURE — 87154 CUL TYP ID BLD PTHGN 6+ TRGT: CPT | Performed by: EMERGENCY MEDICINE

## 2024-10-22 PROCEDURE — 81000 URINALYSIS NONAUTO W/SCOPE: CPT | Performed by: EMERGENCY MEDICINE

## 2024-10-22 PROCEDURE — 87040 BLOOD CULTURE FOR BACTERIA: CPT | Mod: 59 | Performed by: EMERGENCY MEDICINE

## 2024-10-22 PROCEDURE — 25000003 PHARM REV CODE 250: Performed by: STUDENT IN AN ORGANIZED HEALTH CARE EDUCATION/TRAINING PROGRAM

## 2024-10-22 PROCEDURE — 97165 OT EVAL LOW COMPLEX 30 MIN: CPT

## 2024-10-22 PROCEDURE — 84132 ASSAY OF SERUM POTASSIUM: CPT | Performed by: STUDENT IN AN ORGANIZED HEALTH CARE EDUCATION/TRAINING PROGRAM

## 2024-10-22 PROCEDURE — 96372 THER/PROPH/DIAG INJ SC/IM: CPT | Performed by: STUDENT IN AN ORGANIZED HEALTH CARE EDUCATION/TRAINING PROGRAM

## 2024-10-22 PROCEDURE — 96365 THER/PROPH/DIAG IV INF INIT: CPT

## 2024-10-22 PROCEDURE — 83605 ASSAY OF LACTIC ACID: CPT | Performed by: EMERGENCY MEDICINE

## 2024-10-22 PROCEDURE — 25000003 PHARM REV CODE 250: Performed by: NURSE PRACTITIONER

## 2024-10-22 PROCEDURE — 82607 VITAMIN B-12: CPT | Performed by: STUDENT IN AN ORGANIZED HEALTH CARE EDUCATION/TRAINING PROGRAM

## 2024-10-22 PROCEDURE — 63600175 PHARM REV CODE 636 W HCPCS: Performed by: NURSE PRACTITIONER

## 2024-10-22 PROCEDURE — 83605 ASSAY OF LACTIC ACID: CPT | Mod: 91 | Performed by: NURSE PRACTITIONER

## 2024-10-22 PROCEDURE — 25000003 PHARM REV CODE 250: Performed by: EMERGENCY MEDICINE

## 2024-10-22 PROCEDURE — 63600175 PHARM REV CODE 636 W HCPCS: Performed by: EMERGENCY MEDICINE

## 2024-10-22 PROCEDURE — 87186 SC STD MICRODIL/AGAR DIL: CPT | Performed by: EMERGENCY MEDICINE

## 2024-10-22 PROCEDURE — 84443 ASSAY THYROID STIM HORMONE: CPT | Performed by: STUDENT IN AN ORGANIZED HEALTH CARE EDUCATION/TRAINING PROGRAM

## 2024-10-22 PROCEDURE — 82746 ASSAY OF FOLIC ACID SERUM: CPT | Performed by: STUDENT IN AN ORGANIZED HEALTH CARE EDUCATION/TRAINING PROGRAM

## 2024-10-22 PROCEDURE — 87086 URINE CULTURE/COLONY COUNT: CPT | Performed by: EMERGENCY MEDICINE

## 2024-10-22 PROCEDURE — 80053 COMPREHEN METABOLIC PANEL: CPT | Performed by: EMERGENCY MEDICINE

## 2024-10-22 PROCEDURE — 36415 COLL VENOUS BLD VENIPUNCTURE: CPT | Performed by: NURSE PRACTITIONER

## 2024-10-22 RX ORDER — POLYETHYLENE GLYCOL 3350 17 G/17G
17 POWDER, FOR SOLUTION ORAL DAILY
Status: DISCONTINUED | OUTPATIENT
Start: 2024-10-22 | End: 2024-10-28 | Stop reason: HOSPADM

## 2024-10-22 RX ORDER — SODIUM,POTASSIUM PHOSPHATES 280-250MG
2 POWDER IN PACKET (EA) ORAL
Status: DISCONTINUED | OUTPATIENT
Start: 2024-10-22 | End: 2024-10-28 | Stop reason: HOSPADM

## 2024-10-22 RX ORDER — POTASSIUM CHLORIDE 20 MEQ/1
40 TABLET, EXTENDED RELEASE ORAL
Status: COMPLETED | OUTPATIENT
Start: 2024-10-22 | End: 2024-10-22

## 2024-10-22 RX ORDER — CEFTRIAXONE 1 G/1
1 INJECTION, POWDER, FOR SOLUTION INTRAMUSCULAR; INTRAVENOUS ONCE
Status: DISCONTINUED | OUTPATIENT
Start: 2024-10-22 | End: 2024-10-22

## 2024-10-22 RX ORDER — THIAMINE HCL 100 MG
100 TABLET ORAL DAILY
Status: DISCONTINUED | OUTPATIENT
Start: 2024-10-22 | End: 2024-10-28 | Stop reason: HOSPADM

## 2024-10-22 RX ORDER — SIMETHICONE 80 MG
1 TABLET,CHEWABLE ORAL 4 TIMES DAILY PRN
Status: DISCONTINUED | OUTPATIENT
Start: 2024-10-22 | End: 2024-10-28 | Stop reason: HOSPADM

## 2024-10-22 RX ORDER — SODIUM CHLORIDE 0.9 % (FLUSH) 0.9 %
3 SYRINGE (ML) INJECTION EVERY 12 HOURS PRN
Status: DISCONTINUED | OUTPATIENT
Start: 2024-10-22 | End: 2024-10-28 | Stop reason: HOSPADM

## 2024-10-22 RX ORDER — CEFEPIME HYDROCHLORIDE 1 G/1
1 INJECTION, POWDER, FOR SOLUTION INTRAMUSCULAR; INTRAVENOUS
Status: DISCONTINUED | OUTPATIENT
Start: 2024-10-22 | End: 2024-10-22

## 2024-10-22 RX ORDER — POTASSIUM CHLORIDE 20 MEQ/1
40 TABLET, EXTENDED RELEASE ORAL ONCE
Status: COMPLETED | OUTPATIENT
Start: 2024-10-22 | End: 2024-10-22

## 2024-10-22 RX ORDER — PANTOPRAZOLE SODIUM 40 MG/1
40 TABLET, DELAYED RELEASE ORAL 2 TIMES DAILY
Status: DISCONTINUED | OUTPATIENT
Start: 2024-10-22 | End: 2024-10-22

## 2024-10-22 RX ORDER — LEVOTHYROXINE SODIUM 100 UG/1
100 TABLET ORAL
Status: DISCONTINUED | OUTPATIENT
Start: 2024-10-23 | End: 2024-10-28 | Stop reason: HOSPADM

## 2024-10-22 RX ORDER — PANTOPRAZOLE SODIUM 40 MG/10ML
40 INJECTION, POWDER, LYOPHILIZED, FOR SOLUTION INTRAVENOUS DAILY
Status: DISCONTINUED | OUTPATIENT
Start: 2024-10-22 | End: 2024-10-22

## 2024-10-22 RX ORDER — GLUCAGON 1 MG
1 KIT INJECTION
Status: DISCONTINUED | OUTPATIENT
Start: 2024-10-22 | End: 2024-10-28 | Stop reason: HOSPADM

## 2024-10-22 RX ORDER — POTASSIUM CHLORIDE 7.45 MG/ML
10 INJECTION INTRAVENOUS
Status: COMPLETED | OUTPATIENT
Start: 2024-10-22 | End: 2024-10-22

## 2024-10-22 RX ORDER — TALC
9 POWDER (GRAM) TOPICAL NIGHTLY PRN
Status: DISCONTINUED | OUTPATIENT
Start: 2024-10-22 | End: 2024-10-28 | Stop reason: HOSPADM

## 2024-10-22 RX ORDER — LANOLIN ALCOHOL/MO/W.PET/CERES
800 CREAM (GRAM) TOPICAL
Status: DISCONTINUED | OUTPATIENT
Start: 2024-10-22 | End: 2024-10-28 | Stop reason: HOSPADM

## 2024-10-22 RX ORDER — CEFTRIAXONE 2 G/1
2 INJECTION, POWDER, FOR SOLUTION INTRAMUSCULAR; INTRAVENOUS
Status: DISCONTINUED | OUTPATIENT
Start: 2024-10-23 | End: 2024-10-22

## 2024-10-22 RX ORDER — IBUPROFEN 200 MG
24 TABLET ORAL
Status: DISCONTINUED | OUTPATIENT
Start: 2024-10-22 | End: 2024-10-28 | Stop reason: HOSPADM

## 2024-10-22 RX ORDER — CEFTRIAXONE 1 G/1
1 INJECTION, POWDER, FOR SOLUTION INTRAMUSCULAR; INTRAVENOUS
Status: DISCONTINUED | OUTPATIENT
Start: 2024-10-23 | End: 2024-10-22

## 2024-10-22 RX ORDER — NALOXONE HCL 0.4 MG/ML
0.02 VIAL (ML) INJECTION
Status: DISCONTINUED | OUTPATIENT
Start: 2024-10-22 | End: 2024-10-28 | Stop reason: HOSPADM

## 2024-10-22 RX ORDER — CEFTRIAXONE 1 G/1
1 INJECTION, POWDER, FOR SOLUTION INTRAMUSCULAR; INTRAVENOUS
Status: COMPLETED | OUTPATIENT
Start: 2024-10-22 | End: 2024-10-22

## 2024-10-22 RX ORDER — LORAZEPAM 1 MG/1
2 TABLET ORAL EVERY 4 HOURS PRN
Status: DISCONTINUED | OUTPATIENT
Start: 2024-10-22 | End: 2024-10-28 | Stop reason: HOSPADM

## 2024-10-22 RX ORDER — FOLIC ACID 1 MG/1
1 TABLET ORAL DAILY
Status: DISCONTINUED | OUTPATIENT
Start: 2024-10-22 | End: 2024-10-28 | Stop reason: HOSPADM

## 2024-10-22 RX ORDER — IBUPROFEN 200 MG
16 TABLET ORAL
Status: DISCONTINUED | OUTPATIENT
Start: 2024-10-22 | End: 2024-10-28 | Stop reason: HOSPADM

## 2024-10-22 RX ORDER — THIAMINE HCL 100 MG
100 TABLET ORAL 3 TIMES DAILY
Status: DISCONTINUED | OUTPATIENT
Start: 2024-10-22 | End: 2024-10-22

## 2024-10-22 RX ORDER — ACETAMINOPHEN 325 MG/1
650 TABLET ORAL EVERY 8 HOURS PRN
Status: DISCONTINUED | OUTPATIENT
Start: 2024-10-22 | End: 2024-10-22

## 2024-10-22 RX ORDER — ACETAMINOPHEN 325 MG/1
650 TABLET ORAL EVERY 4 HOURS PRN
Status: DISCONTINUED | OUTPATIENT
Start: 2024-10-22 | End: 2024-10-28 | Stop reason: HOSPADM

## 2024-10-22 RX ORDER — PANTOPRAZOLE SODIUM 40 MG/1
40 TABLET, DELAYED RELEASE ORAL DAILY
Status: DISCONTINUED | OUTPATIENT
Start: 2024-10-22 | End: 2024-10-28 | Stop reason: HOSPADM

## 2024-10-22 RX ORDER — ONDANSETRON HYDROCHLORIDE 2 MG/ML
8 INJECTION, SOLUTION INTRAVENOUS EVERY 6 HOURS PRN
Status: DISCONTINUED | OUTPATIENT
Start: 2024-10-22 | End: 2024-10-28 | Stop reason: HOSPADM

## 2024-10-22 RX ORDER — CEFEPIME HYDROCHLORIDE 1 G/1
2 INJECTION, POWDER, FOR SOLUTION INTRAMUSCULAR; INTRAVENOUS
Status: DISCONTINUED | OUTPATIENT
Start: 2024-10-22 | End: 2024-10-28 | Stop reason: HOSPADM

## 2024-10-22 RX ORDER — POTASSIUM CHLORIDE 20 MEQ/1
40 TABLET, EXTENDED RELEASE ORAL
Status: DISPENSED | OUTPATIENT
Start: 2024-10-22 | End: 2024-10-22

## 2024-10-22 RX ORDER — ALUMINUM HYDROXIDE, MAGNESIUM HYDROXIDE, AND SIMETHICONE 1200; 120; 1200 MG/30ML; MG/30ML; MG/30ML
30 SUSPENSION ORAL 4 TIMES DAILY PRN
Status: DISCONTINUED | OUTPATIENT
Start: 2024-10-22 | End: 2024-10-28 | Stop reason: HOSPADM

## 2024-10-22 RX ORDER — ENOXAPARIN SODIUM 100 MG/ML
40 INJECTION SUBCUTANEOUS EVERY 24 HOURS
Status: DISCONTINUED | OUTPATIENT
Start: 2024-10-22 | End: 2024-10-28 | Stop reason: HOSPADM

## 2024-10-22 RX ADMIN — ACETAMINOPHEN 650 MG: 325 TABLET ORAL at 11:10

## 2024-10-22 RX ADMIN — CEFTRIAXONE SODIUM 1 G: 1 INJECTION, POWDER, FOR SOLUTION INTRAMUSCULAR; INTRAVENOUS at 04:10

## 2024-10-22 RX ADMIN — VANCOMYCIN HYDROCHLORIDE 1500 MG: 1.5 INJECTION, POWDER, LYOPHILIZED, FOR SOLUTION INTRAVENOUS at 03:10

## 2024-10-22 RX ADMIN — POTASSIUM CHLORIDE 40 MEQ: 1500 TABLET, EXTENDED RELEASE ORAL at 04:10

## 2024-10-22 RX ADMIN — ACETAMINOPHEN 650 MG: 325 TABLET ORAL at 04:10

## 2024-10-22 RX ADMIN — FOLIC ACID 1 MG: 1 TABLET ORAL at 12:10

## 2024-10-22 RX ADMIN — THIAMINE HCL TAB 100 MG 100 MG: 100 TAB at 11:10

## 2024-10-22 RX ADMIN — POTASSIUM CHLORIDE 40 MEQ: 1500 TABLET, EXTENDED RELEASE ORAL at 11:10

## 2024-10-22 RX ADMIN — LORAZEPAM 2 MG: 1 TABLET ORAL at 04:10

## 2024-10-22 RX ADMIN — PANTOPRAZOLE SODIUM 40 MG: 40 INJECTION, POWDER, LYOPHILIZED, FOR SOLUTION INTRAVENOUS at 04:10

## 2024-10-22 RX ADMIN — PANTOPRAZOLE SODIUM 40 MG: 40 TABLET, DELAYED RELEASE ORAL at 11:10

## 2024-10-22 RX ADMIN — ACETAMINOPHEN 650 MG: 325 TABLET ORAL at 05:10

## 2024-10-22 RX ADMIN — ENOXAPARIN SODIUM 40 MG: 40 INJECTION SUBCUTANEOUS at 04:10

## 2024-10-22 RX ADMIN — CEFEPIME 2 G: 1 INJECTION, POWDER, FOR SOLUTION INTRAMUSCULAR; INTRAVENOUS at 09:10

## 2024-10-22 RX ADMIN — THERA TABS 1 TABLET: TAB at 12:10

## 2024-10-22 RX ADMIN — POTASSIUM CHLORIDE 10 MEQ: 7.46 INJECTION, SOLUTION INTRAVENOUS at 04:10

## 2024-10-22 RX ADMIN — ONDANSETRON 8 MG: 2 INJECTION INTRAMUSCULAR; INTRAVENOUS at 05:10

## 2024-10-22 NOTE — H&P
[facility]  History & Physical    Patient Name: Nicolasa Madera  MRN: [id number]  Patient Class: OP- Observation  Admission Date: 10/22/2024  Attending Physician: Ck Concepcion MD   Primary Care Provider: Page Garcia PA-C         Patient information was obtained from patient, past medical records, and ER records.     Subjective:     Principal Problem:Urinary tract infection without hematuria    Chief Complaint:   Chief Complaint   Patient presents with    Fall     And UTI        HPI: Nicolasa Madera is a 74 year old female with a past medical history of ETOH abuse, peptic ulcer disease, GI bleed, hypothyroidism, stroke, and CKD 3 presents with complaints of falling and dysuria. She has a long history of falls, tonight she fell onto her buttocks and did not hit her head nor injure herself. Her main concern is that she has been having urinary frequency, urgency and dysuria. She denies fever or chills and denies other complaint. She has a history of alcohol use, but denies recent use (last drink over a week ago). She was prescribed a librium taper but never did start it as she was not able to pick it up from the pharmacy. ED work up included a CBC, which shows chronic anemia, slightly lower than baseline. CMP shows hypokalemia of  2.8 and moderate malnutrition. Urinalysis positive for evidence of infection. She was given one dose of Rocephin in the ED. Hospital Medicine consulted for admission and further management.    Past Medical History:   Diagnosis Date    Encounter for blood transfusion     Hypothyroidism     Peptic ulcer with hemorrhage     Peptic ulcer, site unspecified, unspecified as acute or chronic, without hemorrhage or perforation     Peptic ulcer disease    Stroke 2017    no residual effects.     Urticaria        Past Surgical History:   Procedure Laterality Date    ESOPHAGOGASTRODUODENOSCOPY N/A 4/18/2021    Procedure: EGD  (ESOPHAGOGASTRODUODENOSCOPY);  Surgeon: Crow Vigil MD;  Location: [facility];  Service: Endoscopy;  Laterality: N/A;    ESOPHAGOGASTRODUODENOSCOPY N/A 8/31/2021    Procedure: EGD (ESOPHAGOGASTRODUODENOSCOPY);  Surgeon: Crow Vigil MD;  Location: [facility];  Service: Endoscopy;  Laterality: N/A;       Review of patient's allergies indicates:  No Known Allergies    No current facility-administered medications on file prior to encounter.     Current Outpatient Medications on File Prior to Encounter   Medication Sig    biotin 5,000 mcg TbDL Take 1 tablet by mouth once daily.    chlordiazepoxide (LIBRIUM) 5 MG capsule Take 5 capsules (25 mg total) by mouth 3 (three) times daily for 1 day, THEN 4 capsules (20 mg total) 3 (three) times daily for 1 day, THEN 3 capsules (15 mg total) 3 (three) times daily for 1 day, THEN 2 capsules (10 mg total) 3 (three) times daily for 1 day, THEN 2 capsules (10 mg total) 2 (two) times a day for 1 day, THEN 1 capsule (5 mg total) 3 (three) times daily for 1 day, THEN 1 capsule (5 mg total) 2 (two) times a day for 1 day.    folic acid (FOLVITE) 1 MG tablet Take 1 tablet (1 mg total) by mouth once daily.    levothyroxine (SYNTHROID) 100 MCG tablet Take 1 tablet (100 mcg total) by mouth before breakfast.    multivit-min/iron/folic/lutein (CENTRUM SILVER WOMEN ORAL) Take 1 tablet by mouth once daily.    multivitamin Tab Take 1 tablet by mouth once daily.    omega-3 fatty acids/fish oil (FISH OIL-OMEGA-3 FATTY ACIDS) 300-1,000 mg capsule Take 1 capsule by mouth once daily.    pantoprazole (PROTONIX) 40 MG tablet Take 1 tablet (40 mg total) by mouth 2 (two) times daily.    thiamine 100 MG tablet Take 1 tablet (100 mg total) by mouth 3 (three) times daily.    vit B complex no.12/niacin,B3, (VITAMIN B COMPLEX NO.12-NIACIN ORAL) Take 1 tablet by mouth.     Family History       Problem Relation (Age of Onset)    Breast cancer Paternal Aunt, Paternal Grandmother    Stroke Father (70)           Tobacco Use    Smoking status: Never    Smokeless tobacco: Never   Substance and Sexual Activity    Alcohol use: Yes     Alcohol/week: 5.0 standard drinks of alcohol     Types: 5 Glasses of wine per week    Drug use: Never    Sexual activity: Not Currently     Review of Systems   Constitutional:  Positive for chills. Negative for fever.   HENT:  Negative for congestion and sore throat.    Eyes:  Negative for visual disturbance.   Respiratory:  Negative for cough and shortness of breath.    Cardiovascular:  Negative for chest pain and palpitations.   Gastrointestinal:  Negative for abdominal pain, nausea and vomiting.   Endocrine: Negative for cold intolerance and heat intolerance.   Genitourinary:  Positive for dysuria, frequency and urgency. Negative for hematuria.   Musculoskeletal:  Positive for gait problem. Negative for arthralgias and myalgias.   Skin:  Negative for pallor and rash.   Neurological:  Negative for tremors and seizures.   Hematological:  Negative for adenopathy. Does not bruise/bleed easily.   Psychiatric/Behavioral:  Negative for hallucinations.    All other systems reviewed and are negative.    Objective:     Vital Signs (Most Recent):  Temp: 99.1 °F (37.3 °C) (10/22/24 0047)  Pulse: 93 (10/22/24 0455)  Resp: 20 (10/22/24 0455)  BP: (!) 141/67 (10/22/24 0455)  SpO2: 97 % (10/22/24 0455) Vital Signs (24h Range):  Temp:  [99.1 °F (37.3 °C)] 99.1 °F (37.3 °C)  Pulse:  [] 93  Resp:  [20] 20  SpO2:  [95 %-98 %] 97 %  BP: (141-184)/(65-81) 141/67     Weight: 79.4 kg (175 lb)  Body mass index is 32.01 kg/m².     Physical Exam  Vitals and nursing note reviewed.   Constitutional:       General: She is awake. She is not in acute distress.     Appearance: Normal appearance. She is well-developed. She is not ill-appearing.   HENT:      Head: Normocephalic and atraumatic.      Mouth/Throat:      Lips: Pink.      Mouth: Mucous membranes are moist.   Eyes:      Conjunctiva/sclera: Conjunctivae  normal.      Pupils: Pupils are equal, round, and reactive to light.   Neck:      Thyroid: No thyromegaly.      Vascular: No JVD.   Cardiovascular:      Rate and Rhythm: Normal rate and regular rhythm.      Heart sounds: Normal heart sounds, S1 normal and S2 normal. No murmur heard.     No friction rub. No gallop.   Pulmonary:      Effort: Pulmonary effort is normal.      Breath sounds: Normal breath sounds.   Abdominal:      General: Bowel sounds are normal. There is no distension.      Palpations: Abdomen is soft. There is no mass.      Tenderness: There is no abdominal tenderness.   Musculoskeletal:         General: Normal range of motion.      Cervical back: Full passive range of motion without pain, normal range of motion and neck supple.   Skin:     General: Skin is warm and dry.      Capillary Refill: Capillary refill takes less than 2 seconds.   Neurological:      General: No focal deficit present.      Mental Status: She is alert and oriented to person, place, and time. Mental status is at baseline.      GCS: GCS eye subscore is 4. GCS verbal subscore is 5. GCS motor subscore is 6.      Cranial Nerves: No cranial nerve deficit.      Sensory: Sensation is intact.      Motor: Motor function is intact.   Psychiatric:         Behavior: Behavior normal. Behavior is cooperative.              CRANIAL NERVES     CN III, IV, VI   Pupils are equal, round, and reactive to light.       Significant Labs: All pertinent labs within the past 24 hours have been reviewed.  CBC:   Recent Labs   Lab 10/22/24  0315   WBC 6.30   HGB 8.7*   HCT 24.8*        CMP:   Recent Labs   Lab 10/22/24  0315      K 2.8*      CO2 19*   *   BUN 12   CREATININE 1.0   CALCIUM 8.9   PROT 6.2   ALBUMIN 3.2*   BILITOT 0.7   ALKPHOS 52   AST 39   ALT 39   ANIONGAP 10     Lactic Acid:   Recent Labs   Lab 10/22/24  0315   LACTATE 0.6     Urine Studies:   Recent Labs   Lab 10/22/24  0331   COLORU Yellow   APPEARANCEUA Hazy*    PHUR 6.0   SPECGRAV 1.010   PROTEINUA 1+*   GLUCUA Negative   KETONESU Trace*   BILIRUBINUA Negative   OCCULTUA 1+*   NITRITE Positive*   UROBILINOGEN Negative   LEUKOCYTESUR 3+*   RBCUA 8*   WBCUA >100*   BACTERIA Many*   SQUAMEPITHEL 1   HYALINECASTS 0       Significant Imaging:  Imaging Results    None         Assessment/Plan:     * Urinary tract infection without hematuria  Follow urine culture  Rocephin IV      Hypokalemia  Patient's most recent potassium results are listed below.   Recent Labs     10/22/24  0315   K 2.8*     Plan  - Replete potassium per protocol  - Monitor potassium Daily  - Patient's hypokalemia is stable  -     Malnutrition of moderate degree  Nutrition consulted. Most recent weight and BMI monitored-     Measurements:  Wt Readings from Last 1 Encounters:   10/22/24 79.4 kg (175 lb)   Body mass index is 32.01 kg/m².    Patient has been screened and assessed by RD.    Malnutrition Type:  Context:    Level:      Malnutrition Characteristic Summary:       Interventions/Recommendations (treatment strategy):         Debility  Patient with Chronic debility due to other reduced mobility. The patient's latest AMPAC (Activity Measure for Post Acute Care) Score is listed below.    AM-PAC Score - How much help does the patient need for each activity listed       Plan  - PT/OT consulted  - Fall precautions in place  - may need rehab placement  -           Alcohol dependence with alcohol-induced mood disorder  Noted, chronic  Fall precautions  CIWA with PRN medications for signs of withdrawal  Prescribed a librium taper but never did start-last drink over a week ago      Stage 3 chronic kidney disease  Creatine stable for now. BMP reviewed- noted Estimated Creatinine Clearance: 46.7 mL/min (based on SCr of 1 mg/dL). according to latest data. Based on current GFR, CKD stage is stage 3 - GFR 30-59.  Monitor UOP and serial BMP and adjust therapy as needed. Renally dose meds. Avoid nephrotoxic medications  and procedures.    Anemia  Anemia is likely due to chronic blood loss. Most recent hemoglobin and hematocrit are listed below.  Recent Labs     10/22/24  0315   HGB 8.7*   HCT 24.8*     Plan  - Monitor serial CBC: Daily  - Transfuse PRBC if patient becomes hemodynamically unstable, symptomatic or H/H drops below 7/21.  - Patient has not received any PRBC transfusions to date  - Patient's anemia is currently worsening. Will continue current treatment and monitor for evidence of bleeding. She denies melena or blood in vomit (she has not vomited)      Hypothyroidism  Patient has chronic hypothyroidism. TFTs reviewed-   Lab Results   Component Value Date    TSH 1.534 10/11/2023   . Will continue chronic levothyroxine and adjust for and clinical changes.          VTE Risk Mitigation (From admission, onward)           Ordered     Reason for No Pharmacological VTE Prophylaxis  Once        Question:  Reasons:  Answer:  Risk of Bleeding    10/22/24 0419     IP VTE HIGH RISK PATIENT  Once         10/22/24 0419     Place sequential compression device  Until discontinued         10/22/24 0419                                LINN Rai  Department of Hospital Medicine  [facility]/Surg

## 2024-10-22 NOTE — PROGRESS NOTES
"Pharmacokinetic Initial Assessment: IV Vancomycin    Assessment/Plan:    Initiate intravenous vancomycin with a dose of 1500 mg once followed by a maintenance dose of vancomycin 1250mg IV every 24 hours  Desired empiric serum trough concentration is 10 to 15 mcg/mL  Draw vancomycin trough level 60 min prior to third dose on 10/24 at approximately 1400  Pharmacy will continue to follow and monitor vancomycin.      Please contact pharmacy at extension [phone] with any questions regarding this assessment.     Thank you for the consult,   Maurilio Barrett       Patient brief summary:  Nicolasa Madera is a 74 y.o. female initiated on antimicrobial therapy with IV Vancomycin for treatment of suspected urinary tract infection    Drug Allergies:   Review of patient's allergies indicates:  No Known Allergies    Actual Body Weight:   77.7    Renal Function:   Estimated Creatinine Clearance: 46.2 mL/min (based on SCr of 1 mg/dL).,     Dialysis Method (if applicable):  N/A    CBC (last 72 hours):  Recent Labs   Lab Result Units 10/22/24  0315   WBC K/uL 6.30   Hemoglobin g/dL 8.7*   Hematocrit % 24.8*   Platelets K/uL 212   Gran % % 76.4*   Lymph % % 8.3*   Mono % % 14.3   Eosinophil % % 0.2   Basophil % % 0.5   Differential Method  Automated       Metabolic Panel (last 72 hours):  Recent Labs   Lab Result Units 10/22/24  0315 10/22/24  0331 10/22/24  0955   Sodium mmol/L 137  --   --    Potassium mmol/L 2.8*  --  3.1*   Chloride mmol/L 108  --   --    CO2 mmol/L 19*  --   --    Glucose mg/dL 125*  --   --    Glucose, UA   --  Negative  --    BUN mg/dL 12  --   --    Creatinine mg/dL 1.0  --   --    Albumin g/dL 3.2*  --   --    Total Bilirubin mg/dL 0.7  --   --    Alkaline Phosphatase U/L 52  --   --    AST U/L 39  --   --    ALT U/L 39  --   --        Drug levels (last 3 results):  No results for input(s): "VANCOMYCINRA", "VANCORANDOM", "VANCOMYCINPE", "VANCOPEAK", "VANCOMYCINTR", "VANCOTROUGH" in the last 72 " hours.    Microbiologic Results:  Microbiology Results (last 7 days)       Procedure Component Value Units Date/Time    Blood culture #1 **CANNOT BE ORDERED STAT** [[id number]] Collected: 10/22/24 0315    Order Status: Sent Specimen: Blood from Peripheral, Antecubital, Left Updated: 10/22/24 0942    Blood culture #2 **CANNOT BE ORDERED STAT** [[id number]] Collected: 10/22/24 0315    Order Status: Sent Specimen: Blood from Peripheral, Antecubital, Right Updated: 10/22/24 0942    Urine culture [[id number]] Collected: 10/22/24 0331    Order Status: No result Specimen: Urine Updated: 10/22/24 4627

## 2024-10-22 NOTE — ASSESSMENT & PLAN NOTE
Nutrition consulted. Most recent weight and BMI monitored-     Measurements:  Wt Readings from Last 1 Encounters:   10/22/24 79.4 kg (175 lb)   Body mass index is 32.01 kg/m².    Patient has been screened and assessed by RD.    Malnutrition Type:  Context:    Level:      Malnutrition Characteristic Summary:       Interventions/Recommendations (treatment strategy):

## 2024-10-22 NOTE — PLAN OF CARE
BAGLEY explained to pt. Pt verbalized understanding and signed form.     10/22/24 1611   BAGLEY Message   Medicare Outpatient and Observation Notification regarding financial responsibility Given to patient/caregiver;Explained to patient/caregiver;Signed/date by patient/caregiver   Date BAGLEY was signed 10/22/24   Time BAGLEY was signed 1614

## 2024-10-22 NOTE — HPI
Nicolasa Madera is a 74 year old female with a past medical history of ETOH abuse, peptic ulcer disease, GI bleed, hypothyroidism, stroke, and CKD 3 presents with complaints of falling and dysuria. She has a long history of falls, tonight she fell onto her buttocks and did not hit her head nor injure herself. Her main concern is that she has been having urinary frequency, urgency and dysuria. She denies fever or chills and denies other complaint. She has a history of alcohol use, but denies recent use (last drink over a week ago). She was prescribed a librium taper but never did start it as she was not able to pick it up from the pharmacy. ED work up included a CBC, which shows chronic anemia, slightly lower than baseline. CMP shows hypokalemia of  2.8 and moderate malnutrition. Urinalysis positive for evidence of infection. She was given one dose of Rocephin in the ED. Hospital Medicine consulted for admission and further management.

## 2024-10-22 NOTE — PLAN OF CARE
Met with pt and gave list of SNF facilities; pt selected [facility] (1st choice) and [facility] and [facility].  Pt was unable to sign choice form, due to hand tremor.  [facility]    Completed LOCET and faxed PASRR, awaiting 142.     10/22/24 1322   Post-Acute Status   Post-Acute Authorization Placement   Post-Acute Placement Status Referrals Sent   Patient choice form signed by patient/caregiver List from [employer]   Discharge Plan   Discharge Plan A Skilled Nursing Facility   Discharge Plan B Skilled Nursing Facility

## 2024-10-22 NOTE — ASSESSMENT & PLAN NOTE
Creatine stable for now. BMP reviewed- noted Estimated Creatinine Clearance: 46.7 mL/min (based on SCr of 1 mg/dL). according to latest data. Based on current GFR, CKD stage is stage 3 - GFR 30-59.  Monitor UOP and serial BMP and adjust therapy as needed. Renally dose meds. Avoid nephrotoxic medications and procedures.

## 2024-10-22 NOTE — ASSESSMENT & PLAN NOTE
Patient's most recent potassium results are listed below.   Recent Labs     10/22/24  0315   K 2.8*     Plan  - Replete potassium per protocol  - Monitor potassium Daily  - Patient's hypokalemia is stable  -

## 2024-10-22 NOTE — SUBJECTIVE & OBJECTIVE
Past Medical History:   Diagnosis Date    Encounter for blood transfusion     Hypothyroidism     Peptic ulcer with hemorrhage     Peptic ulcer, site unspecified, unspecified as acute or chronic, without hemorrhage or perforation     Peptic ulcer disease    Stroke 2017    no residual effects.     Urticaria        Past Surgical History:   Procedure Laterality Date    ESOPHAGOGASTRODUODENOSCOPY N/A 4/18/2021    Procedure: EGD (ESOPHAGOGASTRODUODENOSCOPY);  Surgeon: Crow Vigil MD;  Location: [facility] ENDO;  Service: Endoscopy;  Laterality: N/A;    ESOPHAGOGASTRODUODENOSCOPY N/A 8/31/2021    Procedure: EGD (ESOPHAGOGASTRODUODENOSCOPY);  Surgeon: Crow Vigil MD;  Location: [facility] ENDO;  Service: Endoscopy;  Laterality: N/A;       Review of patient's allergies indicates:  No Known Allergies    No current facility-administered medications on file prior to encounter.     Current Outpatient Medications on File Prior to Encounter   Medication Sig    biotin 5,000 mcg TbDL Take 1 tablet by mouth once daily.    chlordiazepoxide (LIBRIUM) 5 MG capsule Take 5 capsules (25 mg total) by mouth 3 (three) times daily for 1 day, THEN 4 capsules (20 mg total) 3 (three) times daily for 1 day, THEN 3 capsules (15 mg total) 3 (three) times daily for 1 day, THEN 2 capsules (10 mg total) 3 (three) times daily for 1 day, THEN 2 capsules (10 mg total) 2 (two) times a day for 1 day, THEN 1 capsule (5 mg total) 3 (three) times daily for 1 day, THEN 1 capsule (5 mg total) 2 (two) times a day for 1 day.    folic acid (FOLVITE) 1 MG tablet Take 1 tablet (1 mg total) by mouth once daily.    levothyroxine (SYNTHROID) 100 MCG tablet Take 1 tablet (100 mcg total) by mouth before breakfast.    multivit-min/iron/folic/lutein (CENTRUM SILVER WOMEN ORAL) Take 1 tablet by mouth once daily.    multivitamin Tab Take 1 tablet by mouth once daily.    omega-3 fatty acids/fish oil (FISH OIL-OMEGA-3 FATTY ACIDS) 300-1,000 mg capsule Take 1 capsule by mouth  once daily.    pantoprazole (PROTONIX) 40 MG tablet Take 1 tablet (40 mg total) by mouth 2 (two) times daily.    thiamine 100 MG tablet Take 1 tablet (100 mg total) by mouth 3 (three) times daily.    vit B complex no.12/niacin,B3, (VITAMIN B COMPLEX NO.12-NIACIN ORAL) Take 1 tablet by mouth.     Family History       Problem Relation (Age of Onset)    Breast cancer Paternal Aunt, Paternal Grandmother    Stroke Father (70)          Tobacco Use    Smoking status: Never    Smokeless tobacco: Never   Substance and Sexual Activity    Alcohol use: Yes     Alcohol/week: 5.0 standard drinks of alcohol     Types: 5 Glasses of wine per week    Drug use: Never    Sexual activity: Not Currently     Review of Systems   Constitutional:  Positive for chills. Negative for fever.   HENT:  Negative for congestion and sore throat.    Eyes:  Negative for visual disturbance.   Respiratory:  Negative for cough and shortness of breath.    Cardiovascular:  Negative for chest pain and palpitations.   Gastrointestinal:  Negative for abdominal pain, nausea and vomiting.   Endocrine: Negative for cold intolerance and heat intolerance.   Genitourinary:  Positive for dysuria, frequency and urgency. Negative for hematuria.   Musculoskeletal:  Positive for gait problem. Negative for arthralgias and myalgias.   Skin:  Negative for pallor and rash.   Neurological:  Negative for tremors and seizures.   Hematological:  Negative for adenopathy. Does not bruise/bleed easily.   Psychiatric/Behavioral:  Negative for hallucinations.    All other systems reviewed and are negative.    Objective:     Vital Signs (Most Recent):  Temp: 99.1 °F (37.3 °C) (10/22/24 0047)  Pulse: 93 (10/22/24 0455)  Resp: 20 (10/22/24 0455)  BP: (!) 141/67 (10/22/24 0455)  SpO2: 97 % (10/22/24 0455) Vital Signs (24h Range):  Temp:  [99.1 °F (37.3 °C)] 99.1 °F (37.3 °C)  Pulse:  [] 93  Resp:  [20] 20  SpO2:  [95 %-98 %] 97 %  BP: (141-184)/(65-81) 141/67     Weight: 79.4 kg  (175 lb)  Body mass index is 32.01 kg/m².     Physical Exam  Vitals and nursing note reviewed.   Constitutional:       General: She is awake. She is not in acute distress.     Appearance: Normal appearance. She is well-developed. She is not ill-appearing.   HENT:      Head: Normocephalic and atraumatic.      Mouth/Throat:      Lips: Pink.      Mouth: Mucous membranes are moist.   Eyes:      Conjunctiva/sclera: Conjunctivae normal.      Pupils: Pupils are equal, round, and reactive to light.   Neck:      Thyroid: No thyromegaly.      Vascular: No JVD.   Cardiovascular:      Rate and Rhythm: Normal rate and regular rhythm.      Heart sounds: Normal heart sounds, S1 normal and S2 normal. No murmur heard.     No friction rub. No gallop.   Pulmonary:      Effort: Pulmonary effort is normal.      Breath sounds: Normal breath sounds.   Abdominal:      General: Bowel sounds are normal. There is no distension.      Palpations: Abdomen is soft. There is no mass.      Tenderness: There is no abdominal tenderness.   Musculoskeletal:         General: Normal range of motion.      Cervical back: Full passive range of motion without pain, normal range of motion and neck supple.   Skin:     General: Skin is warm and dry.      Capillary Refill: Capillary refill takes less than 2 seconds.   Neurological:      General: No focal deficit present.      Mental Status: She is alert and oriented to person, place, and time. Mental status is at baseline.      GCS: GCS eye subscore is 4. GCS verbal subscore is 5. GCS motor subscore is 6.      Cranial Nerves: No cranial nerve deficit.      Sensory: Sensation is intact.      Motor: Motor function is intact.   Psychiatric:         Behavior: Behavior normal. Behavior is cooperative.              CRANIAL NERVES     CN III, IV, VI   Pupils are equal, round, and reactive to light.       Significant Labs: All pertinent labs within the past 24 hours have been reviewed.  CBC:   Recent Labs   Lab  10/22/24  0315   WBC 6.30   HGB 8.7*   HCT 24.8*        CMP:   Recent Labs   Lab 10/22/24  0315      K 2.8*      CO2 19*   *   BUN 12   CREATININE 1.0   CALCIUM 8.9   PROT 6.2   ALBUMIN 3.2*   BILITOT 0.7   ALKPHOS 52   AST 39   ALT 39   ANIONGAP 10     Lactic Acid:   Recent Labs   Lab 10/22/24  0315   LACTATE 0.6     Urine Studies:   Recent Labs   Lab 10/22/24  0331   COLORU Yellow   APPEARANCEUA Hazy*   PHUR 6.0   SPECGRAV 1.010   PROTEINUA 1+*   GLUCUA Negative   KETONESU Trace*   BILIRUBINUA Negative   OCCULTUA 1+*   NITRITE Positive*   UROBILINOGEN Negative   LEUKOCYTESUR 3+*   RBCUA 8*   WBCUA >100*   BACTERIA Many*   SQUAMEPITHEL 1   HYALINECASTS 0       Significant Imaging:  Imaging Results    None

## 2024-10-22 NOTE — PLAN OF CARE
[facility] - Med/Surg  Initial Discharge Assessment       Primary Care Provider: Page Garcia PA-C    Admission Diagnosis: Urinary tract infection without hematuria, site unspecified [N39.0]    Admission Date: 10/22/2024  Expected Discharge Date:     Transition of Care Barriers: Mobility, Transportation, Substance Abuse    Payor: HU[employer]NA MANAGED MEDICARE / Plan: HU[employer]NA MEDICARE PPO / Product Type: Medicare Advantage /     Extended Emergency Contact Information  Primary Emergency Contact: Nora Valente  Mobile Phone: [phone]  Relation: Friend   needed? No    Discharge Plan A: Skilled Nursing Facility  Discharge Plan B: Home Health      [id number] - [place], [place] - [address]  [address]  [place] [address]  Phone: [phone] Fax: [phone]    [facility] - [facility] [address]  [address] [address]  Phone: [phone] Fax: [phone]    [id number] - [place], [place] - [address]  [address]  [place] [address]  Phone: [phone] Fax: [phone]    Discharge assessment completed at bedside with pt.  Verified information on facesheet.  Reports lives at listed address alone, needs some assistance with activities, states brother provides transportation to [facility].  States will need assistance with transportation home.  Denies HD, coumadin, opt services.  States HH hasn't yet started (chart review indicates HH with [facility]), has had hospital stay within 30 days, states doesn't currently use any DME.  Discharge plan is SNF.    Initial Assessment (most recent)       Adult Discharge Assessment - 10/22/24 1617          Discharge Assessment    Assessment Type Discharge Planning Assessment     Confirmed/corrected address, phone number and insurance Yes     Confirmed Demographics Correct on Facesheet     Source of Information patient     Does patient/caregiver understand observation  status Yes     People in Home alone     Do you expect to return to your current living situation? Yes     Do you have help at home or someone to help you manage your care at home? No     Prior to hospitilization cognitive status: Alert/Oriented     Current cognitive status: Alert/Oriented     Walking or Climbing Stairs Difficulty yes     Walking or Climbing Stairs --     Dressing/Bathing Difficulty yes     Equipment Currently Used at Home none     Readmission within 30 days? Yes     Patient currently being followed by outpatient case management? No     Do you currently have service(s) that help you manage your care at home? Yes     Name and Contact number of agency [facility] - has not yet started with agency     Is the pt/caregiver preference to resume services with current agency Yes     Do you take prescription medications? Yes     Do you have prescription coverage? Yes     Do you have any problems affording any of your prescribed medications? No     Is the patient taking medications as prescribed? yes     Who is going to help you get home at discharge? states will need assistance     How do you get to doctors appointments? family or friend will provide     Are you on dialysis? No     Do you take coumadin? No     Discharge Plan A Skilled Nursing Facility     Discharge Plan B Home Health     DME Needed Upon Discharge  other (see comments)   TBD    Discharge Plan discussed with: Patient     Transition of Care Barriers Mobility;Transportation;Substance Abuse

## 2024-10-22 NOTE — ASSESSMENT & PLAN NOTE
Noted, chronic  Fall precautions  CIWA with PRN medications for signs of withdrawal  Prescribed a librium taper but never did start-last drink over a week ago

## 2024-10-22 NOTE — PT/OT/SLP EVAL
Physical Therapy Evaluation    Patient Name:  Nicolasa Madera   MRN:  [id number]    Recommendations:     Discharge Recommendations: Moderate Intensity Therapy   Discharge Equipment Recommendations: to be determined by next level of care   Barriers to discharge: Decreased caregiver support    Assessment:     Nicolasa Madera is a 74 y.o. female admitted with a medical diagnosis of Urinary tract infection without hematuria.  She presents with the following impairments/functional limitations: weakness, impaired endurance, impaired self care skills, impaired functional mobility, gait instability, impaired balance, decreased lower extremity function, decreased safety awareness, pain, impaired cardiopulmonary response to activity. Patient sleeping upon entering the room, but once awoken is agreeable to participation with PT evaluation. She reports elbow pain at rest attributed to a fall prior to admission. She lives alone and does not use an AD for ambulation at baseline. She requires Marly for supine to sit and CGA for sit to stand with no AD. She ambulated 5' forward/backward x2 trials with no AD, CGA-Marly, and small shuffling steps with patient attempting to sit prematurely. She reports fatigue and returned to bed with bed alarm on and all needs met.     Rehab Prognosis: Good; patient would benefit from acute skilled PT services to address these deficits and reach maximum level of function.    Recent Surgery: * No surgery found *      Plan:     During this hospitalization, patient to be seen 5 x/week to address the identified rehab impairments via gait training, therapeutic activities, therapeutic exercises and progress toward the following goals:    Plan of Care Expires:  11/22/24    Subjective     Chief Complaint: elbow pain  Patient/Family Comments/goals: eat   Pain/Comfort:  Pain Rating 1:  (not rated)  Location 1: elbow  Pain Addressed 1: Reposition, Distraction    Patients cultural, spiritual, Synagogue  "conflicts given the current situation:      Living Environment:  Patient lives alone in a H with no HENRIETTA  Prior to admission, patients level of function was no AD for ambulation at baseline. She reports "I have a balance problem" with 5 falls in the past year. She denies any recent PT. (-) .  Equipment used at home: none.  DME owned (not currently used): none.  Upon discharge, patient will have assistance from facility.    Objective:     Communicated with CLAUDIO Daniels prior to session.  Patient found HOB elevated with legs dangling over the right side of the bed with bed alarm, PureWick, telemetry  upon PT entry to room.    General Precautions: Standard, fall  Orthopedic Precautions:N/A   Braces: N/A  Respiratory Status: Room air    Exams:  RLE Strength: WFL  LLE Strength: WFL    Functional Mobility:  Bed Mobility:     Supine to Sit: minimum assistance  Transfers:     Sit to Stand:  minimum assistance with no AD  Gait: 5' forward/backward x2 trials with no AD, CGA-Marly, and small shuffling steps with patient attempting to sit prematurely      AM-PAC 6 CLICK MOBILITY  Total Score:17       Treatment & Education:  Patient was educated on the importance of OOB activity and functional mobility to negate negative effects of prolonged bed rest during hospitalization, safe transfers and ambulation, and D/C planning     Patient left HOB elevated with all lines intact, call button in reach, and bed alarm on.    GOALS:   Multidisciplinary Problems       Physical Therapy Goals          Problem: Physical Therapy    Goal Priority Disciplines Outcome Interventions   Physical Therapy Goal     PT, PT/OT     Description: Goals to be met by: 24    Patient will increase functional independence with mobility by performin. Supine to sit with Supervision  2. Sit to stand transfer with Supervision  3. Bed to chair transfer with Supervision using LRAD  4. Gait  x 250 feet with Supervision using LRAD  5. Lower extremity " exercise program x20 reps per handout, with supervision                       History:     Past Medical History:   Diagnosis Date    Encounter for blood transfusion     Hypothyroidism     Peptic ulcer with hemorrhage     Peptic ulcer, site unspecified, unspecified as acute or chronic, without hemorrhage or perforation     Peptic ulcer disease    Stroke 2017    no residual effects.     Urticaria        Past Surgical History:   Procedure Laterality Date    ESOPHAGOGASTRODUODENOSCOPY N/A 4/18/2021    Procedure: EGD (ESOPHAGOGASTRODUODENOSCOPY);  Surgeon: Crow Vigil MD;  Location: [facility];  Service: Endoscopy;  Laterality: N/A;    ESOPHAGOGASTRODUODENOSCOPY N/A 8/31/2021    Procedure: EGD (ESOPHAGOGASTRODUODENOSCOPY);  Surgeon: Crow Vigil MD;  Location: [facility];  Service: Endoscopy;  Laterality: N/A;       Time Tracking:     PT Received On: 10/22/24  PT Start Time: 1359     PT Stop Time: 1412  PT Total Time (min): 13 min     Billable Minutes: Evaluation 13      10/22/2024

## 2024-10-22 NOTE — ASSESSMENT & PLAN NOTE
Patient has chronic hypothyroidism. TFTs reviewed-   Lab Results   Component Value Date    TSH 1.534 10/11/2023   . Will continue chronic levothyroxine and adjust for and clinical changes.

## 2024-10-22 NOTE — ED PROVIDER NOTES
Encounter Date: 10/22/2024       History     Chief Complaint   Patient presents with    Fall     And UTI     Patient presents emergency department with reported fall states she fell to her buttocks did not injure herself she is also concerned that she may have a urinary tract infection patient does have a history of significant falls in the past she reports that she has been having difficulty with dysuria and frequency no fever no chills no nausea vomiting        Review of patient's allergies indicates:  No Known Allergies  Past Medical History:   Diagnosis Date    Encounter for blood transfusion     Hypothyroidism     Peptic ulcer with hemorrhage     Peptic ulcer, site unspecified, unspecified as acute or chronic, without hemorrhage or perforation     Peptic ulcer disease    Stroke 2017    no residual effects.     Urticaria      Past Surgical History:   Procedure Laterality Date    ESOPHAGOGASTRODUODENOSCOPY N/A 4/18/2021    Procedure: EGD (ESOPHAGOGASTRODUODENOSCOPY);  Surgeon: Crow Vigil MD;  Location: [facility];  Service: Endoscopy;  Laterality: N/A;    ESOPHAGOGASTRODUODENOSCOPY N/A 8/31/2021    Procedure: EGD (ESOPHAGOGASTRODUODENOSCOPY);  Surgeon: Crow Vigil MD;  Location: [facility];  Service: Endoscopy;  Laterality: N/A;     Family History   Problem Relation Name Age of Onset    Stroke Father  70    Breast cancer Paternal Aunt      Breast cancer Paternal Grandmother       Social History     Tobacco Use    Smoking status: Never    Smokeless tobacco: Never   Substance Use Topics    Alcohol use: Yes     Alcohol/week: 5.0 standard drinks of alcohol     Types: 5 Glasses of wine per week    Drug use: Never     Review of Systems   Constitutional:  Negative for chills and fever.   Gastrointestinal:  Negative for abdominal pain.   Genitourinary:  Positive for dysuria and frequency. Negative for flank pain.   Musculoskeletal:  Negative for arthralgias, back pain, myalgias and neck pain.   All other  systems reviewed and are negative.      Physical Exam     Initial Vitals [10/22/24 0047]   BP Pulse Resp Temp SpO2   (!) 184/81 107 20 99.1 °F (37.3 °C) 98 %      MAP       --         Physical Exam    Constitutional: She appears well-developed and well-nourished. No distress.   HENT:   Head: Normocephalic and atraumatic.   Right Ear: External ear normal.   Left Ear: External ear normal. Mouth/Throat: Oropharynx is clear and moist.   Eyes: Conjunctivae and EOM are normal. Pupils are equal, round, and reactive to light.   Neck: Neck supple.   Normal range of motion.  Cardiovascular:  Normal rate, regular rhythm, normal heart sounds and intact distal pulses.           Pulmonary/Chest: Breath sounds normal. No respiratory distress.   Abdominal: Abdomen is soft. Bowel sounds are normal. There is no abdominal tenderness.   Musculoskeletal:         General: No edema. Normal range of motion.      Cervical back: Normal range of motion and neck supple.     Neurological: She is alert and oriented to person, place, and time. GCS score is 15. GCS eye subscore is 4. GCS verbal subscore is 5. GCS motor subscore is 6.   Skin: Skin is warm and dry. Capillary refill takes less than 2 seconds. No rash noted.   Psychiatric: She has a normal mood and affect. Her behavior is normal.         ED Course   Procedures  Labs Reviewed   CBC W/ AUTO DIFFERENTIAL - Abnormal       Result Value    WBC 6.30      RBC 3.02 (*)     Hemoglobin 8.7 (*)     Hematocrit 24.8 (*)     MCV 82      MCH 28.8      MCHC 35.1      RDW 24.9 (*)     Platelets 212      MPV 9.3      Immature Granulocytes 0.3      Gran # (ANC) 4.8      Immature Grans (Abs) 0.02      Lymph # 0.5 (*)     Mono # 0.9      Eos # 0.0      Baso # 0.03      nRBC 0      Gran % 76.4 (*)     Lymph % 8.3 (*)     Mono % 14.3      Eosinophil % 0.2      Basophil % 0.5      Differential Method Automated     COMPREHENSIVE METABOLIC PANEL - Abnormal    Sodium 137      Potassium 2.8 (*)     Chloride 108       CO2 19 (*)     Glucose 125 (*)     BUN 12      Creatinine 1.0      Calcium 8.9      Total Protein 6.2      Albumin 3.2 (*)     Total Bilirubin 0.7      Alkaline Phosphatase 52      AST 39      ALT 39      eGFR 58 (*)     Anion Gap 10      Narrative:      potassium  critical result(s) called and verbal readback obtained   from akil abraham by CPW1 10/22/2024 03:51   URINALYSIS, REFLEX TO URINE CULTURE - Abnormal    Specimen UA Urine, Clean Catch      Color, UA Yellow      Appearance, UA Hazy (*)     pH, UA 6.0      Specific Gravity, UA 1.010      Protein, UA 1+ (*)     Glucose, UA Negative      Ketones, UA Trace (*)     Bilirubin (UA) Negative      Occult Blood UA 1+ (*)     Nitrite, UA Positive (*)     Urobilinogen, UA Negative      Leukocytes, UA 3+ (*)     Narrative:     Specimen Source->Urine   URINALYSIS MICROSCOPIC - Abnormal    RBC, UA 8 (*)     WBC, UA >100 (*)     WBC Clumps, UA Few (*)     Bacteria Many (*)     Squam Epithel, UA 1      Hyaline Casts, UA 0      Unclass Lorena UA 1      Microscopic Comment SEE COMMENT      Narrative:     Specimen Source->Urine   CULTURE, BLOOD   CULTURE, BLOOD   CULTURE, URINE   LACTIC ACID, PLASMA    Lactate (Lactic Acid) 0.6            Imaging Results    None          Medications   pantoprazole injection 40 mg (40 mg Intravenous Given 10/22/24 0439)   sodium chloride 0.9% flush 3 mL (has no administration in time range)   melatonin tablet 9 mg (has no administration in time range)   ondansetron injection 8 mg (8 mg Intravenous Given 10/22/24 0523)   polyethylene glycol packet 17 g (has no administration in time range)   acetaminophen tablet 650 mg ( Oral Canceled Entry 10/22/24 0607)   simethicone chewable tablet 80 mg (has no administration in time range)   aluminum-magnesium hydroxide-simethicone 200-200-20 mg/5 mL suspension 30 mL (has no administration in time range)   acetaminophen tablet 650 mg (650 mg Oral Given 10/22/24 0555)   naloxone 0.4 mg/mL injection 0.02 mg  (has no administration in time range)   potassium bicarbonate disintegrating tablet 50 mEq (has no administration in time range)   potassium bicarbonate disintegrating tablet 35 mEq (has no administration in time range)   potassium bicarbonate disintegrating tablet 60 mEq (has no administration in time range)   magnesium oxide tablet 800 mg (has no administration in time range)   magnesium oxide tablet 800 mg (has no administration in time range)   potassium, sodium phosphates 280-160-250 mg packet 2 packet (has no administration in time range)   potassium, sodium phosphates 280-160-250 mg packet 2 packet (has no administration in time range)   potassium, sodium phosphates 280-160-250 mg packet 2 packet (has no administration in time range)   glucose chewable tablet 16 g (has no administration in time range)   glucose chewable tablet 24 g (has no administration in time range)   glucagon (human recombinant) injection 1 mg (has no administration in time range)   LORazepam tablet 2 mg (has no administration in time range)   potassium chloride SA CR tablet 40 mEq (40 mEq Oral Given 10/22/24 8909)   potassium chloride 10 mEq in 100 mL IVPB ( Intravenous Rate/Dose Change 10/22/24 8746)   cefTRIAXone injection 1 g (1 g Intravenous Given 10/22/24 2777)     Medical Decision Making  Laboratory evaluation reviewed patient does have signs of worsening anemia with a hemoglobin of 8.6 urinalysis is significant for urinary tract infection blood cultures were obtained patient was given a g of Rocephin patient's potassium was low at 2.8 her CO2 is 19 patient is temperature has increased at 100.6 over lactate is negative I have discussed patient's findings with Ms. Marsh NP [facility] given her weakness and recurrent falls will admit for IV antibiotics await cultures reviewed patient's chart she had recent admission for upper GI bleed secondary to alcohol abuse I have given her dose of Protonix as well    Amount and/or  Complexity of Data Reviewed  Labs: ordered. Decision-making details documented in ED Course.    Risk  Prescription drug management.                                      Clinical Impression:  Final diagnoses:  [N39.0] Urinary tract infection without hematuria, site unspecified (Primary)  [E87.6] Hypokalemia  [R53.1] Weak          ED Disposition Condition    Observation                 Scar Edwards MD  10/22/24 0657

## 2024-10-22 NOTE — NURSING
Received call from Lab, Blood Cultures positive for Gram Neg rods. Attempted to reach Dr Concepcion, left message. Notified Le VELÁZQUEZ.   1850 received call back from Dr Concepcion, notified Dr Concepcion of positive gram neg rods.

## 2024-10-22 NOTE — ASSESSMENT & PLAN NOTE
Anemia is likely due to chronic blood loss. Most recent hemoglobin and hematocrit are listed below.  Recent Labs     10/22/24  0315   HGB 8.7*   HCT 24.8*     Plan  - Monitor serial CBC: Daily  - Transfuse PRBC if patient becomes hemodynamically unstable, symptomatic or H/H drops below 7/21.  - Patient has not received any PRBC transfusions to date  - Patient's anemia is currently worsening. Will continue current treatment and monitor for evidence of bleeding. She denies melena or blood in vomit (she has not vomited)

## 2024-10-22 NOTE — PT/OT/SLP EVAL
"Occupational Therapy   Evaluation    Name: Nicolasa Madera  MRN: [id number]  Admitting Diagnosis: Urinary tract infection without hematuria  Recent Surgery: * No surgery found *      Recommendations:     Discharge Recommendations: Moderate Intensity Therapy  Discharge Equipment Recommendations:  walker, rolling  Barriers to discharge:  Decreased caregiver support, high change of readmittance     Assessment:     Nicolasa Madera is a 74 y.o. female with a medical diagnosis of Urinary tract infection without hematuria.  She participated in bed mobility, STS transfer, and LE dressing. Performance deficits affecting function: weakness, impaired endurance, impaired self care skills, impaired functional mobility, impaired cognition, impaired balance, gait instability, decreased upper extremity function, decreased lower extremity function, decreased safety awareness, pain.       Nicolasa's mobility limitation cannot be sufficiently resolved by the use of a cane. Her functional mobility deficit can be sufficiently resolved with the use of a Rolling Walker. Patient's mobility limitation significantly impairs their ability to participate in one of more activities of daily living.  The use of a RW will significantly improve the patient's ability to participate in MRADLS and the patient will use it on regular basis in the home.   Rehab Prognosis: Good; patient would benefit from acute skilled OT services to address these deficits and reach maximum level of function.       Plan:     Patient to be seen 5 x/week to address the above listed problems via self-care/home management, therapeutic activities, therapeutic exercises  Plan of Care Expires: 11/22/24  Plan of Care Reviewed with: patient    Subjective     Chief Complaint: " I am freezing"   Patient/Family Comments/goals: Increase strength and endurance to go home     Occupational Profile:  Living Environment: Pt lives in a [facility] with no HENRIETTA. She has a tub/shower combo and a " raised toilet seat.   Previous level of function: MI with ADLs and no AD   Roles and Routines: Homemaker  Equipment Used at Home: none  Assistance upon Discharge: Pt's brother lives nearby; however, he just had stents put in    Pain/Comfort:  Pain Rating 1: 0/10  Pain Rating Post-Intervention 1: 0/10    Patients cultural, spiritual, Yazdanism conflicts given the current situation: no    Objective:     Communicated with: Nurse prior to session.  Patient found HOB elevated with bed alarm, peripheral IV, pulse ox (continuous), PureWick, telemetry upon OT entry to room.    General Precautions: Standard, fall  Orthopedic Precautions: N/A  Braces: N/A  Respiratory Status: Room air    Occupational Performance:    Bed Mobility:    Patient completed Rolling/Turning to Left with  minimum assistance  Patient completed Rolling/Turning to Right with minimum assistance  Patient completed Scooting/Bridging with minimum assistance  Patient completed Supine to Sit with minimum assistance  Patient completed Sit to Supine with minimum assistance    Functional Mobility/Transfers:  Patient completed Sit <> Stand Transfer with minimum assistance  with  hand-held assist       Activities of Daily Living:  Lower Body Dressing: minimum assistance to put sock on    Cognitive/Visual Perceptual:  Cognitive/Psychosocial Skills:     -       Oriented to: Person, Place, and Situation   -       Follows Commands/attention:Follows multistep  commands  -       Communication: clear/fluent  -       Memory: No Deficits noted  -       Safety awareness/insight to disability: intact     Physical Exam:  Balance: -       Seated: G-, Standing: F+  Upper Extremity Range of Motion:     -       Right Upper Extremity: WFL  -       Left Upper Extremity: WFL  Upper Extremity Strength:    -       Right Upper Extremity: WFL  -       Left Upper Extremity: WFL   Strength:    -       Right Upper Extremity: WFL  -       Left Upper Extremity: WFL    [facility] 6 Click  ADL:  AMPA Total Score: 16    Treatment & Education:  Pt was educated on the role of occupational therapy and the importance of completing ADLs and functional mobility.     Patient left HOB elevated with all lines intact, call button in reach, and bed alarm on    GOALS:   Multidisciplinary Problems       Occupational Therapy Goals          Problem: Occupational Therapy    Goal Priority Disciplines Outcome Interventions   Occupational Therapy Goal     OT, PT/OT     Description: Goals to be met by: 11/22/24     Patient will increase functional independence with ADLs by performing:    UE Dressing with Modified [place].  LE Dressing with Modified [place].  Grooming while seated with Modified [place].  Toileting from toilet with Modified [place] for hygiene and clothing management.   Toilet transfer to toilet with Modified [place] using RW.                         History:     Past Medical History:   Diagnosis Date    Encounter for blood transfusion     Hypothyroidism     Peptic ulcer with hemorrhage     Peptic ulcer, site unspecified, unspecified as acute or chronic, without hemorrhage or perforation     Peptic ulcer disease    Stroke 2017    no residual effects.     Urticaria          Past Surgical History:   Procedure Laterality Date    ESOPHAGOGASTRODUODENOSCOPY N/A 4/18/2021    Procedure: EGD (ESOPHAGOGASTRODUODENOSCOPY);  Surgeon: Crow Vigil MD;  Location: [facility];  Service: Endoscopy;  Laterality: N/A;    ESOPHAGOGASTRODUODENOSCOPY N/A 8/31/2021    Procedure: EGD (ESOPHAGOGASTRODUODENOSCOPY);  Surgeon: Crow Vigil MD;  Location: [facility];  Service: Endoscopy;  Laterality: N/A;       Time Tracking:     OT Date of Treatment: 10/22/24  OT Start Time: 1051  OT Stop Time: 1107  OT Total Time (min): 16 min    Billable Minutes:Evaluation 8  Therapeutic Activity 8    10/22/2024

## 2024-10-22 NOTE — ASSESSMENT & PLAN NOTE
Patient with Chronic debility due to other reduced mobility. The patient's latest AMPAC (Activity Measure for Post Acute Care) Score is listed below.    AM-PAC Score - How much help does the patient need for each activity listed       Plan  - PT/OT consulted  - Fall precautions in place  - may need rehab placement  -

## 2024-10-23 PROBLEM — K62.5 RECTAL BLEEDING: Status: RESOLVED | Noted: 2021-04-18 | Resolved: 2024-10-23

## 2024-10-23 PROBLEM — E83.42 HYPOMAGNESEMIA: Status: RESOLVED | Noted: 2024-10-15 | Resolved: 2024-10-23

## 2024-10-23 PROBLEM — E83.42 HYPOMAGNESEMIA: Status: ACTIVE | Noted: 2024-10-23

## 2024-10-23 PROBLEM — B96.20 E COLI BACTEREMIA: Status: ACTIVE | Noted: 2024-10-23

## 2024-10-23 PROBLEM — E83.39 HYPOPHOSPHATEMIA: Status: ACTIVE | Noted: 2024-10-23

## 2024-10-23 PROBLEM — D62 ACUTE BLOOD LOSS ANEMIA: Status: RESOLVED | Noted: 2021-04-18 | Resolved: 2024-10-23

## 2024-10-23 PROBLEM — K92.2 GI BLEED: Status: RESOLVED | Noted: 2021-04-18 | Resolved: 2024-10-23

## 2024-10-23 PROBLEM — D69.6 THROMBOCYTOPENIA: Status: RESOLVED | Noted: 2024-10-13 | Resolved: 2024-10-23

## 2024-10-23 PROBLEM — K92.2 UGIB (UPPER GASTROINTESTINAL BLEED): Status: RESOLVED | Noted: 2021-08-30 | Resolved: 2024-10-23

## 2024-10-23 PROBLEM — E87.29 ALCOHOLIC KETOACIDOSIS: Status: RESOLVED | Noted: 2024-10-13 | Resolved: 2024-10-23

## 2024-10-23 PROBLEM — E87.1 HYPONATREMIA: Status: RESOLVED | Noted: 2021-08-31 | Resolved: 2024-10-23

## 2024-10-23 PROBLEM — M62.82 RHABDOMYOLYSIS: Status: RESOLVED | Noted: 2024-10-17 | Resolved: 2024-10-23

## 2024-10-23 PROBLEM — R78.81 E COLI BACTEREMIA: Status: ACTIVE | Noted: 2024-10-23

## 2024-10-23 PROBLEM — E87.6 HYPOKALEMIA: Status: RESOLVED | Noted: 2024-10-22 | Resolved: 2024-10-23

## 2024-10-23 LAB
ANION GAP SERPL CALC-SCNC: 10 MMOL/L (ref 8–16)
BASOPHILS # BLD AUTO: 0.04 K/UL (ref 0–0.2)
BASOPHILS NFR BLD: 0.5 % (ref 0–1.9)
BUN SERPL-MCNC: 16 MG/DL (ref 8–23)
C DIFF GDH STL QL: NEGATIVE
C DIFF TOX A+B STL QL IA: NEGATIVE
CALCIUM SERPL-MCNC: 8.7 MG/DL (ref 8.7–10.5)
CHLORIDE SERPL-SCNC: 110 MMOL/L (ref 95–110)
CO2 SERPL-SCNC: 16 MMOL/L (ref 23–29)
CREAT SERPL-MCNC: 1.2 MG/DL (ref 0.5–1.4)
DIFFERENTIAL METHOD BLD: ABNORMAL
EOSINOPHIL # BLD AUTO: 0 K/UL (ref 0–0.5)
EOSINOPHIL NFR BLD: 0.1 % (ref 0–8)
ERYTHROCYTE [DISTWIDTH] IN BLOOD BY AUTOMATED COUNT: 25.1 % (ref 11.5–14.5)
EST. GFR  (NO RACE VARIABLE): 47 ML/MIN/1.73 M^2
GLUCOSE SERPL-MCNC: 113 MG/DL (ref 70–110)
HCT VFR BLD AUTO: 25.4 % (ref 37–48.5)
HGB BLD-MCNC: 8.6 G/DL (ref 12–16)
IMM GRANULOCYTES # BLD AUTO: 0.05 K/UL (ref 0–0.04)
IMM GRANULOCYTES NFR BLD AUTO: 0.6 % (ref 0–0.5)
LYMPHOCYTES # BLD AUTO: 0.7 K/UL (ref 1–4.8)
LYMPHOCYTES NFR BLD: 8 % (ref 18–48)
MAGNESIUM SERPL-MCNC: 0.9 MG/DL (ref 1.6–2.6)
MAGNESIUM SERPL-MCNC: 1.8 MG/DL (ref 1.6–2.6)
MCH RBC QN AUTO: 27.7 PG (ref 27–31)
MCHC RBC AUTO-ENTMCNC: 33.9 G/DL (ref 32–36)
MCV RBC AUTO: 82 FL (ref 82–98)
MONOCYTES # BLD AUTO: 1.2 K/UL (ref 0.3–1)
MONOCYTES NFR BLD: 13.2 % (ref 4–15)
NEUTROPHILS # BLD AUTO: 6.8 K/UL (ref 1.8–7.7)
NEUTROPHILS NFR BLD: 77.6 % (ref 38–73)
NRBC BLD-RTO: 0 /100 WBC
PHOSPHATE SERPL-MCNC: 1.4 MG/DL (ref 2.7–4.5)
PHOSPHATE SERPL-MCNC: <1 MG/DL (ref 2.7–4.5)
PLATELET # BLD AUTO: 222 K/UL (ref 150–450)
PMV BLD AUTO: 10 FL (ref 9.2–12.9)
POTASSIUM SERPL-SCNC: 3.7 MMOL/L (ref 3.5–5.1)
RBC # BLD AUTO: 3.1 M/UL (ref 4–5.4)
SODIUM SERPL-SCNC: 136 MMOL/L (ref 136–145)
WBC # BLD AUTO: 8.71 K/UL (ref 3.9–12.7)

## 2024-10-23 PROCEDURE — C1751 CATH, INF, PER/CENT/MIDLINE: HCPCS

## 2024-10-23 PROCEDURE — 80048 BASIC METABOLIC PNL TOTAL CA: CPT | Performed by: STUDENT IN AN ORGANIZED HEALTH CARE EDUCATION/TRAINING PROGRAM

## 2024-10-23 PROCEDURE — 83735 ASSAY OF MAGNESIUM: CPT | Performed by: NURSE PRACTITIONER

## 2024-10-23 PROCEDURE — 36410 VNPNXR 3YR/> PHY/QHP DX/THER: CPT

## 2024-10-23 PROCEDURE — 83735 ASSAY OF MAGNESIUM: CPT | Mod: 91 | Performed by: STUDENT IN AN ORGANIZED HEALTH CARE EDUCATION/TRAINING PROGRAM

## 2024-10-23 PROCEDURE — 87449 NOS EACH ORGANISM AG IA: CPT | Performed by: STUDENT IN AN ORGANIZED HEALTH CARE EDUCATION/TRAINING PROGRAM

## 2024-10-23 PROCEDURE — 97530 THERAPEUTIC ACTIVITIES: CPT | Mod: CQ

## 2024-10-23 PROCEDURE — 84100 ASSAY OF PHOSPHORUS: CPT | Mod: 91 | Performed by: STUDENT IN AN ORGANIZED HEALTH CARE EDUCATION/TRAINING PROGRAM

## 2024-10-23 PROCEDURE — 76937 US GUIDE VASCULAR ACCESS: CPT

## 2024-10-23 PROCEDURE — 86580 TB INTRADERMAL TEST: CPT | Performed by: STUDENT IN AN ORGANIZED HEALTH CARE EDUCATION/TRAINING PROGRAM

## 2024-10-23 PROCEDURE — 84100 ASSAY OF PHOSPHORUS: CPT | Performed by: NURSE PRACTITIONER

## 2024-10-23 PROCEDURE — 63600175 PHARM REV CODE 636 W HCPCS

## 2024-10-23 PROCEDURE — 36415 COLL VENOUS BLD VENIPUNCTURE: CPT | Performed by: STUDENT IN AN ORGANIZED HEALTH CARE EDUCATION/TRAINING PROGRAM

## 2024-10-23 PROCEDURE — 25000003 PHARM REV CODE 250: Performed by: STUDENT IN AN ORGANIZED HEALTH CARE EDUCATION/TRAINING PROGRAM

## 2024-10-23 PROCEDURE — 30200315 PPD INTRADERMAL TEST REV CODE 302: Performed by: STUDENT IN AN ORGANIZED HEALTH CARE EDUCATION/TRAINING PROGRAM

## 2024-10-23 PROCEDURE — 63600175 PHARM REV CODE 636 W HCPCS: Performed by: STUDENT IN AN ORGANIZED HEALTH CARE EDUCATION/TRAINING PROGRAM

## 2024-10-23 PROCEDURE — 25000003 PHARM REV CODE 250: Performed by: NURSE PRACTITIONER

## 2024-10-23 PROCEDURE — 97530 THERAPEUTIC ACTIVITIES: CPT

## 2024-10-23 PROCEDURE — 27000207 HC ISOLATION

## 2024-10-23 PROCEDURE — 11000001 HC ACUTE MED/SURG PRIVATE ROOM

## 2024-10-23 PROCEDURE — 36415 COLL VENOUS BLD VENIPUNCTURE: CPT | Performed by: NURSE PRACTITIONER

## 2024-10-23 PROCEDURE — 84425 ASSAY OF VITAMIN B-1: CPT | Performed by: STUDENT IN AN ORGANIZED HEALTH CARE EDUCATION/TRAINING PROGRAM

## 2024-10-23 PROCEDURE — 97110 THERAPEUTIC EXERCISES: CPT | Mod: CQ

## 2024-10-23 PROCEDURE — 85025 COMPLETE CBC W/AUTO DIFF WBC: CPT | Performed by: NURSE PRACTITIONER

## 2024-10-23 PROCEDURE — 97535 SELF CARE MNGMENT TRAINING: CPT

## 2024-10-23 RX ORDER — SODIUM,POTASSIUM PHOSPHATES 280-250MG
1 POWDER IN PACKET (EA) ORAL
Status: DISCONTINUED | OUTPATIENT
Start: 2024-10-23 | End: 2024-10-28 | Stop reason: HOSPADM

## 2024-10-23 RX ORDER — MAGNESIUM SULFATE HEPTAHYDRATE 40 MG/ML
4 INJECTION, SOLUTION INTRAVENOUS ONCE
Status: COMPLETED | OUTPATIENT
Start: 2024-10-23 | End: 2024-10-23

## 2024-10-23 RX ADMIN — POTASSIUM & SODIUM PHOSPHATES POWDER PACK 280-160-250 MG 2 PACKET: 280-160-250 PACK at 05:10

## 2024-10-23 RX ADMIN — POTASSIUM & SODIUM PHOSPHATES POWDER PACK 280-160-250 MG 1 PACKET: 280-160-250 PACK at 08:10

## 2024-10-23 RX ADMIN — THERA TABS 1 TABLET: TAB at 08:10

## 2024-10-23 RX ADMIN — MAGNESIUM SULFATE HEPTAHYDRATE 2 G: 40 INJECTION, SOLUTION INTRAVENOUS at 10:10

## 2024-10-23 RX ADMIN — ACETAMINOPHEN 650 MG: 325 TABLET ORAL at 04:10

## 2024-10-23 RX ADMIN — FOLIC ACID 1 MG: 1 TABLET ORAL at 08:10

## 2024-10-23 RX ADMIN — CEFEPIME 2 G: 1 INJECTION, POWDER, FOR SOLUTION INTRAMUSCULAR; INTRAVENOUS at 08:10

## 2024-10-23 RX ADMIN — CEFEPIME 2 G: 1 INJECTION, POWDER, FOR SOLUTION INTRAMUSCULAR; INTRAVENOUS at 09:10

## 2024-10-23 RX ADMIN — TUBERCULIN PURIFIED PROTEIN DERIVATIVE 5 UNITS: 5 INJECTION, SOLUTION INTRADERMAL at 10:10

## 2024-10-23 RX ADMIN — MAGNESIUM SULFATE HEPTAHYDRATE 2 G: 40 INJECTION, SOLUTION INTRAVENOUS at 08:10

## 2024-10-23 RX ADMIN — POTASSIUM & SODIUM PHOSPHATES POWDER PACK 280-160-250 MG 1 PACKET: 280-160-250 PACK at 04:10

## 2024-10-23 RX ADMIN — Medication 800 MG: at 09:10

## 2024-10-23 RX ADMIN — Medication 800 MG: at 05:10

## 2024-10-23 RX ADMIN — ACETAMINOPHEN 650 MG: 325 TABLET ORAL at 03:10

## 2024-10-23 RX ADMIN — ENOXAPARIN SODIUM 40 MG: 40 INJECTION SUBCUTANEOUS at 04:10

## 2024-10-23 RX ADMIN — POTASSIUM & SODIUM PHOSPHATES POWDER PACK 280-160-250 MG 2 PACKET: 280-160-250 PACK at 09:10

## 2024-10-23 RX ADMIN — THIAMINE HCL TAB 100 MG 100 MG: 100 TAB at 08:10

## 2024-10-23 RX ADMIN — PANTOPRAZOLE SODIUM 40 MG: 40 TABLET, DELAYED RELEASE ORAL at 08:10

## 2024-10-23 RX ADMIN — LEVOTHYROXINE SODIUM 100 MCG: 0.1 TABLET ORAL at 05:10

## 2024-10-23 RX ADMIN — POTASSIUM & SODIUM PHOSPHATES POWDER PACK 280-160-250 MG 1 PACKET: 280-160-250 PACK at 12:10

## 2024-10-23 NOTE — ASSESSMENT & PLAN NOTE
-Monitor for signs and symptoms of withdrawal  -Patient counseled on cessation  -Thiamine, MVI and folic acid

## 2024-10-23 NOTE — ASSESSMENT & PLAN NOTE
Nutrition consulted. Most recent weight and BMI monitored-     Measurements:  Wt Readings from Last 1 Encounters:   10/22/24 77.7 kg (171 lb 4.8 oz)   Body mass index is 31.33 kg/m².    Patient has been screened and assessed by RD.    Malnutrition Type:  Context:    Level:      Malnutrition Characteristic Summary:       Interventions/Recommendations (treatment strategy):

## 2024-10-23 NOTE — PLAN OF CARE
F/u with [facility] regarding referral, clinicals still under review.  Called [facility] intake, spoke to Nai, no decision yet.  [facility], unable to accept.  Met with pt and she's ok with sending referral to [facility].    [facility], [phone] accepted pt and SW called [phone], left message for Susan.     10/23/24 1507   Post-Acute Status   Post-Acute Authorization Placement   Post-Acute Placement Status Pending payor review/awaiting authorization (if required)   Discharge Plan   Discharge Plan A Skilled Nursing Facility   Discharge Plan B Skilled Nursing Facility

## 2024-10-23 NOTE — PROGRESS NOTES
Nicolasa Madera [id number] is a 74 y.o. female who has been consulted for vancomycin dosing.    Pharmacy consult for vancomycin dosing in no longer required.  Vancomycin was discontinued.    Thank you for allowing us to participate in this patient's care.     Jessica Gallegos, PharmD

## 2024-10-23 NOTE — CONSULTS
18 gauge x 10cm Midline placed to patient's right basilic vein with the use of ultrasound guidance.     Ultrasound guidance: yes  Vessel Caliber: large and patent, compressibility normal  Needle advanced into vessel with real time Ultrasound guidance.  Guidewire confirmed in vessel.  Image recorded and saved.  Sterile sheath used.  Sterile dressing applied  Arm circumference- 38cm  Dressing dated   Limb alert applied.

## 2024-10-23 NOTE — PROGRESS NOTES
"[facility] Medicine  Progress Note    Patient Name: Nicolasa Madera  MRN: [id number]  Patient Class: OP- Observation   Admission Date: 10/22/2024  Length of Stay: 0 days  Attending Physician: Ck Concepcion MD  Primary Care Provider: Page Garcia PA-C        Subjective:     Principal Problem:E coli bacteremia        HPI:  Nicolasa Madera is a 74 year old female with a past medical history of ETOH abuse, peptic ulcer disease, GI bleed, hypothyroidism, stroke, and CKD 3 presents with complaints of falling and dysuria. She has a long history of falls, tonight she fell onto her buttocks and did not hit her head nor injure herself. Her main concern is that she has been having urinary frequency, urgency and dysuria. She denies fever or chills and denies other complaint. She has a history of alcohol use, but denies recent use (last drink over a week ago). She was prescribed a librium taper but never did start it as she was not able to pick it up from the pharmacy. ED work up included a CBC, which shows chronic anemia, slightly lower than baseline. CMP shows hypokalemia of  2.8 and moderate malnutrition. Urinalysis positive for evidence of infection. She was given one dose of Rocephin in the ED. Hospital Medicine consulted for admission and further management.    Overview/Hospital Course:  Nicolasa Madera is a 74 year old female with a past medical history of EtOH dependence, PUD, hypothyroidism, and anemia who presented with E coli UTI with bacteremia as well as another fall. She is on cefepime. Repeat blood cultures are pending. PT/OT has been consulted and recommends moderate intensity therapy. She is being monitored for EtOH withdrawal.     Interval History: see "Hospital Course"    Review of Systems   Constitutional:  Positive for activity change, chills and fever.   Genitourinary:  Positive for dysuria, frequency and urgency.   Musculoskeletal:  Positive for gait problem. "   Neurological:  Positive for weakness.     Objective:     Vital Signs (Most Recent):  Temp: 99.3 °F (37.4 °C) (10/23/24 0753)  Pulse: 87 (10/23/24 0753)  Resp: 18 (10/23/24 0753)  BP: (!) 147/73 (10/23/24 0753)  SpO2: 97 % (10/23/24 0753) Vital Signs (24h Range):  Temp:  [98 °F (36.7 °C)-103.1 °F (39.5 °C)] 99.3 °F (37.4 °C)  Pulse:  [] 87  Resp:  [18-25] 18  SpO2:  [92 %-97 %] 97 %  BP: (125-151)/(61-84) 147/73     Weight: 77.7 kg (171 lb 4.8 oz)  Body mass index is 31.33 kg/m².    Intake/Output Summary (Last 24 hours) at 10/23/2024 0855  Last data filed at 10/23/2024 0458  Gross per 24 hour   Intake 120 ml   Output 1000 ml   Net -880 ml         Physical Exam  Vitals and nursing note reviewed.   Constitutional:       General: She is not in acute distress.     Appearance: She is obese.   HENT:      Head: Normocephalic and atraumatic.      Right Ear: External ear normal.      Left Ear: External ear normal.      Nose: Nose normal.      Mouth/Throat:      Mouth: Mucous membranes are moist.      Pharynx: Oropharynx is clear.   Eyes:      Extraocular Movements: Extraocular movements intact.      Conjunctiva/sclera: Conjunctivae normal.   Cardiovascular:      Rate and Rhythm: Normal rate and regular rhythm.      Pulses: Normal pulses.      Heart sounds: Normal heart sounds.   Pulmonary:      Effort: Pulmonary effort is normal.      Breath sounds: Normal breath sounds.   Abdominal:      General: Bowel sounds are normal. There is no distension.      Palpations: Abdomen is soft.      Tenderness: There is no abdominal tenderness.   Musculoskeletal:         General: Normal range of motion.      Cervical back: Normal range of motion and neck supple.      Right lower leg: No edema.      Left lower leg: No edema.   Skin:     Findings: Bruising present.   Neurological:      Mental Status: She is alert and oriented to person, place, and time.   Psychiatric:         Mood and Affect: Mood normal.         Behavior: Behavior  normal.             Significant Labs: All pertinent labs within the past 24 hours have been reviewed.    Significant Imaging: I have reviewed all pertinent imaging results/findings within the past 24 hours.    Assessment/Plan:      * E coli bacteremia  Likely from urinary source  -Cefepime  -Follow repeat blood cultures      Debility  -Fall precautions  -PT/OT  -CM consulted for SNF      Urinary tract infection without hematuria  -Follow up urine culture  -Continue cefepime      Hypophosphatemia  -Replete PRN  -Trend P    Hypomagnesemia  -Replete PRN  -Trend Mg    Alcohol dependence with alcohol-induced mood disorder  -Monitor for signs and symptoms of withdrawal  -Patient counseled on cessation  -Thiamine, MVI and folic acid    Stage 3 chronic kidney disease  Stable.  -Renally dose medications  -Avoid nephrotoxic agents    Anemia  Stable.  -Trend Hgb with CBC      Hypothyroidism  -Continue home Synthroid      VTE Risk Mitigation (From admission, onward)           Ordered     enoxaparin injection 40 mg  Every 24 hours         10/22/24 0908     IP VTE HIGH RISK PATIENT  Once         10/22/24 0419     Place sequential compression device  Until discontinued         10/22/24 0419                    Discharge Planning   SYLWIA:      Code Status: Full Code   Is the patient medically ready for discharge?:     Reason for patient still in hospital (select all that apply): Patient trending condition, Laboratory test, Treatment, PT / OT recommendations, and Pending disposition  Discharge Plan A: Skilled Nursing Facility                  Ck Concepcion MD  Department of Hospital Medicine   [facility]/Surg

## 2024-10-23 NOTE — PT/OT/SLP PROGRESS
Physical Therapy Treatment    Patient Name:  Nicolasa Madera   MRN:  [id number]    Recommendations:     Discharge Recommendations: Moderate Intensity Therapy  Discharge Equipment Recommendations: to be determined by next level of care  Barriers to discharge: None    Assessment:     Nicolasa Madera is a 74 y.o. female admitted with a medical diagnosis of E coli bacteremia.  She presents with the following impairments/functional limitations: weakness, impaired endurance, impaired self care skills, impaired functional mobility, gait instability, impaired balance, decreased lower extremity function, pain . Awake, alert, supine in bed.  Reports discomfort R shoulder and BLE's. Agreed to participate in therapy.  Transferred sup > sit EOB with Min A, slowly , verbal cues for technique.  Sat briefly, requesting water.   Sit > stand with rw and Min A.  Step transfer bed > chair with rw and Mod A.  Performed LE exercises seated ( AAROM RLE). Received communion while OOB.     Rehab Prognosis: Good; patient would benefit from acute skilled PT services to address these deficits and reach maximum level of function.    Recent Surgery: * No surgery found *      Plan:     During this hospitalization, patient to be seen 5 x/week to address the identified rehab impairments via gait training, therapeutic activities, therapeutic exercises and progress toward the following goals:    Plan of Care Expires:  11/22/24    Subjective     Chief Complaint: weakness, discomfort R shoulder , LH.   Patient/Family Comments/goals: none stated  Pain/Comfort:  Pain Rating 1: other (see comments) (did not rate)  Location - Side 1: Right  Location - Orientation 1: generalized  Location 1: shoulder (BLE's.)  Pain Addressed 1: Reposition, Nurse notified      Objective:     Communicated with nurse Daniels prior to session.  Patient found supine with bed alarm, PureWick, telemetry upon PT entry to room.     General Precautions: Standard, fall  Orthopedic  Precautions: N/A  Braces: N/A  Respiratory Status: Room air     Functional Mobility:  Bed Mobility:     Supine to Sit: minimum assistance  Transfers:     Sit to Stand:  minimum assistance with rolling walker  Bed to Chair: moderate assistance with  rolling walker  using  Step Transfer      AM-PAC 6 CLICK MOBILITY          Treatment & Education:  BLE exercises: TKE's, hip abd / add/ flexion, SP's, SLR's, HS, QS.     Patient left up in chair with all lines intact, call button in reach, chair alarm on, and nurse Frances notified..    GOALS:   Multidisciplinary Problems       Physical Therapy Goals          Problem: Physical Therapy    Goal Priority Disciplines Outcome Interventions   Physical Therapy Goal     PT, PT/OT     Description: Goals to be met by: 24    Patient will increase functional independence with mobility by performin. Supine to sit with Supervision  2. Sit to stand transfer with Supervision  3. Bed to chair transfer with Supervision using LRAD  4. Gait  x 250 feet with Supervision using LRAD  5. Lower extremity exercise program x20 reps per handout, with supervision                       Time Tracking:     PT Received On: 10/23/24  PT Start Time: 0945     PT Stop Time: 1008  PT Total Time (min): 23 min     Billable Minutes: Therapeutic Activity 13min and Therapeutic Exercise 10min    Treatment Type: Treatment  PT/PTA: PTA     Number of PTA visits since last PT visit: 1     10/23/2024

## 2024-10-23 NOTE — PLAN OF CARE
Problem: Physical Therapy  Goal: Physical Therapy Goal  Description: Goals to be met by: 24    Patient will increase functional independence with mobility by performin. Supine to sit with Supervision  2. Sit to stand transfer with Supervision  3. Bed to chair transfer with Supervision using LRAD  4. Gait  x 250 feet with Supervision using LRAD  5. Lower extremity exercise program x20 reps per handout, with supervision  Outcome: Progressing   Therapeutic activity : bed mobility , transfers, gait with assistance, LE exercises.

## 2024-10-23 NOTE — ASSESSMENT & PLAN NOTE
Patient's most recent potassium results are listed below.   Recent Labs     10/22/24  0315 10/22/24  0955 10/23/24  0238   K 2.8* 3.1* 3.7       Plan  - Replete potassium per protocol  - Monitor potassium Daily  - Patient's hypokalemia is stable  -

## 2024-10-23 NOTE — PLAN OF CARE
Problem: Adult Inpatient Plan of Care  Goal: Plan of Care Review  Outcome: Progressing  Goal: Patient-Specific Goal (Individualized)  Outcome: Progressing  Goal: Absence of Hospital-Acquired Illness or Injury  Outcome: Progressing  Goal: Optimal Comfort and Wellbeing  Outcome: Progressing  Goal: Readiness for Transition of Care  Outcome: Progressing     Problem: Wound  Goal: Optimal Wound Healing  Outcome: Progressing     Problem: Infection  Goal: Absence of Infection Signs and Symptoms  Outcome: Progressing

## 2024-10-23 NOTE — SUBJECTIVE & OBJECTIVE
"Interval History: see "Hospital Course"    Review of Systems   Constitutional:  Positive for activity change, chills and fever.   Genitourinary:  Positive for dysuria, frequency and urgency.   Musculoskeletal:  Positive for gait problem.   Neurological:  Positive for weakness.     Objective:     Vital Signs (Most Recent):  Temp: 99.3 °F (37.4 °C) (10/23/24 0753)  Pulse: 87 (10/23/24 0753)  Resp: 18 (10/23/24 0753)  BP: (!) 147/73 (10/23/24 0753)  SpO2: 97 % (10/23/24 0753) Vital Signs (24h Range):  Temp:  [98 °F (36.7 °C)-103.1 °F (39.5 °C)] 99.3 °F (37.4 °C)  Pulse:  [] 87  Resp:  [18-25] 18  SpO2:  [92 %-97 %] 97 %  BP: (125-151)/(61-84) 147/73     Weight: 77.7 kg (171 lb 4.8 oz)  Body mass index is 31.33 kg/m².    Intake/Output Summary (Last 24 hours) at 10/23/2024 0855  Last data filed at 10/23/2024 0458  Gross per 24 hour   Intake 120 ml   Output 1000 ml   Net -880 ml         Physical Exam  Vitals and nursing note reviewed.   Constitutional:       General: She is not in acute distress.     Appearance: She is obese.   HENT:      Head: Normocephalic and atraumatic.      Right Ear: External ear normal.      Left Ear: External ear normal.      Nose: Nose normal.      Mouth/Throat:      Mouth: Mucous membranes are moist.      Pharynx: Oropharynx is clear.   Eyes:      Extraocular Movements: Extraocular movements intact.      Conjunctiva/sclera: Conjunctivae normal.   Cardiovascular:      Rate and Rhythm: Normal rate and regular rhythm.      Pulses: Normal pulses.      Heart sounds: Normal heart sounds.   Pulmonary:      Effort: Pulmonary effort is normal.      Breath sounds: Normal breath sounds.   Abdominal:      General: Bowel sounds are normal. There is no distension.      Palpations: Abdomen is soft.      Tenderness: There is no abdominal tenderness.   Musculoskeletal:         General: Normal range of motion.      Cervical back: Normal range of motion and neck supple.      Right lower leg: No edema.      " Left lower leg: No edema.   Skin:     Findings: Bruising present.   Neurological:      Mental Status: She is alert and oriented to person, place, and time.   Psychiatric:         Mood and Affect: Mood normal.         Behavior: Behavior normal.             Significant Labs: All pertinent labs within the past 24 hours have been reviewed.    Significant Imaging: I have reviewed all pertinent imaging results/findings within the past 24 hours.

## 2024-10-23 NOTE — PT/OT/SLP PROGRESS
"Occupational Therapy   Treatment    Name: Nicolasa Madera  MRN: [id number]  Admitting Diagnosis:  E coli bacteremia       Recommendations:     Discharge Recommendations: Moderate Intensity Therapy  Discharge Equipment Recommendations:  walker, rolling  Barriers to discharge:  Decreased caregiver support, high chance of readmittance     Assessment:     Nicolasa Madera is a 74 y.o. female with a medical diagnosis of E coli bacteremia.  She participated in STS transfers, LE dressing, UE dressing, and standing balance tolerance. Performance deficits affecting function are weakness, impaired endurance, impaired sensation, impaired functional mobility, impaired self care skills, decreased upper extremity function, decreased lower extremity function, decreased safety awareness, pain, decreased coordination, impaired balance.     Rehab Prognosis:  Good; patient would benefit from acute skilled OT services to address these deficits and reach maximum level of function.       Plan:     Patient to be seen 5 x/week to address the above listed problems via self-care/home management, therapeutic activities, therapeutic exercises  Plan of Care Expires: 11/22/24  Plan of Care Reviewed with: patient    Subjective     Chief Complaint: : "I think I am wet, but I don't know"   Patient/Family Comments/goals: Increase strength and endurance to participate in ADLs and functional transfers   Pain/Comfort:  Pain Rating 1: 0/10  Pain Rating Post-Intervention 1: 0/10    Objective:     Communicated with: Nurse prior to session.  Patient found up in chair with chair check, PureWick, peripheral IV, telemetry upon OT entry to room.    General Precautions: Standard, fall    Orthopedic Precautions:N/A  Braces: N/A  Respiratory Status: Room air     Occupational Performance:     Functional Mobility/Transfers:  Patient completed Sit <> Stand Transfer with minimum assistance  with  hand-held assist   Functional Mobility: Pt remained standing for 5 " minutes while patient care was completed with SBA    Activities of Daily Living:  Upper Body Dressing: minimum assistance to don new gown  Lower Body Dressing: maximal assistance to don new brief while standing   Toileting: maximal assistance for hygiene while standing    Treatment & Education:  Pt was educated on the role of occupational therapy and the importance of completing ADLs and functional mobility.     Patient left up in chair with all lines intact, call button in reach, and chair alarm on    GOALS:   Multidisciplinary Problems       Occupational Therapy Goals          Problem: Occupational Therapy    Goal Priority Disciplines Outcome Interventions   Occupational Therapy Goal     OT, PT/OT Progressing    Description: Goals to be met by: 11/22/24     Patient will increase functional independence with ADLs by performing:    UE Dressing with Modified [place].  LE Dressing with Modified [place].  Grooming while seated with Modified [place].  Toileting from toilet with Modified [place] for hygiene and clothing management.   Toilet transfer to toilet with Modified [place] using RW.                         Time Tracking:     OT Date of Treatment: 10/23/24  OT Start Time: 1127  OT Stop Time: 1154  OT Total Time (min): 27 min    Billable Minutes:Self Care/Home Management 17  Therapeutic Activity 10    OT/SCOT: OT          10/23/2024

## 2024-10-23 NOTE — PLAN OF CARE
Problem: Occupational Therapy  Goal: Occupational Therapy Goal  Description: Goals to be met by: 11/22/24     Patient will increase functional independence with ADLs by performing:    UE Dressing with Modified [place].  LE Dressing with Modified [place].  Grooming while seated with Modified [place].  Toileting from toilet with Modified [place] for hygiene and clothing management.   Toilet transfer to toilet with Modified [place] using RW.    Outcome: Progressing

## 2024-10-23 NOTE — HOSPITAL COURSE
Nicolasa Madera is a 74 year old female with a past medical history of EtOH dependence, PUD, hypothyroidism, and anemia who presented with E coli UTI with bacteremia as well as another fall. She improved on cefepime. Repeat blood cultures were negative. The patient continues to spike fevers as of 10/25, then afterward the fever resolved.  PT/OT has been consulted and recommends moderate intensity therapy.  She was discharged to skilled nursing facility on p.o. Cipro to complete a course for bacteremia.  She felt well on day of discharge was agreeable to discharge plan

## 2024-10-24 PROBLEM — E87.6 HYPOKALEMIA: Status: ACTIVE | Noted: 2024-10-24

## 2024-10-24 PROBLEM — E83.42 HYPOMAGNESEMIA: Status: RESOLVED | Noted: 2024-10-23 | Resolved: 2024-10-24

## 2024-10-24 LAB
ANION GAP SERPL CALC-SCNC: 9 MMOL/L (ref 8–16)
BACTERIA BLD CULT: ABNORMAL
BACTERIA UR CULT: ABNORMAL
BASOPHILS # BLD AUTO: 0.04 K/UL (ref 0–0.2)
BASOPHILS NFR BLD: 0.7 % (ref 0–1.9)
BUN SERPL-MCNC: 17 MG/DL (ref 8–23)
CALCIUM SERPL-MCNC: 8.6 MG/DL (ref 8.7–10.5)
CHLORIDE SERPL-SCNC: 107 MMOL/L (ref 95–110)
CO2 SERPL-SCNC: 19 MMOL/L (ref 23–29)
CREAT SERPL-MCNC: 1.2 MG/DL (ref 0.5–1.4)
DIFFERENTIAL METHOD BLD: ABNORMAL
EOSINOPHIL # BLD AUTO: 0 K/UL (ref 0–0.5)
EOSINOPHIL NFR BLD: 0.5 % (ref 0–8)
ERYTHROCYTE [DISTWIDTH] IN BLOOD BY AUTOMATED COUNT: 24.7 % (ref 11.5–14.5)
EST. GFR  (NO RACE VARIABLE): 47 ML/MIN/1.73 M^2
GLUCOSE SERPL-MCNC: 90 MG/DL (ref 70–110)
HCT VFR BLD AUTO: 22.9 % (ref 37–48.5)
HGB BLD-MCNC: 8.2 G/DL (ref 12–16)
IMM GRANULOCYTES # BLD AUTO: 0.03 K/UL (ref 0–0.04)
IMM GRANULOCYTES NFR BLD AUTO: 0.5 % (ref 0–0.5)
LYMPHOCYTES # BLD AUTO: 0.8 K/UL (ref 1–4.8)
LYMPHOCYTES NFR BLD: 13.8 % (ref 18–48)
MAGNESIUM SERPL-MCNC: 1.7 MG/DL (ref 1.6–2.6)
MCH RBC QN AUTO: 28.9 PG (ref 27–31)
MCHC RBC AUTO-ENTMCNC: 35.8 G/DL (ref 32–36)
MCV RBC AUTO: 81 FL (ref 82–98)
MONOCYTES # BLD AUTO: 0.7 K/UL (ref 0.3–1)
MONOCYTES NFR BLD: 12.4 % (ref 4–15)
NEUTROPHILS # BLD AUTO: 4.1 K/UL (ref 1.8–7.7)
NEUTROPHILS NFR BLD: 72.1 % (ref 38–73)
NRBC BLD-RTO: 0 /100 WBC
PHOSPHATE SERPL-MCNC: 2 MG/DL (ref 2.7–4.5)
PLATELET # BLD AUTO: 188 K/UL (ref 150–450)
PMV BLD AUTO: 10.1 FL (ref 9.2–12.9)
POTASSIUM SERPL-SCNC: 3.4 MMOL/L (ref 3.5–5.1)
RBC # BLD AUTO: 2.84 M/UL (ref 4–5.4)
SODIUM SERPL-SCNC: 135 MMOL/L (ref 136–145)
WBC # BLD AUTO: 5.64 K/UL (ref 3.9–12.7)

## 2024-10-24 PROCEDURE — 63600175 PHARM REV CODE 636 W HCPCS

## 2024-10-24 PROCEDURE — 63600175 PHARM REV CODE 636 W HCPCS: Performed by: STUDENT IN AN ORGANIZED HEALTH CARE EDUCATION/TRAINING PROGRAM

## 2024-10-24 PROCEDURE — 11000001 HC ACUTE MED/SURG PRIVATE ROOM

## 2024-10-24 PROCEDURE — 25000003 PHARM REV CODE 250: Performed by: STUDENT IN AN ORGANIZED HEALTH CARE EDUCATION/TRAINING PROGRAM

## 2024-10-24 PROCEDURE — 97110 THERAPEUTIC EXERCISES: CPT | Mod: CQ

## 2024-10-24 PROCEDURE — 25000003 PHARM REV CODE 250: Performed by: NURSE PRACTITIONER

## 2024-10-24 PROCEDURE — 97535 SELF CARE MNGMENT TRAINING: CPT

## 2024-10-24 PROCEDURE — 97530 THERAPEUTIC ACTIVITIES: CPT | Mod: CQ

## 2024-10-24 PROCEDURE — 83735 ASSAY OF MAGNESIUM: CPT | Performed by: NURSE PRACTITIONER

## 2024-10-24 PROCEDURE — 84100 ASSAY OF PHOSPHORUS: CPT | Performed by: NURSE PRACTITIONER

## 2024-10-24 PROCEDURE — 80048 BASIC METABOLIC PNL TOTAL CA: CPT | Performed by: STUDENT IN AN ORGANIZED HEALTH CARE EDUCATION/TRAINING PROGRAM

## 2024-10-24 PROCEDURE — 85025 COMPLETE CBC W/AUTO DIFF WBC: CPT | Performed by: NURSE PRACTITIONER

## 2024-10-24 RX ADMIN — POLYETHYLENE GLYCOL (3350) 17 G: 17 POWDER, FOR SOLUTION ORAL at 09:10

## 2024-10-24 RX ADMIN — POTASSIUM & SODIUM PHOSPHATES POWDER PACK 280-160-250 MG 1 PACKET: 280-160-250 PACK at 08:10

## 2024-10-24 RX ADMIN — CEFEPIME 2 G: 1 INJECTION, POWDER, FOR SOLUTION INTRAMUSCULAR; INTRAVENOUS at 08:10

## 2024-10-24 RX ADMIN — THERA TABS 1 TABLET: TAB at 09:10

## 2024-10-24 RX ADMIN — POTASSIUM & SODIUM PHOSPHATES POWDER PACK 280-160-250 MG 1 PACKET: 280-160-250 PACK at 09:10

## 2024-10-24 RX ADMIN — POTASSIUM & SODIUM PHOSPHATES POWDER PACK 280-160-250 MG 1 PACKET: 280-160-250 PACK at 04:10

## 2024-10-24 RX ADMIN — ENOXAPARIN SODIUM 40 MG: 40 INJECTION SUBCUTANEOUS at 04:10

## 2024-10-24 RX ADMIN — THIAMINE HCL TAB 100 MG 100 MG: 100 TAB at 09:10

## 2024-10-24 RX ADMIN — POTASSIUM BICARBONATE 35 MEQ: 391 TABLET, EFFERVESCENT ORAL at 06:10

## 2024-10-24 RX ADMIN — PANTOPRAZOLE SODIUM 40 MG: 40 TABLET, DELAYED RELEASE ORAL at 09:10

## 2024-10-24 RX ADMIN — POTASSIUM & SODIUM PHOSPHATES POWDER PACK 280-160-250 MG 1 PACKET: 280-160-250 PACK at 11:10

## 2024-10-24 RX ADMIN — LEVOTHYROXINE SODIUM 100 MCG: 0.1 TABLET ORAL at 05:10

## 2024-10-24 RX ADMIN — Medication 800 MG: at 02:10

## 2024-10-24 RX ADMIN — Medication 800 MG: at 10:10

## 2024-10-24 RX ADMIN — POTASSIUM BICARBONATE 35 MEQ: 391 TABLET, EFFERVESCENT ORAL at 09:10

## 2024-10-24 RX ADMIN — ACETAMINOPHEN 650 MG: 325 TABLET ORAL at 08:10

## 2024-10-24 RX ADMIN — ACETAMINOPHEN 650 MG: 325 TABLET ORAL at 09:10

## 2024-10-24 RX ADMIN — CEFEPIME 2 G: 1 INJECTION, POWDER, FOR SOLUTION INTRAMUSCULAR; INTRAVENOUS at 10:10

## 2024-10-24 RX ADMIN — Medication 800 MG: at 06:10

## 2024-10-24 RX ADMIN — FOLIC ACID 1 MG: 1 TABLET ORAL at 09:10

## 2024-10-24 NOTE — PT/OT/SLP PROGRESS
Occupational Therapy   Treatment    Name: Nicolasa Madera  MRN: [id number]  Admitting Diagnosis:  E coli bacteremia       Recommendations:     Discharge Recommendations: Moderate Intensity Therapy  Discharge Equipment Recommendations:  walker, rolling  Barriers to discharge:       Assessment:     Nicolasa Madera is a 74 y.o. female with a medical diagnosis of E coli bacteremia. Performance deficits affecting function are weakness, impaired endurance, impaired self care skills, impaired functional mobility, gait instability, impaired balance, decreased lower extremity function, decreased ROM.     Rehab Prognosis:  Good; patient would benefit from acute skilled OT services to address these deficits and reach maximum level of function.       Plan:     Patient to be seen 5 x/week to address the above listed problems via self-care/home management, therapeutic activities, therapeutic exercises  Plan of Care Expires: 11/22/24  Plan of Care Reviewed with: patient    Subjective     Chief Complaint: none  Patient/Family Comments/goals: none  Pain/Comfort:  Pain Rating 1: 0/10  Pain Rating Post-Intervention 1: 0/10    Objective:     Communicated with: nurse Warren/Mauro prior to session.  Patient found up in chair with chair check, telemetry upon OT entry to room.    General Precautions: Standard, fall    Orthopedic Precautions:N/A  Braces: N/A  Respiratory Status: Room air     Occupational Performance:     Functional Mobility/Transfers:  Patient completed Sit <> Stand Transfer with stand by assistance  with  rolling walker     Activities of Daily Living:  Grooming: stand by assistance with oral/facial hygiene and hair grooming while standing at sink      [facility] 6 Click ADL:      Treatment & Education:  OT ed patient on safety with walker use for functional mobility with cues for hand placement & sequencing.       Patient left up in chair with all lines intact, call button in reach, and chair alarm on    GOALS:    Multidisciplinary Problems       Occupational Therapy Goals          Problem: Occupational Therapy    Goal Priority Disciplines Outcome Interventions   Occupational Therapy Goal     OT, PT/OT Progressing    Description: Goals to be met by: 11/22/24     Patient will increase functional independence with ADLs by performing:    UE Dressing with Modified [place].  LE Dressing with Modified [place].  Grooming while seated with Modified [place].  Toileting from toilet with Modified [place] for hygiene and clothing management.   Toilet transfer to toilet with Modified [place] using RW.                         Time Tracking:     OT Date of Treatment: 10/24/24  OT Start Time: 1100  OT Stop Time: 1123  OT Total Time (min): 23 min    Billable Minutes:Self Care/Home Management 23    OT/SCOT: OT          10/24/2024

## 2024-10-24 NOTE — PT/OT/SLP PROGRESS
Physical Therapy Treatment    Patient Name:  Nicolasa Madera   MRN:  [id number]    Recommendations:     Discharge Recommendations: Moderate Intensity Therapy  Discharge Equipment Recommendations: to be determined by next level of care  Barriers to discharge: None    Assessment:     Nicolasa Madera is a 74 y.o. female admitted with a medical diagnosis of E coli bacteremia.  She presents with the following impairments/functional limitations: weakness, impaired endurance, impaired self care skills, impaired functional mobility, gait instability, impaired balance, decreased lower extremity function, pain, decreased ROM . Supine in bed, just received meds from nurse.  Agreed to mobilize. Reports having headache. Presents with bruising R lower leg.  Sup > sit with Min A.  Sat briefly.  Sit > stand with rw and Min A.  Returned to sit , requested to use BSC.  Performed step transfer bed > chair with rw and Min A.  Required A for hygiene post BM in standing.  Step transfer BSC > chair with rw and Min A.  Performed LE exercises.  Requires extra time and verbal cues for safe technique.       Rehab Prognosis: Good; patient would benefit from acute skilled PT services to address these deficits and reach maximum level of function.    Recent Surgery: * No surgery found *      Plan:     During this hospitalization, patient to be seen 5 x/week to address the identified rehab impairments via gait training, therapeutic activities, therapeutic exercises and progress toward the following goals:    Plan of Care Expires:  11/22/24    Subjective     Chief Complaint: headache  Patient/Family Comments/goals: none stated  Pain/Comfort:  Pain Rating 1: other (see comments) (did not rate)  Location - Orientation 1: generalized  Location 1: head  Pain Addressed 1: Pre-medicate for activity, Reposition, Nurse notified      Objective:     Communicated with nurse Kelley prior to session.  Patient found supine with bed alarm, telemetry upon PT  entry to room.     General Precautions: Standard, fall  Orthopedic Precautions: N/A  Braces: N/A  Respiratory Status: Room air     Functional Mobility:  Bed Mobility:     Supine to Sit: minimum assistance  Transfers:     Sit to Stand:  minimum assistance with rolling walker  Bed to Chair: minimum assistance with  rolling walker  using  Step Transfer      AM-PAC 6 CLICK MOBILITY          Treatment & Education:  Sup > sit with Min A.  Sit > stand with rw , Min A.  Step transfer bed > BSC > chair with rw and Min A.  BLE exercises: TKE's, hip abd/add/ flexion, AP's, SLR's, HS, QS.       Patient left up in chair with all lines intact, call button in reach, chair alarm on, and nurse Warren notified..    GOALS:   Multidisciplinary Problems       Physical Therapy Goals          Problem: Physical Therapy    Goal Priority Disciplines Outcome Interventions   Physical Therapy Goal     PT, PT/OT Progressing    Description: Goals to be met by: 24    Patient will increase functional independence with mobility by performin. Supine to sit with Supervision  2. Sit to stand transfer with Supervision  3. Bed to chair transfer with Supervision using LRAD  4. Gait  x 250 feet with Supervision using LRAD  5. Lower extremity exercise program x20 reps per handout, with supervision                       Time Tracking:     PT Received On: 10/24/24  PT Start Time: 905     PT Stop Time: 930  PT Total Time (min): 25 min     Billable Minutes: Therapeutic Activity 15min and Therapeutic Exercise 10min    Treatment Type: Treatment  PT/PTA: PTA     Number of PTA visits since last PT visit: 2     10/24/2024

## 2024-10-24 NOTE — PLAN OF CARE
Problem: Adult Inpatient Plan of Care  Goal: Plan of Care Review  Outcome: Progressing  Goal: Patient-Specific Goal (Individualized)  Outcome: Progressing  Goal: Absence of Hospital-Acquired Illness or Injury  Outcome: Progressing  Goal: Optimal Comfort and Wellbeing  Outcome: Progressing  Goal: Readiness for Transition of Care  Outcome: Progressing     Problem: Wound  Goal: Optimal Coping  Outcome: Progressing  Goal: Optimal Functional Ability  Outcome: Progressing  Goal: Absence of Infection Signs and Symptoms  Outcome: Progressing  Goal: Improved Oral Intake  Outcome: Progressing  Goal: Optimal Pain Control and Function  Outcome: Progressing  Goal: Skin Health and Integrity  Outcome: Progressing  Goal: Optimal Wound Healing  Outcome: Progressing     Problem: Skin Injury Risk Increased  Goal: Skin Health and Integrity  Outcome: Progressing   Plan of care reviewed with patient,verbalized understanding.  SR  on telemetry.  Remains free from falls, injury. Instructed to call for assistance as needed ,verbalized understanding. Bed in low & locked position. Call light in reach, bed alarm on .

## 2024-10-24 NOTE — SUBJECTIVE & OBJECTIVE
"Interval History: see "Hospital Course"    Review of Systems   Constitutional:  Positive for activity change, chills and fever.   Genitourinary:  Positive for dysuria, frequency and urgency.   Musculoskeletal:  Positive for gait problem.   Neurological:  Positive for weakness.     Objective:     Vital Signs (Most Recent):  Temp: 99.5 °F (37.5 °C) (10/24/24 0737)  Pulse: 85 (10/24/24 0737)  Resp: 17 (10/24/24 0737)  BP: 134/76 (10/24/24 0737)  SpO2: (!) 94 % (10/24/24 0737) Vital Signs (24h Range):  Temp:  [97.2 °F (36.2 °C)-101.4 °F (38.6 °C)] 99.5 °F (37.5 °C)  Pulse:  [82-97] 85  Resp:  [16-18] 17  SpO2:  [92 %-99 %] 94 %  BP: (103-161)/(56-81) 134/76     Weight: 77.7 kg (171 lb 4.8 oz)  Body mass index is 31.33 kg/m².    Intake/Output Summary (Last 24 hours) at 10/24/2024 0843  Last data filed at 10/23/2024 1443  Gross per 24 hour   Intake 142.8 ml   Output --   Net 142.8 ml         Physical Exam  Vitals and nursing note reviewed.   Constitutional:       General: She is not in acute distress.     Appearance: She is obese.   HENT:      Head: Normocephalic and atraumatic.      Right Ear: External ear normal.      Left Ear: External ear normal.      Nose: Nose normal.      Mouth/Throat:      Mouth: Mucous membranes are moist.      Pharynx: Oropharynx is clear.   Eyes:      Extraocular Movements: Extraocular movements intact.      Conjunctiva/sclera: Conjunctivae normal.   Cardiovascular:      Rate and Rhythm: Normal rate and regular rhythm.      Pulses: Normal pulses.      Heart sounds: Normal heart sounds.   Pulmonary:      Effort: Pulmonary effort is normal.      Breath sounds: Normal breath sounds.   Abdominal:      General: Bowel sounds are normal. There is no distension.      Palpations: Abdomen is soft.      Tenderness: There is no abdominal tenderness.   Musculoskeletal:         General: Normal range of motion.      Cervical back: Normal range of motion and neck supple.      Right lower leg: No edema.      " Left lower leg: No edema.   Skin:     Findings: Bruising present.   Neurological:      Mental Status: She is alert and oriented to person, place, and time.      Motor: Weakness present.   Psychiatric:         Mood and Affect: Mood normal.         Behavior: Behavior normal.             Significant Labs: All pertinent labs within the past 24 hours have been reviewed.    Significant Imaging: I have reviewed all pertinent imaging results/findings within the past 24 hours.

## 2024-10-24 NOTE — PLAN OF CARE
Problem: Physical Therapy  Goal: Physical Therapy Goal  Description: Goals to be met by: 24    Patient will increase functional independence with mobility by performin. Supine to sit with Supervision  2. Sit to stand transfer with Supervision  3. Bed to chair transfer with Supervision using LRAD  4. Gait  x 250 feet with Supervision using LRAD  5. Lower extremity exercise program x20 reps per handout, with supervision  Outcome: Progressing   Therapeutic activity : bed mobility , transfers, gait with rw and assistance for safety, LE exercises.

## 2024-10-24 NOTE — PROGRESS NOTES
"Critical access hospital Medicine  Progress Note    Patient Name: Nicolasa Madera  MRN: [id number]  Patient Class: IP- Inpatient   Admission Date: 10/22/2024  Length of Stay: 1 days  Attending Physician: Ck Concepcion MD  Primary Care Provider: Page Garcia PA-C        Subjective:     Principal Problem:E coli bacteremia        HPI:  Nicolasa Madera is a 74 year old female with a past medical history of ETOH abuse, peptic ulcer disease, GI bleed, hypothyroidism, stroke, and CKD 3 presents with complaints of falling and dysuria. She has a long history of falls, tonight she fell onto her buttocks and did not hit her head nor injure herself. Her main concern is that she has been having urinary frequency, urgency and dysuria. She denies fever or chills and denies other complaint. She has a history of alcohol use, but denies recent use (last drink over a week ago). She was prescribed a librium taper but never did start it as she was not able to pick it up from the pharmacy. ED work up included a CBC, which shows chronic anemia, slightly lower than baseline. CMP shows hypokalemia of  2.8 and moderate malnutrition. Urinalysis positive for evidence of infection. She was given one dose of Rocephin in the ED. Hospital Medicine consulted for admission and further management.    Overview/Hospital Course:  Nicolasa Madera is a 74 year old female with a past medical history of EtOH dependence, PUD, hypothyroidism, and anemia who presented with E coli UTI with bacteremia as well as another fall. She is on cefepime. Repeat blood cultures are negative. The patient continues to spike fevers as of 10/23. PT/OT has been consulted and recommends moderate intensity therapy. She is being monitored for EtOH withdrawal.     Interval History: see "Hospital Course"    Review of Systems   Constitutional:  Positive for activity change, chills and fever.   Genitourinary:  Positive for dysuria, frequency and " urgency.   Musculoskeletal:  Positive for gait problem.   Neurological:  Positive for weakness.     Objective:     Vital Signs (Most Recent):  Temp: 99.5 °F (37.5 °C) (10/24/24 0737)  Pulse: 85 (10/24/24 0737)  Resp: 17 (10/24/24 0737)  BP: 134/76 (10/24/24 0737)  SpO2: (!) 94 % (10/24/24 0737) Vital Signs (24h Range):  Temp:  [97.2 °F (36.2 °C)-101.4 °F (38.6 °C)] 99.5 °F (37.5 °C)  Pulse:  [82-97] 85  Resp:  [16-18] 17  SpO2:  [92 %-99 %] 94 %  BP: (103-161)/(56-81) 134/76     Weight: 77.7 kg (171 lb 4.8 oz)  Body mass index is 31.33 kg/m².    Intake/Output Summary (Last 24 hours) at 10/24/2024 0843  Last data filed at 10/23/2024 1443  Gross per 24 hour   Intake 142.8 ml   Output --   Net 142.8 ml         Physical Exam  Vitals and nursing note reviewed.   Constitutional:       General: She is not in acute distress.     Appearance: She is obese.   HENT:      Head: Normocephalic and atraumatic.      Right Ear: External ear normal.      Left Ear: External ear normal.      Nose: Nose normal.      Mouth/Throat:      Mouth: Mucous membranes are moist.      Pharynx: Oropharynx is clear.   Eyes:      Extraocular Movements: Extraocular movements intact.      Conjunctiva/sclera: Conjunctivae normal.   Cardiovascular:      Rate and Rhythm: Normal rate and regular rhythm.      Pulses: Normal pulses.      Heart sounds: Normal heart sounds.   Pulmonary:      Effort: Pulmonary effort is normal.      Breath sounds: Normal breath sounds.   Abdominal:      General: Bowel sounds are normal. There is no distension.      Palpations: Abdomen is soft.      Tenderness: There is no abdominal tenderness.   Musculoskeletal:         General: Normal range of motion.      Cervical back: Normal range of motion and neck supple.      Right lower leg: No edema.      Left lower leg: No edema.   Skin:     Findings: Bruising present.   Neurological:      Mental Status: She is alert and oriented to person, place, and time.      Motor: Weakness  present.   Psychiatric:         Mood and Affect: Mood normal.         Behavior: Behavior normal.             Significant Labs: All pertinent labs within the past 24 hours have been reviewed.    Significant Imaging: I have reviewed all pertinent imaging results/findings within the past 24 hours.    Assessment/Plan:      * E coli bacteremia  Likely from urinary source  -Cefepime  -Follow repeat blood cultures; negative thus far  -If fever continues may consider CT of the abdomen and pelvis      Debility  -Fall precautions  -PT/OT  -CM consulted for SNF      Urinary tract infection without hematuria  -Follow up urine culture  -Continue cefepime  -Consider CT A/P if fevers continue      Hypophosphatemia  -Replete PRN  -Trend P    Alcohol dependence with alcohol-induced mood disorder  -Monitor for signs and symptoms of withdrawal  -Patient counseled on cessation  -Thiamine, MVI and folic acid    Stage 3 chronic kidney disease  Stable.  -Renally dose medications  -Avoid nephrotoxic agents    Anemia  Stable.  -Trend Hgb with CBC      Hypothyroidism  -Continue home Synthroid      VTE Risk Mitigation (From admission, onward)           Ordered     enoxaparin injection 40 mg  Every 24 hours         10/22/24 0908     IP VTE HIGH RISK PATIENT  Once         10/22/24 0419     Place sequential compression device  Until discontinued         10/22/24 0419                    Discharge Planning   SYLWIA:      Code Status: Full Code   Is the patient medically ready for discharge?:     Reason for patient still in hospital (select all that apply): Patient trending condition, Laboratory test, Treatment, and Pending disposition  Discharge Plan A: Skilled Nursing Facility                  Ck Concepcion MD  Department of Hospital Medicine   [facility]/Surg

## 2024-10-24 NOTE — PLAN OF CARE
[facility] accepted; informed pt and she prefers [facility].  Ginna at [facility] will submit for auth and cancelled referral with [facility].    [facility]    Faxed [phone] updated clinicials to [facility] (unable to send in [facility]).         10/24/24 1144   Post-Acute Status   Post-Acute Authorization Placement   Post-Acute Placement Status Pending payor review/awaiting authorization (if required)   Discharge Plan   Discharge Plan A Skilled Nursing Facility   Discharge Plan B Skilled Nursing Facility

## 2024-10-24 NOTE — ASSESSMENT & PLAN NOTE
Likely from urinary source  -Cefepime  -Follow repeat blood cultures; negative thus far  -If fever continues may consider CT of the abdomen and pelvis

## 2024-10-25 LAB
ANION GAP SERPL CALC-SCNC: 11 MMOL/L (ref 8–16)
BASOPHILS # BLD AUTO: 0.03 K/UL (ref 0–0.2)
BASOPHILS NFR BLD: 0.6 % (ref 0–1.9)
BUN SERPL-MCNC: 15 MG/DL (ref 8–23)
CALCIUM SERPL-MCNC: 8.9 MG/DL (ref 8.7–10.5)
CHLORIDE SERPL-SCNC: 108 MMOL/L (ref 95–110)
CO2 SERPL-SCNC: 20 MMOL/L (ref 23–29)
CREAT SERPL-MCNC: 1 MG/DL (ref 0.5–1.4)
DIFFERENTIAL METHOD BLD: ABNORMAL
EOSINOPHIL # BLD AUTO: 0.1 K/UL (ref 0–0.5)
EOSINOPHIL NFR BLD: 1.3 % (ref 0–8)
ERYTHROCYTE [DISTWIDTH] IN BLOOD BY AUTOMATED COUNT: 25.1 % (ref 11.5–14.5)
EST. GFR  (NO RACE VARIABLE): 58 ML/MIN/1.73 M^2
GLUCOSE SERPL-MCNC: 89 MG/DL (ref 70–110)
HCT VFR BLD AUTO: 24.1 % (ref 37–48.5)
HGB BLD-MCNC: 8.2 G/DL (ref 12–16)
IMM GRANULOCYTES # BLD AUTO: 0.04 K/UL (ref 0–0.04)
IMM GRANULOCYTES NFR BLD AUTO: 0.8 % (ref 0–0.5)
LYMPHOCYTES # BLD AUTO: 0.8 K/UL (ref 1–4.8)
LYMPHOCYTES NFR BLD: 14.7 % (ref 18–48)
MAGNESIUM SERPL-MCNC: 1.6 MG/DL (ref 1.6–2.6)
MCH RBC QN AUTO: 27.8 PG (ref 27–31)
MCHC RBC AUTO-ENTMCNC: 34 G/DL (ref 32–36)
MCV RBC AUTO: 82 FL (ref 82–98)
MONOCYTES # BLD AUTO: 0.6 K/UL (ref 0.3–1)
MONOCYTES NFR BLD: 12 % (ref 4–15)
NEUTROPHILS # BLD AUTO: 3.7 K/UL (ref 1.8–7.7)
NEUTROPHILS NFR BLD: 70.6 % (ref 38–73)
NRBC BLD-RTO: 0 /100 WBC
PHOSPHATE SERPL-MCNC: 1.9 MG/DL (ref 2.7–4.5)
PLATELET # BLD AUTO: 219 K/UL (ref 150–450)
PMV BLD AUTO: 9.8 FL (ref 9.2–12.9)
POTASSIUM SERPL-SCNC: 3.5 MMOL/L (ref 3.5–5.1)
RBC # BLD AUTO: 2.95 M/UL (ref 4–5.4)
SODIUM SERPL-SCNC: 139 MMOL/L (ref 136–145)
WBC # BLD AUTO: 5.25 K/UL (ref 3.9–12.7)

## 2024-10-25 PROCEDURE — 63600175 PHARM REV CODE 636 W HCPCS: Performed by: STUDENT IN AN ORGANIZED HEALTH CARE EDUCATION/TRAINING PROGRAM

## 2024-10-25 PROCEDURE — 25000003 PHARM REV CODE 250: Performed by: NURSE PRACTITIONER

## 2024-10-25 PROCEDURE — 80048 BASIC METABOLIC PNL TOTAL CA: CPT | Performed by: STUDENT IN AN ORGANIZED HEALTH CARE EDUCATION/TRAINING PROGRAM

## 2024-10-25 PROCEDURE — 25000003 PHARM REV CODE 250: Performed by: STUDENT IN AN ORGANIZED HEALTH CARE EDUCATION/TRAINING PROGRAM

## 2024-10-25 PROCEDURE — 83735 ASSAY OF MAGNESIUM: CPT | Performed by: NURSE PRACTITIONER

## 2024-10-25 PROCEDURE — 11000001 HC ACUTE MED/SURG PRIVATE ROOM

## 2024-10-25 PROCEDURE — 63600175 PHARM REV CODE 636 W HCPCS

## 2024-10-25 PROCEDURE — 97530 THERAPEUTIC ACTIVITIES: CPT | Mod: CQ

## 2024-10-25 PROCEDURE — 85025 COMPLETE CBC W/AUTO DIFF WBC: CPT | Performed by: NURSE PRACTITIONER

## 2024-10-25 PROCEDURE — 84100 ASSAY OF PHOSPHORUS: CPT | Performed by: NURSE PRACTITIONER

## 2024-10-25 PROCEDURE — 97116 GAIT TRAINING THERAPY: CPT | Mod: CQ

## 2024-10-25 RX ADMIN — THERA TABS 1 TABLET: TAB at 08:10

## 2024-10-25 RX ADMIN — Medication 800 MG: at 06:10

## 2024-10-25 RX ADMIN — FOLIC ACID 1 MG: 1 TABLET ORAL at 08:10

## 2024-10-25 RX ADMIN — POLYETHYLENE GLYCOL (3350) 17 G: 17 POWDER, FOR SOLUTION ORAL at 08:10

## 2024-10-25 RX ADMIN — CEFEPIME 2 G: 1 INJECTION, POWDER, FOR SOLUTION INTRAMUSCULAR; INTRAVENOUS at 09:10

## 2024-10-25 RX ADMIN — POTASSIUM & SODIUM PHOSPHATES POWDER PACK 280-160-250 MG 1 PACKET: 280-160-250 PACK at 08:10

## 2024-10-25 RX ADMIN — ENOXAPARIN SODIUM 40 MG: 40 INJECTION SUBCUTANEOUS at 04:10

## 2024-10-25 RX ADMIN — Medication 800 MG: at 10:10

## 2024-10-25 RX ADMIN — CEFEPIME 2 G: 1 INJECTION, POWDER, FOR SOLUTION INTRAMUSCULAR; INTRAVENOUS at 08:10

## 2024-10-25 RX ADMIN — LEVOTHYROXINE SODIUM 100 MCG: 0.1 TABLET ORAL at 06:10

## 2024-10-25 RX ADMIN — POTASSIUM & SODIUM PHOSPHATES POWDER PACK 280-160-250 MG 1 PACKET: 280-160-250 PACK at 04:10

## 2024-10-25 RX ADMIN — POTASSIUM BICARBONATE 50 MEQ: 977.5 TABLET, EFFERVESCENT ORAL at 06:10

## 2024-10-25 RX ADMIN — THIAMINE HCL TAB 100 MG 100 MG: 100 TAB at 08:10

## 2024-10-25 RX ADMIN — PANTOPRAZOLE SODIUM 40 MG: 40 TABLET, DELAYED RELEASE ORAL at 08:10

## 2024-10-25 RX ADMIN — POTASSIUM & SODIUM PHOSPHATES POWDER PACK 280-160-250 MG 1 PACKET: 280-160-250 PACK at 12:10

## 2024-10-25 NOTE — PROGRESS NOTES
"[facility] Medicine  Progress Note    Patient Name: Nicolasa Madera  MRN: [id number]  Patient Class: IP- Inpatient   Admission Date: 10/22/2024  Length of Stay: 2 days  Attending Physician: Ck Concepcion MD  Primary Care Provider: Page Garcia PA-C        Subjective:     Principal Problem:E coli bacteremia        HPI:  Nicolasa Madera is a 74 year old female with a past medical history of ETOH abuse, peptic ulcer disease, GI bleed, hypothyroidism, stroke, and CKD 3 presents with complaints of falling and dysuria. She has a long history of falls, tonight she fell onto her buttocks and did not hit her head nor injure herself. Her main concern is that she has been having urinary frequency, urgency and dysuria. She denies fever or chills and denies other complaint. She has a history of alcohol use, but denies recent use (last drink over a week ago). She was prescribed a librium taper but never did start it as she was not able to pick it up from the pharmacy. ED work up included a CBC, which shows chronic anemia, slightly lower than baseline. CMP shows hypokalemia of  2.8 and moderate malnutrition. Urinalysis positive for evidence of infection. She was given one dose of Rocephin in the ED. Hospital Medicine consulted for admission and further management.    Overview/Hospital Course:  Nicolasa Madera is a 74 year old female with a past medical history of EtOH dependence, PUD, hypothyroidism, and anemia who presented with E coli UTI with bacteremia as well as another fall. She is improving on cefepime. Repeat blood cultures are negative. The patient continues to spike fevers as of 10/25, yet they are progressively decreasing. PT/OT has been consulted and recommends moderate intensity therapy. She is being monitored for EtOH withdrawal. She is pending SNF placement.    Interval History: see "Hospital Course"    Review of Systems   Constitutional:  Positive for activity change, " chills and fever.   Genitourinary:  Positive for dysuria, frequency and urgency.   Musculoskeletal:  Positive for gait problem.   Neurological:  Positive for weakness.     Objective:     Vital Signs (Most Recent):  Temp: 99.7 °F (37.6 °C) (10/25/24 0730)  Pulse: 80 (10/25/24 0730)  Resp: 18 (10/25/24 0730)  BP: (!) 141/75 (10/25/24 0730)  SpO2: 95 % (10/25/24 0730) Vital Signs (24h Range):  Temp:  [98 °F (36.7 °C)-100.6 °F (38.1 °C)] 99.7 °F (37.6 °C)  Pulse:  [77-91] 80  Resp:  [17-20] 18  SpO2:  [93 %-99 %] 95 %  BP: (110-166)/(60-75) 141/75     Weight: 77.7 kg (171 lb 4.8 oz)  Body mass index is 31.33 kg/m².    Intake/Output Summary (Last 24 hours) at 10/25/2024 0834  Last data filed at 10/25/2024 0451  Gross per 24 hour   Intake 840 ml   Output 450 ml   Net 390 ml         Physical Exam  Vitals and nursing note reviewed.   Constitutional:       General: She is not in acute distress.     Appearance: She is obese.   HENT:      Head: Normocephalic and atraumatic.      Right Ear: External ear normal.      Left Ear: External ear normal.      Nose: Nose normal.      Mouth/Throat:      Mouth: Mucous membranes are moist.      Pharynx: Oropharynx is clear.   Eyes:      Extraocular Movements: Extraocular movements intact.      Conjunctiva/sclera: Conjunctivae normal.   Cardiovascular:      Rate and Rhythm: Normal rate and regular rhythm.      Pulses: Normal pulses.      Heart sounds: Normal heart sounds.   Pulmonary:      Effort: Pulmonary effort is normal.      Breath sounds: Normal breath sounds.   Abdominal:      General: Bowel sounds are normal. There is no distension.      Palpations: Abdomen is soft.      Tenderness: There is no abdominal tenderness.   Musculoskeletal:         General: Normal range of motion.      Cervical back: Normal range of motion and neck supple.      Right lower leg: No edema.      Left lower leg: No edema.   Skin:     Findings: Bruising present.   Neurological:      Mental Status: She is alert  and oriented to person, place, and time.      Motor: Weakness present.   Psychiatric:         Mood and Affect: Mood normal.         Behavior: Behavior normal.             Significant Labs: All pertinent labs within the past 24 hours have been reviewed.    Significant Imaging: I have reviewed all pertinent imaging results/findings within the past 24 hours.    Assessment/Plan:      * E coli bacteremia  Likely from urinary source.  -Cefepime  -Follow repeat blood cultures; negative thus far  -If fever continues or worsens may consider CT of the abdomen and pelvis      Debility  -Fall precautions  -PT/OT  -CM consulted for SNF      Urinary tract infection without hematuria  E coli.  -Continue cefepime  -Consider CT A/P if fevers continue      Hypophosphatemia  -Replete PRN  -Trend P    Alcohol dependence with alcohol-induced mood disorder  -Monitor for signs and symptoms of withdrawal  -Patient counseled on cessation  -Thiamine, MVI and folic acid    Stage 3 chronic kidney disease  Stable.  -Renally dose medications  -Avoid nephrotoxic agents    Anemia  Stable.  -Trend Hgb with CBC      Hypothyroidism  -Continue home Synthroid      VTE Risk Mitigation (From admission, onward)           Ordered     enoxaparin injection 40 mg  Every 24 hours         10/22/24 0908     IP VTE HIGH RISK PATIENT  Once         10/22/24 0419     Place sequential compression device  Until discontinued         10/22/24 0419                    Discharge Planning   SYLWIA: 10/25/2024     Code Status: Full Code   Is the patient medically ready for discharge?:     Reason for patient still in hospital (select all that apply): Patient trending condition, Laboratory test, Treatment, and Pending disposition  Discharge Plan A: Skilled Nursing Facility                  Ck Concepcion MD  Department of Hospital Medicine   [facility]/Surg

## 2024-10-25 NOTE — PLAN OF CARE
Called [facility] [phone], spoke to [facility] and SNF approved, auth #[id number].  Called and informed Ginna at [facility] and plan for dc to SNF Monday.     10/25/24 1416   Post-Acute Status   Post-Acute Authorization Placement   Post-Acute Placement Status Pending medical clearance/testing   Discharge Plan   Discharge Plan A Skilled Nursing Facility   Discharge Plan B [facility]

## 2024-10-25 NOTE — PT/OT/SLP PROGRESS
Physical Therapy Treatment    Patient Name:  Nicolasa Madera   MRN:  [id number]    Recommendations:     Discharge Recommendations: Moderate Intensity Therapy  Discharge Equipment Recommendations: to be determined by next level of care  Barriers to discharge: None    Assessment:     Nicolasa Madera is a 74 y.o. female admitted with a medical diagnosis of E coli bacteremia.  She presents with the following impairments/functional limitations: weakness, impaired endurance, impaired self care skills, impaired functional mobility, gait instability, impaired balance, decreased lower extremity function, decreased ROM . Awake, alert, supine in bed.  Agreed to mobilize.  Transferred sup  > sit with Min A. Sit > stand with rw and Min A.  Ambulated 25' , returning to restroom with rw and Min A, short steps noted, verbal cues to remain in walker.   Min A to pull down diaper , on / off commode . Required A with hygiene in standing post BM.  Ambulated 10' additional, diaper applied, sat up in chair. Requires A and extra time for mobility , moves slowly.    Rehab Prognosis: Good; patient would benefit from acute skilled PT services to address these deficits and reach maximum level of function.    Recent Surgery: * No surgery found *      Plan:     During this hospitalization, patient to be seen 5 x/week to address the identified rehab impairments via gait training, therapeutic activities, therapeutic exercises and progress toward the following goals:    Plan of Care Expires:  11/22/24    Subjective     Chief Complaint: none stated  Patient/Family Comments/goals: to go to facility  Pain/Comfort:  Pain Rating 1: 0/10      Objective:     Communicated with nurse Kelley prior to session.  Patient found supine with bed alarm, telemetry upon PT entry to room.     General Precautions: Standard, fall  Orthopedic Precautions: N/A  Braces: N/A  Respiratory Status: Room air     Functional Mobility:  Bed Mobility:     Supine to Sit:  minimum assistance  Transfers:     Sit to Stand:  minimum assistance with rolling walker  Gait: 25' + 10' with rw and Min A. Commode transfer between each.       AM-PAC 6 CLICK MOBILITY          Treatment & Education:  Ambulated with rw and Min A, slowly, short steps noted.  Verbal cues for safe technique.     Patient left up in chair with all lines intact, call button in reach, chair alarm on, and nurse Warren notified..    GOALS:   Multidisciplinary Problems       Physical Therapy Goals          Problem: Physical Therapy    Goal Priority Disciplines Outcome Interventions   Physical Therapy Goal     PT, PT/OT Progressing    Description: Goals to be met by: 24    Patient will increase functional independence with mobility by performin. Supine to sit with Supervision  2. Sit to stand transfer with Supervision  3. Bed to chair transfer with Supervision using LRAD  4. Gait  x 250 feet with Supervision using LRAD  5. Lower extremity exercise program x20 reps per handout, with supervision                       Time Tracking:     PT Received On: 10/25/24  PT Start Time: 0900     PT Stop Time: 0925  PT Total Time (min): 25 min     Billable Minutes: Gait Training 15min and Therapeutic Activity 10min    Treatment Type: Treatment  PT/PTA: PTA     Number of PTA visits since last PT visit: 3     10/25/2024

## 2024-10-25 NOTE — PHYSICIAN QUERY
Due to the conflicting clinical picture, please clinically validate the diagnosis. If validated, please provide additional clinical support for the diagnosis.   The condition is not confirmed and/or it has been ruled out

## 2024-10-25 NOTE — SUBJECTIVE & OBJECTIVE
"Interval History: see "Hospital Course"    Review of Systems   Constitutional:  Positive for activity change, chills and fever.   Genitourinary:  Positive for dysuria, frequency and urgency.   Musculoskeletal:  Positive for gait problem.   Neurological:  Positive for weakness.     Objective:     Vital Signs (Most Recent):  Temp: 99.7 °F (37.6 °C) (10/25/24 0730)  Pulse: 80 (10/25/24 0730)  Resp: 18 (10/25/24 0730)  BP: (!) 141/75 (10/25/24 0730)  SpO2: 95 % (10/25/24 0730) Vital Signs (24h Range):  Temp:  [98 °F (36.7 °C)-100.6 °F (38.1 °C)] 99.7 °F (37.6 °C)  Pulse:  [77-91] 80  Resp:  [17-20] 18  SpO2:  [93 %-99 %] 95 %  BP: (110-166)/(60-75) 141/75     Weight: 77.7 kg (171 lb 4.8 oz)  Body mass index is 31.33 kg/m².    Intake/Output Summary (Last 24 hours) at 10/25/2024 0834  Last data filed at 10/25/2024 0451  Gross per 24 hour   Intake 840 ml   Output 450 ml   Net 390 ml         Physical Exam  Vitals and nursing note reviewed.   Constitutional:       General: She is not in acute distress.     Appearance: She is obese.   HENT:      Head: Normocephalic and atraumatic.      Right Ear: External ear normal.      Left Ear: External ear normal.      Nose: Nose normal.      Mouth/Throat:      Mouth: Mucous membranes are moist.      Pharynx: Oropharynx is clear.   Eyes:      Extraocular Movements: Extraocular movements intact.      Conjunctiva/sclera: Conjunctivae normal.   Cardiovascular:      Rate and Rhythm: Normal rate and regular rhythm.      Pulses: Normal pulses.      Heart sounds: Normal heart sounds.   Pulmonary:      Effort: Pulmonary effort is normal.      Breath sounds: Normal breath sounds.   Abdominal:      General: Bowel sounds are normal. There is no distension.      Palpations: Abdomen is soft.      Tenderness: There is no abdominal tenderness.   Musculoskeletal:         General: Normal range of motion.      Cervical back: Normal range of motion and neck supple.      Right lower leg: No edema.      Left " lower leg: No edema.   Skin:     Findings: Bruising present.   Neurological:      Mental Status: She is alert and oriented to person, place, and time.      Motor: Weakness present.   Psychiatric:         Mood and Affect: Mood normal.         Behavior: Behavior normal.             Significant Labs: All pertinent labs within the past 24 hours have been reviewed.    Significant Imaging: I have reviewed all pertinent imaging results/findings within the past 24 hours.

## 2024-10-25 NOTE — ASSESSMENT & PLAN NOTE
Likely from urinary source.  -Cefepime  -Follow repeat blood cultures; negative thus far  -If fever continues or worsens may consider CT of the abdomen and pelvis

## 2024-10-25 NOTE — PLAN OF CARE
Problem: Physical Therapy  Goal: Physical Therapy Goal  Description: Goals to be met by: 24    Patient will increase functional independence with mobility by performin. Supine to sit with Supervision  2. Sit to stand transfer with Supervision  3. Bed to chair transfer with Supervision using LRAD  4. Gait  x 250 feet with Supervision using LRAD  5. Lower extremity exercise program x20 reps per handout, with supervision  Outcome: Progressing   Ambulate with rw and assistance for safety.    HPI:  · HPI Objective Statement: 74 yo M with diarrhea.  Pt. sent in by family because he was covered in feces.  Pt. admits to diarrhea, abd pain, no other complaints.  Pt. currently getting chemo for lung Ca.  Pt's family told EMS, "we can't clean him all the time,"    	ROS: negative for fever, cough, headache, chest pain, shortness of breath, abd pain, nausea, vomiting, diarrhea, rash, paresthesia, and weakness--all other systems reviewed are negative.   PMH: HTN, stomach cancer, ?lung cancer; Meds: See EMR; SH: Denies smoking/drinking/drug use (11 Jul 2018 17:42)  PAST MEDICAL & SURGICAL HISTORY:  Stomach cancer  Hypertension  No significant past surgical history  HPI:        SYMPTOMS---	                   DIDILITY    OTHER REVIEW OF SYSTEMS:  UNABLE TO OBTAIN  due to: SEDATION, CONFUSION    Baseline ADLs (prior to admission):  Independent [ ] moderately [ ] fully   Dependent   [ ] moderately [ ]fully    	                 T(C): 36.5 (07-14-18 @ 11:46), Max: 36.5 (07-14-18 @ 11:46)  T(F): 97.7 (07-14-18 @ 11:46), Max: 97.7 (07-14-18 @ 11:46)  HR: 72 (07-14-18 @ 11:46) (44 - 81)  BP: 116/47 (07-14-18 @ 11:46) (116/47 - 139/79)  RR: 16 (07-14-18 @ 11:46) (16 - 18)  SpO2: 97% (07-14-18 @ 11:46) (97% - 98%)  Wt(kg): --      GENERAL:    EYES : non icteric :               Other:     HEENT:      	atraumatic, normocephalic, PEERLA, sclera anicteric, dry oral mucosa   NECK:          Trach:        Vent:  CVS:          Tachypnic:               Bradycardic:              Normal:		   RESP:        tachypnic:                Dyspenic :                  Comfortable:	  GI:          NGT/PEG 	  :      may      	  MUSC:       	Normal	Weakness	  Edema	   NEURO:     	No focal deficit	  Focal  PSYCH:           Alert	Oriented	  Lethargic     SKIN:         	Normal	  Other  LYMPH:      	Normal	  Other  	                     ALLERGIES: No Known Allergies    MEDICATIONS  (STANDING):  ciprofloxacin   IVPB      ciprofloxacin   IVPB 400 milliGRAM(s) IV Intermittent every 12 hours  enoxaparin Injectable 40 milliGRAM(s) SubCutaneous every 24 hours  lactobacillus acidophilus 1 Tablet(s) Oral daily  metroNIDAZOLE  IVPB 500 milliGRAM(s) IV Intermittent every 8 hours  metroNIDAZOLE  IVPB        MEDICATIONS  (PRN):  ondansetron Injectable 4 milliGRAM(s) IV Push every 6 hours PRN Nausea and/or Vomiting  oxyCODONE    5 mG/acetaminophen 325 mG 1 Tablet(s) Oral every 6 hours PRN Moderate Pain (4 - 6)    	                   LABS REVIEWED    H/H: 12.6/38.8   07-14 @ 07:08    BUN/CR: 21/0.92  07-14 @ 07:08    Albumin: 2.8  07-14 @ 07:08    CRP/SED RATE: --/-- 07-14 @ 07:08    Sodium: 138  07-14 @ 07:08                  	  ADVANCED DIRECTIVES:   FULL CODE [  x ]  DNR [  ]    DNI [  ]     MOLST [  ]  PSYCHOSOCIAL-SPIRITUAL ASSESSMENT:       Reviewed       GOALS OF CARE DISCUSSION       Palliative care info/counseling provided	           Family meeting       Advanced Directives addressed	           See previous Palliative Medicine Note  	        REFERRALS	        Palliative Med        Unit SW/Case Mgmt              Speech/Swallow        Hospice             Nutrition         Functional Assessment: KPS:   low            PPS: low      FINAL IMPRESSION AND RECOMMENDATIONS: overall prognosis poor   wants full intervention

## 2024-10-25 NOTE — PT/OT/SLP PROGRESS
Occupational Therapy      Patient Name:  Nicolasa Madera   MRN:  [id number]    Attempted OT tx. Patient declined for the morning. Will re-attempt later today if schedule permits.     10/25/2024

## 2024-10-26 LAB
ANION GAP SERPL CALC-SCNC: 9 MMOL/L (ref 8–16)
BASOPHILS # BLD AUTO: 0.04 K/UL (ref 0–0.2)
BASOPHILS NFR BLD: 0.8 % (ref 0–1.9)
BUN SERPL-MCNC: 13 MG/DL (ref 8–23)
CALCIUM SERPL-MCNC: 9.1 MG/DL (ref 8.7–10.5)
CHLORIDE SERPL-SCNC: 108 MMOL/L (ref 95–110)
CO2 SERPL-SCNC: 23 MMOL/L (ref 23–29)
CREAT SERPL-MCNC: 0.9 MG/DL (ref 0.5–1.4)
DIFFERENTIAL METHOD BLD: ABNORMAL
EOSINOPHIL # BLD AUTO: 0.1 K/UL (ref 0–0.5)
EOSINOPHIL NFR BLD: 1.9 % (ref 0–8)
ERYTHROCYTE [DISTWIDTH] IN BLOOD BY AUTOMATED COUNT: 24.6 % (ref 11.5–14.5)
EST. GFR  (NO RACE VARIABLE): >60 ML/MIN/1.73 M^2
GLUCOSE SERPL-MCNC: 95 MG/DL (ref 70–110)
HCT VFR BLD AUTO: 25.2 % (ref 37–48.5)
HGB BLD-MCNC: 8.4 G/DL (ref 12–16)
IMM GRANULOCYTES # BLD AUTO: 0.04 K/UL (ref 0–0.04)
IMM GRANULOCYTES NFR BLD AUTO: 0.8 % (ref 0–0.5)
LYMPHOCYTES # BLD AUTO: 0.9 K/UL (ref 1–4.8)
LYMPHOCYTES NFR BLD: 18.5 % (ref 18–48)
MAGNESIUM SERPL-MCNC: 1.5 MG/DL (ref 1.6–2.6)
MCH RBC QN AUTO: 27.6 PG (ref 27–31)
MCHC RBC AUTO-ENTMCNC: 33.3 G/DL (ref 32–36)
MCV RBC AUTO: 83 FL (ref 82–98)
MONOCYTES # BLD AUTO: 0.5 K/UL (ref 0.3–1)
MONOCYTES NFR BLD: 9.7 % (ref 4–15)
NEUTROPHILS # BLD AUTO: 3.3 K/UL (ref 1.8–7.7)
NEUTROPHILS NFR BLD: 68.3 % (ref 38–73)
NRBC BLD-RTO: 0 /100 WBC
PHOSPHATE SERPL-MCNC: 1.7 MG/DL (ref 2.7–4.5)
PLATELET # BLD AUTO: 218 K/UL (ref 150–450)
PMV BLD AUTO: 10.8 FL (ref 9.2–12.9)
POTASSIUM SERPL-SCNC: 4.1 MMOL/L (ref 3.5–5.1)
RBC # BLD AUTO: 3.04 M/UL (ref 4–5.4)
SODIUM SERPL-SCNC: 140 MMOL/L (ref 136–145)
WBC # BLD AUTO: 4.86 K/UL (ref 3.9–12.7)

## 2024-10-26 PROCEDURE — 25000003 PHARM REV CODE 250: Performed by: STUDENT IN AN ORGANIZED HEALTH CARE EDUCATION/TRAINING PROGRAM

## 2024-10-26 PROCEDURE — 63600175 PHARM REV CODE 636 W HCPCS

## 2024-10-26 PROCEDURE — 63600175 PHARM REV CODE 636 W HCPCS: Performed by: STUDENT IN AN ORGANIZED HEALTH CARE EDUCATION/TRAINING PROGRAM

## 2024-10-26 PROCEDURE — 84100 ASSAY OF PHOSPHORUS: CPT | Performed by: NURSE PRACTITIONER

## 2024-10-26 PROCEDURE — 85025 COMPLETE CBC W/AUTO DIFF WBC: CPT | Performed by: NURSE PRACTITIONER

## 2024-10-26 PROCEDURE — 25000003 PHARM REV CODE 250: Performed by: NURSE PRACTITIONER

## 2024-10-26 PROCEDURE — 11000001 HC ACUTE MED/SURG PRIVATE ROOM

## 2024-10-26 PROCEDURE — 80048 BASIC METABOLIC PNL TOTAL CA: CPT | Performed by: STUDENT IN AN ORGANIZED HEALTH CARE EDUCATION/TRAINING PROGRAM

## 2024-10-26 PROCEDURE — 83735 ASSAY OF MAGNESIUM: CPT | Performed by: NURSE PRACTITIONER

## 2024-10-26 RX ADMIN — POTASSIUM & SODIUM PHOSPHATES POWDER PACK 280-160-250 MG 1 PACKET: 280-160-250 PACK at 08:10

## 2024-10-26 RX ADMIN — POTASSIUM & SODIUM PHOSPHATES POWDER PACK 280-160-250 MG 1 PACKET: 280-160-250 PACK at 09:10

## 2024-10-26 RX ADMIN — THIAMINE HCL TAB 100 MG 100 MG: 100 TAB at 09:10

## 2024-10-26 RX ADMIN — CEFEPIME 2 G: 1 INJECTION, POWDER, FOR SOLUTION INTRAMUSCULAR; INTRAVENOUS at 09:10

## 2024-10-26 RX ADMIN — THERA TABS 1 TABLET: TAB at 09:10

## 2024-10-26 RX ADMIN — POTASSIUM & SODIUM PHOSPHATES POWDER PACK 280-160-250 MG 1 PACKET: 280-160-250 PACK at 06:10

## 2024-10-26 RX ADMIN — ALUMINUM HYDROXIDE, MAGNESIUM HYDROXIDE, AND SIMETHICONE 30 ML: 1200; 120; 1200 SUSPENSION ORAL at 03:10

## 2024-10-26 RX ADMIN — PANTOPRAZOLE SODIUM 40 MG: 40 TABLET, DELAYED RELEASE ORAL at 09:10

## 2024-10-26 RX ADMIN — ALUMINUM HYDROXIDE, MAGNESIUM HYDROXIDE, AND SIMETHICONE 30 ML: 1200; 120; 1200 SUSPENSION ORAL at 11:10

## 2024-10-26 RX ADMIN — LEVOTHYROXINE SODIUM 100 MCG: 0.1 TABLET ORAL at 06:10

## 2024-10-26 RX ADMIN — ACETAMINOPHEN 650 MG: 325 TABLET ORAL at 03:10

## 2024-10-26 RX ADMIN — ENOXAPARIN SODIUM 40 MG: 40 INJECTION SUBCUTANEOUS at 06:10

## 2024-10-26 RX ADMIN — CEFEPIME 2 G: 1 INJECTION, POWDER, FOR SOLUTION INTRAMUSCULAR; INTRAVENOUS at 08:10

## 2024-10-26 RX ADMIN — Medication 800 MG: at 03:10

## 2024-10-26 RX ADMIN — Medication 800 MG: at 09:10

## 2024-10-26 RX ADMIN — FOLIC ACID 1 MG: 1 TABLET ORAL at 09:10

## 2024-10-26 NOTE — SUBJECTIVE & OBJECTIVE
Interval History:  Patient seen and examined.  Denies dysuria at this time.  Awaiting skilled nursing facility on Monday    Review of Systems   Constitutional:  Positive for activity change. Negative for chills and fever.   Genitourinary:  Negative for dysuria, frequency and urgency.   Musculoskeletal:  Positive for gait problem.   Neurological:  Positive for weakness.     Objective:     Vital Signs (Most Recent):  Temp: 97.9 °F (36.6 °C) (10/26/24 0825)  Pulse: 81 (10/26/24 0825)  Resp: 18 (10/26/24 0825)  BP: (!) 159/70 (10/26/24 0825)  SpO2: 96 % (10/26/24 0825) Vital Signs (24h Range):  Temp:  [97.2 °F (36.2 °C)-98.9 °F (37.2 °C)] 97.9 °F (36.6 °C)  Pulse:  [78-81] 81  Resp:  [16-18] 18  SpO2:  [93 %-98 %] 96 %  BP: (133-166)/(64-82) 159/70     Weight: 77.7 kg (171 lb 4.8 oz)  Body mass index is 31.33 kg/m².    Intake/Output Summary (Last 24 hours) at 10/26/2024 1238  Last data filed at 10/26/2024 0727  Gross per 24 hour   Intake 750 ml   Output 1200 ml   Net -450 ml         Physical Exam  Vitals and nursing note reviewed.   Constitutional:       General: She is not in acute distress.     Appearance: She is obese.   HENT:      Head: Normocephalic and atraumatic.      Right Ear: External ear normal.      Left Ear: External ear normal.      Nose: Nose normal.      Mouth/Throat:      Mouth: Mucous membranes are moist.      Pharynx: Oropharynx is clear.   Eyes:      Extraocular Movements: Extraocular movements intact.      Conjunctiva/sclera: Conjunctivae normal.   Cardiovascular:      Rate and Rhythm: Normal rate and regular rhythm.      Pulses: Normal pulses.      Heart sounds: Normal heart sounds.   Pulmonary:      Effort: Pulmonary effort is normal.      Breath sounds: Normal breath sounds.   Abdominal:      General: Bowel sounds are normal. There is no distension.      Palpations: Abdomen is soft.      Tenderness: There is no abdominal tenderness.   Musculoskeletal:         General: Normal range of motion.       Cervical back: Normal range of motion and neck supple.      Right lower leg: No edema.      Left lower leg: No edema.   Skin:     Findings: Bruising present.   Neurological:      Mental Status: She is alert and oriented to person, place, and time.      Motor: Weakness present.   Psychiatric:         Mood and Affect: Mood normal.         Behavior: Behavior normal.             Significant Labs: All pertinent labs within the past 24 hours have been reviewed.    Significant Imaging: I have reviewed all pertinent imaging results/findings within the past 24 hours.

## 2024-10-26 NOTE — ASSESSMENT & PLAN NOTE
Likely from urinary source.  -Cefepime  -Follow repeat blood cultures; negative thus far  - fever has resolved

## 2024-10-26 NOTE — PROGRESS NOTES
[facility] Medicine  Progress Note    Patient Name: Nicolasa Madera  MRN: [id number]  Patient Class: IP- Inpatient   Admission Date: 10/22/2024  Length of Stay: 3 days  Attending Physician: Anel Marina MD  Primary Care Provider: No primary care provider on file.        Subjective:     Principal Problem:E coli bacteremia        HPI:  Nicolasa Madera is a 74 year old female with a past medical history of ETOH abuse, peptic ulcer disease, GI bleed, hypothyroidism, stroke, and CKD 3 presents with complaints of falling and dysuria. She has a long history of falls, tonight she fell onto her buttocks and did not hit her head nor injure herself. Her main concern is that she has been having urinary frequency, urgency and dysuria. She denies fever or chills and denies other complaint. She has a history of alcohol use, but denies recent use (last drink over a week ago). She was prescribed a librium taper but never did start it as she was not able to pick it up from the pharmacy. ED work up included a CBC, which shows chronic anemia, slightly lower than baseline. CMP shows hypokalemia of  2.8 and moderate malnutrition. Urinalysis positive for evidence of infection. She was given one dose of Rocephin in the ED. Hospital Medicine consulted for admission and further management.    Overview/Hospital Course:  Nicolasa Madera is a 74 year old female with a past medical history of EtOH dependence, PUD, hypothyroidism, and anemia who presented with E coli UTI with bacteremia as well as another fall. She is improving on cefepime. Repeat blood cultures are negative. The patient continues to spike fevers as of 10/25, yet they are progressively decreasing. PT/OT has been consulted and recommends moderate intensity therapy. She is being monitored for EtOH withdrawal. She is pending SNF placement.    Interval History:  Patient seen and examined.  Denies dysuria at this time.  Awaiting skilled  nursing facility on Monday    Review of Systems   Constitutional:  Positive for activity change. Negative for chills and fever.   Genitourinary:  Negative for dysuria, frequency and urgency.   Musculoskeletal:  Positive for gait problem.   Neurological:  Positive for weakness.     Objective:     Vital Signs (Most Recent):  Temp: 97.9 °F (36.6 °C) (10/26/24 0825)  Pulse: 81 (10/26/24 0825)  Resp: 18 (10/26/24 0825)  BP: (!) 159/70 (10/26/24 0825)  SpO2: 96 % (10/26/24 0825) Vital Signs (24h Range):  Temp:  [97.2 °F (36.2 °C)-98.9 °F (37.2 °C)] 97.9 °F (36.6 °C)  Pulse:  [78-81] 81  Resp:  [16-18] 18  SpO2:  [93 %-98 %] 96 %  BP: (133-166)/(64-82) 159/70     Weight: 77.7 kg (171 lb 4.8 oz)  Body mass index is 31.33 kg/m².    Intake/Output Summary (Last 24 hours) at 10/26/2024 1238  Last data filed at 10/26/2024 0727  Gross per 24 hour   Intake 750 ml   Output 1200 ml   Net -450 ml         Physical Exam  Vitals and nursing note reviewed.   Constitutional:       General: She is not in acute distress.     Appearance: She is obese.   HENT:      Head: Normocephalic and atraumatic.      Right Ear: External ear normal.      Left Ear: External ear normal.      Nose: Nose normal.      Mouth/Throat:      Mouth: Mucous membranes are moist.      Pharynx: Oropharynx is clear.   Eyes:      Extraocular Movements: Extraocular movements intact.      Conjunctiva/sclera: Conjunctivae normal.   Cardiovascular:      Rate and Rhythm: Normal rate and regular rhythm.      Pulses: Normal pulses.      Heart sounds: Normal heart sounds.   Pulmonary:      Effort: Pulmonary effort is normal.      Breath sounds: Normal breath sounds.   Abdominal:      General: Bowel sounds are normal. There is no distension.      Palpations: Abdomen is soft.      Tenderness: There is no abdominal tenderness.   Musculoskeletal:         General: Normal range of motion.      Cervical back: Normal range of motion and neck supple.      Right lower leg: No edema.       Left lower leg: No edema.   Skin:     Findings: Bruising present.   Neurological:      Mental Status: She is alert and oriented to person, place, and time.      Motor: Weakness present.   Psychiatric:         Mood and Affect: Mood normal.         Behavior: Behavior normal.             Significant Labs: All pertinent labs within the past 24 hours have been reviewed.    Significant Imaging: I have reviewed all pertinent imaging results/findings within the past 24 hours.    Assessment/Plan:      * E coli bacteremia  Likely from urinary source.  -Cefepime  -Follow repeat blood cultures; negative thus far  - fever has resolved      Hypophosphatemia  -Replete PRN  -Trend P    Debility  -Fall precautions  -PT/OT  -CM consulted for SNF      Alcohol dependence with alcohol-induced mood disorder  -Monitor for signs and symptoms of withdrawal  -Patient counseled on cessation  -Thiamine, MVI and folic acid    Stage 3 chronic kidney disease  Stable.  -Renally dose medications  -Avoid nephrotoxic agents    Urinary tract infection without hematuria  E coli.  -Continue cefepime  -fever has resolved      Anemia  Stable.  -Trend Hgb with CBC      Hypothyroidism  -Continue home Synthroid      VTE Risk Mitigation (From admission, onward)           Ordered     enoxaparin injection 40 mg  Every 24 hours         10/22/24 0908     IP VTE HIGH RISK PATIENT  Once         10/22/24 0419     Place sequential compression device  Until discontinued         10/22/24 0419                    Discharge Planning   SYLWIA: 10/28/2024     Code Status: Full Code   Is the patient medically ready for discharge?:     Reason for patient still in hospital (select all that apply): Patient trending condition and Treatment  Discharge Plan A: Skilled Nursing Facility                  Anel Marina MD  Department of Hospital Medicine   [facility]/Surg

## 2024-10-26 NOTE — PLAN OF CARE
Problem: Adult Inpatient Plan of Care  Goal: Plan of Care Review  Outcome: Progressing     Problem: Adult Inpatient Plan of Care  Goal: Absence of Hospital-Acquired Illness or Injury  Outcome: Progressing     Problem: Adult Inpatient Plan of Care  Goal: Optimal Comfort and Wellbeing  Outcome: Progressing     Problem: Adult Inpatient Plan of Care  Goal: Readiness for Transition of Care  Outcome: Progressing     Problem: Wound  Goal: Optimal Functional Ability  Outcome: Progressing     Problem: Wound  Goal: Absence of Infection Signs and Symptoms  Outcome: Progressing

## 2024-10-27 LAB
ANION GAP SERPL CALC-SCNC: 9 MMOL/L (ref 8–16)
BASOPHILS # BLD AUTO: 0.04 K/UL (ref 0–0.2)
BASOPHILS NFR BLD: 0.7 % (ref 0–1.9)
BUN SERPL-MCNC: 15 MG/DL (ref 8–23)
CALCIUM SERPL-MCNC: 9.2 MG/DL (ref 8.7–10.5)
CHLORIDE SERPL-SCNC: 106 MMOL/L (ref 95–110)
CO2 SERPL-SCNC: 24 MMOL/L (ref 23–29)
CREAT SERPL-MCNC: 1 MG/DL (ref 0.5–1.4)
DIFFERENTIAL METHOD BLD: ABNORMAL
EOSINOPHIL # BLD AUTO: 0.1 K/UL (ref 0–0.5)
EOSINOPHIL NFR BLD: 1.9 % (ref 0–8)
ERYTHROCYTE [DISTWIDTH] IN BLOOD BY AUTOMATED COUNT: 24.5 % (ref 11.5–14.5)
EST. GFR  (NO RACE VARIABLE): 58 ML/MIN/1.73 M^2
GLUCOSE SERPL-MCNC: 101 MG/DL (ref 70–110)
HCT VFR BLD AUTO: 24.4 % (ref 37–48.5)
HGB BLD-MCNC: 8.4 G/DL (ref 12–16)
IMM GRANULOCYTES # BLD AUTO: 0.09 K/UL (ref 0–0.04)
IMM GRANULOCYTES NFR BLD AUTO: 1.6 % (ref 0–0.5)
LYMPHOCYTES # BLD AUTO: 1 K/UL (ref 1–4.8)
LYMPHOCYTES NFR BLD: 17.9 % (ref 18–48)
MAGNESIUM SERPL-MCNC: 1.6 MG/DL (ref 1.6–2.6)
MCH RBC QN AUTO: 28 PG (ref 27–31)
MCHC RBC AUTO-ENTMCNC: 34.4 G/DL (ref 32–36)
MCV RBC AUTO: 81 FL (ref 82–98)
MONOCYTES # BLD AUTO: 0.5 K/UL (ref 0.3–1)
MONOCYTES NFR BLD: 8.1 % (ref 4–15)
NEUTROPHILS # BLD AUTO: 4 K/UL (ref 1.8–7.7)
NEUTROPHILS NFR BLD: 69.8 % (ref 38–73)
NRBC BLD-RTO: 0 /100 WBC
PHOSPHATE SERPL-MCNC: 2.3 MG/DL (ref 2.7–4.5)
PLATELET # BLD AUTO: 206 K/UL (ref 150–450)
PMV BLD AUTO: 9.7 FL (ref 9.2–12.9)
POTASSIUM SERPL-SCNC: 4 MMOL/L (ref 3.5–5.1)
RBC # BLD AUTO: 3 M/UL (ref 4–5.4)
SODIUM SERPL-SCNC: 139 MMOL/L (ref 136–145)
WBC # BLD AUTO: 5.69 K/UL (ref 3.9–12.7)

## 2024-10-27 PROCEDURE — 84100 ASSAY OF PHOSPHORUS: CPT | Performed by: NURSE PRACTITIONER

## 2024-10-27 PROCEDURE — 25000003 PHARM REV CODE 250: Performed by: NURSE PRACTITIONER

## 2024-10-27 PROCEDURE — 80048 BASIC METABOLIC PNL TOTAL CA: CPT | Performed by: STUDENT IN AN ORGANIZED HEALTH CARE EDUCATION/TRAINING PROGRAM

## 2024-10-27 PROCEDURE — 63600175 PHARM REV CODE 636 W HCPCS

## 2024-10-27 PROCEDURE — 63600175 PHARM REV CODE 636 W HCPCS: Performed by: STUDENT IN AN ORGANIZED HEALTH CARE EDUCATION/TRAINING PROGRAM

## 2024-10-27 PROCEDURE — 83735 ASSAY OF MAGNESIUM: CPT | Performed by: NURSE PRACTITIONER

## 2024-10-27 PROCEDURE — 11000001 HC ACUTE MED/SURG PRIVATE ROOM

## 2024-10-27 PROCEDURE — 85025 COMPLETE CBC W/AUTO DIFF WBC: CPT | Performed by: NURSE PRACTITIONER

## 2024-10-27 PROCEDURE — 25000003 PHARM REV CODE 250: Performed by: STUDENT IN AN ORGANIZED HEALTH CARE EDUCATION/TRAINING PROGRAM

## 2024-10-27 RX ADMIN — POTASSIUM & SODIUM PHOSPHATES POWDER PACK 280-160-250 MG 1 PACKET: 280-160-250 PACK at 09:10

## 2024-10-27 RX ADMIN — THERA TABS 1 TABLET: TAB at 09:10

## 2024-10-27 RX ADMIN — CEFEPIME 2 G: 1 INJECTION, POWDER, FOR SOLUTION INTRAMUSCULAR; INTRAVENOUS at 09:10

## 2024-10-27 RX ADMIN — POTASSIUM & SODIUM PHOSPHATES POWDER PACK 280-160-250 MG 1 PACKET: 280-160-250 PACK at 08:10

## 2024-10-27 RX ADMIN — LEVOTHYROXINE SODIUM 100 MCG: 0.1 TABLET ORAL at 05:10

## 2024-10-27 RX ADMIN — CEFEPIME 2 G: 1 INJECTION, POWDER, FOR SOLUTION INTRAMUSCULAR; INTRAVENOUS at 08:10

## 2024-10-27 RX ADMIN — PANTOPRAZOLE SODIUM 40 MG: 40 TABLET, DELAYED RELEASE ORAL at 09:10

## 2024-10-27 RX ADMIN — ENOXAPARIN SODIUM 40 MG: 40 INJECTION SUBCUTANEOUS at 05:10

## 2024-10-27 RX ADMIN — POTASSIUM & SODIUM PHOSPHATES POWDER PACK 280-160-250 MG 1 PACKET: 280-160-250 PACK at 01:10

## 2024-10-27 RX ADMIN — Medication 800 MG: at 09:10

## 2024-10-27 RX ADMIN — THIAMINE HCL TAB 100 MG 100 MG: 100 TAB at 09:10

## 2024-10-27 RX ADMIN — POTASSIUM & SODIUM PHOSPHATES POWDER PACK 280-160-250 MG 1 PACKET: 280-160-250 PACK at 05:10

## 2024-10-27 RX ADMIN — ALUMINUM HYDROXIDE, MAGNESIUM HYDROXIDE, AND SIMETHICONE 30 ML: 1200; 120; 1200 SUSPENSION ORAL at 03:10

## 2024-10-27 RX ADMIN — ACETAMINOPHEN 650 MG: 325 TABLET ORAL at 03:10

## 2024-10-27 RX ADMIN — ACETAMINOPHEN 650 MG: 325 TABLET ORAL at 01:10

## 2024-10-27 RX ADMIN — FOLIC ACID 1 MG: 1 TABLET ORAL at 09:10

## 2024-10-27 NOTE — PLAN OF CARE
Problem: Adult Inpatient Plan of Care  Goal: Plan of Care Review  Outcome: Progressing     Problem: Adult Inpatient Plan of Care  Goal: Absence of Hospital-Acquired Illness or Injury  Outcome: Progressing     Problem: Adult Inpatient Plan of Care  Goal: Optimal Comfort and Wellbeing  Outcome: Progressing     Problem: Wound  Goal: Optimal Functional Ability  Outcome: Progressing     Problem: Wound  Goal: Absence of Infection Signs and Symptoms  Outcome: Progressing     Problem: Infection  Goal: Absence of Infection Signs and Symptoms  Outcome: Progressing

## 2024-10-27 NOTE — SUBJECTIVE & OBJECTIVE
Interval History:  Patient seen and examined.  Reports having a good night and reports having a good appetite.  Awaiting skilled nursing facility on Monday    Review of Systems   Constitutional:  Positive for activity change. Negative for chills and fever.   Genitourinary:  Negative for dysuria, frequency and urgency.   Musculoskeletal:  Positive for gait problem.   Neurological:  Positive for weakness.     Objective:     Vital Signs (Most Recent):  Temp: 97.9 °F (36.6 °C) (10/27/24 0822)  Pulse: 85 (10/27/24 0822)  Resp: 18 (10/27/24 0822)  BP: 135/75 (10/27/24 0822)  SpO2: 97 % (10/27/24 0822) Vital Signs (24h Range):  Temp:  [97.2 °F (36.2 °C)-97.9 °F (36.6 °C)] 97.9 °F (36.6 °C)  Pulse:  [74-85] 85  Resp:  [16-18] 18  SpO2:  [93 %-98 %] 97 %  BP: (125-144)/(65-83) 135/75     Weight: 77.7 kg (171 lb 4.8 oz)  Body mass index is 31.33 kg/m².    Intake/Output Summary (Last 24 hours) at 10/27/2024 0936  Last data filed at 10/27/2024 0539  Gross per 24 hour   Intake 150 ml   Output 800 ml   Net -650 ml         Physical Exam  Vitals and nursing note reviewed.   Constitutional:       General: She is not in acute distress.     Appearance: She is obese.   HENT:      Head: Normocephalic and atraumatic.      Right Ear: External ear normal.      Left Ear: External ear normal.      Nose: Nose normal.      Mouth/Throat:      Mouth: Mucous membranes are moist.      Pharynx: Oropharynx is clear.   Eyes:      Extraocular Movements: Extraocular movements intact.      Conjunctiva/sclera: Conjunctivae normal.   Cardiovascular:      Rate and Rhythm: Normal rate and regular rhythm.      Pulses: Normal pulses.      Heart sounds: Normal heart sounds.   Pulmonary:      Effort: Pulmonary effort is normal.      Breath sounds: Normal breath sounds.   Abdominal:      General: Bowel sounds are normal. There is no distension.      Palpations: Abdomen is soft.      Tenderness: There is no abdominal tenderness.   Musculoskeletal:          General: Normal range of motion.      Cervical back: Normal range of motion and neck supple.      Right lower leg: No edema.      Left lower leg: No edema.   Skin:     Findings: Bruising present.   Neurological:      Mental Status: She is alert and oriented to person, place, and time.      Motor: Weakness present.   Psychiatric:         Mood and Affect: Mood normal.         Behavior: Behavior normal.             Significant Labs: All pertinent labs within the past 24 hours have been reviewed.    Significant Imaging: I have reviewed all pertinent imaging results/findings within the past 24 hours.

## 2024-10-27 NOTE — PROGRESS NOTES
SUBJECTIVE:    Chief Complaint   Patient presents with   • Pharyngitis     restarted yesterday   • Cough     dry cough- restarted yesterday   • Fever     fever + generalized achiness- started last night (102.6F highest)         Patient is a 11 year old female who presents with complains of upper respiratory symptoms that has been present for yesterday      Symptoms started with sore throat, cough , fever, chills, bodyaches.       The above symptoms are associated with the following:      EYE Problems: none.      Cough symptomS:  cough  mild, non productive, congested     FEVER:  102-103.         ROS:  APPETITE: decreased solids but taking good liquids  NECK Issues:  None  Lungs: No difficulty breathing or wheezing.  Heart:  No chest pain.  Abdomen:  No abdomen pain, vomiting,  diarrhea or constipation.    Neuro:  No headache    Urinary:  No urination issues.  SKIN:  No rashes.      PMH:    Patient Active Problem List   Diagnosis   • Routine infant or child health check   • Contracture of toe of left foot           ALLERGIES:    ALLERGIES: no known allergies.      MEDICATION:  Current Outpatient Medications   Medication Sig Dispense Refill   • clotrimazole-betamethasone (LOTRISONE) 1-0.05 % cream Apply topically 2 times daily. 30 g 0   • hydroCORTisone (CORTIZONE) 2.5 % ointment Apply 3 times daily       No current facility-administered medications for this visit.            Social History:  Social History     Tobacco Use   • Smoking status: Not on file   Substance Use Topics   • Alcohol use: Not on file   • Drug use: Not on file           OBJECTIVE:    Vitals:    11/23/19 1723   BP: 100/58   Pulse: 127   Resp: 16   Temp: (!) 102.6 °F (39.2 °C)   TempSrc: Tympanic   SpO2: 99%   Weight: 45 kg (99 lb 3.2 oz)         General appearance: Alert and in no apparent distress.    Right Ear Exam: External auditory canal without erythema, swelling, or discharge., TM's intact without erythema, bulging, retraction, or fluid  [facility]  Progress Note    Patient Name: Nicolasa Madera  MRN: [id number]  Patient Class: IP- Inpatient   Admission Date: 10/22/2024  Length of Stay: 4 days  Attending Physician: Anel Marina MD  Primary Care Provider: No primary care provider on file.        Subjective:     Principal Problem:E coli bacteremia        HPI:  Nicolasa Madera is a 74 year old female with a past medical history of ETOH abuse, peptic ulcer disease, GI bleed, hypothyroidism, stroke, and CKD 3 presents with complaints of falling and dysuria. She has a long history of falls, tonight she fell onto her buttocks and did not hit her head nor injure herself. Her main concern is that she has been having urinary frequency, urgency and dysuria. She denies fever or chills and denies other complaint. She has a history of alcohol use, but denies recent use (last drink over a week ago). She was prescribed a librium taper but never did start it as she was not able to pick it up from the pharmacy. ED work up included a CBC, which shows chronic anemia, slightly lower than baseline. CMP shows hypokalemia of  2.8 and moderate malnutrition. Urinalysis positive for evidence of infection. She was given one dose of Rocephin in the ED. Hospital Medicine consulted for admission and further management.    Overview/Hospital Course:  Nicolasa Madera is a 74 year old female with a past medical history of EtOH dependence, PUD, hypothyroidism, and anemia who presented with E coli UTI with bacteremia as well as another fall. She is improving on cefepime. Repeat blood cultures are negative. The patient continues to spike fevers as of 10/25, yet they are progressively decreasing. PT/OT has been consulted and recommends moderate intensity therapy. She is being monitored for EtOH withdrawal. She is pending SNF placement.    Interval History:  Patient seen and examined.  Reports having a good night and reports having a  good appetite.  Awaiting skilled nursing facility on Monday    Review of Systems   Constitutional:  Positive for activity change. Negative for chills and fever.   Genitourinary:  Negative for dysuria, frequency and urgency.   Musculoskeletal:  Positive for gait problem.   Neurological:  Positive for weakness.     Objective:     Vital Signs (Most Recent):  Temp: 97.9 °F (36.6 °C) (10/27/24 0822)  Pulse: 85 (10/27/24 0822)  Resp: 18 (10/27/24 0822)  BP: 135/75 (10/27/24 0822)  SpO2: 97 % (10/27/24 0822) Vital Signs (24h Range):  Temp:  [97.2 °F (36.2 °C)-97.9 °F (36.6 °C)] 97.9 °F (36.6 °C)  Pulse:  [74-85] 85  Resp:  [16-18] 18  SpO2:  [93 %-98 %] 97 %  BP: (125-144)/(65-83) 135/75     Weight: 77.7 kg (171 lb 4.8 oz)  Body mass index is 31.33 kg/m².    Intake/Output Summary (Last 24 hours) at 10/27/2024 0936  Last data filed at 10/27/2024 0539  Gross per 24 hour   Intake 150 ml   Output 800 ml   Net -650 ml         Physical Exam  Vitals and nursing note reviewed.   Constitutional:       General: She is not in acute distress.     Appearance: She is obese.   HENT:      Head: Normocephalic and atraumatic.      Right Ear: External ear normal.      Left Ear: External ear normal.      Nose: Nose normal.      Mouth/Throat:      Mouth: Mucous membranes are moist.      Pharynx: Oropharynx is clear.   Eyes:      Extraocular Movements: Extraocular movements intact.      Conjunctiva/sclera: Conjunctivae normal.   Cardiovascular:      Rate and Rhythm: Normal rate and regular rhythm.      Pulses: Normal pulses.      Heart sounds: Normal heart sounds.   Pulmonary:      Effort: Pulmonary effort is normal.      Breath sounds: Normal breath sounds.   Abdominal:      General: Bowel sounds are normal. There is no distension.      Palpations: Abdomen is soft.      Tenderness: There is no abdominal tenderness.   Musculoskeletal:         General: Normal range of motion.      Cervical back: Normal range of motion and neck supple.      Right  level.  Left Ear Exam:  External auditory canal without erythema, swelling, or discharge., TM's intact without erythema, bulging, retraction, or fluid level.   Eyes:   Clear without injection or discharge  Throat: pink and moist without edema, erythema or exudates or signs of abscess.  Neck: supple without cervical lymphadenopathy.  No meningismus.  Lungs: clear to auscultation without wheezes, rhonchi, rales, retractions or stridors.  Heart:  Regular rate and rhythm.  Extremities: unremarkable  Skin: no rash       Lab Test:     Results for orders placed or performed in visit on 11/23/19   POCT RAPID STREP A   Result Value Ref Range    GRP A STREP Negative Negative    Internal Procedural Controls Acceptable Yes    POCT INFLUENZA A/B   Result Value Ref Range    Rapid Influenza A Ag Positive (A)     Rapid Influenza B Ag Negative (N)    STREPTOCOCCUS GROUP A (STREPTOCOCCUS PYOGENES), BACTERIAL CULTURE   Result Value Ref Range    FINAL REPORT Microbiology results            ASSESSMENT/PLAN:     Influenza        PLAN:      Orders Placed This Encounter   • POCT Rapid strep A   • POCT Flu, Influenza, Rapid A/B   • STREPTOCOCCUS GROUP A (STREPTOCOCCUS PYOGENES), BACTERIAL CULTURE   • oseltamivir (TAMIFLU) 6 MG/ML suspension       See orders for medications prescribed. Discussed with patient the side effects of medication prescribed and precaution to take.  D/w with parent the side effect of Tamiflu:   •People with the flu can have nervous system problems and behavior problems that can lead to death. The chance may be higher in children. Call your doctor right away if you have change in thinking clearly and with logic, change in the way you act, speech problems, shakiness, seizures, or hallucinations.       See AVS with details of treatment and plan.    Parent was given the AVS instructions to keep after it was was orally reviewed with patient regarding treatment and plan.  Parent voiced understanding and was in an agreement  lower leg: No edema.      Left lower leg: No edema.   Skin:     Findings: Bruising present.   Neurological:      Mental Status: She is alert and oriented to person, place, and time.      Motor: Weakness present.   Psychiatric:         Mood and Affect: Mood normal.         Behavior: Behavior normal.             Significant Labs: All pertinent labs within the past 24 hours have been reviewed.    Significant Imaging: I have reviewed all pertinent imaging results/findings within the past 24 hours.    Assessment/Plan:      * E coli bacteremia  Likely from urinary source.  -Cefepime  -Follow repeat blood cultures; negative thus far  - fever has resolved      Hypophosphatemia  -Replete PRN  -Trend P    Debility  -Fall precautions  -PT/OT  -CM consulted for SNF      Alcohol dependence with alcohol-induced mood disorder  -Monitor for signs and symptoms of withdrawal  -Patient counseled on cessation  -Thiamine, MVI and folic acid    Stage 3 chronic kidney disease  Stable.  -Renally dose medications  -Avoid nephrotoxic agents    Urinary tract infection without hematuria  E coli.  -Continue cefepime  -fever has resolved      Anemia  Stable.  -Trend Hgb with CBC      Hypothyroidism  -Continue home Synthroid      VTE Risk Mitigation (From admission, onward)           Ordered     enoxaparin injection 40 mg  Every 24 hours         10/22/24 0908     IP VTE HIGH RISK PATIENT  Once         10/22/24 0419     Place sequential compression device  Until discontinued         10/22/24 0419                    Discharge Planning   SYLWIA: 10/28/2024     Code Status: Full Code   Is the patient medically ready for discharge?:     Reason for patient still in hospital (select all that apply): Patient trending condition and Treatment  Discharge Plan A: Skilled Nursing Facility                  Anel Marina MD  Department of Hospital Medicine   [facility]/Surg     with plan. Parent was encouraged to contact our office with any questions or issues regarding treatment and care.    If symptoms  persist, worsen, or develops new symptoms that were not present at the time of visit then the patient will need to reassessed.  If the patient cannot be seen by their PCP, then the patient is recommended to return to Immediate Care or to seek care in Emergency Room if Immediate Care is not available.  This was discussed with the parent and voiced understanding.      Margot Galeano MD

## 2024-10-28 ENCOUNTER — PATIENT OUTREACH (OUTPATIENT)
Dept: ADMINISTRATIVE | Facility: OTHER | Age: 74
End: 2024-10-28
Payer: MEDICARE

## 2024-10-28 VITALS
WEIGHT: 171.31 LBS | HEART RATE: 74 BPM | RESPIRATION RATE: 18 BRPM | DIASTOLIC BLOOD PRESSURE: 63 MMHG | BODY MASS INDEX: 31.52 KG/M2 | SYSTOLIC BLOOD PRESSURE: 139 MMHG | OXYGEN SATURATION: 97 % | TEMPERATURE: 99 F | HEIGHT: 62 IN

## 2024-10-28 LAB
ANION GAP SERPL CALC-SCNC: 11 MMOL/L (ref 8–16)
BACTERIA BLD CULT: NORMAL
BACTERIA BLD CULT: NORMAL
BASOPHILS # BLD AUTO: 0.06 K/UL (ref 0–0.2)
BASOPHILS NFR BLD: 1.2 % (ref 0–1.9)
BUN SERPL-MCNC: 14 MG/DL (ref 8–23)
CALCIUM SERPL-MCNC: 9.3 MG/DL (ref 8.7–10.5)
CHLORIDE SERPL-SCNC: 105 MMOL/L (ref 95–110)
CO2 SERPL-SCNC: 24 MMOL/L (ref 23–29)
CREAT SERPL-MCNC: 1.1 MG/DL (ref 0.5–1.4)
DIFFERENTIAL METHOD BLD: ABNORMAL
EOSINOPHIL # BLD AUTO: 0.2 K/UL (ref 0–0.5)
EOSINOPHIL NFR BLD: 3.2 % (ref 0–8)
ERYTHROCYTE [DISTWIDTH] IN BLOOD BY AUTOMATED COUNT: 24.5 % (ref 11.5–14.5)
EST. GFR  (NO RACE VARIABLE): 52 ML/MIN/1.73 M^2
GLUCOSE SERPL-MCNC: 90 MG/DL (ref 70–110)
HCT VFR BLD AUTO: 24.8 % (ref 37–48.5)
HGB BLD-MCNC: 8.4 G/DL (ref 12–16)
IMM GRANULOCYTES # BLD AUTO: 0.12 K/UL (ref 0–0.04)
IMM GRANULOCYTES NFR BLD AUTO: 2.4 % (ref 0–0.5)
LYMPHOCYTES # BLD AUTO: 1.3 K/UL (ref 1–4.8)
LYMPHOCYTES NFR BLD: 24.9 % (ref 18–48)
MAGNESIUM SERPL-MCNC: 1.6 MG/DL (ref 1.6–2.6)
MCH RBC QN AUTO: 27.7 PG (ref 27–31)
MCHC RBC AUTO-ENTMCNC: 33.9 G/DL (ref 32–36)
MCV RBC AUTO: 82 FL (ref 82–98)
MONOCYTES # BLD AUTO: 0.5 K/UL (ref 0.3–1)
MONOCYTES NFR BLD: 9.1 % (ref 4–15)
NEUTROPHILS # BLD AUTO: 3 K/UL (ref 1.8–7.7)
NEUTROPHILS NFR BLD: 59.2 % (ref 38–73)
NRBC BLD-RTO: 0 /100 WBC
PHOSPHATE SERPL-MCNC: 3.2 MG/DL (ref 2.7–4.5)
PLATELET # BLD AUTO: 263 K/UL (ref 150–450)
PMV BLD AUTO: 11 FL (ref 9.2–12.9)
POTASSIUM SERPL-SCNC: 4.2 MMOL/L (ref 3.5–5.1)
RBC # BLD AUTO: 3.03 M/UL (ref 4–5.4)
SODIUM SERPL-SCNC: 140 MMOL/L (ref 136–145)
TB INDURATION 48 - 72 HR READ: 0 MM
TB SKIN TEST 48 - 72 HR READ: NEGATIVE
WBC # BLD AUTO: 5.06 K/UL (ref 3.9–12.7)

## 2024-10-28 PROCEDURE — 84100 ASSAY OF PHOSPHORUS: CPT | Performed by: NURSE PRACTITIONER

## 2024-10-28 PROCEDURE — 36415 COLL VENOUS BLD VENIPUNCTURE: CPT | Performed by: STUDENT IN AN ORGANIZED HEALTH CARE EDUCATION/TRAINING PROGRAM

## 2024-10-28 PROCEDURE — 85025 COMPLETE CBC W/AUTO DIFF WBC: CPT | Performed by: NURSE PRACTITIONER

## 2024-10-28 PROCEDURE — 80048 BASIC METABOLIC PNL TOTAL CA: CPT | Performed by: STUDENT IN AN ORGANIZED HEALTH CARE EDUCATION/TRAINING PROGRAM

## 2024-10-28 PROCEDURE — 25000003 PHARM REV CODE 250: Performed by: STUDENT IN AN ORGANIZED HEALTH CARE EDUCATION/TRAINING PROGRAM

## 2024-10-28 PROCEDURE — 63600175 PHARM REV CODE 636 W HCPCS

## 2024-10-28 PROCEDURE — 83735 ASSAY OF MAGNESIUM: CPT | Performed by: NURSE PRACTITIONER

## 2024-10-28 PROCEDURE — 97530 THERAPEUTIC ACTIVITIES: CPT | Mod: CQ

## 2024-10-28 PROCEDURE — 25000003 PHARM REV CODE 250: Performed by: NURSE PRACTITIONER

## 2024-10-28 PROCEDURE — 97116 GAIT TRAINING THERAPY: CPT | Mod: CQ

## 2024-10-28 PROCEDURE — 97535 SELF CARE MNGMENT TRAINING: CPT

## 2024-10-28 RX ORDER — CIPROFLOXACIN 500 MG/1
500 TABLET ORAL EVERY 12 HOURS
Qty: 16 TABLET | Refills: 0 | Status: SHIPPED | OUTPATIENT
Start: 2024-10-28 | End: 2024-11-05

## 2024-10-28 RX ADMIN — THIAMINE HCL TAB 100 MG 100 MG: 100 TAB at 08:10

## 2024-10-28 RX ADMIN — POTASSIUM & SODIUM PHOSPHATES POWDER PACK 280-160-250 MG 1 PACKET: 280-160-250 PACK at 08:10

## 2024-10-28 RX ADMIN — PANTOPRAZOLE SODIUM 40 MG: 40 TABLET, DELAYED RELEASE ORAL at 08:10

## 2024-10-28 RX ADMIN — Medication 800 MG: at 10:10

## 2024-10-28 RX ADMIN — FOLIC ACID 1 MG: 1 TABLET ORAL at 08:10

## 2024-10-28 RX ADMIN — ACETAMINOPHEN 650 MG: 325 TABLET ORAL at 10:10

## 2024-10-28 RX ADMIN — Medication 800 MG: at 06:10

## 2024-10-28 RX ADMIN — CEFEPIME 2 G: 1 INJECTION, POWDER, FOR SOLUTION INTRAMUSCULAR; INTRAVENOUS at 08:10

## 2024-10-28 RX ADMIN — ACETAMINOPHEN 650 MG: 325 TABLET ORAL at 04:10

## 2024-10-28 RX ADMIN — LEVOTHYROXINE SODIUM 100 MCG: 0.1 TABLET ORAL at 06:10

## 2024-10-28 RX ADMIN — THERA TABS 1 TABLET: TAB at 08:10

## 2024-10-28 NOTE — DISCHARGE INSTRUCTIONS
[facility]/Surg  Facility Transfer Orders        Admit to: SNF    Diagnoses:   Active Hospital Problems    Diagnosis  POA    *E coli bacteremia [R78.81, B96.20]  Yes    Hypophosphatemia [E83.39]  Yes    Debility [R53.81]  Yes    Alcohol dependence with alcohol-induced mood disorder [F10.24]  Yes    Stage 3 chronic kidney disease [N18.30]  Yes    Urinary tract infection without hematuria [N39.0]  Yes    Anemia [D64.9]  Yes    Hypothyroidism [E03.9]  Yes      Resolved Hospital Problems    Diagnosis Date Resolved POA    Hypomagnesemia [E83.42] 10/24/2024 Yes    Hypokalemia [E87.6] 10/23/2024 Yes     Allergies: Review of patient's allergies indicates:  No Known Allergies    Code Status: Full    Vitals: Routine       Diet: cardiac diet    Activity: Activity as tolerated    Nursing Precautions: Aspiration , Fall, Seizure, and Pressure ulcer prevention    Bed/Surface: Low Air Loss    Consults: PT to evaluate and treat- 5 times a week and OT to evaluate and treat- 5 times a week    Oxygen: room air    Dialysis: Patient is not on dialysis.     Labs: CBC and BMP weekly x 2  Pending Diagnostic Studies:       Procedure Component Value Units Date/Time    Vitamin B1 [[id number]] Collected: 10/23/24 0238    Order Status: Sent Lab Status: In process Updated: 10/23/24 0242    Specimen: Blood           Imaging: none    Miscellaneous Care:       IV Access: Peripheral     Medications: Discontinue all previous medication orders, if any. See new list below.  Current Discharge Medication List        START taking these medications    Details   ciprofloxacin HCl (CIPRO) 500 MG tablet Take 1 tablet (500 mg total) by mouth every 12 (twelve) hours. for 8 days  Qty: 16 tablet, Refills: 0           CONTINUE these medications which have NOT CHANGED    Details   multivitamin Tab Take 1 tablet by mouth once daily.  Qty: 30 tablet, Refills: 0      thiamine 100 MG tablet Take 1 tablet (100 mg total) by mouth 3 (three) times  daily.  Qty: 90 tablet, Refills: 0      vit B complex no.12/niacin,B3, (VITAMIN B COMPLEX NO.12-NIACIN ORAL) Take 1 tablet by mouth.      biotin 5,000 mcg TbDL Take 1 tablet by mouth once daily.      folic acid (FOLVITE) 1 MG tablet Take 1 tablet (1 mg total) by mouth once daily.  Qty: 30 tablet, Refills: 0      levothyroxine (SYNTHROID) 100 MCG tablet Take 1 tablet (100 mcg total) by mouth before breakfast.  Qty: 90 tablet, Refills: 3      multivit-min/iron/folic/lutein (CENTRUM SILVER WOMEN ORAL) Take 1 tablet by mouth once daily.      omega-3 fatty acids/fish oil (FISH OIL-OMEGA-3 FATTY ACIDS) 300-1,000 mg capsule Take 1 capsule by mouth once daily.      pantoprazole (PROTONIX) 40 MG tablet Take 1 tablet (40 mg total) by mouth 2 (two) times daily.  Qty: 60 tablet, Refills: 0           STOP taking these medications       chlordiazepoxide (LIBRIUM) 5 MG capsule Comments:   Reason for Stopping:             Follow up:       Immunizations Administered as of 10/28/2024       Name Date Dose VIS Date Route Exp Date    COVID-19, MRNA, LN-S, PF (Pfizer) (Purple Cap) 3/12/2021  1:52 PM 0.3 mL 12/12/2020 Intramuscular 5/31/2021    Site: Left deltoid     Given By: Nichole Boykin LPN     : [employer]     Lot: [id number]     COVID-19, MRNA, LN-S, PF (Pfizer) (Purple Cap) 2/19/2021  1:51 PM 0.3 mL 12/12/2020 Intramuscular 5/31/2021    Site: Left deltoid     Given By: Briana Jimenez LPN     : [employer]     Lot: [id number]                   Anel Marina MD

## 2024-10-28 NOTE — NURSING
Discharge instructions given to patient. Patient verbalizes understanding. PIV removed. Patient tolerated well. Left floor safely with all belongings with transport svs to [facility].

## 2024-10-28 NOTE — PT/OT/SLP PROGRESS
Physical Therapy Treatment    Patient Name:  Nicolasa Madera   MRN:  [id number]    Recommendations:     Discharge Recommendations: Moderate Intensity Therapy  Discharge Equipment Recommendations: to be determined by next level of care  Barriers to discharge: None    Assessment:     Nicolasa Madera is a 74 y.o. female admitted with a medical diagnosis of E coli bacteremia.  She presents with the following impairments/functional limitations: weakness, impaired endurance, impaired self care skills, impaired functional mobility, gait instability, impaired balance, decreased lower extremity function .  Seated in chair at bedside.  Agreed to mobilize and requested to use restroom. Sit > stand with rw and CGA.  Ambulated 15' to restroom , on / off commode with CGA.  Performed hygiene post BM without difficulty.  Ambulated with 250' additional with rw and Min A. Sat up at bedside, performed LE exercises.     Rehab Prognosis: Good; patient would benefit from acute skilled PT services to address these deficits and reach maximum level of function.    Recent Surgery: * No surgery found *      Plan:     During this hospitalization, patient to be seen 5 x/week to address the identified rehab impairments via gait training, therapeutic activities, therapeutic exercises and progress toward the following goals:    Plan of Care Expires:  11/22/24    Subjective     Chief Complaint: none stated  Patient/Family Comments/goals: none stated  Pain/Comfort:  Pain Rating 1: 0/10      Objective:     Communicated with nurse Merida prior to session.  Patient found up in chair with chair check, telemetry upon PT entry to room.     General Precautions: Standard, fall  Orthopedic Precautions: N/A  Braces: N/A  Respiratory Status: Room air     Functional Mobility:  Transfers:     Sit to Stand:  contact guard assistance with rolling walker  Gait: Ambulated 250' with rw and Min A for safety, chair follow.       AM-PAC 6 CLICK MOBILITY           Treatment & Education:  Ambulated to restroom on / off commode with CGA.   Ambulated with rw and Min A in hallway.  BLE exercises seated :  TKE's, hip flexion, AP's.    Patient left up in chair with all lines intact, call button in reach, chair alarm on, and nurse Magnolia notified..    GOALS:   Multidisciplinary Problems       Physical Therapy Goals          Problem: Physical Therapy    Goal Priority Disciplines Outcome Interventions   Physical Therapy Goal     PT, PT/OT Progressing    Description: Goals to be met by: 24    Patient will increase functional independence with mobility by performin. Supine to sit with Supervision  2. Sit to stand transfer with Supervision  3. Bed to chair transfer with Supervision using LRAD  4. Gait  x 250 feet with Supervision using LRAD  5. Lower extremity exercise program x20 reps per handout, with supervision                       Time Tracking:     PT Received On: 10/28/24  PT Start Time: 855     PT Stop Time: 918  PT Total Time (min): 23 min     Billable Minutes: Gait Training 10 min and Therapeutic Activity 13 min     Treatment Type: Treatment  PT/PTA: PTA     Number of PTA visits since last PT visit: 4     10/28/2024

## 2024-10-28 NOTE — PLAN OF CARE
Problem: Adult Inpatient Plan of Care  Goal: Plan of Care Review  Outcome: Progressing     Problem: Adult Inpatient Plan of Care  Goal: Absence of Hospital-Acquired Illness or Injury  Outcome: Progressing     Problem: Adult Inpatient Plan of Care  Goal: Optimal Comfort and Wellbeing  Outcome: Progressing     Problem: Wound  Goal: Optimal Coping  Outcome: Progressing     Problem: Wound  Goal: Absence of Infection Signs and Symptoms  Outcome: Progressing     Problem: Infection  Goal: Absence of Infection Signs and Symptoms  Outcome: Progressing     Problem: Skin Injury Risk Increased  Goal: Skin Health and Integrity  Outcome: Progressing

## 2024-10-28 NOTE — PLAN OF CARE
Pt cleared for discharge from case management    Call report to [facility] [phone], ask for Ke and [facility] will pickup pt.  Nurse notified.     10/28/24 5364   Final Note   Assessment Type Final Discharge Note   Anticipated Discharge Disposition SNF   Post-Acute Status   Post-Acute Authorization Placement   Post-Acute Placement Status Set-up Complete/Auth obtained

## 2024-10-28 NOTE — PT/OT/SLP PROGRESS
Occupational Therapy   Treatment    Name: Nicolasa Madera  MRN: [id number]  Admitting Diagnosis:  E coli bacteremia       Recommendations:     Discharge Recommendations: Moderate Intensity Therapy  Discharge Equipment Recommendations:  walker, rolling  Barriers to discharge:       Assessment:     Nicolasa Madera is a 74 y.o. female with a medical diagnosis of E coli bacteremia.  She continues to present with weakness, impaired endurance, impaired self care skills, impaired functional mobility, gait instability, impaired balance, decreased lower extremity function and decreased ROM.     Rehab Prognosis:  Good; patient would benefit from acute skilled OT services to address these deficits and reach maximum level of function.       Plan:     Patient to be seen 5 x/week to address the above listed problems via self-care/home management, therapeutic activities, therapeutic exercises  Plan of Care Expires: 11/22/24  Plan of Care Reviewed with: patient    Subjective     Chief Complaint: None stated  Patient/Family Comments/goals: Patient agreed to transfer to the chair.  Pain/Comfort:  Pain Rating 1: 0/10    Objective:     Communicated with: nurse prior to session.  Patient found HOB elevated upon OT entry to room.    General Precautions: Standard, fall    Orthopedic Precautions:N/A  Braces: N/A  Respiratory Status: Room air     Occupational Performance:     Bed Mobility:    Patient completed Supine to Sit with stand by assistance     Functional Mobility/Transfers:  Patient completed Sit <> Stand Transfer with contact guard assistance with no assistive device   Patient completed Bed > Chair Transfer using Step Transfer technique with contact guard assistance with no assistive device    Activities of Daily Living:  Grooming: set up A seated in chair      Treatment & Education:  Therapist provided facilitation and instruction of fall prevention strategies during tasks listed above.      Patient left up in chair with  all lines intact and call button in reach.    GOALS:   Multidisciplinary Problems       Occupational Therapy Goals          Problem: Occupational Therapy    Goal Priority Disciplines Outcome Interventions   Occupational Therapy Goal     OT, PT/OT Progressing    Description: Goals to be met by: 11/22/24     Patient will increase functional independence with ADLs by performing:    UE Dressing with Modified [place].  LE Dressing with Modified [place].  Grooming while seated with Modified [place].  Toileting from toilet with Modified [place] for hygiene and clothing management.   Toilet transfer to toilet with Modified [place] using RW.                         Time Tracking:     OT Date of Treatment: 10/28/24  OT Start Time: 0837  OT Stop Time: 0845  OT Total Time (min): 8 min    Billable Minutes:Self Care/Home Management 8               10/28/2024

## 2024-10-28 NOTE — PLAN OF CARE
Problem: Physical Therapy  Goal: Physical Therapy Goal  Description: Goals to be met by: 24    Patient will increase functional independence with mobility by performin. Supine to sit with Supervision  2. Sit to stand transfer with Supervision  3. Bed to chair transfer with Supervision using LRAD  4. Gait  x 250 feet with Supervision using LRAD  5. Lower extremity exercise program x20 reps per handout, with supervision  Outcome: Progressing   Ambulate with rw and assistance for safety.

## 2024-10-28 NOTE — PLAN OF CARE
Problem: Adult Inpatient Plan of Care  Goal: Plan of Care Review  Outcome: Met  Goal: Patient-Specific Goal (Individualized)  Outcome: Met  Goal: Absence of Hospital-Acquired Illness or Injury  Outcome: Met  Goal: Optimal Comfort and Wellbeing  Outcome: Met  Goal: Readiness for Transition of Care  Outcome: Met     Problem: Occupational Therapy  Goal: Occupational Therapy Goal  Description: Goals to be met by: 24     Patient will increase functional independence with ADLs by performing:    UE Dressing with Modified [place].  LE Dressing with Modified [place].  Grooming while seated with Modified [place].  Toileting from toilet with Modified [place] for hygiene and clothing management.   Toilet transfer to toilet with Modified [place] using RW.    Outcome: Met     Problem: Physical Therapy  Goal: Physical Therapy Goal  Description: Goals to be met by: 24    Patient will increase functional independence with mobility by performin. Supine to sit with Supervision  2. Sit to stand transfer with Supervision  3. Bed to chair transfer with Supervision using LRAD  4. Gait  x 250 feet with Supervision using LRAD  5. Lower extremity exercise program x20 reps per handout, with supervision  Outcome: Met     Problem: Wound  Goal: Optimal Coping  Outcome: Met  Goal: Optimal Functional Ability  Outcome: Met  Goal: Absence of Infection Signs and Symptoms  Outcome: Met  Goal: Improved Oral Intake  Outcome: Met  Goal: Optimal Pain Control and Function  Outcome: Met  Goal: Skin Health and Integrity  Outcome: Met  Goal: Optimal Wound Healing  Outcome: Met     Problem: Infection  Goal: Absence of Infection Signs and Symptoms  Outcome: Met     Problem: Skin Injury Risk Increased  Goal: Skin Health and Integrity  Outcome: Met

## 2024-10-29 LAB — VIT B1 BLD-MCNC: 48 UG/L (ref 38–122)

## 2024-10-29 NOTE — DISCHARGE SUMMARY
[facility] Medicine  Discharge Summary      Patient Name: Nicolasa Madera  MRN: [id number]  [facility]: [id number]  Patient Class: IP- Inpatient  Admission Date: 10/22/2024  Hospital Length of Stay: 5 days  Discharge Date and Time: 10/28/2024  4:48 PM  Attending Physician: No att. providers found   Discharging Provider: Anel Marina MD  Primary Care Provider: No primary care provider on file.    Primary Care Team: Networked reference to record PCT     HPI:   Nicolasa Madera is a 74 year old female with a past medical history of ETOH abuse, peptic ulcer disease, GI bleed, hypothyroidism, stroke, and CKD 3 presents with complaints of falling and dysuria. She has a long history of falls, tonight she fell onto her buttocks and did not hit her head nor injure herself. Her main concern is that she has been having urinary frequency, urgency and dysuria. She denies fever or chills and denies other complaint. She has a history of alcohol use, but denies recent use (last drink over a week ago). She was prescribed a librium taper but never did start it as she was not able to pick it up from the pharmacy. ED work up included a CBC, which shows chronic anemia, slightly lower than baseline. CMP shows hypokalemia of  2.8 and moderate malnutrition. Urinalysis positive for evidence of infection. She was given one dose of Rocephin in the ED. Hospital Medicine consulted for admission and further management.    * No surgery found *      Hospital Course:   Nicolasa Madera is a 74 year old female with a past medical history of EtOH dependence, PUD, hypothyroidism, and anemia who presented with E coli UTI with bacteremia as well as another fall. She improved on cefepime. Repeat blood cultures were negative. The patient continues to spike fevers as of 10/25, then afterward the fever resolved.  PT/OT has been consulted and recommends moderate intensity therapy.  She was discharged to skilled nursing  facility on p.o. Cipro to complete a course for bacteremia.  She felt well on day of discharge was agreeable to discharge plan     Goals of Care Treatment Preferences:  Code Status: Full Code      SDOH Screening:  The patient declined to be screened for utility difficulties, food insecurity, transport difficulties, housing insecurity, and interpersonal safety, so no concerns could be identified this admission.     Consults:   Consults (From admission, onward)          Status Ordering Provider     Inpatient consult to Midline team  Once        Provider:  (Not yet assigned)    COREY Moore            No new Assessment & Plan notes have been filed under this hospital service since the last note was generated.  Service: Hospital Medicine    Final Active Diagnoses:    Diagnosis Date Noted POA    PRINCIPAL PROBLEM:  E coli bacteremia [R78.81, B96.20] 10/23/2024 Yes    Hypophosphatemia [E83.39] 10/23/2024 Yes    Debility [R53.81] 10/17/2024 Yes    Alcohol dependence with alcohol-induced mood disorder [F10.24] 10/15/2024 Yes    Stage 3 chronic kidney disease [N18.30] 08/31/2021 Yes    Urinary tract infection without hematuria [N39.0] 08/31/2021 Yes    Anemia [D64.9] 08/30/2021 Yes    Hypothyroidism [E03.9] 05/29/2020 Yes      Problems Resolved During this Admission:    Diagnosis Date Noted Date Resolved POA    Hypomagnesemia [E83.42] 10/23/2024 10/24/2024 Yes    Hypokalemia [E87.6] 10/22/2024 10/23/2024 Yes       Discharged Condition: good    Disposition: Skilled Nursing Facility    Follow Up:    Patient Instructions:      Ambulatory referral/consult to Outpatient Case Management   Referral Priority: Routine Referral Type: Consultation   Referral Reason: Specialty Services Required   Number of Visits Requested: 1     Notify your health care provider if you experience any of the following:  temperature >100.4     Notify your health care provider if you experience any of the following:  persistent nausea and  vomiting or diarrhea     Notify your health care provider if you experience any of the following:  severe uncontrolled pain     Notify your health care provider if you experience any of the following:  persistent dizziness, light-headedness, or visual disturbances     Notify your health care provider if you experience any of the following:  increased confusion or weakness     Activity as tolerated       Significant Diagnostic Studies: Labs: BMP:   Recent Labs   Lab 10/28/24  0329   GLU 90      K 4.2      CO2 24   BUN 14   CREATININE 1.1   CALCIUM 9.3   MG 1.6    and CBC   Recent Labs   Lab 10/28/24  0329   WBC 5.06   HGB 8.4*   HCT 24.8*        Microbiology Results (Last 365 Days)    Procedure Component Value Units Date/Time   Clostridium difficile EIA [[id number]] Collected: 10/23/24 1355   Order Status: Completed Specimen: Stool Updated: 10/23/24 2105    C. diff Antigen Negative    C difficile Toxins A+B, EIA Negative    Comment: Testing not recommended for children <24 months old.      Clostridium Diff Toxin, A & B, EIA [[id number]] Collected: 10/23/24 1350   Order Status: Sent Specimen: Stool    Clostridium difficile EIA [[id number]]    Order Status: Canceled Specimen: Stool    Blood culture [[id number]] Collected: 10/22/24 2231   Order Status: Completed Specimen: Blood from Peripheral, Antecubital, Right Updated: 10/28/24 1032    Blood Culture, Routine No growth after 5 days.   Narrative:     Collection has been rescheduled by LMD1 at 10/22/2024 18:58 Reason:  Patient is eating dinner  Asked to come back  Collection has been rescheduled by LMD1 at 10/22/2024 18:58 Reason:  Patient is eating dinner  Asked to come back   Blood culture [[id number]] Collected: 10/22/24 2231   Order Status: Completed Specimen: Blood from Peripheral, Antecubital, Left Updated: 10/28/24 1032    Blood Culture, Routine No growth after 5 days.   Narrative:     Collection has been rescheduled by LMD1 at 10/22/2024 18:58  Reason:  Patient is eating dinner  Asked to come back  Collection has been rescheduled by LMD1 at 10/22/2024 18:58 Reason:  Patient is eating dinner  Asked to come back   Urine culture [[id number]] (Abnormal)  Collected: 10/22/24 0331   Order Status: Completed Specimen: Urine Updated: 10/24/24 0726    Urine Culture, Routine ESCHERICHIA COLI  >100,000 cfu/ml   Abnormal    Narrative:     Specimen Source->Urine   Susceptibility     Escherichia coli     CULTURE, URINE     Amp/Sulbactam <=8/4 mcg/mL Sensitive     Ampicillin >16 mcg/mL Resistant     Cefazolin <=2 mcg/mL Sensitive     Cefepime <=2 mcg/mL Sensitive     Ceftriaxone <=1 mcg/mL Sensitive     Ciprofloxacin <=0.25 mcg/mL Sensitive     Ertapenem <=0.5 mcg/mL Sensitive     Gentamicin <=2 mcg/mL Sensitive     Levofloxacin <=0.5 mcg/mL Sensitive     Meropenem <=1 mcg/mL Sensitive     Nitrofurantoin <=32 mcg/mL Sensitive     Piperacillin/Tazo <=8 mcg/mL Sensitive     Tetracycline <=4 mcg/mL Sensitive     Tobramycin <=2 mcg/mL Sensitive     Trimeth/Sulfa <=2/38 mcg/mL Sensitive               Linear View         Blood culture #1 **CANNOT BE ORDERED STAT** [[id number]] (Abnormal)  Collected: 10/22/24 0315   Order Status: Completed Specimen: Blood from Peripheral, Antecubital, Left Updated: 10/24/24 0745    Blood Culture, Routine Gram stain aer bottle: Gram negative rods     Results called to and read back by: Raina Murphy RN at [facility]     Gram stain marina bottle: Gram negative rods     Positive results previously called     ESCHERICHIA COLI Abnormal    Susceptibility     Escherichia coli     CULTURE, BLOOD     Amp/Sulbactam 16/8 mcg/mL Intermediate     Ampicillin >16 mcg/mL Resistant     Cefazolin <=2 mcg/mL Sensitive     Cefepime <=2 mcg/mL Sensitive     Ceftriaxone <=1 mcg/mL Sensitive     Ciprofloxacin <=0.25 mcg/mL Sensitive     Ertapenem <=0.5 mcg/mL Sensitive     Gentamicin <=2 mcg/mL Sensitive     Levofloxacin <=0.5 mcg/mL Sensitive     Meropenem <=1 mcg/mL Sensitive      Piperacillin/Tazo <=8 mcg/mL Sensitive     Tetracycline <=4 mcg/mL Sensitive     Tobramycin <=2 mcg/mL Sensitive     Trimeth/Sulfa <=2/38 mcg/mL Sensitive               Linear View         Blood culture #2 **CANNOT BE ORDERED STAT** [[id number]] (Abnormal) Collected: 10/22/24 0315   Order Status: Completed Specimen: Blood from Peripheral, Antecubital, Right Updated: 10/24/24 0746    Blood Culture, Routine Gram stain aer bottle: Gram negative rods     Positive results previously called on order [id number]     Gram stain marina bottle: Gram negative rods     Positive results previously called on order [id number]     ESCHERICHIA COLI  For susceptibility see order #[id number]   Abnormal    Rapid Organism ID by PCR (from Blood culture) [[id number]] (Abnormal) Collected: 10/22/24 0315   Order Status: Completed Updated: 10/22/24 2057    Enterococcus faecalis Not Detected    Enterococcus faecium Not Detected    Listeria monocytogenes Not Detected    Staphylococcus spp. Not Detected    Staphylococcus aureus Not Detected    Staphylococcus epidermidis Not Detected    Staphylococcus lugdunensis Not Detected    Streptococcus species Not Detected    Streptococcus agalactiae Not Detected    Streptococcus pneumoniae Not Detected    Streptococcus pyogenes Not Detected    Acinetobacter calcoaceticus/baumannii complex Not Detected    Bacteroides fragilis Not Detected    Enterobacterales See species for ID Abnormal     Enterobacter cloacae complex Not Detected    Escherichia coli Detected Abnormal     Klebsiella aerogenes Not Detected    Klebsiella oxytoca Not Detected    Klebsiella pneumoniae group Not Detected    Proteus Not Detected    Salmonella sp Not Detected    Serratia marcescens Not Detected    Haemophilus influenzae Not Detected    Neisseria meningtidis Not Detected    Pseudomonas aeruginosa Not Detected    Stenotrophomonas maltophilia Not Detected    Candida albicans Not Detected    Candida auris Not Detected    Candida  glabrata Not Detected    Candida krusei Not Detected    Candida parapsilosis Not Detected    Candida tropicalis Not Detected    Cryptococcus neoformans/gattii Not Detected    CTX-M (ESBL ) Not Detected    IMP (Carbapenem resistant) Not Detected    KPC resistance gene (Carbapenem resistant) Not Detected    mcr-1 Not Detected    mec A/C Test not applicable    mec A/C and MREJ (MRSA) gene Test not applicable    NDM (Carbapenem resistant) Not Detected    OXA-48-like (Carbapenem resistant) Not Detected    van A/B (VRE gene) Test not applicable    VIM (Carbapenem resistant) Not Detected     Pending Diagnostic Studies:       None           Medications:  Reconciled Home Medications:      Medication List        START taking these medications      ciprofloxacin HCl 500 MG tablet  Commonly known as: CIPRO  Take 1 tablet (500 mg total) by mouth every 12 (twelve) hours. for 8 days            CONTINUE taking these medications      biotin 5,000 mcg Tbdl  Take 1 tablet by mouth once daily.     CENTRUM SILVER WOMEN ORAL  Take 1 tablet by mouth once daily.     fish oil-omega-3 fatty acids 300-1,000 mg capsule  Take 1 capsule by mouth once daily.     folic acid 1 MG tablet  Commonly known as: FOLVITE  Take 1 tablet (1 mg total) by mouth once daily.     levothyroxine 100 MCG tablet  Commonly known as: SYNTHROID  Take 1 tablet (100 mcg total) by mouth before breakfast.     multivitamin Tab  Take 1 tablet by mouth once daily.     pantoprazole 40 MG tablet  Commonly known as: PROTONIX  Take 1 tablet (40 mg total) by mouth 2 (two) times daily.     thiamine 100 MG tablet  Take 1 tablet (100 mg total) by mouth 3 (three) times daily.     VITAMIN B COMPLEX NO.12-NIACIN ORAL  Take 1 tablet by mouth.            STOP taking these medications      chlordiazepoxide 5 MG capsule  Commonly known as: LIBRIUM              Indwelling Lines/Drains at time of discharge:   Lines/Drains/Airways       None                   Time spent on the  discharge of patient: 35 minutes         Anel Marina MD  Department of Hospital Medicine  [facility]/Surg

## 2024-12-10 ENCOUNTER — TELEPHONE (OUTPATIENT)
Dept: FAMILY MEDICINE | Facility: CLINIC | Age: 74
End: 2024-12-10
Payer: MEDICARE

## 2024-12-10 DIAGNOSIS — R11.0 NAUSEA: Primary | ICD-10-CM

## 2024-12-10 RX ORDER — ONDANSETRON 4 MG/1
4 TABLET, ORALLY DISINTEGRATING ORAL EVERY 8 HOURS PRN
Qty: 20 TABLET | Refills: 0 | Status: SHIPPED | OUTPATIENT
Start: 2024-12-10

## 2024-12-10 NOTE — TELEPHONE ENCOUNTER
Please see request from home health nurse below.  Thank you.     LOV--4/15/24 with YOVANA Garcia   FOV--12/16/24 with Kvng Baron Staff     ----- Message from Kvng sent at 12/10/2024 10:36 AM CST -----  Type:  Needs Medical Advice    Who Called: Celena  NURSE  Symptoms (please be specific):nausea/vomiting  How long has patient had these symptoms:    Pharmacy name and phone #:   [facility] - [place] - [id number]  [id number]  [address]  [place] [address]  Phone: [phone] Fax: [phone]  Would the patient rather a call back or a response via [facility]? call back/  Best Call Back Number: [phone]  Additional Information: Caregiver would like a rx for Zofran or Phenergan for symptoms  Please advise  Thanks

## 2024-12-10 NOTE — TELEPHONE ENCOUNTER
Pt returned call to the office. I spoke with pt via phone. I advised her that zofran was sent to the pharmacy. Local pharmacy phone number given.

## 2024-12-16 ENCOUNTER — OFFICE VISIT (OUTPATIENT)
Dept: FAMILY MEDICINE | Facility: CLINIC | Age: 74
End: 2024-12-16
Payer: MEDICARE

## 2024-12-16 VITALS
OXYGEN SATURATION: 98 % | SYSTOLIC BLOOD PRESSURE: 132 MMHG | HEART RATE: 78 BPM | BODY MASS INDEX: 30.44 KG/M2 | TEMPERATURE: 99 F | WEIGHT: 165.38 LBS | HEIGHT: 62 IN | RESPIRATION RATE: 16 BRPM | DIASTOLIC BLOOD PRESSURE: 74 MMHG

## 2024-12-16 DIAGNOSIS — M25.572 ACUTE LEFT ANKLE PAIN: ICD-10-CM

## 2024-12-16 DIAGNOSIS — F10.11 HISTORY OF ALCOHOL ABUSE: ICD-10-CM

## 2024-12-16 DIAGNOSIS — N18.31 STAGE 3A CHRONIC KIDNEY DISEASE: ICD-10-CM

## 2024-12-16 DIAGNOSIS — Z86.73 HISTORY OF STROKE: ICD-10-CM

## 2024-12-16 DIAGNOSIS — Z87.440 HISTORY OF UTI: Primary | ICD-10-CM

## 2024-12-16 DIAGNOSIS — R26.89 BALANCE PROBLEM: ICD-10-CM

## 2024-12-16 DIAGNOSIS — E03.9 HYPOTHYROIDISM, UNSPECIFIED TYPE: ICD-10-CM

## 2024-12-16 PROCEDURE — 1160F RVW MEDS BY RX/DR IN RCRD: CPT | Mod: CPTII,S$GLB,,

## 2024-12-16 PROCEDURE — 1101F PT FALLS ASSESS-DOCD LE1/YR: CPT | Mod: CPTII,S$GLB,,

## 2024-12-16 PROCEDURE — 99999 PR PBB SHADOW E&M-EST. PATIENT-LVL IV: CPT | Mod: PBBFAC,,,

## 2024-12-16 PROCEDURE — 1159F MED LIST DOCD IN RCRD: CPT | Mod: CPTII,S$GLB,,

## 2024-12-16 PROCEDURE — 99214 OFFICE O/P EST MOD 30 MIN: CPT | Mod: S$GLB,,,

## 2024-12-16 PROCEDURE — 3078F DIAST BP <80 MM HG: CPT | Mod: CPTII,S$GLB,,

## 2024-12-16 PROCEDURE — 1126F AMNT PAIN NOTED NONE PRSNT: CPT | Mod: CPTII,S$GLB,,

## 2024-12-16 PROCEDURE — 3075F SYST BP GE 130 - 139MM HG: CPT | Mod: CPTII,S$GLB,,

## 2024-12-16 PROCEDURE — 3288F FALL RISK ASSESSMENT DOCD: CPT | Mod: CPTII,S$GLB,,

## 2024-12-16 RX ORDER — DICLOFENAC SODIUM 10 MG/G
2 GEL TOPICAL DAILY
Qty: 50 G | Refills: 0 | Status: SHIPPED | OUTPATIENT
Start: 2024-12-16

## 2024-12-16 NOTE — PROGRESS NOTES
Subjective:       Patient ID:  [id number]     Chief Complaint: Follow-up      History of Present Illness    Ms. Madera presents today for follow-up after hospital visit in October.  This is her 1st appointment with me.  Previously followed by Norman Moran NP.   She was hospitalized in October for E. coli urinary tract infection and anemia.  Patient was discharged to a skilled nursing facility for physical therapy for 2 weeks.  She reports that she has been home for approximately 2 weeks and doing well.     She has experienced balance problems for 25-30 years with occasional veering while walking and feelings of unsteadiness.  Patient reports that she previously was seen by balance specialist.  She reports ankle pain, previously attributed to possible arthritis. She has modified her footwear choices to avoid high heels. She lives alone with her brother residing approximately half a block away who checks on her. She has a history of binge drinking but reports no current alcohol use.     Of note, patient had 3 emergency room visits in October with diagnoses of alcohol ketoacidosis, recurrent falls, and UTI.       No other concerns today.    Past Medical History:   Diagnosis Date    Encounter for blood transfusion     Hypothyroidism     Peptic ulcer with hemorrhage     Peptic ulcer, site unspecified, unspecified as acute or chronic, without hemorrhage or perforation     Peptic ulcer disease    Stroke 2017    no residual effects.     Urticaria       Active Problem List with Overview Notes    Diagnosis Date Noted    Hypophosphatemia 10/23/2024    E coli bacteremia 10/23/2024    Debility 10/17/2024    Alcohol dependence with alcohol-induced mood disorder 10/15/2024    Urinary tract infection without hematuria 08/31/2021    Stage 3 chronic kidney disease 08/31/2021    Anemia 08/30/2021    Hypothyroidism 05/29/2020      Review of patient's allergies indicates:  No Known Allergies      Current Outpatient Medications:      biotin 5,000 mcg TbDL, Take 1 tablet by mouth once daily., Disp: , Rfl:     levothyroxine (SYNTHROID) 100 MCG tablet, Take 1 tablet (100 mcg total) by mouth before breakfast., Disp: 90 tablet, Rfl: 3    multivit-min/iron/folic/lutein (CENTRUM SILVER WOMEN ORAL), Take 1 tablet by mouth once daily., Disp: , Rfl:     omega-3 fatty acids/fish oil (FISH OIL-OMEGA-3 FATTY ACIDS) 300-1,000 mg capsule, Take 1 capsule by mouth once daily., Disp: , Rfl:     ondansetron (ZOFRAN-ODT) 4 MG TbDL, Take 1 tablet (4 mg total) by mouth every 8 (eight) hours as needed (nausea)., Disp: 20 tablet, Rfl: 0    vit B complex no.12/niacin,B3, (VITAMIN B COMPLEX NO.12-NIACIN ORAL), Take 1 tablet by mouth., Disp: , Rfl:     diclofenac sodium (VOLTAREN ARTHRITIS PAIN) 1 % Gel, Apply 2 g topically once daily., Disp: 50 g, Rfl: 0    folic acid (FOLVITE) 1 MG tablet, Take 1 tablet (1 mg total) by mouth once daily., Disp: 30 tablet, Rfl: 0    pantoprazole (PROTONIX) 40 MG tablet, Take 1 tablet (40 mg total) by mouth 2 (two) times daily., Disp: 60 tablet, Rfl: 0    Lab Results   Component Value Date    WBC 5.06 10/28/2024    HGB 8.4 (L) 10/28/2024    HCT 24.8 (L) 10/28/2024     10/28/2024    CHOL 278 (H) 10/11/2023    TRIG 60 10/11/2023    HDL >140 (H) 10/11/2023    ALT 39 10/22/2024    AST 39 10/22/2024     10/28/2024    K 4.2 10/28/2024     10/28/2024    CREATININE 1.1 10/28/2024    BUN 14 10/28/2024    CO2 24 10/28/2024    TSH 1.757 10/22/2024    HGBA1C 5.0 06/10/2020       Review of Systems   Constitutional:  Negative for fatigue and fever.   HENT: Negative.  Negative for congestion, sneezing and sore throat.    Eyes: Negative.    Respiratory:  Negative for cough, shortness of breath and wheezing.    Cardiovascular:  Negative for chest pain, palpitations and leg swelling.   Gastrointestinal:  Negative for abdominal pain, nausea and vomiting.   Genitourinary: Negative.    Musculoskeletal:  Positive for arthralgias.   Skin:  Negative.  Negative for rash.   Neurological:  Negative for dizziness, weakness, light-headedness, numbness and headaches.   Hematological: Negative.    Psychiatric/Behavioral: Negative.         Objective:      Physical Exam  Constitutional:       Appearance: Normal appearance.   HENT:      Head: Normocephalic and atraumatic.      Nose: Nose normal.   Eyes:      Extraocular Movements: Extraocular movements intact.   Cardiovascular:      Rate and Rhythm: Normal rate and regular rhythm.   Pulmonary:      Effort: Pulmonary effort is normal.      Breath sounds: Normal breath sounds.   Musculoskeletal:         General: Normal range of motion.      Cervical back: Normal range of motion.   Skin:     General: Skin is warm and dry.   Neurological:      General: No focal deficit present.      Mental Status: She is alert and oriented to person, place, and time.      Motor: No weakness.      Gait: Gait is intact.   Psychiatric:         Mood and Affect: Mood normal. Affect is flat.         Speech: Speech is delayed.         Behavior: Behavior is cooperative.         Assessment:       1. History of UTI    2. History of stroke    3. Hypothyroidism, unspecified type    4. Acute left ankle pain    5. Stage 3a chronic kidney disease    6. Balance problem    7. History of alcohol abuse        Plan:       Nicolasa was seen today for follow-up.    Diagnoses and all orders for this visit:    History of UTI  - symptoms have resolved   -     COMPREHENSIVE METABOLIC PANEL; Future  -     CBC W/ AUTO DIFFERENTIAL; Future    History of stroke  - does not appear to be currently on statin or ASA.  Discuss further at upcoming appointment.  She is due for updated fasting lipid panel.  We will obtain at next appointment.    Hypothyroidism, unspecified type  -     TSH; Future    Acute left ankle pain  -     diclofenac sodium (VOLTAREN ARTHRITIS PAIN) 1 % Gel; Apply 2 g topically once daily.    Stage 3a chronic kidney disease  GFR 52  - avoid  nephrotoxic drugs  - continue to monitor     Balance problem  - discussed referral to physical therapy for symptoms.  However, patient declines secondary to transportation concerns.      History of alcohol abuse  - patient reports that she is currently sober.  - Encourage patient to continue to remain sober.           FOLLOW-UP:  - Follow up to schedule appointment with Dr. Urbina to establish care as new primary care physician.        Future Appointments       Date Provider Specialty Appt Notes    3/14/2025 Wen Urbina MD Family Medicine f/u               Portions of this note were dictated using voice recognition software and may contain dictation related errors in spelling / grammar / syntax not discovered on text review.     Amanda Parkinson PA-C

## 2024-12-18 DIAGNOSIS — E03.9 HYPOTHYROIDISM, UNSPECIFIED TYPE: Primary | ICD-10-CM

## 2024-12-18 NOTE — TELEPHONE ENCOUNTER
----- Message from Melly sent at 12/18/2024  3:09 PM CST -----  Type:  RX Refill Request    Who Called:  pt   Refill or New Rx:  refill   RX Name and Strength:  levothyroxine (SYNTHROID) 100 MCG tablet  How is the patient currently taking it? (ex. 1XDay):  as direct   Is this a 30 day or 90 day RX:  90  Preferred Pharmacy with phone number:      [facility] - [facility] [address]  [address] [address]  Phone: [phone] Fax: [phone]      Local or Mail Order:  mail   Ordering Provider:  brittney  [facility] Call Back Number:  [phone] (home)     Additional Information:  please advise

## 2024-12-19 RX ORDER — LEVOTHYROXINE SODIUM 100 UG/1
100 TABLET ORAL
Qty: 90 TABLET | Refills: 0 | Status: SHIPPED | OUTPATIENT
Start: 2024-12-19 | End: 2025-03-19

## 2024-12-19 NOTE — TELEPHONE ENCOUNTER
Refill Routing Note   Medication(s) are not appropriate for processing by [facility] for the following reason(s):        Non-participating provider    [facility] action(s):  Route               Appointments  past 12m or future 3m with PCP    Date Provider   Last Visit   12/16/2024 Amanda Parkinson PA-C   Next Visit   Visit date not found Amanda Parkinson PA-C   ED visits in past 90 days: 0        Note composed:7:45 PM 12/18/2024

## 2025-01-16 ENCOUNTER — TELEPHONE (OUTPATIENT)
Dept: FAMILY MEDICINE | Facility: CLINIC | Age: 75
End: 2025-01-16
Payer: MEDICARE

## 2025-01-16 NOTE — TELEPHONE ENCOUNTER
----- Message from Willa sent at 1/16/2025  4:04 PM CST -----  Type:  Needs Medical Advice    Who Called: Pt    Would the patient rather a call back or a response via [facility]? Please call    Best Call Back Number: [phone]    Additional Information: Patient had a follow up and needs Dr or PA to send a request to Compliance Support in regards to the breathilizer in her car...she does not have the lung compacity, and needs the intensity lowered.. please send a small summary of explanation to [email or web address]   customer ID # is [id number] Please call back to advise. Thanks!

## 2025-01-21 ENCOUNTER — TELEPHONE (OUTPATIENT)
Dept: FAMILY MEDICINE | Facility: CLINIC | Age: 75
End: 2025-01-21
Payer: MEDICARE

## 2025-01-21 NOTE — TELEPHONE ENCOUNTER
----- Message from Pamela sent at 1/21/2025  1:08 PM CST -----  Type:  Patient Returning Call    Who Called:pt   Who Left Message for Patient:Anahi   Does the patient know what this is regarding?:no   Would the patient rather a call back or a response via [facility] [facility]? Call   Best Call Back Number: [phone]  Additional Information:

## 2025-01-24 ENCOUNTER — TELEPHONE (OUTPATIENT)
Dept: FAMILY MEDICINE | Facility: CLINIC | Age: 75
End: 2025-01-24
Payer: MEDICARE

## 2025-01-24 NOTE — TELEPHONE ENCOUNTER
Yarelis Hearn Staff     ----- Message from Yarelis sent at 1/24/2025  9:17 AM CST -----  Type:  Patient Returning Call    Who Called:  pt   Who Left Message for Patient:  Anahi  Does the patient know what this is regarding?:  n/a  Best Call Back Number:  [phone]   Additional Information:   Please call back to advise. Thanks!

## 2025-01-24 NOTE — TELEPHONE ENCOUNTER
Patient reports sending message to YOVANA Parkinson requesting the following:      Additional Information: Patient had a follow up and needs Dr or PA to send a request to Compliance Support in regards to the breathilizer in her car...she does not have the lung compacity, and needs the intensity lowered.. please send a small summary of explanation to [email or web address]   customer ID # is [id number] Please call back to advise. Thanks!          Reviewed attached message from YOVANA Parkinson  with patient.      Amanda Parkinson PA-C to Anahi Marshall, CANDACE       1/17/25  9:23 AM  Sharmin Christian,  This is not something I am prepared to do. I recommend it come from her PCP. If she would like I can see her again via telemed and refer her to pulm for further assessment.  Thanks,  Amanda      Patient advised she has an appointment scheduled with Dr. Urbina in March, requesting to schedule a sooner appointment to discuss this matter.  Also advised she would like to keep appointment in March as currently scheduled.  Appointment scheduled for the date of 2/17/25 to discuss current issue; patient agreed to appointment date, time, and location.  Appointment on 3/14/25 remains in place as per patient's request.

## 2025-01-29 ENCOUNTER — TELEPHONE (OUTPATIENT)
Dept: FAMILY MEDICINE | Facility: CLINIC | Age: 75
End: 2025-01-29
Payer: MEDICARE

## 2025-01-29 NOTE — TELEPHONE ENCOUNTER
----- Message from Yasmin sent at 1/29/2025  2:25 PM CST -----  Contact: pt [phone]  Type:  Patient Returning Call    Who Called:  Pt   Who Left Message for Patient:  NA   Does the patient know what this is regarding?:  Yes   Best Call Back Number:  [phone]    Additional Information:  Pls call back

## 2025-01-29 NOTE — TELEPHONE ENCOUNTER
Spoke to pt discussed previous message with Tesha YEN and Mary MARIA. Pt advised she will need to be seen by Dr. Urbina to discuss letter to lower breathalyzer intensity. Advised pt a sooner appt was offered and scheduled 2-17-25. Pt verbalizes understanding and states will discuss at upcoming appt

## 2025-01-30 ENCOUNTER — PATIENT OUTREACH (OUTPATIENT)
Dept: ADMINISTRATIVE | Facility: OTHER | Age: 75
End: 2025-01-30
Payer: MEDICARE

## 2025-01-30 NOTE — PROGRESS NOTES
CHW - Follow Up    This Community Health Worker completed a follow up visit with patient via telephone today.  Pt/Caregiver reported: Spoke to the pt, she believes she may have misplace the packet that I mailed to her. I will mail [employer] again and follow up.   Community Health Worker provided: Spoke to the pt, she believes she may have misplace the packet that I mailed to her. I will mail [employer] again and follow up.   Follow up required:   Follow-up Outreach - Due: 2/13/2025

## 2025-02-16 NOTE — PROGRESS NOTES
"[facility]   OFFICE VISIT NOTE    Patient Name: Nicolasa Madera  YOB: 1950    PRESENTING HISTORY     History of Present Illness:  Ms. Nicolasa Madera is a 74 y.o. female     History of Present Illness    CHIEF COMPLAINT:  Patient presents today for balance issues.    BALANCE AND VERTIGO:  She reports a 30-year history of balance issues. A few months ago, she experienced positional vertigo, particularly when rising quickly or getting out of bed without pausing first. She previously received crystal adjustment from a balance specialist in [place]. She has a history of prolonged motion sickness after cruises, experiencing a persistent swaying sensation. During one episode, these symptoms lasted approximately one year before resolving, though she was informed they could potentially be permanent.    RECENT MEDICAL HISTORY:  She was recently hospitalized for UTI and followed up with physician assistant a few weeks ago. She has had low blood count anemia noted for the past three months. She denies history of asthma, COPD, or shortness of breath at baseline.    MEDICATIONS:  She takes levothyroxine for thyroid management.         Review of Systems   Constitutional:  Negative for chills and fever.   Respiratory:  Negative for shortness of breath.    Cardiovascular:  Negative for chest pain.   Gastrointestinal:  Negative for blood in stool, nausea and vomiting.   Genitourinary:  Negative for hematuria.   Neurological:  Positive for light-headedness.          OBJECTIVE:   Vital Signs:  Vitals:    02/17/25 1301 02/17/25 1351 02/17/25 1352 02/17/25 1353   BP: 130/82 120/80 116/80 118/70   Pulse: 78 66 68 77   SpO2: 100% 100% 99% 100%   Weight: 73.1 kg (161 lb 2.5 oz)      Height: 5' 2" (1.575 m)              Physical Exam  Constitutional:       General: She is not in acute distress.     Appearance: She is not ill-appearing or toxic-appearing.   HENT:      Head: Normocephalic and " atraumatic.      Mouth/Throat:      Mouth: Mucous membranes are moist.      Pharynx: Uvula midline. No pharyngeal swelling.   Cardiovascular:      Rate and Rhythm: Normal rate and regular rhythm.   Pulmonary:      Effort: Pulmonary effort is normal. No tachypnea, bradypnea, accessory muscle usage, prolonged expiration or respiratory distress.      Breath sounds: Normal breath sounds. No stridor. No wheezing, rhonchi or rales.   Neurological:      General: No focal deficit present.      Mental Status: She is alert.      Comments: Charlene hallpike maneuver negative    Psychiatric:         Mood and Affect: Mood normal.         Behavior: Behavior normal.         ASSESSMENT & PLAN:     Abnormal CBC  -     CBC Auto Differential; Future; Expected date: 02/17/2025  -     Iron and TIBC; Future; Expected date: 02/17/2025  -     Ferritin; Future; Expected date: 02/17/2025    Orthostatic hypotension  BALANCE ISSUES:  - Investigated balance issues reported by the patient for about 30 years.  - Noted dizziness when getting up too fast a few months ago.  - Performed physical exam to check for crystalloid displacement.  - Considered orthostatic hypotension as a potential cause and ordered an orthostatic blood pressure check.  - Ruled out benign paroxysmal positional vertigo (BPPV) through [place]-Hallpike maneuver.  - Orthostatic vitals positive for ortho hypotension -- advised the patient to take time to go with position changes such as sitting to standing     Macrocytic anemia  -     CBC Auto Differential; Future; Expected date: 02/17/2025  -     Iron and TIBC; Future; Expected date: 02/17/2025  -     Ferritin; Future; Expected date: 02/17/2025  - Inquired about visible blood in urine or stool and patient denies   - Evaluated the patient for persistent low blood count anemia, present for at least 3 months.  - Ordered a blood count recheck to assess current levels and instructed the patient to undergo lab work for  reassessment.    Hypothyroidism, unspecified type  -     TSH; Future; Expected date: 02/17/2025  -     T4, Free; Future; Expected date: 02/17/2025  - Assessed thyroid function and current levothyroxine treatment.  - Continued levothyroxine at the current dose.  - Ordered a thyroid function test to recheck levels.    Stage 3a chronic kidney disease  -     Comprehensive Metabolic Panel; Future; Expected date: 02/17/2025  -     Hemoglobin A1C; Future; Expected date: 02/17/2025    ALCOHOL DEPENDENCE WITH ALCOHOL-INDUCED MOOD DISORDER:  - Noted patient's use of Intoxalock breathalyzer device since December 19th of last year due to a DUI in the past   - Assessed patient's difficulty in using the device, which is preventing them from driving.  - Considering a pulmonary function test to evaluate lung capacity.  - Plan to investigate further before potentially writing a letter to adjust breathalyzer settings.    FOLLOW UP:  - Follow up after completion of ordered tests for review of results and further management.     Follow up with PA in 6 months and with me in 1 year  Sooner appointment if indicated/needed     Wen Urbina MD  Family Medicine  [facility]     This note was created using [employer] voice recognition software that occasionally misinterprets phrases or words

## 2025-02-17 ENCOUNTER — LAB VISIT (OUTPATIENT)
Dept: LAB | Facility: HOSPITAL | Age: 75
End: 2025-02-17
Attending: STUDENT IN AN ORGANIZED HEALTH CARE EDUCATION/TRAINING PROGRAM
Payer: MEDICARE

## 2025-02-17 ENCOUNTER — OFFICE VISIT (OUTPATIENT)
Dept: FAMILY MEDICINE | Facility: CLINIC | Age: 75
End: 2025-02-17
Payer: MEDICARE

## 2025-02-17 VITALS
DIASTOLIC BLOOD PRESSURE: 70 MMHG | SYSTOLIC BLOOD PRESSURE: 118 MMHG | OXYGEN SATURATION: 100 % | BODY MASS INDEX: 29.66 KG/M2 | HEART RATE: 77 BPM | HEIGHT: 62 IN | WEIGHT: 161.19 LBS

## 2025-02-17 DIAGNOSIS — R79.89 ABNORMAL CBC: ICD-10-CM

## 2025-02-17 DIAGNOSIS — R79.89 ABNORMAL CBC: Primary | ICD-10-CM

## 2025-02-17 DIAGNOSIS — I95.1 ORTHOSTATIC HYPOTENSION: ICD-10-CM

## 2025-02-17 DIAGNOSIS — E03.9 HYPOTHYROIDISM, UNSPECIFIED TYPE: ICD-10-CM

## 2025-02-17 DIAGNOSIS — D53.9 MACROCYTIC ANEMIA: ICD-10-CM

## 2025-02-17 DIAGNOSIS — N18.31 STAGE 3A CHRONIC KIDNEY DISEASE: ICD-10-CM

## 2025-02-17 DIAGNOSIS — R79.9 ABNORMAL FINDING OF BLOOD CHEMISTRY, UNSPECIFIED: ICD-10-CM

## 2025-02-17 PROCEDURE — G2211 COMPLEX E/M VISIT ADD ON: HCPCS | Mod: S$GLB,,, | Performed by: STUDENT IN AN ORGANIZED HEALTH CARE EDUCATION/TRAINING PROGRAM

## 2025-02-17 PROCEDURE — 83036 HEMOGLOBIN GLYCOSYLATED A1C: CPT | Performed by: STUDENT IN AN ORGANIZED HEALTH CARE EDUCATION/TRAINING PROGRAM

## 2025-02-17 PROCEDURE — 99214 OFFICE O/P EST MOD 30 MIN: CPT | Mod: S$GLB,,, | Performed by: STUDENT IN AN ORGANIZED HEALTH CARE EDUCATION/TRAINING PROGRAM

## 2025-02-17 PROCEDURE — 84443 ASSAY THYROID STIM HORMONE: CPT | Performed by: STUDENT IN AN ORGANIZED HEALTH CARE EDUCATION/TRAINING PROGRAM

## 2025-02-17 PROCEDURE — 82728 ASSAY OF FERRITIN: CPT | Performed by: STUDENT IN AN ORGANIZED HEALTH CARE EDUCATION/TRAINING PROGRAM

## 2025-02-17 PROCEDURE — 85025 COMPLETE CBC W/AUTO DIFF WBC: CPT | Performed by: STUDENT IN AN ORGANIZED HEALTH CARE EDUCATION/TRAINING PROGRAM

## 2025-02-17 PROCEDURE — 83540 ASSAY OF IRON: CPT | Performed by: STUDENT IN AN ORGANIZED HEALTH CARE EDUCATION/TRAINING PROGRAM

## 2025-02-17 PROCEDURE — 84439 ASSAY OF FREE THYROXINE: CPT | Performed by: STUDENT IN AN ORGANIZED HEALTH CARE EDUCATION/TRAINING PROGRAM

## 2025-02-17 PROCEDURE — 80053 COMPREHEN METABOLIC PANEL: CPT | Performed by: STUDENT IN AN ORGANIZED HEALTH CARE EDUCATION/TRAINING PROGRAM

## 2025-02-18 LAB
ALBUMIN SERPL BCP-MCNC: 4.3 G/DL (ref 3.5–5.2)
ALP SERPL-CCNC: 76 U/L (ref 40–150)
ALT SERPL W/O P-5'-P-CCNC: 62 U/L (ref 10–44)
ANION GAP SERPL CALC-SCNC: 12 MMOL/L (ref 8–16)
AST SERPL-CCNC: 85 U/L (ref 10–40)
BASOPHILS # BLD AUTO: 0.02 K/UL (ref 0–0.2)
BASOPHILS NFR BLD: 0.6 % (ref 0–1.9)
BILIRUB SERPL-MCNC: 0.3 MG/DL (ref 0.1–1)
BUN SERPL-MCNC: 22 MG/DL (ref 8–23)
CALCIUM SERPL-MCNC: 10.6 MG/DL (ref 8.7–10.5)
CHLORIDE SERPL-SCNC: 103 MMOL/L (ref 95–110)
CO2 SERPL-SCNC: 24 MMOL/L (ref 23–29)
CREAT SERPL-MCNC: 1.1 MG/DL (ref 0.5–1.4)
DIFFERENTIAL METHOD BLD: ABNORMAL
EOSINOPHIL # BLD AUTO: 0 K/UL (ref 0–0.5)
EOSINOPHIL NFR BLD: 0.3 % (ref 0–8)
ERYTHROCYTE [DISTWIDTH] IN BLOOD BY AUTOMATED COUNT: 21.7 % (ref 11.5–14.5)
EST. GFR  (NO RACE VARIABLE): 52.1 ML/MIN/1.73 M^2
ESTIMATED AVG GLUCOSE: 94 MG/DL (ref 68–131)
FERRITIN SERPL-MCNC: 59 NG/ML (ref 20–300)
GLUCOSE SERPL-MCNC: 98 MG/DL (ref 70–110)
HBA1C MFR BLD: 4.9 % (ref 4–5.6)
HCT VFR BLD AUTO: 35.9 % (ref 37–48.5)
HGB BLD-MCNC: 11.6 G/DL (ref 12–16)
IMM GRANULOCYTES # BLD AUTO: 0.01 K/UL (ref 0–0.04)
IMM GRANULOCYTES NFR BLD AUTO: 0.3 % (ref 0–0.5)
IRON SERPL-MCNC: 42 UG/DL (ref 30–160)
LYMPHOCYTES # BLD AUTO: 1.1 K/UL (ref 1–4.8)
LYMPHOCYTES NFR BLD: 30.2 % (ref 18–48)
MCH RBC QN AUTO: 27.2 PG (ref 27–31)
MCHC RBC AUTO-ENTMCNC: 32.3 G/DL (ref 32–36)
MCV RBC AUTO: 84 FL (ref 82–98)
MONOCYTES # BLD AUTO: 0.7 K/UL (ref 0.3–1)
MONOCYTES NFR BLD: 19.3 % (ref 4–15)
NEUTROPHILS # BLD AUTO: 1.7 K/UL (ref 1.8–7.7)
NEUTROPHILS NFR BLD: 49.3 % (ref 38–73)
NRBC BLD-RTO: 0 /100 WBC
PLATELET # BLD AUTO: 148 K/UL (ref 150–450)
PMV BLD AUTO: 11.5 FL (ref 9.2–12.9)
POTASSIUM SERPL-SCNC: 4.7 MMOL/L (ref 3.5–5.1)
PROT SERPL-MCNC: 7.9 G/DL (ref 6–8.4)
RBC # BLD AUTO: 4.27 M/UL (ref 4–5.4)
SATURATED IRON: 11 % (ref 20–50)
SODIUM SERPL-SCNC: 139 MMOL/L (ref 136–145)
T4 FREE SERPL-MCNC: 1.15 NG/DL (ref 0.71–1.51)
TOTAL IRON BINDING CAPACITY: 374 UG/DL (ref 250–450)
TRANSFERRIN SERPL-MCNC: 253 MG/DL (ref 200–375)
TSH SERPL DL<=0.005 MIU/L-ACNC: 3.72 UIU/ML (ref 0.4–4)
WBC # BLD AUTO: 3.48 K/UL (ref 3.9–12.7)

## 2025-02-19 ENCOUNTER — TELEPHONE (OUTPATIENT)
Dept: FAMILY MEDICINE | Facility: CLINIC | Age: 75
End: 2025-02-19
Payer: MEDICARE

## 2025-02-19 ENCOUNTER — RESULTS FOLLOW-UP (OUTPATIENT)
Dept: FAMILY MEDICINE | Facility: CLINIC | Age: 75
End: 2025-02-19

## 2025-02-19 DIAGNOSIS — R79.89 ABNORMAL CBC: Primary | ICD-10-CM

## 2025-02-19 DIAGNOSIS — R79.89 LFT ELEVATION: ICD-10-CM

## 2025-02-19 DIAGNOSIS — R74.01 ELEVATION OF LEVELS OF LIVER TRANSAMINASE LEVELS: ICD-10-CM

## 2025-02-19 NOTE — TELEPHONE ENCOUNTER
Frances Gagnon Staff     ----- Message from Frances sent at 2/19/2025  9:26 AM CST -----  Contact: [phone] Patient  .[email or web address]   Patient is calling for Medical Advice regarding: Pt is calling in regards to asking for a call back please. Please call and advise. Thank you. Pt gave no other information.    How long has patient had these symptoms:N/A  Pharmacy name and phone#:N/A  Patient wants a call back or thru [facility]:  Comments:Please advise patient replies from provider may take up to 48 hours.

## 2025-02-19 NOTE — TELEPHONE ENCOUNTER
Letter written for patient by Dr. Urbina on 25.  Patient is requesting for office to fax letter to [facility] with the following information on fax cover sheet:    --Patient's name  --Patient's   --Attention: Compliance Support  --Customer ID: [id number]    Fax number provided by patient as: [phone]    Writer reached out to in office staff member CANDACE Jordan for assistance with faxing letter as stated above.

## 2025-02-20 ENCOUNTER — TELEPHONE (OUTPATIENT)
Dept: FAMILY MEDICINE | Facility: CLINIC | Age: 75
End: 2025-02-20
Payer: MEDICARE

## 2025-02-20 NOTE — TELEPHONE ENCOUNTER
----- Message from Angelia sent at 2/20/2025  7:54 AM CST -----  Contact: self  Type:  Patient Returning CallWho Called:  ptLyudmila Left Message for Patient:  ?Does the patient know what this is regarding?:  ?Best Call Back Number:  [phone] Additional Information:  please call

## 2025-02-28 ENCOUNTER — HOSPITAL ENCOUNTER (OUTPATIENT)
Facility: HOSPITAL | Age: 75
Discharge: HOME HEALTH | End: 2025-03-01
Attending: STUDENT IN AN ORGANIZED HEALTH CARE EDUCATION/TRAINING PROGRAM | Admitting: INTERNAL MEDICINE
Payer: MEDICARE

## 2025-02-28 DIAGNOSIS — S06.0X0A CONCUSSION WITHOUT LOSS OF CONSCIOUSNESS, INITIAL ENCOUNTER: Primary | ICD-10-CM

## 2025-02-28 DIAGNOSIS — I16.0 HYPERTENSIVE URGENCY: ICD-10-CM

## 2025-02-28 DIAGNOSIS — R47.89 WORD FINDING DIFFICULTY: ICD-10-CM

## 2025-02-28 DIAGNOSIS — R51.9 ACUTE NONINTRACTABLE HEADACHE, UNSPECIFIED HEADACHE TYPE: ICD-10-CM

## 2025-02-28 DIAGNOSIS — R29.818 ACUTE FOCAL NEUROLOGICAL DEFICIT: ICD-10-CM

## 2025-02-28 LAB
ALBUMIN SERPL BCP-MCNC: 4.1 G/DL (ref 3.5–5.2)
ALP SERPL-CCNC: 75 U/L (ref 40–150)
ALT SERPL W/O P-5'-P-CCNC: 34 U/L (ref 10–44)
ANION GAP SERPL CALC-SCNC: 13 MMOL/L (ref 8–16)
AST SERPL-CCNC: 26 U/L (ref 10–40)
BASOPHILS # BLD AUTO: 0.06 K/UL (ref 0–0.2)
BASOPHILS NFR BLD: 1.4 % (ref 0–1.9)
BILIRUB SERPL-MCNC: 0.2 MG/DL (ref 0.1–1)
BNP SERPL-MCNC: 143 PG/ML (ref 0–99)
BUN SERPL-MCNC: 18 MG/DL (ref 8–23)
CALCIUM SERPL-MCNC: 9.8 MG/DL (ref 8.7–10.5)
CHLORIDE SERPL-SCNC: 106 MMOL/L (ref 95–110)
CHOLEST SERPL-MCNC: 232 MG/DL (ref 120–199)
CHOLEST/HDLC SERPL: 2.5 {RATIO} (ref 2–5)
CK SERPL-CCNC: 82 U/L (ref 20–180)
CO2 SERPL-SCNC: 22 MMOL/L (ref 23–29)
CREAT SERPL-MCNC: 1.2 MG/DL (ref 0.5–1.4)
CRP SERPL-MCNC: 1.4 MG/L (ref 0–8.2)
DIFFERENTIAL METHOD BLD: ABNORMAL
EOSINOPHIL # BLD AUTO: 0.1 K/UL (ref 0–0.5)
EOSINOPHIL NFR BLD: 1.1 % (ref 0–8)
ERYTHROCYTE [DISTWIDTH] IN BLOOD BY AUTOMATED COUNT: 20.9 % (ref 11.5–14.5)
ERYTHROCYTE [SEDIMENTATION RATE] IN BLOOD BY WESTERGREN METHOD: 14 MM/HR (ref 0–20)
EST. GFR  (NO RACE VARIABLE): 47 ML/MIN/1.73 M^2
GLUCOSE SERPL-MCNC: 99 MG/DL (ref 70–110)
HCT VFR BLD AUTO: 32.8 % (ref 37–48.5)
HCV AB SERPL QL IA: NEGATIVE
HDLC SERPL-MCNC: 94 MG/DL (ref 40–75)
HDLC SERPL: 40.5 % (ref 20–50)
HGB BLD-MCNC: 11 G/DL (ref 12–16)
HIV 1+2 AB+HIV1 P24 AG SERPL QL IA: NEGATIVE
IMM GRANULOCYTES # BLD AUTO: 0.01 K/UL (ref 0–0.04)
IMM GRANULOCYTES NFR BLD AUTO: 0.2 % (ref 0–0.5)
INR PPP: 1 (ref 0.8–1.2)
LDLC SERPL CALC-MCNC: 122.2 MG/DL (ref 63–159)
LYMPHOCYTES # BLD AUTO: 1.2 K/UL (ref 1–4.8)
LYMPHOCYTES NFR BLD: 27.2 % (ref 18–48)
MCH RBC QN AUTO: 28 PG (ref 27–31)
MCHC RBC AUTO-ENTMCNC: 33.5 G/DL (ref 32–36)
MCV RBC AUTO: 84 FL (ref 82–98)
MONOCYTES # BLD AUTO: 0.4 K/UL (ref 0.3–1)
MONOCYTES NFR BLD: 8 % (ref 4–15)
NEUTROPHILS # BLD AUTO: 2.7 K/UL (ref 1.8–7.7)
NEUTROPHILS NFR BLD: 62.1 % (ref 38–73)
NONHDLC SERPL-MCNC: 138 MG/DL
NRBC BLD-RTO: 0 /100 WBC
PLATELET # BLD AUTO: 164 K/UL (ref 150–450)
PMV BLD AUTO: 9.9 FL (ref 9.2–12.9)
POTASSIUM SERPL-SCNC: 4.1 MMOL/L (ref 3.5–5.1)
PROT SERPL-MCNC: 7.8 G/DL (ref 6–8.4)
PROTHROMBIN TIME: 11.2 SEC (ref 9–12.5)
RBC # BLD AUTO: 3.93 M/UL (ref 4–5.4)
SODIUM SERPL-SCNC: 141 MMOL/L (ref 136–145)
TRIGL SERPL-MCNC: 79 MG/DL (ref 30–150)
TROPONIN I SERPL DL<=0.01 NG/ML-MCNC: <0.006 NG/ML (ref 0–0.03)
TSH SERPL DL<=0.005 MIU/L-ACNC: 1.53 UIU/ML (ref 0.4–4)
WBC # BLD AUTO: 4.37 K/UL (ref 3.9–12.7)

## 2025-02-28 PROCEDURE — 93010 ELECTROCARDIOGRAM REPORT: CPT | Mod: ,,, | Performed by: GENERAL PRACTICE

## 2025-02-28 PROCEDURE — 82550 ASSAY OF CK (CPK): CPT | Performed by: STUDENT IN AN ORGANIZED HEALTH CARE EDUCATION/TRAINING PROGRAM

## 2025-02-28 PROCEDURE — 99285 EMERGENCY DEPT VISIT HI MDM: CPT | Mod: 25

## 2025-02-28 PROCEDURE — G0378 HOSPITAL OBSERVATION PER HR: HCPCS

## 2025-02-28 PROCEDURE — 85610 PROTHROMBIN TIME: CPT | Performed by: STUDENT IN AN ORGANIZED HEALTH CARE EDUCATION/TRAINING PROGRAM

## 2025-02-28 PROCEDURE — 94760 N-INVAS EAR/PLS OXIMETRY 1: CPT

## 2025-02-28 PROCEDURE — 25000003 PHARM REV CODE 250: Performed by: NURSE PRACTITIONER

## 2025-02-28 PROCEDURE — 94799 UNLISTED PULMONARY SVC/PX: CPT

## 2025-02-28 PROCEDURE — 99900035 HC TECH TIME PER 15 MIN (STAT)

## 2025-02-28 PROCEDURE — 80061 LIPID PANEL: CPT | Performed by: STUDENT IN AN ORGANIZED HEALTH CARE EDUCATION/TRAINING PROGRAM

## 2025-02-28 PROCEDURE — 63600175 PHARM REV CODE 636 W HCPCS: Performed by: STUDENT IN AN ORGANIZED HEALTH CARE EDUCATION/TRAINING PROGRAM

## 2025-02-28 PROCEDURE — 36415 COLL VENOUS BLD VENIPUNCTURE: CPT | Performed by: STUDENT IN AN ORGANIZED HEALTH CARE EDUCATION/TRAINING PROGRAM

## 2025-02-28 PROCEDURE — 86803 HEPATITIS C AB TEST: CPT | Performed by: EMERGENCY MEDICINE

## 2025-02-28 PROCEDURE — 87389 HIV-1 AG W/HIV-1&-2 AB AG IA: CPT | Performed by: EMERGENCY MEDICINE

## 2025-02-28 PROCEDURE — 86140 C-REACTIVE PROTEIN: CPT | Performed by: STUDENT IN AN ORGANIZED HEALTH CARE EDUCATION/TRAINING PROGRAM

## 2025-02-28 PROCEDURE — 84443 ASSAY THYROID STIM HORMONE: CPT | Performed by: STUDENT IN AN ORGANIZED HEALTH CARE EDUCATION/TRAINING PROGRAM

## 2025-02-28 PROCEDURE — 94761 N-INVAS EAR/PLS OXIMETRY MLT: CPT

## 2025-02-28 PROCEDURE — 85651 RBC SED RATE NONAUTOMATED: CPT | Performed by: STUDENT IN AN ORGANIZED HEALTH CARE EDUCATION/TRAINING PROGRAM

## 2025-02-28 PROCEDURE — 80053 COMPREHEN METABOLIC PANEL: CPT | Performed by: STUDENT IN AN ORGANIZED HEALTH CARE EDUCATION/TRAINING PROGRAM

## 2025-02-28 PROCEDURE — 83880 ASSAY OF NATRIURETIC PEPTIDE: CPT | Performed by: STUDENT IN AN ORGANIZED HEALTH CARE EDUCATION/TRAINING PROGRAM

## 2025-02-28 PROCEDURE — 85025 COMPLETE CBC W/AUTO DIFF WBC: CPT | Performed by: STUDENT IN AN ORGANIZED HEALTH CARE EDUCATION/TRAINING PROGRAM

## 2025-02-28 PROCEDURE — 84484 ASSAY OF TROPONIN QUANT: CPT | Performed by: STUDENT IN AN ORGANIZED HEALTH CARE EDUCATION/TRAINING PROGRAM

## 2025-02-28 PROCEDURE — 25000003 PHARM REV CODE 250: Performed by: STUDENT IN AN ORGANIZED HEALTH CARE EDUCATION/TRAINING PROGRAM

## 2025-02-28 PROCEDURE — 93005 ELECTROCARDIOGRAM TRACING: CPT

## 2025-02-28 PROCEDURE — 96374 THER/PROPH/DIAG INJ IV PUSH: CPT

## 2025-02-28 RX ORDER — HYDRALAZINE HYDROCHLORIDE 20 MG/ML
5 INJECTION INTRAMUSCULAR; INTRAVENOUS
Status: COMPLETED | OUTPATIENT
Start: 2025-02-28 | End: 2025-02-28

## 2025-02-28 RX ORDER — ASPIRIN 325 MG
325 TABLET ORAL
Status: COMPLETED | OUTPATIENT
Start: 2025-02-28 | End: 2025-02-28

## 2025-02-28 RX ORDER — SODIUM CHLORIDE 0.9 % (FLUSH) 0.9 %
10 SYRINGE (ML) INJECTION
Status: DISCONTINUED | OUTPATIENT
Start: 2025-02-28 | End: 2025-03-01 | Stop reason: HOSPADM

## 2025-02-28 RX ORDER — BISACODYL 10 MG/1
10 SUPPOSITORY RECTAL DAILY PRN
Status: DISCONTINUED | OUTPATIENT
Start: 2025-02-28 | End: 2025-03-01 | Stop reason: HOSPADM

## 2025-02-28 RX ORDER — FOLIC ACID 1 MG/1
1 TABLET ORAL DAILY
Status: DISCONTINUED | OUTPATIENT
Start: 2025-03-01 | End: 2025-03-01 | Stop reason: HOSPADM

## 2025-02-28 RX ORDER — OLOPATADINE HYDROCHLORIDE 1 MG/ML
1 SOLUTION/ DROPS OPHTHALMIC 2 TIMES DAILY
Status: DISCONTINUED | OUTPATIENT
Start: 2025-02-28 | End: 2025-03-01 | Stop reason: HOSPADM

## 2025-02-28 RX ORDER — ATORVASTATIN CALCIUM 40 MG/1
40 TABLET, FILM COATED ORAL DAILY
Status: DISCONTINUED | OUTPATIENT
Start: 2025-03-01 | End: 2025-03-01 | Stop reason: HOSPADM

## 2025-02-28 RX ORDER — ONDANSETRON HYDROCHLORIDE 2 MG/ML
4 INJECTION, SOLUTION INTRAVENOUS EVERY 6 HOURS PRN
Status: DISCONTINUED | OUTPATIENT
Start: 2025-02-28 | End: 2025-03-01 | Stop reason: HOSPADM

## 2025-02-28 RX ORDER — PANTOPRAZOLE SODIUM 40 MG/1
40 TABLET, DELAYED RELEASE ORAL 2 TIMES DAILY
Status: CANCELLED | OUTPATIENT
Start: 2025-02-28

## 2025-02-28 RX ORDER — BUTALBITAL, ACETAMINOPHEN AND CAFFEINE 50; 325; 40 MG/1; MG/1; MG/1
1 TABLET ORAL
Status: COMPLETED | OUTPATIENT
Start: 2025-02-28 | End: 2025-02-28

## 2025-02-28 RX ORDER — LEVOTHYROXINE SODIUM 100 UG/1
100 TABLET ORAL
Status: DISCONTINUED | OUTPATIENT
Start: 2025-03-01 | End: 2025-03-01 | Stop reason: HOSPADM

## 2025-02-28 RX ORDER — ACETAMINOPHEN 500 MG
5000 TABLET ORAL DAILY
COMMUNITY

## 2025-02-28 RX ORDER — FAMOTIDINE 20 MG/1
20 TABLET, FILM COATED ORAL DAILY
Status: DISCONTINUED | OUTPATIENT
Start: 2025-03-01 | End: 2025-03-01 | Stop reason: HOSPADM

## 2025-02-28 RX ORDER — ACETAMINOPHEN 325 MG/1
650 TABLET ORAL EVERY 6 HOURS PRN
Status: DISCONTINUED | OUTPATIENT
Start: 2025-02-28 | End: 2025-03-01 | Stop reason: HOSPADM

## 2025-02-28 RX ADMIN — OLOPATADINE HYDROCHLORIDE 1 DROP: 1 SOLUTION OPHTHALMIC at 08:02

## 2025-02-28 RX ADMIN — BUTALBITAL, ACETAMINOPHEN, AND CAFFEINE 1 TABLET: 50; 325; 40 TABLET ORAL at 03:02

## 2025-02-28 RX ADMIN — ASPIRIN 325 MG: 325 TABLET ORAL at 05:02

## 2025-02-28 RX ADMIN — ACETAMINOPHEN 650 MG: 325 TABLET ORAL at 08:02

## 2025-02-28 RX ADMIN — HYDRALAZINE HYDROCHLORIDE 5 MG: 20 INJECTION, SOLUTION INTRAMUSCULAR; INTRAVENOUS at 05:02

## 2025-02-28 NOTE — ED PROVIDER NOTES
"Encounter Date: 2/28/2025       History     Chief Complaint   Patient presents with    Fall     States " walking funny " started yesterday     Headache     74-year-old female presents for evaluation of head injury.  Patient had fall from ground level yesterday, since then has been having neurological deficits.  Difficulty finding words.  Sometimes difficulty walking.  No anticoagulation use or antiplatelet use documented on patient's chart and patient denies any on history exam.  Patient reports having a headache but no associated neck pain or thoracic or trunk or extremity pain    The history is provided by the patient.     Review of patient's allergies indicates:  No Known Allergies  Past Medical History:   Diagnosis Date    Encounter for blood transfusion     Hypothyroidism     Peptic ulcer with hemorrhage     Peptic ulcer, site unspecified, unspecified as acute or chronic, without hemorrhage or perforation     Peptic ulcer disease    Stroke 2017    no residual effects.     Urticaria      Past Surgical History:   Procedure Laterality Date    ESOPHAGOGASTRODUODENOSCOPY N/A 4/18/2021    Procedure: EGD (ESOPHAGOGASTRODUODENOSCOPY);  Surgeon: Crow Vigil MD;  Location: [facility];  Service: Endoscopy;  Laterality: N/A;    ESOPHAGOGASTRODUODENOSCOPY N/A 8/31/2021    Procedure: EGD (ESOPHAGOGASTRODUODENOSCOPY);  Surgeon: Crow Vigil MD;  Location: [facility];  Service: Endoscopy;  Laterality: N/A;     Family History   Problem Relation Name Age of Onset    Stroke Father  70    Breast cancer Paternal Aunt      Breast cancer Paternal Grandmother       Social History[1]  Review of Systems   All other systems reviewed and are negative.      Physical Exam     Initial Vitals [02/28/25 1359]   BP Pulse Resp Temp SpO2   (!) 193/91 77 20 98.1 °F (36.7 °C) 98 %      MAP       --         Physical Exam    Nursing note and vitals reviewed.  Constitutional: She is not diaphoretic.   HENT:   Head: Normocephalic.   Eyes: No " scleral icterus.   Cardiovascular:  Normal rate.           Pulmonary/Chest: Effort normal. No stridor. No respiratory distress.   Abdominal: There is no guarding.   Musculoskeletal:      Comments: No spinal tenderness to palpation or deformity     Neurological: She is alert.   Moving all extremities with no focal extremity motor or sensory deficit, appears to have some difficulty word finding   Skin: No rash noted. No erythema.         ED Course   Critical Care    Date/Time: 2/28/2025 1:51 PM    Performed by: Rick Mcdonald Jr., DO  Authorized by: Rick Mcdonald Jr., DO  Direct patient critical care time: 10 minutes  Additional history critical care time: 10 minutes  Ordering / reviewing critical care time: 10 minutes  Documentation critical care time: 10 minutes  Total critical care time (exclusive of procedural time) : 40 minutes        Labs Reviewed   CBC W/ AUTO DIFFERENTIAL - Abnormal       Result Value    WBC 4.37      RBC 3.93 (*)     Hemoglobin 11.0 (*)     Hematocrit 32.8 (*)     MCV 84      MCH 28.0      MCHC 33.5      RDW 20.9 (*)     Platelets 164      MPV 9.9      Immature Granulocytes 0.2      Gran # (ANC) 2.7      Immature Grans (Abs) 0.01      Lymph # 1.2      Mono # 0.4      Eos # 0.1      Baso # 0.06      nRBC 0      Gran % 62.1      Lymph % 27.2      Mono % 8.0      Eosinophil % 1.1      Basophil % 1.4      Differential Method Automated      Narrative:     Release to patient->Immediate  Collection has been rescheduled by AMR3 at 02/28/2025 14:45 Reason:   Patient unavailable Unable to collect   COMPREHENSIVE METABOLIC PANEL - Abnormal    Sodium 141      Potassium 4.1      Chloride 106      CO2 22 (*)     Glucose 99      BUN 18      Creatinine 1.2      Calcium 9.8      Total Protein 7.8      Albumin 4.1      Total Bilirubin 0.2      Alkaline Phosphatase 75      AST 26      ALT 34      eGFR 47 (*)     Anion Gap 13      Narrative:     Release to patient->Immediate  Collection has been  rescheduled by AMR3 at 02/28/2025 14:45 Reason:   Patient unavailable Unable to collect   LIPID PANEL - Abnormal    Cholesterol 232 (*)     Triglycerides 79      HDL 94 (*)     LDL Cholesterol 122.2      HDL/Cholesterol Ratio 40.5      Total Cholesterol/HDL Ratio 2.5      Non-HDL Cholesterol 138      Narrative:     Release to patient->Immediate  Collection has been rescheduled by AMR3 at 02/28/2025 14:45 Reason:   Patient unavailable Unable to collect   B-TYPE NATRIURETIC PEPTIDE - Abnormal     (*)     Narrative:     Release to patient->Immediate  Collection has been rescheduled by AMR3 at 02/28/2025 14:45 Reason:   Patient unavailable Unable to collect   HEPATITIS C ANTIBODY    Hepatitis C Ab Negative      Narrative:     Release to patient->Immediate  Collection has been rescheduled by AMR3 at 02/28/2025 14:45 Reason:   Patient unavailable Unable to collect   HIV 1 / 2 ANTIBODY    HIV 1/2 Ag/Ab Negative      Narrative:     Release to patient->Immediate  Collection has been rescheduled by AMR3 at 02/28/2025 14:45 Reason:   Patient unavailable Unable to collect   PROTIME-INR    Prothrombin Time 11.2      INR 1.0      Narrative:     Release to patient->Immediate  Collection has been rescheduled by AMR3 at 02/28/2025 14:45 Reason:   Patient unavailable Unable to collect   TSH    TSH 1.531      Narrative:     Release to patient->Immediate  Collection has been rescheduled by AMR3 at 02/28/2025 14:45 Reason:   Patient unavailable Unable to collect   TROPONIN I    Troponin I <0.006      Narrative:     Release to patient->Immediate  Collection has been rescheduled by AMR3 at 02/28/2025 14:45 Reason:   Patient unavailable Unable to collect   CK    CPK 82     SEDIMENTATION RATE    Sed Rate 14     C-REACTIVE PROTEIN    CRP 1.4            Imaging Results              MRI Brain Without Contrast (Final result)  Result time 02/28/25 16:44:02      Final result by Ck Ackerman MD (02/28/25 16:44:02)                    Impression:      1. No acute process.  2. Chronic small vessel ischemic and involutional changes as described above.      Electronically signed by: Ck Ackerman  Date:    02/28/2025  Time:    16:44               Narrative:    EXAMINATION:  MRI BRAIN WITHOUT CONTRAST    CLINICAL HISTORY:  Neuro deficit, acute, stroke suspected;neuro deficit;.    TECHNIQUE:  Multiplanar multisequence MR imaging of the brain was performed without contrast    COMPARISON:  Head CT 02/28/2025    FINDINGS:  Brain: No abnormal restricted diffusion or acute infarct.  No mass, hemorrhage, or midline shift/space-occupying extra-axial fluid collections.  Scattered T2/FLAIR hyperintensities within the cerebral hemispheric white matter and central fela consistent with mild to moderate sequela of chronic small vessel ischemic changes as well as moderate global parenchymal volume loss.  Midline Structures: The midline structures of the brain are normal.  Ventricles: The ventricles, sulci and cisterns are stable in size and configuration.  Vasculature: The vascular flow voids at the base of the brain are within normal limits.  Calvarium: The visualized osseous structures are unremarkable.  Sinuses: Mild scattered mucosal thickening within the maxillary sinuses.  Orbits: The orbits and globes are unremarkable.  Mastoids: The mastoid air cells are adequately aerated.  Extracranial soft tissues: The visualized extracranial soft tissues are unremarkable.                                       CT Head Without Contrast (Final result)  Result time 02/28/25 15:09:27      Final result by Ck Ackerman MD (02/28/25 15:09:27)                   Impression:      1. No acute intracranial CT findings.      Electronically signed by: Ck Ackerman  Date:    02/28/2025  Time:    15:09               Narrative:    EXAMINATION:  CT HEAD WITHOUT CONTRAST    CLINICAL HISTORY:  Neuro deficit, acute, stroke suspected;    TECHNIQUE:  Low dose axial CT images obtained throughout the  head without intravenous contrast. Sagittal and coronal reconstructions were performed.    COMPARISON:  Head CT 10/17/2024.    FINDINGS:  Brain: There is no evidence of a mass, edema, midline shift, or intracranial hemorrhage. No extra-axial fluid collection.  Grossly stable findings of chronic small vessel ischemic and involutional changes.  No CT evidence of an acute major vascular territorial infarct.    Ventricles: The ventricles, sulci, and cisterns are within normal limits.    Skull: The osseous structures are unremarkable in appearance.    Extracranial soft tissues: Limited imaging is within normal limits.    Other: The visualized portions of the sinuses, orbits, and mastoid air cells are unremarkable.                                       Medications   butalbital-acetaminophen-caffeine -40 mg per tablet 1 tablet (1 tablet Oral Given 2/28/25 1540)   hydrALAZINE injection 5 mg (5 mg Intravenous Given 2/28/25 1709)   aspirin tablet 325 mg (325 mg Oral Given 2/28/25 1709)     Medical Decision Making  74-year-old female presents with difficulty word finding.  Associated head trauma, we will within differential diagnosis includes intracranial hemorrhage versus ischemic stroke versus TIA versus concussion.  No associated photophobia or photosensitivity, do not suspect any CNS infection.  CT head no hemorrhage, MRI brain no acute stroke.  Patient also hypertensive so within differential diagnosis includes hypertensive emergency.  Patient administered IV hydralazine, oral aspirin and admitted to hospitalist team for further supportive care.  Patient and friend updated and agree with plan    Amount and/or Complexity of Data Reviewed  Labs: ordered. Decision-making details documented in ED Course.  Radiology: ordered.  ECG/medicine tests: ordered and independent interpretation performed. Decision-making details documented in ED Course.  Discussion of management or test interpretation with external provider(s):  Spoke with Gato Juarez NP with hospitalist team will admit for further management and evaluation      Risk  OTC drugs.  Prescription drug management.  Decision regarding hospitalization.               ED Course as of 02/28/25 1838 Fri Feb 28, 2025   1436 EKG rate 63, QRS 80, , normal sinus rhythm, no STEMI EKG interpreted by myself Rick Mcdonald DO   [KB]   1512 WBC: 4.37 [KB]   1512 Hemoglobin(!): 11.0 [KB]   1512 Hematocrit(!): 32.8 [KB]   1512 WBC: 4.37 [KB]   1512 Hemoglobin(!): 11.0 [KB]   1512 Hematocrit(!): 32.8 [KB]   1527 Within differential diagnosis includes stroke-like symptoms versus concussion, we will obtain MRI brain rule out acute stroke, patient agreeable with plan [KB]   1559 INR: 1.0 [KB]   1559 PT: 11.2 [KB]   1600 Troponin I: <0.006 [KB]   1600 Sodium: 141 [KB]   1600 Potassium: 4.1 [KB]   1600 Chloride: 106 [KB]   1600 CO2(!): 22 [KB]   1600 Glucose: 99 [KB]   1600 BUN: 18 [KB]   1600 Creatinine: 1.2 [KB]   1600 Calcium: 9.8 [KB]   1600 PROTEIN TOTAL: 7.8 [KB]   1600 eGFR(!): 47 [KB]   1600 Anion Gap: 13 [KB]   1615 BNP(!): 143 [KB]   1615 HIV 1/2 Ag/Ab: Negative [KB]   1615 Hepatitis C Ab: Negative [KB]   1705 CPK: 82 [KB]   1707 Patient hypertensive, had head trauma, intermittent headache and difficulty word finding, within differential diagnosis includes TIA as MRI imaging with no evidence of acute stroke.  Less likely temporal arteritis however given age and headache we will obtain inflammatory markers.  We will plan for admission, administered aspirin IV hydralazine for blood pressure control, patient and friend updated and agree with plan [KB]   1734 TSH: 1.531 [KB]   1819 CRP: 1.4 [KB]   1836 Sed Rate: 14 [KB]      ED Course User Index  [KB] Rick Mcdonald Jr., DO                           Clinical Impression:  Final diagnoses:  [R29.818] Acute focal neurological deficit  [R47.89] Word finding difficulty (Primary)  [I16.0] Hypertensive urgency  [R51.9] Acute  nonintractable headache, unspecified headache type          ED Disposition Condition    Observation Stable                  [1]   Social History  Tobacco Use    Smoking status: Never    Smokeless tobacco: Never   Substance Use Topics    Alcohol use: Yes     Alcohol/week: 5.0 standard drinks of alcohol     Types: 5 Glasses of wine per week    Drug use: Never        Rick Mcdonald Jr.,   02/28/25 1836

## 2025-03-01 VITALS
RESPIRATION RATE: 17 BRPM | BODY MASS INDEX: 29.45 KG/M2 | HEIGHT: 62 IN | SYSTOLIC BLOOD PRESSURE: 141 MMHG | HEART RATE: 66 BPM | WEIGHT: 160.06 LBS | OXYGEN SATURATION: 100 % | TEMPERATURE: 98 F | DIASTOLIC BLOOD PRESSURE: 73 MMHG

## 2025-03-01 PROBLEM — S06.0X0A CONCUSSION WITHOUT LOSS OF CONSCIOUSNESS: Status: ACTIVE | Noted: 2025-03-01

## 2025-03-01 PROBLEM — R29.818 ACUTE FOCAL NEUROLOGICAL DEFICIT: Status: RESOLVED | Noted: 2025-02-28 | Resolved: 2025-03-01

## 2025-03-01 LAB
ALBUMIN SERPL BCP-MCNC: 3.5 G/DL (ref 3.5–5.2)
ALP SERPL-CCNC: 66 U/L (ref 40–150)
ALT SERPL W/O P-5'-P-CCNC: 27 U/L (ref 10–44)
ANION GAP SERPL CALC-SCNC: 10 MMOL/L (ref 8–16)
APTT PPP: 28 SEC (ref 21–32)
AST SERPL-CCNC: 18 U/L (ref 10–40)
BASOPHILS # BLD AUTO: 0.07 K/UL (ref 0–0.2)
BASOPHILS NFR BLD: 2.1 % (ref 0–1.9)
BILIRUB SERPL-MCNC: 0.2 MG/DL (ref 0.1–1)
BUN SERPL-MCNC: 18 MG/DL (ref 8–23)
CALCIUM SERPL-MCNC: 9.3 MG/DL (ref 8.7–10.5)
CHLORIDE SERPL-SCNC: 110 MMOL/L (ref 95–110)
CK MB SERPL-MCNC: 1.3 NG/ML (ref 0.1–6.5)
CO2 SERPL-SCNC: 22 MMOL/L (ref 23–29)
CREAT SERPL-MCNC: 1 MG/DL (ref 0.5–1.4)
DIFFERENTIAL METHOD BLD: ABNORMAL
EOSINOPHIL # BLD AUTO: 0.1 K/UL (ref 0–0.5)
EOSINOPHIL NFR BLD: 2.4 % (ref 0–8)
ERYTHROCYTE [DISTWIDTH] IN BLOOD BY AUTOMATED COUNT: 20.9 % (ref 11.5–14.5)
EST. GFR  (NO RACE VARIABLE): 58 ML/MIN/1.73 M^2
GLUCOSE SERPL-MCNC: 88 MG/DL (ref 70–110)
HCT VFR BLD AUTO: 31.1 % (ref 37–48.5)
HGB BLD-MCNC: 10.3 G/DL (ref 12–16)
IMM GRANULOCYTES # BLD AUTO: 0 K/UL (ref 0–0.04)
IMM GRANULOCYTES NFR BLD AUTO: 0 % (ref 0–0.5)
INR PPP: 1 (ref 0.8–1.2)
LYMPHOCYTES # BLD AUTO: 1.1 K/UL (ref 1–4.8)
LYMPHOCYTES NFR BLD: 32.5 % (ref 18–48)
MAGNESIUM SERPL-MCNC: 1.8 MG/DL (ref 1.6–2.6)
MCH RBC QN AUTO: 27.3 PG (ref 27–31)
MCHC RBC AUTO-ENTMCNC: 33.1 G/DL (ref 32–36)
MCV RBC AUTO: 83 FL (ref 82–98)
MONOCYTES # BLD AUTO: 0.4 K/UL (ref 0.3–1)
MONOCYTES NFR BLD: 11.9 % (ref 4–15)
NEUTROPHILS # BLD AUTO: 1.7 K/UL (ref 1.8–7.7)
NEUTROPHILS NFR BLD: 51.1 % (ref 38–73)
NRBC BLD-RTO: 0 /100 WBC
OHS QRS DURATION: 80 MS
OHS QTC CALCULATION: 374 MS
PHOSPHATE SERPL-MCNC: 4.2 MG/DL (ref 2.7–4.5)
PLATELET # BLD AUTO: 175 K/UL (ref 150–450)
PMV BLD AUTO: 11.1 FL (ref 9.2–12.9)
POTASSIUM SERPL-SCNC: 3.9 MMOL/L (ref 3.5–5.1)
PROT SERPL-MCNC: 6.6 G/DL (ref 6–8.4)
PROTHROMBIN TIME: 11.4 SEC (ref 9–12.5)
RBC # BLD AUTO: 3.77 M/UL (ref 4–5.4)
SODIUM SERPL-SCNC: 142 MMOL/L (ref 136–145)
TROPONIN I SERPL DL<=0.01 NG/ML-MCNC: <0.006 NG/ML (ref 0–0.03)
WBC # BLD AUTO: 3.29 K/UL (ref 3.9–12.7)

## 2025-03-01 PROCEDURE — 92523 SPEECH SOUND LANG COMPREHEN: CPT

## 2025-03-01 PROCEDURE — 97116 GAIT TRAINING THERAPY: CPT

## 2025-03-01 PROCEDURE — 96376 TX/PRO/DX INJ SAME DRUG ADON: CPT

## 2025-03-01 PROCEDURE — 25000003 PHARM REV CODE 250: Performed by: NURSE PRACTITIONER

## 2025-03-01 PROCEDURE — 84484 ASSAY OF TROPONIN QUANT: CPT | Performed by: NURSE PRACTITIONER

## 2025-03-01 PROCEDURE — 94761 N-INVAS EAR/PLS OXIMETRY MLT: CPT

## 2025-03-01 PROCEDURE — G0378 HOSPITAL OBSERVATION PER HR: HCPCS

## 2025-03-01 PROCEDURE — 84100 ASSAY OF PHOSPHORUS: CPT | Performed by: NURSE PRACTITIONER

## 2025-03-01 PROCEDURE — 97161 PT EVAL LOW COMPLEX 20 MIN: CPT

## 2025-03-01 PROCEDURE — 92610 EVALUATE SWALLOWING FUNCTION: CPT

## 2025-03-01 PROCEDURE — 25000003 PHARM REV CODE 250: Performed by: STUDENT IN AN ORGANIZED HEALTH CARE EDUCATION/TRAINING PROGRAM

## 2025-03-01 PROCEDURE — 85025 COMPLETE CBC W/AUTO DIFF WBC: CPT | Performed by: NURSE PRACTITIONER

## 2025-03-01 PROCEDURE — 82553 CREATINE MB FRACTION: CPT | Performed by: NURSE PRACTITIONER

## 2025-03-01 PROCEDURE — 63600175 PHARM REV CODE 636 W HCPCS: Performed by: STUDENT IN AN ORGANIZED HEALTH CARE EDUCATION/TRAINING PROGRAM

## 2025-03-01 PROCEDURE — 94760 N-INVAS EAR/PLS OXIMETRY 1: CPT

## 2025-03-01 PROCEDURE — 36415 COLL VENOUS BLD VENIPUNCTURE: CPT | Performed by: NURSE PRACTITIONER

## 2025-03-01 PROCEDURE — 85730 THROMBOPLASTIN TIME PARTIAL: CPT | Performed by: NURSE PRACTITIONER

## 2025-03-01 PROCEDURE — 97165 OT EVAL LOW COMPLEX 30 MIN: CPT

## 2025-03-01 PROCEDURE — 83735 ASSAY OF MAGNESIUM: CPT | Performed by: NURSE PRACTITIONER

## 2025-03-01 PROCEDURE — 80053 COMPREHEN METABOLIC PANEL: CPT | Performed by: NURSE PRACTITIONER

## 2025-03-01 PROCEDURE — 85610 PROTHROMBIN TIME: CPT | Performed by: NURSE PRACTITIONER

## 2025-03-01 RX ORDER — BUTALBITAL, ACETAMINOPHEN AND CAFFEINE 50; 325; 40 MG/1; MG/1; MG/1
1 TABLET ORAL EVERY 6 HOURS PRN
Status: DISCONTINUED | OUTPATIENT
Start: 2025-03-01 | End: 2025-03-01 | Stop reason: HOSPADM

## 2025-03-01 RX ORDER — AMLODIPINE BESYLATE 5 MG/1
5 TABLET ORAL DAILY
Qty: 90 TABLET | Refills: 3 | Status: SHIPPED | OUTPATIENT
Start: 2025-03-02 | End: 2026-03-02

## 2025-03-01 RX ORDER — HYDRALAZINE HYDROCHLORIDE 20 MG/ML
10 INJECTION INTRAMUSCULAR; INTRAVENOUS ONCE
Status: COMPLETED | OUTPATIENT
Start: 2025-03-01 | End: 2025-03-01

## 2025-03-01 RX ORDER — AMLODIPINE BESYLATE 5 MG/1
5 TABLET ORAL DAILY
Status: DISCONTINUED | OUTPATIENT
Start: 2025-03-01 | End: 2025-03-01 | Stop reason: HOSPADM

## 2025-03-01 RX ADMIN — BUTALBITAL, ACETAMINOPHEN, AND CAFFEINE 1 TABLET: 325; 50; 40 TABLET ORAL at 09:03

## 2025-03-01 RX ADMIN — AMLODIPINE BESYLATE 5 MG: 5 TABLET ORAL at 01:03

## 2025-03-01 RX ADMIN — HYDRALAZINE HYDROCHLORIDE 10 MG: 20 INJECTION INTRAMUSCULAR; INTRAVENOUS at 01:03

## 2025-03-01 RX ADMIN — FOLIC ACID 1 MG: 1 TABLET ORAL at 09:03

## 2025-03-01 RX ADMIN — LEVOTHYROXINE SODIUM 100 MCG: 0.1 TABLET ORAL at 05:03

## 2025-03-01 RX ADMIN — ACETAMINOPHEN 650 MG: 325 TABLET ORAL at 05:03

## 2025-03-01 RX ADMIN — ATORVASTATIN CALCIUM 40 MG: 40 TABLET, FILM COATED ORAL at 09:03

## 2025-03-01 RX ADMIN — BUTALBITAL, ACETAMINOPHEN, AND CAFFEINE 1 TABLET: 325; 50; 40 TABLET ORAL at 05:03

## 2025-03-01 NOTE — PLAN OF CARE
Pt cleared for discharge from case management  Met with pt and gave list of HH agencies; pt selected [facility] and signed choice form.  Sent HH order to HH agency via [employer].     03/01/25 4782   Final Note   Assessment Type Final Discharge Note   Anticipated Discharge Disposition Home-Health   Post-Acute Status   Post-Acute Authorization Home Health   Home Health Status Referrals Sent   Patient choice form signed by patient/caregiver List from [employer]

## 2025-03-01 NOTE — SUBJECTIVE & OBJECTIVE
Past Medical History:   Diagnosis Date    Encounter for blood transfusion     Hypothyroidism     Peptic ulcer with hemorrhage     Peptic ulcer, site unspecified, unspecified as acute or chronic, without hemorrhage or perforation     Peptic ulcer disease    Stroke 2017    no residual effects.     Urticaria        Past Surgical History:   Procedure Laterality Date    ESOPHAGOGASTRODUODENOSCOPY N/A 4/18/2021    Procedure: EGD (ESOPHAGOGASTRODUODENOSCOPY);  Surgeon: Crow Vigil MD;  Location: [facility] ENDO;  Service: Endoscopy;  Laterality: N/A;    ESOPHAGOGASTRODUODENOSCOPY N/A 8/31/2021    Procedure: EGD (ESOPHAGOGASTRODUODENOSCOPY);  Surgeon: Crow Vigil MD;  Location: [facility] ENDO;  Service: Endoscopy;  Laterality: N/A;       Review of patient's allergies indicates:  No Known Allergies    No current facility-administered medications on file prior to encounter.     Current Outpatient Medications on File Prior to Encounter   Medication Sig    biotin 5,000 mcg TbDL Take 1 tablet by mouth once daily.    diclofenac sodium (VOLTAREN ARTHRITIS PAIN) 1 % Gel Apply 2 g topically once daily.    folic acid (FOLVITE) 1 MG tablet Take 1 tablet (1 mg total) by mouth once daily.    levothyroxine (SYNTHROID) 100 MCG tablet Take 1 tablet (100 mcg total) by mouth before breakfast.    multivit-min/iron/folic/lutein (CENTRUM SILVER WOMEN ORAL) Take 1 tablet by mouth once daily.    omega-3 fatty acids/fish oil (FISH OIL-OMEGA-3 FATTY ACIDS) 300-1,000 mg capsule Take 1 capsule by mouth once daily.    ondansetron (ZOFRAN-ODT) 4 MG TbDL Take 1 tablet (4 mg total) by mouth every 8 (eight) hours as needed (nausea).    pantoprazole (PROTONIX) 40 MG tablet Take 1 tablet (40 mg total) by mouth 2 (two) times daily.    vit B complex no.12/niacin,B3, (VITAMIN B COMPLEX NO.12-NIACIN ORAL) Take 1 tablet by mouth.     Family History       Problem Relation (Age of Onset)    Breast cancer Paternal Aunt, Paternal Grandmother    Stroke Father (70)           Tobacco Use    Smoking status: Never    Smokeless tobacco: Never   Substance and Sexual Activity    Alcohol use: Yes     Alcohol/week: 5.0 standard drinks of alcohol     Types: 5 Glasses of wine per week    Drug use: Never    Sexual activity: Not Currently     Review of Systems   Constitutional:  Positive for activity change. Negative for chills, diaphoresis and fever.   HENT:  Negative for congestion, nosebleeds and tinnitus.    Eyes:  Negative for photophobia and visual disturbance.   Respiratory:  Negative for cough, chest tightness, shortness of breath and wheezing.    Cardiovascular:  Negative for chest pain, palpitations and leg swelling.   Gastrointestinal:  Negative for abdominal distention, abdominal pain, constipation, diarrhea, nausea and vomiting.   Endocrine: Negative for cold intolerance and heat intolerance.   Genitourinary:  Negative for difficulty urinating, dysuria, frequency, hematuria and urgency.   Musculoskeletal:  Negative for arthralgias, back pain and myalgias.   Skin:  Negative for pallor, rash and wound.   Allergic/Immunologic: Negative for immunocompromised state.   Neurological:  Positive for speech difficulty and headaches. Negative for dizziness, tremors, facial asymmetry and weakness.   Hematological:  Negative for adenopathy. Does not bruise/bleed easily.   Psychiatric/Behavioral:  Negative for confusion and sleep disturbance. The patient is not nervous/anxious.      Objective:     Vital Signs (Most Recent):  Temp: 98.1 °F (36.7 °C) (02/28/25 1359)  Pulse: 70 (02/28/25 1833)  Resp: 18 (02/28/25 1833)  BP: (!) 165/88 (02/28/25 1833)  SpO2: 98 % (02/28/25 1833) Vital Signs (24h Range):  Temp:  [98.1 °F (36.7 °C)] 98.1 °F (36.7 °C)  Pulse:  [57-79] 70  Resp:  [18-20] 18  SpO2:  [98 %-100 %] 98 %  BP: (165-228)/() 165/88     Weight: 73 kg (161 lb)  Body mass index is 29.45 kg/m².     Physical Exam  Vitals and nursing note reviewed.   Constitutional:       General: She is  not in acute distress.     Appearance: She is well-developed. She is not diaphoretic.   HENT:      Head: Normocephalic.      Nose: Nose normal.      Mouth/Throat:      Mouth: Mucous membranes are moist.      Pharynx: Oropharynx is clear.   Eyes:      General: No scleral icterus.     Conjunctiva/sclera: Conjunctivae normal.      Pupils: Pupils are equal, round, and reactive to light.   Neck:      Vascular: No JVD.   Cardiovascular:      Rate and Rhythm: Normal rate and regular rhythm.      Heart sounds: Normal heart sounds. No murmur heard.     No friction rub. No gallop.   Pulmonary:      Effort: Pulmonary effort is normal. No respiratory distress.      Breath sounds: Normal breath sounds. No wheezing or rales.   Abdominal:      General: Bowel sounds are normal. There is no distension.      Palpations: Abdomen is soft.      Tenderness: There is no abdominal tenderness. There is no guarding or rebound.   Musculoskeletal:         General: No tenderness. Normal range of motion.      Cervical back: Normal range of motion and neck supple.   Lymphadenopathy:      Cervical: No cervical adenopathy.   Skin:     General: Skin is warm and dry.      Capillary Refill: Capillary refill takes less than 2 seconds.      Coloration: Skin is not pale.      Findings: No erythema or rash.   Neurological:      Mental Status: She is alert and oriented to person, place, and time.      Cranial Nerves: No cranial nerve deficit.      Sensory: No sensory deficit.      Coordination: Coordination normal.      Deep Tendon Reflexes: Reflexes normal.   Psychiatric:         Behavior: Behavior normal.         Thought Content: Thought content normal.         Judgment: Judgment normal.              CRANIAL NERVES     CN III, IV, VI   Pupils are equal, round, and reactive to light.       Significant Labs: All pertinent labs within the past 24 hours have been reviewed.  CBC:   Recent Labs   Lab 02/28/25  1500   WBC 4.37   HGB 11.0*   HCT 32.8*         CMP:   Recent Labs   Lab 02/28/25  1500      K 4.1      CO2 22*   GLU 99   BUN 18   CREATININE 1.2   CALCIUM 9.8   PROT 7.8   ALBUMIN 4.1   BILITOT 0.2   ALKPHOS 75   AST 26   ALT 34   ANIONGAP 13       Significant Imaging: I have reviewed all pertinent imaging results/findings within the past 24 hours.    CT  FINDINGS:  Brain: There is no evidence of a mass, edema, midline shift, or intracranial hemorrhage. No extra-axial fluid collection.  Grossly stable findings of chronic small vessel ischemic and involutional changes.  No CT evidence of an acute major vascular territorial infarct.     Ventricles: The ventricles, sulci, and cisterns are within normal limits.     Skull: The osseous structures are unremarkable in appearance.     Extracranial soft tissues: Limited imaging is within normal limits.     Other: The visualized portions of the sinuses, orbits, and mastoid air cells are unremarkable.     Impression:     1. No acute intracranial CT findings.    MRI    FINDINGS:  Brain: No abnormal restricted diffusion or acute infarct.  No mass, hemorrhage, or midline shift/space-occupying extra-axial fluid collections.  Scattered T2/FLAIR hyperintensities within the cerebral hemispheric white matter and central fela consistent with mild to moderate sequela of chronic small vessel ischemic changes as well as moderate global parenchymal volume loss.  Midline Structures: The midline structures of the brain are normal.  Ventricles: The ventricles, sulci and cisterns are stable in size and configuration.  Vasculature: The vascular flow voids at the base of the brain are within normal limits.  Calvarium: The visualized osseous structures are unremarkable.  Sinuses: Mild scattered mucosal thickening within the maxillary sinuses.  Orbits: The orbits and globes are unremarkable.  Mastoids: The mastoid air cells are adequately aerated.  Extracranial soft tissues: The visualized extracranial soft tissues are  unremarkable.     Impression:     1. No acute process.  2. Chronic small vessel ischemic and involutional changes as described above.

## 2025-03-01 NOTE — H&P
"[facility] Medicine  History & Physical    Patient Name: Nicolasa Madera  MRN: [id number]  Patient Class: OP- Observation  Admission Date: 2/28/2025  Attending Physician: Frances Gomez MD   Primary Care Provider: No primary care provider on file.         Patient information was obtained from patient, past medical records, and ER records.     Subjective:     Principal Problem:Acute focal neurological deficit    Chief Complaint:   Chief Complaint   Patient presents with    Fall     States " walking funny " started yesterday     Headache        HPI: Nicolasa Madera is a 74-year-old female who presents emergency room for evaluation of head injury.  She sustained a mechanical fall yesterday striking her head on concrete.  She denies loss of consciousness or cervical pain.  She reports several labs after the fall she began experiencing word-finding difficulty as well as some dysarthria.  She reports dysarthria has improved but she is still experiencing word-finding difficulty.  She denies any facial asymmetry or focal neurological weakness.  She denies fever chills.  No known sick contacts or travel.  Previous medical history includes TIA, hypothyroidism, anemia, CKD, and previous alcohol use.  She reports she stopped drinking alcohol 2 months ago.  ER workup: CBC and CMP were unremarkable.  CT of the brain was negative.  MRI of the brain demonstrated chronic small-vessel ischemic changes but no acute process.  Patient be admitted to Hospital Medicine for treatment and management.  Patient placed on CVA pathway.  Neuro consult in a.m..                Past Medical History:   Diagnosis Date    Encounter for blood transfusion     Hypothyroidism     Peptic ulcer with hemorrhage     Peptic ulcer, site unspecified, unspecified as acute or chronic, without hemorrhage or perforation     Peptic ulcer disease    Stroke 2017    no residual effects.     Urticaria        Past Surgical History: "   Procedure Laterality Date    ESOPHAGOGASTRODUODENOSCOPY N/A 4/18/2021    Procedure: EGD (ESOPHAGOGASTRODUODENOSCOPY);  Surgeon: Crow Vigil MD;  Location: [facility];  Service: Endoscopy;  Laterality: N/A;    ESOPHAGOGASTRODUODENOSCOPY N/A 8/31/2021    Procedure: EGD (ESOPHAGOGASTRODUODENOSCOPY);  Surgeon: Crow Vigil MD;  Location: [facility];  Service: Endoscopy;  Laterality: N/A;       Review of patient's allergies indicates:  No Known Allergies    No current facility-administered medications on file prior to encounter.     Current Outpatient Medications on File Prior to Encounter   Medication Sig    biotin 5,000 mcg TbDL Take 1 tablet by mouth once daily.    diclofenac sodium (VOLTAREN ARTHRITIS PAIN) 1 % Gel Apply 2 g topically once daily.    folic acid (FOLVITE) 1 MG tablet Take 1 tablet (1 mg total) by mouth once daily.    levothyroxine (SYNTHROID) 100 MCG tablet Take 1 tablet (100 mcg total) by mouth before breakfast.    multivit-min/iron/folic/lutein (CENTRUM SILVER WOMEN ORAL) Take 1 tablet by mouth once daily.    omega-3 fatty acids/fish oil (FISH OIL-OMEGA-3 FATTY ACIDS) 300-1,000 mg capsule Take 1 capsule by mouth once daily.    ondansetron (ZOFRAN-ODT) 4 MG TbDL Take 1 tablet (4 mg total) by mouth every 8 (eight) hours as needed (nausea).    pantoprazole (PROTONIX) 40 MG tablet Take 1 tablet (40 mg total) by mouth 2 (two) times daily.    vit B complex no.12/niacin,B3, (VITAMIN B COMPLEX NO.12-NIACIN ORAL) Take 1 tablet by mouth.     Family History       Problem Relation (Age of Onset)    Breast cancer Paternal Aunt, Paternal Grandmother    Stroke Father (70)          Tobacco Use    Smoking status: Never    Smokeless tobacco: Never   Substance and Sexual Activity    Alcohol use: Yes     Alcohol/week: 5.0 standard drinks of alcohol     Types: 5 Glasses of wine per week    Drug use: Never    Sexual activity: Not Currently     Review of Systems   Constitutional:  Positive for activity change.  Negative for chills, diaphoresis and fever.   HENT:  Negative for congestion, nosebleeds and tinnitus.    Eyes:  Negative for photophobia and visual disturbance.   Respiratory:  Negative for cough, chest tightness, shortness of breath and wheezing.    Cardiovascular:  Negative for chest pain, palpitations and leg swelling.   Gastrointestinal:  Negative for abdominal distention, abdominal pain, constipation, diarrhea, nausea and vomiting.   Endocrine: Negative for cold intolerance and heat intolerance.   Genitourinary:  Negative for difficulty urinating, dysuria, frequency, hematuria and urgency.   Musculoskeletal:  Negative for arthralgias, back pain and myalgias.   Skin:  Negative for pallor, rash and wound.   Allergic/Immunologic: Negative for immunocompromised state.   Neurological:  Positive for speech difficulty and headaches. Negative for dizziness, tremors, facial asymmetry and weakness.   Hematological:  Negative for adenopathy. Does not bruise/bleed easily.   Psychiatric/Behavioral:  Negative for confusion and sleep disturbance. The patient is not nervous/anxious.      Objective:     Vital Signs (Most Recent):  Temp: 98.1 °F (36.7 °C) (02/28/25 1359)  Pulse: 70 (02/28/25 1833)  Resp: 18 (02/28/25 1833)  BP: (!) 165/88 (02/28/25 1833)  SpO2: 98 % (02/28/25 1833) Vital Signs (24h Range):  Temp:  [98.1 °F (36.7 °C)] 98.1 °F (36.7 °C)  Pulse:  [57-79] 70  Resp:  [18-20] 18  SpO2:  [98 %-100 %] 98 %  BP: (165-228)/() 165/88     Weight: 73 kg (161 lb)  Body mass index is 29.45 kg/m².     Physical Exam  Vitals and nursing note reviewed.   Constitutional:       General: She is not in acute distress.     Appearance: She is well-developed. She is not diaphoretic.   HENT:      Head: Normocephalic.      Nose: Nose normal.      Mouth/Throat:      Mouth: Mucous membranes are moist.      Pharynx: Oropharynx is clear.   Eyes:      General: No scleral icterus.     Conjunctiva/sclera: Conjunctivae normal.      Pupils:  Pupils are equal, round, and reactive to light.   Neck:      Vascular: No JVD.   Cardiovascular:      Rate and Rhythm: Normal rate and regular rhythm.      Heart sounds: Normal heart sounds. No murmur heard.     No friction rub. No gallop.   Pulmonary:      Effort: Pulmonary effort is normal. No respiratory distress.      Breath sounds: Normal breath sounds. No wheezing or rales.   Abdominal:      General: Bowel sounds are normal. There is no distension.      Palpations: Abdomen is soft.      Tenderness: There is no abdominal tenderness. There is no guarding or rebound.   Musculoskeletal:         General: No tenderness. Normal range of motion.      Cervical back: Normal range of motion and neck supple.   Lymphadenopathy:      Cervical: No cervical adenopathy.   Skin:     General: Skin is warm and dry.      Capillary Refill: Capillary refill takes less than 2 seconds.      Coloration: Skin is not pale.      Findings: No erythema or rash.   Neurological:      Mental Status: She is alert and oriented to person, place, and time.      Cranial Nerves: No cranial nerve deficit.      Sensory: No sensory deficit.      Coordination: Coordination normal.      Deep Tendon Reflexes: Reflexes normal.   Psychiatric:         Behavior: Behavior normal.         Thought Content: Thought content normal.         Judgment: Judgment normal.              CRANIAL NERVES     CN III, IV, VI   Pupils are equal, round, and reactive to light.       Significant Labs: All pertinent labs within the past 24 hours have been reviewed.  CBC:   Recent Labs   Lab 02/28/25  1500   WBC 4.37   HGB 11.0*   HCT 32.8*        CMP:   Recent Labs   Lab 02/28/25  1500      K 4.1      CO2 22*   GLU 99   BUN 18   CREATININE 1.2   CALCIUM 9.8   PROT 7.8   ALBUMIN 4.1   BILITOT 0.2   ALKPHOS 75   AST 26   ALT 34   ANIONGAP 13       Significant Imaging: I have reviewed all pertinent imaging results/findings within the past 24  hours.    CT  FINDINGS:  Brain: There is no evidence of a mass, edema, midline shift, or intracranial hemorrhage. No extra-axial fluid collection.  Grossly stable findings of chronic small vessel ischemic and involutional changes.  No CT evidence of an acute major vascular territorial infarct.     Ventricles: The ventricles, sulci, and cisterns are within normal limits.     Skull: The osseous structures are unremarkable in appearance.     Extracranial soft tissues: Limited imaging is within normal limits.     Other: The visualized portions of the sinuses, orbits, and mastoid air cells are unremarkable.     Impression:     1. No acute intracranial CT findings.    MRI    FINDINGS:  Brain: No abnormal restricted diffusion or acute infarct.  No mass, hemorrhage, or midline shift/space-occupying extra-axial fluid collections.  Scattered T2/FLAIR hyperintensities within the cerebral hemispheric white matter and central fela consistent with mild to moderate sequela of chronic small vessel ischemic changes as well as moderate global parenchymal volume loss.  Midline Structures: The midline structures of the brain are normal.  Ventricles: The ventricles, sulci and cisterns are stable in size and configuration.  Vasculature: The vascular flow voids at the base of the brain are within normal limits.  Calvarium: The visualized osseous structures are unremarkable.  Sinuses: Mild scattered mucosal thickening within the maxillary sinuses.  Orbits: The orbits and globes are unremarkable.  Mastoids: The mastoid air cells are adequately aerated.  Extracranial soft tissues: The visualized extracranial soft tissues are unremarkable.     Impression:     1. No acute process.  2. Chronic small vessel ischemic and involutional changes as described above.    Assessment/Plan:     * Acute focal neurological deficit  Acute  CVA pathway   Neurochecks   Telemetry monitoring   Trend electrolytes   Neuro consult      Hypothyroidism  Chronic  problem   Continue levothyroxine        VTE Risk Mitigation (From admission, onward)           Ordered     IP VTE HIGH RISK PATIENT  Once         02/28/25 1840     Place sequential compression device  Until discontinued         02/28/25 1840     Place JEAN CARLOS hose  Until discontinued         02/28/25 1840                         On 02/28/2025, patient should be placed in hospital observation services under my care in collaboration with Dr Gomez.      Pharmacist Renal Dose Adjustment Note    Nicolasa Madera is a 74 y.o. female being treated with the medication famotidine    Patient Data:    Vital Signs (Most Recent):  Temp: 98.1 °F (36.7 °C) (02/28/25 1359)  Pulse: 70 (02/28/25 1833)  Resp: 18 (02/28/25 1833)  BP: (!) 165/88 (02/28/25 1833)  SpO2: 98 % (02/28/25 1833) Vital Signs (72h Range):  Temp:  [98.1 °F (36.7 °C)]   Pulse:  [57-79]   Resp:  [18-20]   BP: (165-228)/()   SpO2:  [98 %-100 %]      Recent Labs   Lab 02/28/25  1500   CREATININE 1.2     Serum creatinine: 1.2 mg/dL 02/28/25 1500  Estimated creatinine clearance: 37.3 mL/min    Famotidine 20 mg twice daily will be changed to famotidine 20 mg daily  Due to CrCl < 60    Pharmacist's Name: Adina Marie  Pharmacist's Extension: [phone]      Gato Juarez NP  Department of [facility] Medicine  Formerly [facility]

## 2025-03-01 NOTE — PHARMACY MED REC
"              .        Admission Medication History     The home medication history was taken by Sarah Stone.    You may go to "Admission" then "Reconcile Home Medications" tabs to review and/or act upon these items.     The home medication list has been updated by the Pharmacy department.   Please read ALL comments highlighted in yellow.   Please address this information as you see fit.    Feel free to contact us if you have any questions or require assistance.      The medications listed below were removed from the home medication list. Please reorder if appropriate:  Patient reports no longer taking the following medication(s):  Voltaren Gel  Omega 3 Fish Oil  Pantoprazole    Medications listed below were obtained from: Patient/family and Analytic software- TATE'S LIST  Medications Ordered Prior to Encounter[1]    Potential issues to be addressed PRIOR TO DISCHARGE  Patient reported not taking the following medications: (Zofran). These medications remain on the home medication list. Please address accordingly.     Sarah Stone  EXT 1921           [1]   No current facility-administered medications on file prior to encounter.     Current Outpatient Medications on File Prior to Encounter   Medication Sig Dispense Refill    biotin 5,000 mcg TbDL Take 1 tablet by mouth once daily.      cholecalciferol, vitamin D3, (VITAMIN D3) 125 mcg (5,000 unit) Tab Take 5,000 Units by mouth once daily.      folic acid (FOLVITE) 1 MG tablet Take 1 tablet (1 mg total) by mouth once daily. 30 tablet 0    levothyroxine (SYNTHROID) 100 MCG tablet Take 1 tablet (100 mcg total) by mouth before breakfast. 90 tablet 0    multivit-min/iron/folic/lutein (CENTRUM SILVER WOMEN ORAL) Take 1 tablet by mouth once daily.      ondansetron (ZOFRAN-ODT) 4 MG TbDL Take 1 tablet (4 mg total) by mouth every 8 (eight) hours as needed (nausea). (Patient not taking: Reported on 2/28/2025) 20 tablet 0    [DISCONTINUED] diclofenac sodium (VOLTAREN " ARTHRITIS PAIN) 1 % Gel Apply 2 g topically once daily. 50 g 0    [DISCONTINUED] omega-3 fatty acids/fish oil (FISH OIL-OMEGA-3 FATTY ACIDS) 300-1,000 mg capsule Take 1 capsule by mouth once daily.      [DISCONTINUED] pantoprazole (PROTONIX) 40 MG tablet Take 1 tablet (40 mg total) by mouth 2 (two) times daily. 60 tablet 0    [DISCONTINUED] vit B complex no.12/niacin,B3, (VITAMIN B COMPLEX NO.12-NIACIN ORAL) Take 1 tablet by mouth.

## 2025-03-01 NOTE — PT/OT/SLP EVAL
Physical Therapy Evaluation and Discharge Note    Patient Name:  Nicolasa Madera   MRN:  [id number]    Recommendations:     Discharge Recommendations: Low Intensity Therapy (outpatient PT for balance & vestibular training)  Discharge Equipment Recommendations: to be determined by next level of care, none   Barriers to discharge: Decreased caregiver support    Assessment:     Nicolasa Madera is a 74 y.o. female admitted with a medical diagnosis of Concussion without loss of consciousness. .  At this time, patient is functioning at their prior level of function and does not require further acute PT services.     Recent Surgery: * No surgery found *      Plan:     During this hospitalization, patient does not require further acute PT services.  Please re-consult if situation changes.      Subjective     Chief Complaint: vertigo at times, none now  Patient/Family Comments/goals:  Pain/Comfort:  Pain Rating 1: 0/10  Pain Rating Post-Intervention 1: 0/10    Patients cultural, spiritual, Hinduism conflicts given the current situation: no    Living Environment:  Lives alone in a 1-story home with a threshold step at the entrance  Prior to admission, patients level of function was (I).  Equipment used at home: raised toilet.  DME owned (not currently used): none.  Upon discharge, patient will have assistance from family.    Objective:     Communicated with CLAUDIO Mac prior to session.  Patient found  standing @ bedside with OT  with   upon PT entry to room.    General Precautions: Standard, fall    Orthopedic Precautions:N/A   Braces: N/A  Respiratory Status: Room air    Exams:  Cognitive Exam:  Patient is oriented to Person, Place, Time, and Situation  Fine Motor Coordination:    -       Intact  Gross Motor Coordination:  WFL  Postural Exam:  Patient presented with the following abnormalities:    -       Rounded shoulders  -       Forward head  Sensation:    -       Intact  Skin Integrity/Edema:      -       Skin  integrity: Visible skin intact  RLE ROM: WFL  RLE Strength: WFL  LLE ROM: WFL  LLE Strength: WFL    Functional Mobility:  Transfers:     Sit to Stand:  modified independence with no AD  Gait: 250 ft with no AD, mod (I), with good safety awareness, no loss of balance during walking in the hallways  Balance: Tandem stance with R foot in front: 25 sec; while L in front is 30 s with min sway    AM-PAC 6 CLICK MOBILITY  Total Score:23       Treatment and Education:  Functional mobility training as above; pt educated on safety precautions and mobility techniques, as well as the role and benefits of PT and mobilization    AM-PAC 6 CLICK MOBILITY  Total Score:23     Patient left sitting edge of bed with all lines intact, call button in reach, and RN notified.    GOALS:   Multidisciplinary Problems       Physical Therapy Goals       Not on file                    DME Justifications:  No DME recommended requiring DME justifications    History:     Past Medical History:   Diagnosis Date    Encounter for blood transfusion     Hypothyroidism     Peptic ulcer with hemorrhage     Peptic ulcer, site unspecified, unspecified as acute or chronic, without hemorrhage or perforation     Peptic ulcer disease    Stroke 2017    no residual effects.     Urticaria        Past Surgical History:   Procedure Laterality Date    ESOPHAGOGASTRODUODENOSCOPY N/A 4/18/2021    Procedure: EGD (ESOPHAGOGASTRODUODENOSCOPY);  Surgeon: Crow Vigil MD;  Location: [facility];  Service: Endoscopy;  Laterality: N/A;    ESOPHAGOGASTRODUODENOSCOPY N/A 8/31/2021    Procedure: EGD (ESOPHAGOGASTRODUODENOSCOPY);  Surgeon: Crow Vigil MD;  Location: [facility];  Service: Endoscopy;  Laterality: N/A;       Time Tracking:     PT Received On: 03/01/25  PT Start Time: 1356     PT Stop Time: 1424  PT Total Time (min): 28 min     Billable Minutes: Evaluation 12 and Gait Training 14      03/01/2025

## 2025-03-01 NOTE — PLAN OF CARE
1. Use memory strategies to set and recall functional info immediately and after 5 minute filled delay, 100% acc, modif indep. 2. Solve functional safety problems 100% acc, indep.   3. Demo understanding of strategies to address post concussion syndrome.

## 2025-03-01 NOTE — PLAN OF CARE
03/01/25 1229   BAGLEY Message   Medicare Outpatient and Observation Notification regarding financial responsibility Explained to patient/caregiver;Signed/date by patient/caregiver;Given to patient/caregiver   Date BAGLEY was signed 03/01/25   Time BAGLEY was signed 122

## 2025-03-01 NOTE — PROGRESS NOTES
Pharmacist Renal Dose Adjustment Note    Nicolasa Madera is a 74 y.o. female being treated with the medication famotidine    Patient Data:    Vital Signs (Most Recent):  Temp: 98.1 °F (36.7 °C) (02/28/25 1359)  Pulse: 70 (02/28/25 1833)  Resp: 18 (02/28/25 1833)  BP: (!) 165/88 (02/28/25 1833)  SpO2: 98 % (02/28/25 1833) Vital Signs (72h Range):  Temp:  [98.1 °F (36.7 °C)]   Pulse:  [57-79]   Resp:  [18-20]   BP: (165-228)/()   SpO2:  [98 %-100 %]      Recent Labs   Lab 02/28/25  1500   CREATININE 1.2     Serum creatinine: 1.2 mg/dL 02/28/25 1500  Estimated creatinine clearance: 37.3 mL/min    Famotidine 20 mg twice daily will be changed to famotidine 20 mg daily  Due to CrCl < 60    Pharmacist's Name: Adina Marie  Pharmacist's Extension: [phone]

## 2025-03-01 NOTE — HOSPITAL COURSE
Admitted due to transient dysarthria and word-finding difficulty noticed after she fell and hit her head on concrete.  CT head and MRI brain without acute findings.  PT/OT/SLP consulted.  Also with uncontrolled HTN so amlodipine added.  Recommended close PCP follow up.  Set up with home health to continue with PT/OT/SLP.

## 2025-03-01 NOTE — PLAN OF CARE
Problem: Stroke, Ischemic (Includes Transient Ischemic Attack)  Goal: Optimal Coping  Outcome: Progressing  Goal: Optimal Cerebral Tissue Perfusion  Outcome: Progressing  Goal: Optimal Cognitive Function  Outcome: Progressing  Goal: Improved Sensorimotor Function  Outcome: Progressing     Problem: Adult Inpatient Plan of Care  Goal: Plan of Care Review  Outcome: Progressing  Goal: Patient-Specific Goal (Individualized)  Outcome: Progressing  Goal: Absence of Hospital-Acquired Illness or Injury  Outcome: Progressing

## 2025-03-01 NOTE — HPI
Nicolasa Madera is a 74-year-old female who presents emergency room for evaluation of head injury.  She sustained a mechanical fall yesterday striking her head on concrete.  She denies loss of consciousness or cervical pain.  She reports several labs after the fall she began experiencing word-finding difficulty as well as some dysarthria.  She reports dysarthria has improved but she is still experiencing word-finding difficulty.  She denies any facial asymmetry or focal neurological weakness.  She denies fever chills.  No known sick contacts or travel.  Previous medical history includes TIA, hypothyroidism, anemia, CKD, and previous alcohol use.  She reports she stopped drinking alcohol 2 months ago.  ER workup: CBC and CMP were unremarkable.  CT of the brain was negative.  MRI of the brain demonstrated chronic small-vessel ischemic changes but no acute process.  Patient be admitted to Hospital Medicine for treatment and management.  Patient placed on CVA pathway.  Neuro consult in a.m..

## 2025-03-01 NOTE — PT/OT/SLP EVAL
Occupational Therapy   Evaluation and Discharge Note    Name: Nicolasa Madera  MRN: [id number]  Admitting Diagnosis: Concussion without loss of consciousness  Recent Surgery: * No surgery found *      Recommendations:     Discharge Recommendations: Low Intensity Therapy (For home safety with IADL's as a precaution)  Discharge Equipment Recommendations: other (see comments) (OT provided education in use of a tub transfer bench to increase safety with bathing. Pt verbalized understanding and will obtain on own if desired.)  Barriers to discharge:       Assessment:     Nicolasa Madera is a 74 y.o. female with a medical diagnosis of Concussion without loss of consciousness. At this time, patient demonstrates ability to perform ADL's. OT provided instruction in home safety with ADL's/IADL's including review of home set up and DME/AE. OT recommended having friends/family check on her regularly to make sure all is going well as a precaution since she lives alone. Pt verbalized understanding. Pt did not feel that further OT was indicated. Pt expecting discharge to home today.    Plan:     During this hospitalization, patient does not require further acute OT services.  Please re-consult if situation changes.    Plan of Care Reviewed with: patient    Subjective     Chief Complaint: Wants to go home  Patient/Family Comments/goals: To go home    Occupational Profile:  Living Environment: Pt lives alone in 1 story home with threshold [place]. Pt has a tub/shower and raised toilet. Pt has an older brother down the street. He is in his 80's.  Previous level of function: Independent  Roles and Routines: Sister  Equipment Used at home: raised toilet  Assistance upon Discharge: Family    Pain/Comfort:  Pain Rating 1: 0/10    Patients cultural, spiritual, Restoration conflicts given the current situation:      Objective:     Communicated with: Nurse Mac prior to session.  Patient found HOB elevated with   upon OT entry to  room.    General Precautions: Standard, fall  Orthopedic Precautions: N/A  Braces: N/A  Respiratory Status: Room air     Occupational Performance:    Bed Mobility:    Patient completed Supine to Sit with independence    Functional Mobility/Transfers:  Patient completed Sit <> Stand Transfer with independence  with  no assistive device   Patient completed Toilet Transfer Step Transfer technique with independence with  no AD  Functional Mobility:  Pt ambulated to/from bathroom with no AD and no loss of balance. Pt does report a history of balance issues that she feels may be related to the crystals in her ears.    Activities of Daily Living:  Feeding:  independence    Grooming: independence    Upper Body Dressing: independence    Lower Body Dressing: independence    Toileting: independence      Cognitive/Visual Perceptual:  Pt alert and oriented    Physical Exam:  Upper Extremity Strength:    -       Right Upper Extremity: WFL  -       Left Upper Extremity: WFL    AMPAC 6 Click ADL:  AMPAC Total Score: 24    Treatment & Education:  OT provided education in role of OT. Patient verbalized understanding and participated in OT.  OT provided instruction in home safety with ADL/IADL including review of home set up and DME/AE. Patient verbalized understanding.   OT provided education in calling for assist. Patient verbalized understanding.        Patient left ambulatory in room/vogel with PT arriving for PT Evaluation.    GOALS:   Multidisciplinary Problems       Occupational Therapy Goals       Not on file                    DME Justifications:  No DME recommended requiring DME justifications    History:     Past Medical History:   Diagnosis Date    Encounter for blood transfusion     Hypothyroidism     Peptic ulcer with hemorrhage     Peptic ulcer, site unspecified, unspecified as acute or chronic, without hemorrhage or perforation     Peptic ulcer disease    Stroke 2017    no residual effects.     Urticaria          Past  Surgical History:   Procedure Laterality Date    ESOPHAGOGASTRODUODENOSCOPY N/A 4/18/2021    Procedure: EGD (ESOPHAGOGASTRODUODENOSCOPY);  Surgeon: Crow Vigil MD;  Location: [facility];  Service: Endoscopy;  Laterality: N/A;    ESOPHAGOGASTRODUODENOSCOPY N/A 8/31/2021    Procedure: EGD (ESOPHAGOGASTRODUODENOSCOPY);  Surgeon: Crow Vigil MD;  Location: [facility];  Service: Endoscopy;  Laterality: N/A;       Time Tracking:     OT Date of Treatment: 03/01/25  OT Start Time: 1405  OT Stop Time: 1418  OT Total Time (min): 13 min    Billable Minutes:Evaluation 13    3/1/2025

## 2025-03-01 NOTE — PLAN OF CARE
[facility] - Med/Surg  Initial Discharge Assessment       Primary Care Provider: Wen Urbina MD    Admission Diagnosis: Word finding difficulty [R47.89]    Admission Date: 2/28/2025  Expected Discharge Date: 3/1/2025    Met with pt at bedside to complete discharge assessment, verified PCP, pharmacy and information on facesheet.  No HH, DME, dialysis or Coumadin.  Pt's brother, Ambrosio or cousin, Aysha will drive pt home.  Pending PT/OT eval.    Transition of Care Barriers: None    Payor: [employer] MEDICARE / Plan: [employer] MEDICARE PPO / Product Type: Medicare Advantage /     Extended Emergency Contact Information  Primary Emergency Contact: Nora Valente  Mobile Phone: [phone]  Relation: Friend   needed? No    Discharge Plan A: Home  Discharge Plan B: Home      [facility] [address]  [address]  [facility] [address]  Phone: [phone] Fax: [phone]      Initial Assessment (most recent)       Adult Discharge Assessment - 03/01/25 1214          Discharge Assessment    Assessment Type Discharge Planning Assessment     Confirmed/corrected address, phone number and insurance Yes     Confirmed Demographics Correct on Facesheet     Source of Information patient     Does patient/caregiver understand observation status Yes     Communicated SYLWIA with patient/caregiver No     People in Home alone     Facility Arrived From: [facility] in [place]     Do you expect to return to your current living situation? Yes     Do you have help at home or someone to help you manage your care at home? No     Prior to hospitilization cognitive status: Alert/Oriented     Current cognitive status: Alert/Oriented     Walking or Climbing Stairs Difficulty no     Dressing/Bathing Difficulty no     Home Layout Able to live on 1st floor     Equipment Currently Used at Home none     Readmission within 30 days? No     Patient currently being followed by  outpatient case management? No     Do you currently have service(s) that help you manage your care at home? No     Do you take prescription medications? Yes     Do you have prescription coverage? Yes     Coverage humana     Do you have any problems affording any of your prescribed medications? No     Is the patient taking medications as prescribed? yes     Who is going to help you get home at discharge? brother, Eddent or cousin, Khadijah     How do you get to doctors appointments? car, drives self     Are you on dialysis? No     Do you take coumadin? No     Discharge Plan A Home     Discharge Plan B Home     DME Needed Upon Discharge  none     Discharge Plan discussed with: Parent(s)     Transition of Care Barriers None

## 2025-03-01 NOTE — CARE UPDATE
03/01/25 0741   Patient Assessment/Suction   Level of Consciousness (AVPU) alert   Respiratory Effort Unlabored   PRE-TX-O2   Device (Oxygen Therapy) room air   SpO2 99 %   Pulse Oximetry Type Intermittent   $ Pulse Oximetry - Multiple Charge Pulse Oximetry - Multiple   Pulse (!) 55   Resp 18   Positioning HOB elevated 30 degrees

## 2025-03-01 NOTE — PT/OT/SLP EVAL
Speech Language Pathology Evaluation  Cognitive/Bedside Swallow    Patient Name:  Nicolasa Madera   MRN:  [id number]  Admitting Diagnosis: Acute focal neurological deficit    Recommendations:                  General Recommendations:  Cognitive-linguistic therapy  Diet recommendations:  Regular Diet - IDDSI Level 7, Thin liquids - IDDSI Level 0   Aspiration Precautions: Standard aspiration precautions   General Precautions: Standard, fall, vision impaired (reading glasses)  Communication strategies:  provide increased time to answer    Assessment:     Nicolasa Madera is a 74 y.o. female with an SLP diagnosis of Cognitive-Linguistic Impairment.  She presented with moderate processing delays, immediate and delayed memory deficits, decreased divergent naming, poor safety solutions, and difficulty in prolonged story telling due to digressions.  She has symptoms of post concussion syndrome.  She also does not have a safety plann as she lives alone and recently fell and was unable to call for help.  Recommend Case Management follow.  Recommend cognitive-linguistic tx after discharge.  Continue regular textures and liquids.     History:     Past Medical History:   Diagnosis Date    Encounter for blood transfusion     Hypothyroidism     Peptic ulcer with hemorrhage     Peptic ulcer, site unspecified, unspecified as acute or chronic, without hemorrhage or perforation     Peptic ulcer disease    Stroke 2017    no residual effects.     Urticaria        Past Surgical History:   Procedure Laterality Date    ESOPHAGOGASTRODUODENOSCOPY N/A 4/18/2021    Procedure: EGD (ESOPHAGOGASTRODUODENOSCOPY);  Surgeon: Crow Vigil MD;  Location: [facility];  Service: Endoscopy;  Laterality: N/A;    ESOPHAGOGASTRODUODENOSCOPY N/A 8/31/2021    Procedure: EGD (ESOPHAGOGASTRODUODENOSCOPY);  Surgeon: Crow Vigil MD;  Location: [facility];  Service: Endoscopy;  Laterality: N/A;       Social History: Patient lives alone.    Prior  Intubation HX:  none this admission    Modified Barium Swallow: none in Epic    Chest X-Rays: none recent    MRI Brain - No abnormal restricted diffusion or acute infarct. No mass, hemorrhage, or midline shift/space-occupying extra-axial fluid collections. Scattered T2/FLAIR hyperintensities within the cerebral hemispheric white matter and central fela consistent with mild to moderate sequela of chronic small vessel ischemic changes as well as moderate global parenchymal volume loss.     Prior diet: regular textures & liquids.    Occupation/hobbies/homemaking: indep.    Subjective     I moved here from [place] after my      Objective:     Cognitive Status:    Alert, cooperative.  Oriented except month and date.  Mild immediate and moderate delayed memory deficits,  Good math/time/money calculation, poor functional safety problem solving     Valeriano Cognitive Assessment (MoCA) score -  24 out of 30 indicating mild cognitive-linguistic deficits    Receptive Language/Comprehension: intact    Pragmatics:  WNL    Expressive Language: mild divergent naming deficit and word finding deficits      Motor Speech: intact    Voice: WFL    Visual-Spatial: WFL    Reading: WFL     Written Expression:  [facility]    Oral Musculature Evaluation  Oral Musculature: WNL  Dentition: present and adequate  Secretion Management: adequate  Mucosal Quality: good  Mandibular Strength and Mobility: WNL  Oral Labial Strength and Mobility: WNL  Lingual Strength and Mobility: WFL  Velar Elevation: WFL  Buccal Strength and Mobility: WFL  Volitional Swallow: rise and tilt palpated  Voice Prior to PO Intake: clear, no dysarthria    Bedside Swallow Eval:   Consistencies Assessed:  Thin liquids via cup and straw  Puree applesauce  Solids cracker      Oral Phase:   WNL    Pharyngeal Phase:   no overt clinical signs/symptoms of aspiration  no overt clinical signs/symptoms of pharyngeal dysphagia    Compensatory Strategies  None    Treatment:  education re impressions and recommendations    Goals:   Multidisciplinary Problems       SLP Goals          Problem: SLP    Goal Priority Disciplines Outcome   SLP Goal    High SLP    Description: 1. Use memory strategies to set and recall functional info immediately and after 5 minute filled delay, 100% acc, modif  indep.  2. Solve functional safety problems 100% acc, indep.   3. Demo understanding of strategies to address post concussion syndrome.                       Plan:     Patient to be seen:  3 x/week   Plan of Care expires:     Plan of Care reviewed with:  patient   SLP Follow-Up:  Yes       Discharge recommendations:  Therapy Intensity Recommendations at Discharge: Low Intensity Therapy   Barriers to Discharge:  None    Time Tracking:     SLP Treatment Date:   03/01/25  Speech Start Time:  1038  Speech Stop Time:  1128     Speech Total Time (min):  50 min    Billable Minutes: Eval 45  and Eval Swallow and Oral Function 10    03/01/2025

## 2025-03-01 NOTE — DISCHARGE SUMMARY
[facility] Medicine  Discharge Summary      Patient Name: Nicolasa Madera  MRN: [id number]  [facility]: [id number]  Patient Class: OP- Observation  Admission Date: 2/28/2025  Hospital Length of Stay: 0 days  Discharge Date and Time:  03/01/2025 2:37 PM  Attending Physician: Aj Rowland MD   Discharging Provider: Aj Rowland MD  Primary Care Provider: Wen Urbina MD    Primary Care Team: Networked reference to record PCT     HPI:   Nicolasa Madera is a 74-year-old female who presents emergency room for evaluation of head injury.  She sustained a mechanical fall yesterday striking her head on concrete.  She denies loss of consciousness or cervical pain.  She reports several labs after the fall she began experiencing word-finding difficulty as well as some dysarthria.  She reports dysarthria has improved but she is still experiencing word-finding difficulty.  She denies any facial asymmetry or focal neurological weakness.  She denies fever chills.  No known sick contacts or travel.  Previous medical history includes TIA, hypothyroidism, anemia, CKD, and previous alcohol use.  She reports she stopped drinking alcohol 2 months ago.  ER workup: CBC and CMP were unremarkable.  CT of the brain was negative.  MRI of the brain demonstrated chronic small-vessel ischemic changes but no acute process.  Patient be admitted to Hospital Medicine for treatment and management.  Patient placed on CVA pathway.  Neuro consult in a.m..                * No surgery found *      Hospital Course:   Admitted due to transient dysarthria and word-finding difficulty noticed after she fell and hit her head on concrete.  CT head and MRI brain without acute findings.  PT/OT/SLP consulted.  Also with uncontrolled HTN so amlodipine added.  Recommended close PCP follow up.  Set up with home health to continue with PT/OT/SLP.     Goals of Care Treatment Preferences:  Code Status: Full Code         Consults:    Consults (From admission, onward)          Status Ordering Provider     Inpatient Consult to Neurology Services (General Neurology)  Once        Provider:  (Not yet assigned)    Ordered MELINDA BILLY     -discharged prior to being seen by consultant         Final Active Diagnoses:    Diagnosis Date Noted POA    PRINCIPAL PROBLEM:  Concussion without loss of consciousness [S06.0X0A] 03/01/2025 Yes    Hypothyroidism [E03.9] 05/29/2020 Yes      Problems Resolved During this Admission:    Diagnosis Date Noted Date Resolved POA    Acute focal neurological deficit [R29.818] 02/28/2025 03/01/2025 Yes       Discharged Condition: good    [place] [facility] [place] Up:   Follow-up Information       Wen Urbina MD. Schedule an appointment as soon as possible for a visit in 1 week(s).    Specialty: Family Medicine  Contact information:  [address] [facility] [address]  [phone]                           Patient Instructions:      Ambulatory referral/consult to Home Health   Standing Status: Future   Referral Priority: Routine Referral Type: Home Health   Referral Reason: Specialty Services Required   Requested Specialty: Home Health Services   Number of Visits Requested: 1     Diet Cardiac     Notify your health care provider if you experience any of the following:  temperature >100.4     Notify your health care provider if you experience any of the following:  persistent nausea and vomiting or diarrhea     Notify your health care provider if you experience any of the following:  severe uncontrolled pain     Notify your health care provider if you experience any of the following:  redness, tenderness, or signs of infection (pain, swelling, redness, odor or green/yellow discharge around incision site)     Notify your health care provider if you experience any of the following:  difficulty breathing or increased cough     Notify your health care provider if you experience any of the following:  severe  persistent headache     Notify your health care provider if you experience any of the following:  worsening rash     Notify your health care provider if you experience any of the following:  persistent dizziness, light-headedness, or visual disturbances     Notify your health care provider if you experience any of the following:  increased confusion or weakness     Activity as tolerated       Significant Diagnostic Studies:     Lab Results   Component Value Date    WBC 3.29 (L) 03/01/2025    HGB 10.3 (L) 03/01/2025    HCT 31.1 (L) 03/01/2025    MCV 83 03/01/2025     03/01/2025       BMP  Lab Results   Component Value Date     03/01/2025    K 3.9 03/01/2025     03/01/2025    CO2 22 (L) 03/01/2025    BUN 18 03/01/2025    CREATININE 1.0 03/01/2025    CALCIUM 9.3 03/01/2025    ANIONGAP 10 03/01/2025    EGFRNORACEVR 58 (A) 03/01/2025       MRI Brain Without Contrast  Result Date: 2/28/2025  EXAMINATION: MRI BRAIN WITHOUT CONTRAST CLINICAL HISTORY: Neuro deficit, acute, stroke suspected;neuro deficit;. TECHNIQUE: Multiplanar multisequence MR imaging of the brain was performed without contrast COMPARISON: Head CT 02/28/2025 FINDINGS: Brain: No abnormal restricted diffusion or acute infarct.  No mass, hemorrhage, or midline shift/space-occupying extra-axial fluid collections.  Scattered T2/FLAIR hyperintensities within the cerebral hemispheric white matter and central fela consistent with mild to moderate sequela of chronic small vessel ischemic changes as well as moderate global parenchymal volume loss. Midline Structures: The midline structures of the brain are normal. Ventricles: The ventricles, sulci and cisterns are stable in size and configuration. Vasculature: The vascular flow voids at the base of the brain are within normal limits. Calvarium: The visualized osseous structures are unremarkable. Sinuses: Mild scattered mucosal thickening within the maxillary sinuses. Orbits: The orbits and globes  are unremarkable. Mastoids: The mastoid air cells are adequately aerated. Extracranial soft tissues: The visualized extracranial soft tissues are unremarkable.     1. No acute process. 2. Chronic small vessel ischemic and involutional changes as described above. Electronically signed by: Ck Ackerman Date:    02/28/2025 Time:    16:44    CT Head Without Contrast  Result Date: 2/28/2025  EXAMINATION: CT HEAD WITHOUT CONTRAST CLINICAL HISTORY: Neuro deficit, acute, stroke suspected; TECHNIQUE: Low dose axial CT images obtained throughout the head without intravenous contrast. Sagittal and coronal reconstructions were performed. COMPARISON: Head CT 10/17/2024. FINDINGS: Brain: There is no evidence of a mass, edema, midline shift, or intracranial hemorrhage. No extra-axial fluid collection.  Grossly stable findings of chronic small vessel ischemic and involutional changes.  No CT evidence of an acute major vascular territorial infarct. Ventricles: The ventricles, sulci, and cisterns are within normal limits. Skull: The osseous structures are unremarkable in appearance. Extracranial soft tissues: Limited imaging is within normal limits. Other: The visualized portions of the sinuses, orbits, and mastoid air cells are unremarkable.     1. No acute intracranial CT findings. Electronically signed by: Ck Ackerman Date:    02/28/2025 Time:    15:09  - pulls last radiology orders      Pending Diagnostic Studies:       None           Medications:  Reconciled Home Medications:      Medication List        START taking these medications      amLODIPine 5 MG tablet  Commonly known as: NORVASC  Take 1 tablet (5 mg total) by mouth once daily.  Start taking on: March 2, 2025            CONTINUE taking these medications      biotin 5,000 mcg Tbdl  Take 1 tablet by mouth once daily.     CENTRUM SILVER WOMEN ORAL  Take 1 tablet by mouth once daily.     folic acid 1 MG tablet  Commonly known as: FOLVITE  Take 1 tablet (1 mg total) by mouth  once daily.     levothyroxine 100 MCG tablet  Commonly known as: SYNTHROID  Take 1 tablet (100 mcg total) by mouth before breakfast.     ondansetron 4 MG Tbdl  Commonly known as: ZOFRAN-ODT  Take 1 tablet (4 mg total) by mouth every 8 (eight) hours as needed (nausea).     VITAMIN D3 125 mcg (5,000 unit) Tab  Generic drug: cholecalciferol (vitamin D3)  Take 5,000 Units by mouth once daily.              Indwelling Lines/Drains at time of discharge:   Lines/Drains/Airways       None                   Time spent on the discharge of patient: 35 minutes         Aj Rowland MD  Department of Hospital Medicine  [facility]/Surg

## 2025-03-07 ENCOUNTER — TELEPHONE (OUTPATIENT)
Dept: FAMILY MEDICINE | Facility: CLINIC | Age: 75
End: 2025-03-07
Payer: MEDICARE

## 2025-03-27 ENCOUNTER — PATIENT OUTREACH (OUTPATIENT)
Dept: ADMINISTRATIVE | Facility: OTHER | Age: 75
End: 2025-03-27
Payer: MEDICARE

## 2025-03-27 NOTE — PROGRESS NOTES
CHW - Follow Up    This Community Health Worker completed a follow up visit with patient via telephone today.  Pt/Caregiver reported: Spoke with the pt and she is still hasn't recieved the packet. I follow up in a few weeks.   Community Health Worker provided: Spoke with the pt and she is still hasn't recieved the packet. I follow up in a few weeks.   Follow up required:   Follow-up Outreach - Due: 4/10/2025

## 2025-04-11 ENCOUNTER — TELEPHONE (OUTPATIENT)
Dept: GASTROENTEROLOGY | Facility: CLINIC | Age: 75
End: 2025-04-11

## 2025-04-11 ENCOUNTER — HOSPITAL ENCOUNTER (OUTPATIENT)
Facility: HOSPITAL | Age: 75
Discharge: HOME | End: 2025-04-12
Attending: STUDENT IN AN ORGANIZED HEALTH CARE EDUCATION/TRAINING PROGRAM | Admitting: HOSPITALIST
Payer: MEDICARE

## 2025-04-11 ENCOUNTER — ANESTHESIA EVENT (OUTPATIENT)
Dept: ENDOSCOPY | Facility: HOSPITAL | Age: 75
End: 2025-04-11
Payer: MEDICARE

## 2025-04-11 ENCOUNTER — ANESTHESIA (OUTPATIENT)
Dept: ENDOSCOPY | Facility: HOSPITAL | Age: 75
End: 2025-04-11
Payer: MEDICARE

## 2025-04-11 DIAGNOSIS — R53.81 DEBILITY: ICD-10-CM

## 2025-04-11 DIAGNOSIS — E03.9 HYPOTHYROIDISM, UNSPECIFIED TYPE: ICD-10-CM

## 2025-04-11 DIAGNOSIS — K27.9 PEPTIC ULCER DISEASE: ICD-10-CM

## 2025-04-11 DIAGNOSIS — K92.2 UPPER GI BLEED: Primary | ICD-10-CM

## 2025-04-11 DIAGNOSIS — K92.2 GI BLEED: ICD-10-CM

## 2025-04-11 DIAGNOSIS — R07.9 CHEST PAIN: ICD-10-CM

## 2025-04-11 DIAGNOSIS — K29.60 EROSIVE GASTRITIS: ICD-10-CM

## 2025-04-11 PROBLEM — F10.21 HISTORY OF ALCOHOL DEPENDENCE: Status: ACTIVE | Noted: 2024-10-15

## 2025-04-11 LAB
ABSOLUTE EOSINOPHIL (SMH): 0.01 K/UL
ABSOLUTE MONOCYTE (SMH): 0.35 K/UL (ref 0.3–1)
ABSOLUTE NEUTROPHIL COUNT (SMH): 3.3 K/UL (ref 1.8–7.7)
ALBUMIN SERPL-MCNC: 4 G/DL (ref 3.5–5.2)
ALP SERPL-CCNC: 66 UNIT/L (ref 40–150)
ALT SERPL-CCNC: 19 UNIT/L (ref 10–44)
ANION GAP (SMH): 14 MMOL/L (ref 8–16)
APTT PPP: <21 SECONDS (ref 21–32)
AST SERPL-CCNC: 31 UNIT/L (ref 11–45)
BASOPHILS # BLD AUTO: 0.02 K/UL
BASOPHILS NFR BLD AUTO: 0.4 %
BILIRUB SERPL-MCNC: 0.3 MG/DL (ref 0.1–1)
BUN SERPL-MCNC: 43 MG/DL (ref 8–23)
CALCIUM SERPL-MCNC: 9.1 MG/DL (ref 8.7–10.5)
CHLORIDE SERPL-SCNC: 104 MMOL/L (ref 95–110)
CO2 SERPL-SCNC: 18 MMOL/L (ref 23–29)
CREAT SERPL-MCNC: 1.2 MG/DL (ref 0.5–1.4)
ERYTHROCYTE [DISTWIDTH] IN BLOOD BY AUTOMATED COUNT: 18.9 % (ref 11.5–14.5)
ETHANOL SERPL-MCNC: 162 MG/DL
GFR SERPLBLD CREATININE-BSD FMLA CKD-EPI: 47 ML/MIN/1.73/M2
GLUCOSE SERPL-MCNC: 127 MG/DL (ref 70–110)
HCT VFR BLD AUTO: 24.6 % (ref 37–48.5)
HGB BLD-MCNC: 8.6 GM/DL (ref 12–16)
IMM GRANULOCYTES # BLD AUTO: 0.01 K/UL (ref 0–0.04)
IMM GRANULOCYTES NFR BLD AUTO: 0.2 % (ref 0–0.5)
INDIRECT COOMBS: NORMAL
INR PPP: 1.1 (ref 0.8–1.2)
LIPASE SERPL-CCNC: 43 U/L (ref 4–60)
LYMPHOCYTES # BLD AUTO: 0.82 K/UL (ref 1–4.8)
MAGNESIUM SERPL-MCNC: 1.8 MG/DL (ref 1.6–2.6)
MCH RBC QN AUTO: 29.7 PG (ref 27–31)
MCHC RBC AUTO-ENTMCNC: 35 G/DL (ref 32–36)
MCV RBC AUTO: 85 FL (ref 82–98)
NUCLEATED RBC (/100WBC) (SMH): 0 /100 WBC
PHOSPHATE SERPL-MCNC: 3.4 MG/DL (ref 2.7–4.5)
PLATELET # BLD AUTO: 119 K/UL (ref 150–450)
PMV BLD AUTO: 11.1 FL (ref 9.2–12.9)
POTASSIUM SERPL-SCNC: 4.7 MMOL/L (ref 3.5–5.1)
PROT SERPL-MCNC: 7.2 GM/DL (ref 6–8.4)
PROTHROMBIN TIME: 11.7 SECONDS (ref 9–12.5)
RBC # BLD AUTO: 2.9 M/UL (ref 4–5.4)
RELATIVE EOSINOPHIL (SMH): 0.2 % (ref 0–8)
RELATIVE LYMPHOCYTE (SMH): 18.1 % (ref 18–48)
RELATIVE MONOCYTE (SMH): 7.7 % (ref 4–15)
RELATIVE NEUTROPHIL (SMH): 73.4 % (ref 38–73)
RH BLD: NORMAL
SODIUM SERPL-SCNC: 136 MMOL/L (ref 136–145)
SPECIMEN OUTDATE: NORMAL
T4 FREE SERPL-MCNC: 0.59 NG/DL (ref 0.71–1.51)
TSH SERPL-ACNC: 8.57 UIU/ML (ref 0.4–4)
WBC # BLD AUTO: 4.52 K/UL (ref 3.9–12.7)

## 2025-04-11 PROCEDURE — 84100 ASSAY OF PHOSPHORUS: CPT

## 2025-04-11 PROCEDURE — 80053 COMPREHEN METABOLIC PANEL: CPT | Performed by: STUDENT IN AN ORGANIZED HEALTH CARE EDUCATION/TRAINING PROGRAM

## 2025-04-11 PROCEDURE — 82077 ASSAY SPEC XCP UR&BREATH IA: CPT

## 2025-04-11 PROCEDURE — 25000003 PHARM REV CODE 250: Performed by: INTERNAL MEDICINE

## 2025-04-11 PROCEDURE — 27201012 HC FORCEPS, HOT/COLD, DISP: Performed by: INTERNAL MEDICINE

## 2025-04-11 PROCEDURE — 37000008 HC ANESTHESIA 1ST 15 MINUTES: Performed by: INTERNAL MEDICINE

## 2025-04-11 PROCEDURE — 63600175 PHARM REV CODE 636 W HCPCS

## 2025-04-11 PROCEDURE — 96374 THER/PROPH/DIAG INJ IV PUSH: CPT | Mod: 59

## 2025-04-11 PROCEDURE — 83690 ASSAY OF LIPASE: CPT | Performed by: STUDENT IN AN ORGANIZED HEALTH CARE EDUCATION/TRAINING PROGRAM

## 2025-04-11 PROCEDURE — 93005 ELECTROCARDIOGRAM TRACING: CPT

## 2025-04-11 PROCEDURE — 25000003 PHARM REV CODE 250: Performed by: HOSPITALIST

## 2025-04-11 PROCEDURE — 96375 TX/PRO/DX INJ NEW DRUG ADDON: CPT

## 2025-04-11 PROCEDURE — 96361 HYDRATE IV INFUSION ADD-ON: CPT

## 2025-04-11 PROCEDURE — 45378 DIAGNOSTIC COLONOSCOPY: CPT | Mod: ,,, | Performed by: INTERNAL MEDICINE

## 2025-04-11 PROCEDURE — 86901 BLOOD TYPING SEROLOGIC RH(D): CPT | Performed by: STUDENT IN AN ORGANIZED HEALTH CARE EDUCATION/TRAINING PROGRAM

## 2025-04-11 PROCEDURE — 99285 EMERGENCY DEPT VISIT HI MDM: CPT | Mod: 25

## 2025-04-11 PROCEDURE — 45378 DIAGNOSTIC COLONOSCOPY: CPT | Performed by: INTERNAL MEDICINE

## 2025-04-11 PROCEDURE — 99284 EMERGENCY DEPT VISIT MOD MDM: CPT | Mod: ,,, | Performed by: INTERNAL MEDICINE

## 2025-04-11 PROCEDURE — 63600175 PHARM REV CODE 636 W HCPCS: Performed by: STUDENT IN AN ORGANIZED HEALTH CARE EDUCATION/TRAINING PROGRAM

## 2025-04-11 PROCEDURE — 85025 COMPLETE CBC W/AUTO DIFF WBC: CPT | Performed by: STUDENT IN AN ORGANIZED HEALTH CARE EDUCATION/TRAINING PROGRAM

## 2025-04-11 PROCEDURE — 93010 ELECTROCARDIOGRAM REPORT: CPT | Mod: ,,, | Performed by: GENERAL PRACTICE

## 2025-04-11 PROCEDURE — 85610 PROTHROMBIN TIME: CPT | Performed by: STUDENT IN AN ORGANIZED HEALTH CARE EDUCATION/TRAINING PROGRAM

## 2025-04-11 PROCEDURE — 63600175 PHARM REV CODE 636 W HCPCS: Performed by: NURSE ANESTHETIST, CERTIFIED REGISTERED

## 2025-04-11 PROCEDURE — 96376 TX/PRO/DX INJ SAME DRUG ADON: CPT

## 2025-04-11 PROCEDURE — D9220A PRA ANESTHESIA: Mod: CRNA,,, | Performed by: NURSE ANESTHETIST, CERTIFIED REGISTERED

## 2025-04-11 PROCEDURE — D9220A PRA ANESTHESIA: Mod: ANES,,, | Performed by: ANESTHESIOLOGY

## 2025-04-11 PROCEDURE — 36415 COLL VENOUS BLD VENIPUNCTURE: CPT

## 2025-04-11 PROCEDURE — 84443 ASSAY THYROID STIM HORMONE: CPT

## 2025-04-11 PROCEDURE — G0378 HOSPITAL OBSERVATION PER HR: HCPCS

## 2025-04-11 PROCEDURE — 37000009 HC ANESTHESIA EA ADD 15 MINS: Performed by: INTERNAL MEDICINE

## 2025-04-11 PROCEDURE — 43239 EGD BIOPSY SINGLE/MULTIPLE: CPT | Performed by: INTERNAL MEDICINE

## 2025-04-11 PROCEDURE — 85730 THROMBOPLASTIN TIME PARTIAL: CPT | Performed by: STUDENT IN AN ORGANIZED HEALTH CARE EDUCATION/TRAINING PROGRAM

## 2025-04-11 PROCEDURE — 83735 ASSAY OF MAGNESIUM: CPT

## 2025-04-11 PROCEDURE — 88312 SPECIAL STAINS GROUP 1: CPT | Mod: TC | Performed by: PATHOLOGY

## 2025-04-11 PROCEDURE — 43239 EGD BIOPSY SINGLE/MULTIPLE: CPT | Mod: 51,,, | Performed by: INTERNAL MEDICINE

## 2025-04-11 PROCEDURE — 96374 THER/PROPH/DIAG INJ IV PUSH: CPT

## 2025-04-11 RX ORDER — PANTOPRAZOLE SODIUM 40 MG/10ML
80 INJECTION, POWDER, LYOPHILIZED, FOR SOLUTION INTRAVENOUS
Status: COMPLETED | OUTPATIENT
Start: 2025-04-11 | End: 2025-04-11

## 2025-04-11 RX ORDER — PROPOFOL 10 MG/ML
VIAL (ML) INTRAVENOUS
Status: DISCONTINUED | OUTPATIENT
Start: 2025-04-11 | End: 2025-04-11

## 2025-04-11 RX ORDER — NALOXONE HCL 0.4 MG/ML
0.02 VIAL (ML) INJECTION
Status: DISCONTINUED | OUTPATIENT
Start: 2025-04-11 | End: 2025-04-12 | Stop reason: HOSPADM

## 2025-04-11 RX ORDER — IBUPROFEN 200 MG
24 TABLET ORAL
Status: DISCONTINUED | OUTPATIENT
Start: 2025-04-11 | End: 2025-04-12 | Stop reason: HOSPADM

## 2025-04-11 RX ORDER — PANTOPRAZOLE SODIUM 40 MG/10ML
40 INJECTION, POWDER, LYOPHILIZED, FOR SOLUTION INTRAVENOUS EVERY 12 HOURS
Status: DISCONTINUED | OUTPATIENT
Start: 2025-04-11 | End: 2025-04-12 | Stop reason: HOSPADM

## 2025-04-11 RX ORDER — SUCRALFATE 1 G/1
1 TABLET ORAL
Status: DISCONTINUED | OUTPATIENT
Start: 2025-04-11 | End: 2025-04-12 | Stop reason: HOSPADM

## 2025-04-11 RX ORDER — ONDANSETRON HYDROCHLORIDE 2 MG/ML
4 INJECTION, SOLUTION INTRAVENOUS
Status: COMPLETED | OUTPATIENT
Start: 2025-04-11 | End: 2025-04-11

## 2025-04-11 RX ORDER — SODIUM CHLORIDE, SODIUM LACTATE, POTASSIUM CHLORIDE, CALCIUM CHLORIDE 600; 310; 30; 20 MG/100ML; MG/100ML; MG/100ML; MG/100ML
INJECTION, SOLUTION INTRAVENOUS CONTINUOUS
Status: DISCONTINUED | OUTPATIENT
Start: 2025-04-11 | End: 2025-04-11

## 2025-04-11 RX ORDER — HYDRALAZINE HYDROCHLORIDE 20 MG/ML
10 INJECTION INTRAMUSCULAR; INTRAVENOUS EVERY 6 HOURS PRN
Status: DISCONTINUED | OUTPATIENT
Start: 2025-04-11 | End: 2025-04-12 | Stop reason: HOSPADM

## 2025-04-11 RX ORDER — GLUCAGON 1 MG
1 KIT INJECTION
Status: DISCONTINUED | OUTPATIENT
Start: 2025-04-11 | End: 2025-04-12 | Stop reason: HOSPADM

## 2025-04-11 RX ORDER — PNV NO.95/FERROUS FUM/FOLIC AC 28MG-0.8MG
100 TABLET ORAL DAILY
COMMUNITY

## 2025-04-11 RX ORDER — IBUPROFEN 200 MG
16 TABLET ORAL
Status: DISCONTINUED | OUTPATIENT
Start: 2025-04-11 | End: 2025-04-12 | Stop reason: HOSPADM

## 2025-04-11 RX ORDER — LIDOCAINE HYDROCHLORIDE 20 MG/ML
INJECTION INTRAVENOUS
Status: DISCONTINUED | OUTPATIENT
Start: 2025-04-11 | End: 2025-04-11

## 2025-04-11 RX ORDER — SODIUM CHLORIDE 9 MG/ML
INJECTION, SOLUTION INTRAVENOUS CONTINUOUS
Status: DISCONTINUED | OUTPATIENT
Start: 2025-04-11 | End: 2025-04-12 | Stop reason: HOSPADM

## 2025-04-11 RX ORDER — POLYETHYLENE GLYCOL 3350, SODIUM CHLORIDE, SODIUM BICARBONATE, POTASSIUM CHLORIDE 420; 11.2; 5.72; 1.48 G/4L; G/4L; G/4L; G/4L
4000 POWDER, FOR SOLUTION ORAL ONCE
Status: COMPLETED | OUTPATIENT
Start: 2025-04-11 | End: 2025-04-11

## 2025-04-11 RX ORDER — POLYETHYLENE GLYCOL 3350, SODIUM SULFATE ANHYDROUS, SODIUM BICARBONATE, SODIUM CHLORIDE, POTASSIUM CHLORIDE 236; 22.74; 6.74; 5.86; 2.97 G/4L; G/4L; G/4L; G/4L; G/4L
4000 POWDER, FOR SOLUTION ORAL ONCE
Status: DISCONTINUED | OUTPATIENT
Start: 2025-04-11 | End: 2025-04-11 | Stop reason: SDUPTHER

## 2025-04-11 RX ORDER — SODIUM CHLORIDE 0.9 % (FLUSH) 0.9 %
10 SYRINGE (ML) INJECTION EVERY 12 HOURS PRN
Status: DISCONTINUED | OUTPATIENT
Start: 2025-04-11 | End: 2025-04-12 | Stop reason: HOSPADM

## 2025-04-11 RX ORDER — PROCHLORPERAZINE EDISYLATE 5 MG/ML
5 INJECTION INTRAMUSCULAR; INTRAVENOUS EVERY 6 HOURS PRN
Status: DISCONTINUED | OUTPATIENT
Start: 2025-04-11 | End: 2025-04-12 | Stop reason: HOSPADM

## 2025-04-11 RX ORDER — ONDANSETRON HYDROCHLORIDE 2 MG/ML
4 INJECTION, SOLUTION INTRAVENOUS EVERY 8 HOURS PRN
Status: DISCONTINUED | OUTPATIENT
Start: 2025-04-11 | End: 2025-04-12 | Stop reason: HOSPADM

## 2025-04-11 RX ORDER — AMLODIPINE BESYLATE 5 MG/1
5 TABLET ORAL DAILY
Status: DISCONTINUED | OUTPATIENT
Start: 2025-04-11 | End: 2025-04-12 | Stop reason: HOSPADM

## 2025-04-11 RX ADMIN — PROPOFOL 50 MG: 10 INJECTION, EMULSION INTRAVENOUS at 04:04

## 2025-04-11 RX ADMIN — PROPOFOL 100 MG: 10 INJECTION, EMULSION INTRAVENOUS at 04:04

## 2025-04-11 RX ADMIN — SODIUM CHLORIDE, POTASSIUM CHLORIDE, SODIUM LACTATE AND CALCIUM CHLORIDE: 600; 310; 30; 20 INJECTION, SOLUTION INTRAVENOUS at 07:04

## 2025-04-11 RX ADMIN — ONDANSETRON 4 MG: 2 INJECTION INTRAMUSCULAR; INTRAVENOUS at 12:04

## 2025-04-11 RX ADMIN — PANTOPRAZOLE SODIUM 40 MG: 40 INJECTION, POWDER, FOR SOLUTION INTRAVENOUS at 08:04

## 2025-04-11 RX ADMIN — PROCHLORPERAZINE EDISYLATE 5 MG: 5 INJECTION INTRAMUSCULAR; INTRAVENOUS at 07:04

## 2025-04-11 RX ADMIN — SUCRALFATE 1 G: 1 TABLET ORAL at 06:04

## 2025-04-11 RX ADMIN — SUCRALFATE 1 G: 1 TABLET ORAL at 08:04

## 2025-04-11 RX ADMIN — POLYETHYLENE GLYCOL 3350, SODIUM CHLORIDE, SODIUM BICARBONATE AND POTASSIUM CHLORIDE WITH LEMON FLAVOR 4000 ML: 420; 11.2; 5.72; 1.48 POWDER, FOR SOLUTION ORAL at 08:04

## 2025-04-11 RX ADMIN — AMLODIPINE BESYLATE 5 MG: 5 TABLET ORAL at 08:04

## 2025-04-11 RX ADMIN — PANTOPRAZOLE SODIUM 80 MG: 40 INJECTION, POWDER, FOR SOLUTION INTRAVENOUS at 05:04

## 2025-04-11 RX ADMIN — LIDOCAINE HYDROCHLORIDE 100 MG: 20 INJECTION, SOLUTION INTRAVENOUS at 04:04

## 2025-04-11 RX ADMIN — ONDANSETRON 4 MG: 2 INJECTION INTRAMUSCULAR; INTRAVENOUS at 05:04

## 2025-04-11 RX ADMIN — SODIUM CHLORIDE: 9 INJECTION, SOLUTION INTRAVENOUS at 02:04

## 2025-04-11 NOTE — TELEPHONE ENCOUNTER
----- Message from Crow Vigil MD sent at 4/11/2025  4:26 PM CDT -----  Outpatient EGD in 8 weeks for follow up of gastric ulcer

## 2025-04-11 NOTE — PROGRESS NOTES
EGD/colonoscopy done.  Colon unremarkable.  Stomach showed severe erosive gastritis but no active bleeding.  Recommend BID PPI and QID sucralfate.  Resume diet.  Outpatient EGD in 8 weeks to check healing.  GI to sign off.

## 2025-04-11 NOTE — PLAN OF CARE
[facility] - ED  Initial Discharge Assessment       Primary Care Provider: Wen Urbina MD    Admission Diagnosis: Upper GI bleed [K92.2]    Admission Date: 4/11/2025  Expected Discharge Date: 4/12/2025    Transition of Care Barriers: None    Payor: HU[employer] MANAGED MEDICARE / Plan: HU[employer] MEDICARE PPO / Product Type: Medicare Advantage /     Extended Emergency Contact Information  Primary Emergency Contact: Nora Valente  Mobile Phone: [phone]  Relation: Friend   needed? No    Discharge Plan A: Home Health  Discharge Plan B: Home      [id number] - [place] - [address]  [address] [address]  Phone: [phone] Fax: [phone]    DC assessment completed at bedside with pt, information verified. Lives alone. PCP is Dr. Urbina. Pahrm is [employer]. Denies blood thinners/hd/dme. Active with [employer]. May need ride home. D[place] self [place] but does not have her car at the hospital. Insurance verified. Denies POA. NOK is 2 daughters. Denies recent admission. Planning to DC Home when clear.     Initial Assessment (most recent)       Adult Discharge Assessment - 04/11/25 1132          Discharge Assessment    Assessment Type Discharge Planning Assessment     Confirmed/corrected address, phone number and insurance Yes     Confirmed Demographics Correct on Facesheet     Source of Information patient     Does patient/caregiver understand observation status Yes     Communicated SYLWIA with patient/caregiver Yes     People in Home alone     Facility Arrived From: home     Do you expect to return to your current living situation? Yes     Do you have help at home or someone to help you manage your care at home? No     Prior to hospitilization cognitive status: Unable to Assess     Current cognitive status: Alert/Oriented     Equipment Currently Used at Home none     Readmission within 30 days? No     Patient currently being followed by outpatient case management?  No     Do you currently have service(s) that help you manage your care at home? Yes     Name and Contact number of agency [facility]     Is the pt/caregiver preference to resume services with current agency Yes     Do you take prescription medications? Yes     Do you have prescription coverage? Yes     Do you have any problems affording any of your prescribed medications? No     Is the patient taking medications as prescribed? yes     Who is going to help you get home at discharge? may need ride home     How do you get to doctors appointments? car, drives self     Are you on dialysis? No     Do you take coumadin? No     Discharge Plan A Home Health     Discharge Plan B Home     DME Needed Upon Discharge  none     Discharge Plan discussed with: Patient     Transition of Care Barriers None

## 2025-04-11 NOTE — H&P
[facility] Medicine  History & Physical    Patient Name: Nicolasa Madera  MRN: [id number]  Patient Class: OP- Observation  Admission Date: 4/11/2025  Attending Physician: Anel Marina MD   Primary Care Provider: Wen Urbina MD         Patient information was obtained from patient, past medical records, and ER records.     Subjective:     Principal Problem:GI bleed    Chief Complaint:   Chief Complaint   Patient presents with    Rectal Bleeding     Pt reports dark stools for a few days with bright red blood in stool tonight that prompted call to EMS      Weakness     Generalized. Caused her fall or slide herself down at home due to the weakness.         HPI: Nicolasa Madera is a 74 year old female with a previous medical history of anemia with blood transfusions, peptic ulcer with hemorrhage, Stroke, hypothyroidism, alcohol dependence and CKD who presented to the ED for GI bleeding. Patient reports some days of dark stools and generalized weakness. She did fall yesterday but denies any injury. This morning what prompted her to present to the ED was an episode of diarrhea that was bright red blood. Denies any pain with bowel movement. She had one episode of nausea. Coagulation studies normal, CMP unremarkable but shows creatinine at baseline, CBC shows h/h of 8.6/24.6. Stool guaic positive in ED. GI consulted and plans for scope this morning. Patient admitted by hospital medicine for further evaluation and management.     Past Medical History:   Diagnosis Date    Encounter for blood transfusion     Hypothyroidism     Peptic ulcer with hemorrhage     Peptic ulcer, site unspecified, unspecified as acute or chronic, without hemorrhage or perforation     Peptic ulcer disease    Stroke 2017    no residual effects.     Urticaria        Past Surgical History:   Procedure Laterality Date    ESOPHAGOGASTRODUODENOSCOPY N/A 4/18/2021    Procedure: EGD (ESOPHAGOGASTRODUODENOSCOPY);   "Surgeon: Crow Vigil MD;  Location: [facility];  Service: Endoscopy;  Laterality: N/A;    ESOPHAGOGASTRODUODENOSCOPY N/A 8/31/2021    Procedure: EGD (ESOPHAGOGASTRODUODENOSCOPY);  Surgeon: Crow Vigil MD;  Location: [facility];  Service: Endoscopy;  Laterality: N/A;       Review of patient's allergies indicates:  No Known Allergies    No current facility-administered medications on file prior to encounter.     Current Outpatient Medications on File Prior to Encounter   Medication Sig    amLODIPine (NORVASC) 5 MG tablet Take 1 tablet (5 mg total) by mouth once daily.    biotin 5,000 mcg TbDL Take 1 tablet by mouth once daily.    cholecalciferol, vitamin D3, (VITAMIN D3) 125 mcg (5,000 unit) Tab Take 5,000 Units by mouth once daily.    folic acid (FOLVITE) 1 MG tablet Take 1 tablet (1 mg total) by mouth once daily.    levothyroxine (SYNTHROID) 100 MCG tablet Take 1 tablet (100 mcg total) by mouth before breakfast.    multivit-min/iron/folic/lutein (CENTRUM SILVER WOMEN ORAL) Take 1 tablet by mouth once daily.    ondansetron (ZOFRAN-ODT) 4 MG TbDL Take 1 tablet (4 mg total) by mouth every 8 (eight) hours as needed (nausea).     Family History       Problem Relation (Age of Onset)    Breast cancer Paternal Aunt, Paternal Grandmother    Stroke Father (70)          Tobacco Use    Smoking status: Never    Smokeless tobacco: Never   Substance and Sexual Activity    Alcohol use: Yes     Alcohol/week: 5.0 standard drinks of alcohol     Types: 5 Glasses of wine per week     Comment: stated she "binge" drinks    Drug use: Never    Sexual activity: Not Currently     Review of Systems   Constitutional:  Positive for activity change and fatigue.   Gastrointestinal:  Positive for abdominal pain, blood in stool, diarrhea and nausea.   Neurological:  Positive for weakness.        Generalized   All other systems reviewed and are negative.    Objective:     Vital Signs (Most Recent):  Temp: 98.5 °F (36.9 °C) (04/11/25 " 0410)  Pulse: 92 (04/11/25 0630)  Resp: 16 (04/11/25 0410)  BP: 116/61 (04/11/25 0630)  SpO2: 96 % (04/11/25 0630) Vital Signs (24h Range):  Temp:  [98.5 °F (36.9 °C)] 98.5 °F (36.9 °C)  Pulse:  [] 92  Resp:  [16] 16  SpO2:  [96 %-100 %] 96 %  BP: (116-159)/(59-79) 116/61     Weight: 77.1 kg (170 lb)  Body mass index is 31.09 kg/m².     Physical Exam  Vitals reviewed.   Constitutional:       Appearance: Normal appearance.   HENT:      Head: Normocephalic and atraumatic.      Nose: Nose normal.      Mouth/Throat:      Pharynx: Oropharynx is clear.   Eyes:      Conjunctiva/sclera: Conjunctivae normal.   Cardiovascular:      Rate and Rhythm: Regular rhythm. Tachycardia present.      Pulses: Normal pulses.      Heart sounds: Normal heart sounds.   Pulmonary:      Effort: Pulmonary effort is normal.      Breath sounds: Normal breath sounds.   Abdominal:      General: Bowel sounds are normal. There is no distension.      Palpations: Abdomen is soft.      Tenderness: There is no abdominal tenderness.   Musculoskeletal:         General: Normal range of motion.      Cervical back: Normal range of motion and neck supple.      Right lower leg: No edema.      Left lower leg: No edema.   Skin:     General: Skin is warm and dry.      Capillary Refill: Capillary refill takes less than 2 seconds.      Coloration: Skin is pale.   Neurological:      General: No focal deficit present.      Mental Status: She is alert and oriented to person, place, and time.   Psychiatric:         Attention and Perception: Attention normal.         Mood and Affect: Mood normal. Affect is flat.         Speech: Speech is delayed.         Behavior: Behavior is slowed. Behavior is cooperative.                Significant Labs: All pertinent labs within the past 24 hours have been reviewed.  A1C:   Recent Labs   Lab 02/17/25  1358   HGBA1C 4.9     Bilirubin:   Recent Labs   Lab 04/11/25 0431   BILITOT 0.3       BMP:   Recent Labs   Lab 04/11/25 0431       K 4.7   CO2 18*   BUN 43*   CREATININE 1.2   CALCIUM 9.1     CBC:   Recent Labs   Lab 04/11/25 0431   WBC 4.52   HGB 8.6*   HCT 24.6*   *     CMP:   Recent Labs   Lab 04/11/25 0431      K 4.7   CO2 18*   BUN 43*   CREATININE 1.2   CALCIUM 9.1   ALBUMIN 4.0   BILITOT 0.3   ALKPHOS 66   AST 31   ALT 19       Coagulation:   Recent Labs   Lab 04/11/25 0431   INR 1.1       Lipase:   Recent Labs   Lab 04/11/25 0431   LIPASE 43       TSH:   Recent Labs   Lab 02/28/25  1500   TSH 1.531         Significant Imaging: I have reviewed all pertinent imaging results/findings within the past 24 hours.  None for review  Assessment/Plan:     Assessment & Plan  GI bleed  Patient's hemorrhage is due to gastrointestinal bleed, this bleeding is not associated with a medication or a coagulopathy. Patients most recent Hgb, Hct, platelets, and INR are listed below.  Recent Labs     04/11/25 0431   HGB 8.6*   HCT 24.6*   *   INR 1.1     Plan  - Will trend hemoglobin/hematocrit Daily  - Will monitor and correct any coagulation defects  - Will transfuse if Hgb is <7g/dl (<8g/dl in cases of active ACS) or if patient has rapid bleeding leading to hemodynamic instability  - IV pantoprazole 40mg BID  -GI consulted and plans for EGD morning of 4/11/25  -NPO  -Coagulation studies normal  -Type and screen  Hypothyroidism  Tachycardia noted    -TSH ordered and pending  -Continue levothyroxine when patient not longer NPO    Stage 3 chronic kidney disease  Creatine stable for now. BMP reviewed- noted Estimated Creatinine Clearance: 38.3 mL/min (based on SCr of 1.2 mg/dL). according to latest data. Based on current GFR, CKD stage is stage 3 - GFR 30-59.  Monitor UOP and serial BMP and adjust therapy as needed. Renally dose meds. Avoid nephrotoxic medications and procedures.  History of alcohol dependence  -CIWA Q 4 hours  -Fall precautions  -Ethanol level      VTE Risk Mitigation (From admission, onward)            Ordered     Reason for No Pharmacological VTE Prophylaxis  Once        Question:  Reasons:  Answer:  Active Bleeding    04/11/25 0636     IP VTE HIGH RISK PATIENT  Once         04/11/25 0636     Place sequential compression device  Until discontinued         04/11/25 0636                         On 04/11/2025, patient should be placed in hospital observation services under my care in collaboration with Dr. Anel Marina MD.           [place] NP  Department of Hospital Medicine  [facility]

## 2025-04-11 NOTE — PROVATION PATIENT INSTRUCTIONS
Discharge Summary/Instructions after an Endoscopic Procedure  Patient Name: Nicolasa Aguirre  Patient MRN: [id number]  Patient YOB: 1950 Friday, April 11, 2025  Crow Marc MD  Dear patient,  As a result of recent federal legislation (The Federal Cures Act), you may   receive lab or pathology results from your procedure in your [facility]   account before your physician is able to contact you. Your physician or   their representative will relay the results to you with their   recommendations at their soonest availability.  Thank you,  RESTRICTIONS:  During your procedure today, you received medications for sedation.  These   medications may affect your judgment, balance and coordination.  Therefore,   for 24 hours, you have the following restrictions:   - DO NOT drive a car, operate machinery, make legal/financial decisions,   sign important papers or drink alcohol.    ACTIVITY:  Today: no heavy lifting, straining or running due to procedural   sedation/anesthesia.  The following day: return to full activity including work.  DIET:  Eat and drink normally unless instructed otherwise.     TREATMENT FOR COMMON SIDE EFFECTS:  - Mild abdominal pain, nausea, belching, bloating or excessive gas:  rest,   eat lightly and use a heating pad.  - Sore Throat: treat with throat lozenges and/or gargle with warm salt   water.  - Because air was used during the procedure, expelling large amounts of air   from your rectum or belching is normal.  - If a bowel prep was taken, you may not have a bowel movement for 1-3 days.    This is normal.  SYMPTOMS TO WATCH FOR AND REPORT TO YOUR PHYSICIAN:  1. Abdominal pain or bloating, other than gas cramps.  2. Chest pain.  3. Back pain.  4. Signs of infection such as: chills or fever occurring within 24 hours   after the procedure.  5. Rectal bleeding, which would show as bright red, maroon, or black stools.   (A tablespoon of blood from the rectum is not serious,  especially if   hemorrhoids are present.)  6. Vomiting.  7. Weakness or dizziness.  GO DIRECTLY TO THE NEAREST EMERGENCY ROOM IF YOU HAVE ANY OF THE FOLLOWING:      Difficulty breathing              Chills and/or fever over 101 F   Persistent vomiting and/or vomiting blood   Severe abdominal pain   Severe chest pain   Black, tarry stools   Bleeding- more than one tablespoon   Any other symptom or condition that you feel may need urgent attention  Your doctor recommends these additional instructions:  If any biopsies were taken, your doctors clinic will contact you in 1 to 2   weeks with any results.  - [facility] for [facility].   - Resume previous diet.   - Continue present medications.   - No aspirin, ibuprofen, naproxen, or other non-steroidal anti-inflammatory   drugs.   - Await pathology results.   - Repeat upper endoscopy in 8 weeks to check healing.   - Follow an antireflux regimen.   - Use Protonix (pantoprazole) 40 mg PO BID.   - Use sucralfate tablets 1 gram PO QID.   - Perform a colonoscopy today.   - Return to GI office after studies are complete.  For questions, problems or results please call your physician - Crow Marc MD at Work:  [phone].  [facility], EMERGENCY ROOM PHONE NUMBER: [phone]  IF A COMPLICATION OR EMERGENCY SITUATION ARISES AND YOU ARE UNABLE TO REACH   YOUR PHYSICIAN - GO DIRECTLY TO THE EMERGENCY ROOM.  Crow Marc MD  4/11/2025 4:13:20 PM  This report has been verified and signed electronically.  Dear patient,  As a result of recent federal legislation (The Federal Cures Act), you may   receive lab or pathology results from your procedure in your [facility]   account before your physician is able to contact you. Your physician or   their representative will relay the results to you with their   recommendations at their soonest availability.  Thank you,  PROVATION

## 2025-04-11 NOTE — SUBJECTIVE & OBJECTIVE
Past Medical History:   Diagnosis Date    Encounter for blood transfusion     Hypothyroidism     Peptic ulcer with hemorrhage     Peptic ulcer, site unspecified, unspecified as acute or chronic, without hemorrhage or perforation     Peptic ulcer disease    Stroke 2017    no residual effects.     Urticaria        Past Surgical History:   Procedure Laterality Date    ESOPHAGOGASTRODUODENOSCOPY N/A 4/18/2021    Procedure: EGD (ESOPHAGOGASTRODUODENOSCOPY);  Surgeon: Crow Vigil MD;  Location: [facility] ENDO;  Service: Endoscopy;  Laterality: N/A;    ESOPHAGOGASTRODUODENOSCOPY N/A 8/31/2021    Procedure: EGD (ESOPHAGOGASTRODUODENOSCOPY);  Surgeon: Crow Vigil MD;  Location: [facility] ENDO;  Service: Endoscopy;  Laterality: N/A;       Review of patient's allergies indicates:  No Known Allergies    No current facility-administered medications on file prior to encounter.     Current Outpatient Medications on File Prior to Encounter   Medication Sig    amLODIPine (NORVASC) 5 MG tablet Take 1 tablet (5 mg total) by mouth once daily.    biotin 5,000 mcg TbDL Take 1 tablet by mouth once daily.    cholecalciferol, vitamin D3, (VITAMIN D3) 125 mcg (5,000 unit) Tab Take 5,000 Units by mouth once daily.    folic acid (FOLVITE) 1 MG tablet Take 1 tablet (1 mg total) by mouth once daily.    levothyroxine (SYNTHROID) 100 MCG tablet Take 1 tablet (100 mcg total) by mouth before breakfast.    multivit-min/iron/folic/lutein (CENTRUM SILVER WOMEN ORAL) Take 1 tablet by mouth once daily.    ondansetron (ZOFRAN-ODT) 4 MG TbDL Take 1 tablet (4 mg total) by mouth every 8 (eight) hours as needed (nausea).     Family History       Problem Relation (Age of Onset)    Breast cancer Paternal Aunt, Paternal Grandmother    Stroke Father (70)          Tobacco Use    Smoking status: Never    Smokeless tobacco: Never   Substance and Sexual Activity    Alcohol use: Yes     Alcohol/week: 5.0 standard drinks of alcohol     Types: 5 Glasses of wine per  "week     Comment: stated she "binge" drinks    Drug use: Never    Sexual activity: Not Currently     Review of Systems   Constitutional:  Positive for activity change and fatigue.   Gastrointestinal:  Positive for abdominal pain, blood in stool, diarrhea and nausea.   Neurological:  Positive for weakness.        Generalized   All other systems reviewed and are negative.    Objective:     Vital Signs (Most Recent):  Temp: 98.5 °F (36.9 °C) (04/11/25 0410)  Pulse: 92 (04/11/25 0630)  Resp: 16 (04/11/25 0410)  BP: 116/61 (04/11/25 0630)  SpO2: 96 % (04/11/25 0630) Vital Signs (24h Range):  Temp:  [98.5 °F (36.9 °C)] 98.5 °F (36.9 °C)  Pulse:  [] 92  Resp:  [16] 16  SpO2:  [96 %-100 %] 96 %  BP: (116-159)/(59-79) 116/61     Weight: 77.1 kg (170 lb)  Body mass index is 31.09 kg/m².     Physical Exam  Vitals reviewed.   Constitutional:       Appearance: Normal appearance.   HENT:      Head: Normocephalic and atraumatic.      Nose: Nose normal.      Mouth/Throat:      Pharynx: Oropharynx is clear.   Eyes:      Conjunctiva/sclera: Conjunctivae normal.   Cardiovascular:      Rate and Rhythm: Regular rhythm. Tachycardia present.      Pulses: Normal pulses.      Heart sounds: Normal heart sounds.   Pulmonary:      Effort: Pulmonary effort is normal.      Breath sounds: Normal breath sounds.   Abdominal:      General: Bowel sounds are normal. There is no distension.      Palpations: Abdomen is soft.      Tenderness: There is no abdominal tenderness.   Musculoskeletal:         General: Normal range of motion.      Cervical back: Normal range of motion and neck supple.      Right lower leg: No edema.      Left lower leg: No edema.   Skin:     General: Skin is warm and dry.      Capillary Refill: Capillary refill takes less than 2 seconds.      Coloration: Skin is pale.   Neurological:      General: No focal deficit present.      Mental Status: She is alert and oriented to person, place, and time.   Psychiatric:         " Attention and Perception: Attention normal.         Mood and Affect: Mood normal. Affect is flat.         Speech: Speech is delayed.         Behavior: Behavior is slowed. Behavior is cooperative.                Significant Labs: All pertinent labs within the past 24 hours have been reviewed.  A1C:   Recent Labs   Lab 02/17/25  1358   HGBA1C 4.9     Bilirubin:   Recent Labs   Lab 04/11/25  0431   BILITOT 0.3       BMP:   Recent Labs   Lab 04/11/25  0431      K 4.7   CO2 18*   BUN 43*   CREATININE 1.2   CALCIUM 9.1     CBC:   Recent Labs   Lab 04/11/25  0431   WBC 4.52   HGB 8.6*   HCT 24.6*   *     CMP:   Recent Labs   Lab 04/11/25  0431      K 4.7   CO2 18*   BUN 43*   CREATININE 1.2   CALCIUM 9.1   ALBUMIN 4.0   BILITOT 0.3   ALKPHOS 66   AST 31   ALT 19       Coagulation:   Recent Labs   Lab 04/11/25  0431   INR 1.1       Lipase:   Recent Labs   Lab 04/11/25  0431   LIPASE 43       TSH:   Recent Labs   Lab 02/28/25  1500   TSH 1.531         Significant Imaging: I have reviewed all pertinent imaging results/findings within the past 24 hours.  None for review

## 2025-04-11 NOTE — PLAN OF CARE
VSS, no visible signs of distress noted. Denies nausea and pain. Dr. Vigil placed orders for regular diet and Carafate 1 g before meals and at bedtime. Report given to Terry on 2nd floor.

## 2025-04-11 NOTE — PHARMACY MED REC
"              .        Admission Medication History     The home medication history was taken by Sarah Stone.    You may go to "Admission" then "Reconcile Home Medications" tabs to review and/or act upon these items.     The home medication list has been updated by the Pharmacy department.   Please read ALL comments highlighted in yellow.   Please address this information as you see fit.    Feel free to contact us if you have any questions or require assistance.      The medications listed below were removed from the home medication list. Please reorder if appropriate:  Patient reports no longer taking the following medication(s):  Folic Acid  Zofran      Medications listed below were obtained from: Patient/family and Analytic software- "[employer]  Medications Ordered Prior to Encounter[1]    Potential issues to be addressed PRIOR TO DISCHARGE  Patient reported not taking the following medications: (Amlodipine). These medications remain on the home medication list. Please address accordingly.     Sarah Stone  EXT 1921           [1]   No current facility-administered medications on file prior to encounter.     Current Outpatient Medications on File Prior to Encounter   Medication Sig Dispense Refill    biotin 5,000 mcg TbDL Take 1 tablet by mouth once daily.      cholecalciferol, vitamin D3, (VITAMIN D3) 125 mcg (5,000 unit) Tab Take 5,000 Units by mouth once daily.      cyanocobalamin (VITAMIN B-12) 100 MCG tablet Take 100 mcg by mouth once daily.      levothyroxine (SYNTHROID) 100 MCG tablet Take 1 tablet (100 mcg total) by mouth before breakfast. 90 tablet 0    multivit-min/iron/folic/lutein (CENTRUM SILVER WOMEN ORAL) Take 1 tablet by mouth once daily.      amLODIPine (NORVASC) 5 MG tablet Take 1 tablet (5 mg total) by mouth once daily. (Patient not taking: Reported on 4/11/2025) 90 tablet 3    [DISCONTINUED] folic acid (FOLVITE) 1 MG tablet Take 1 tablet (1 mg total) by mouth once daily. 30 tablet 0    " [DISCONTINUED] ondansetron (ZOFRAN-ODT) 4 MG TbDL Take 1 tablet (4 mg total) by mouth every 8 (eight) hours as needed (nausea). 20 tablet 0

## 2025-04-11 NOTE — ASSESSMENT & PLAN NOTE
Tachycardia noted    -TSH ordered and pending  -Continue levothyroxine when patient not longer NPO

## 2025-04-11 NOTE — ANESTHESIA PREPROCEDURE EVALUATION
04/11/2025  Nicolasa Madera is a 74 y.o., female.      Pre-op Assessment    I have reviewed the Patient Summary Reports.    I have reviewed the NPO Status.   I have reviewed the Medications.     Review of Systems  Anesthesia Hx:  No problems with previous Anesthesia                Social:  Alcohol Use       Hematology/Oncology:       -- Anemia:                                  Cardiovascular:  Cardiovascular Normal                                              Pulmonary:  Pulmonary Normal                       Renal/:  Chronic Renal Disease, CKD        Kidney Function/Disease             Hepatic/GI:   PUD,     GI Bleed      Peptic Ulcer Disease          Neurological:   CVA                       CVA - Cerebrovasular Accident                 Endocrine:   Hypothyroidism       Hypothyroidism              Physical Exam  General: Well nourished    Airway:  Mallampati: II   Mouth Opening: Normal  TM Distance: Normal  Neck ROM: Normal ROM        Anesthesia Plan  Type of Anesthesia, risks & benefits discussed:    Anesthesia Type: Gen Natural Airway  Intra-op Monitoring Plan: Standard ASA Monitors  Induction:  IV  Informed Consent: Informed consent signed with the Patient and all parties understand the risks and agree with anesthesia plan.  All questions answered.   ASA Score: 3    Ready For Surgery From Anesthesia Perspective.     .

## 2025-04-11 NOTE — PLAN OF CARE
Pt states she is active with [facility], sent clinicals to them to update on weekend DC     04/11/25 1400   Post-Acute Status   Post-Acute Authorization Home Health   Home Health Status Referrals Sent

## 2025-04-11 NOTE — PLAN OF CARE
Hospital follow up scheduled with PCP for 4/22. Added to AVS.       04/11/25 1358   Post-Acute Status   Hospital Resources/Appts/Education Provided Appointments scheduled and added to AVS

## 2025-04-11 NOTE — HPI
Nicolasa Madera is a 74 year old female with a previous medical history of anemia with blood transfusions, peptic ulcer with hemorrhage, Stroke, hypothyroidism, alcohol dependence and CKD who presented to the ED for GI bleeding. Patient reports some days of dark stools and generalized weakness. She did fall yesterday but denies any injury. This morning what prompted her to present to the ED was an episode of diarrhea that was bright red blood. Denies any pain with bowel movement. She had one episode of nausea. Coagulation studies normal, CMP unremarkable but shows creatinine at baseline, CBC shows h/h of 8.6/24.6. Stool guaic positive in ED. GI consulted and plans for scope this morning. Patient admitted by hospital medicine for further evaluation and management.   
supervision/stand-by assist

## 2025-04-11 NOTE — PROVATION PATIENT INSTRUCTIONS
Discharge Summary/Instructions after an Endoscopic Procedure  Patient Name: Nicolasa Aguirre  Patient MRN: [id number]  Patient YOB: 1950 Friday, April 11, 2025  Crow Marc MD  Dear patient,  As a result of recent federal legislation (The Federal Cures Act), you may   receive lab or pathology results from your procedure in your [facility]   account before your physician is able to contact you. Your physician or   their representative will relay the results to you with their   recommendations at their soonest availability.  Thank you,  RESTRICTIONS:  During your procedure today, you received medications for sedation.  These   medications may affect your judgment, balance and coordination.  Therefore,   for 24 hours, you have the following restrictions:   - DO NOT drive a car, operate machinery, make legal/financial decisions,   sign important papers or drink alcohol.    ACTIVITY:  Today: no heavy lifting, straining or running due to procedural   sedation/anesthesia.  The following day: return to full activity including work.  DIET:  Eat and drink normally unless instructed otherwise.     TREATMENT FOR COMMON SIDE EFFECTS:  - Mild abdominal pain, nausea, belching, bloating or excessive gas:  rest,   eat lightly and use a heating pad.  - Sore Throat: treat with throat lozenges and/or gargle with warm salt   water.  - Because air was used during the procedure, expelling large amounts of air   from your rectum or belching is normal.  - If a bowel prep was taken, you may not have a bowel movement for 1-3 days.    This is normal.  SYMPTOMS TO WATCH FOR AND REPORT TO YOUR PHYSICIAN:  1. Abdominal pain or bloating, other than gas cramps.  2. Chest pain.  3. Back pain.  4. Signs of infection such as: chills or fever occurring within 24 hours   after the procedure.  5. Rectal bleeding, which would show as bright red, maroon, or black stools.   (A tablespoon of blood from the rectum is not serious,  especially if   hemorrhoids are present.)  6. Vomiting.  7. Weakness or dizziness.  GO DIRECTLY TO THE NEAREST EMERGENCY ROOM IF YOU HAVE ANY OF THE FOLLOWING:      Difficulty breathing              Chills and/or fever over 101 F   Persistent vomiting and/or vomiting blood   Severe abdominal pain   Severe chest pain   Black, tarry stools   Bleeding- more than one tablespoon   Any other symptom or condition that you feel may need urgent attention  Your doctor recommends these additional instructions:  If any biopsies were taken, your doctors clinic will contact you in 1 to 2   weeks with any results.  - [facility] patient to [facility] for [facility] care.   - Resume previous diet.   - Continue present medications.   - No repeat colonoscopy due to age and the absence of colonic polyps.   - Return to GI clinic after studies are complete.  For questions, problems or results please call your physician - Crow Marc MD at Work:  [phone].  [facility], EMERGENCY ROOM PHONE NUMBER: [phone]  IF A COMPLICATION OR EMERGENCY SITUATION ARISES AND YOU ARE UNABLE TO REACH   YOUR PHYSICIAN - GO DIRECTLY TO THE EMERGENCY ROOM.  Crow Marc MD  4/11/2025 4:25:03 PM  This report has been verified and signed electronically.  Dear patient,  As a result of recent federal legislation (The Federal Cures Act), you may   receive lab or pathology results from your procedure in your [facility]   account before your physician is able to contact you. Your physician or   their representative will relay the results to you with their   recommendations at their soonest availability.  Thank you,  PROVATION

## 2025-04-11 NOTE — TRANSFER OF CARE
"Anesthesia Transfer of Care Note    Patient: Nicolasa Madera    Procedure(s) Performed: Procedure(s) (LRB):  EGD (ESOPHAGOGASTRODUODENOSCOPY) (N/A)  COLONOSCOPY (N/A)    Patient location: PACU    Anesthesia Type: general    Transport from OR: Transported from OR on room air with adequate spontaneous ventilation    Post pain: adequate analgesia    Post assessment: no apparent anesthetic complications    Post vital signs: stable    Level of consciousness: awake    Nausea/Vomiting: no nausea/vomiting    Complications: none    Transfer of care protocol was followed      Last vitals: Visit Vitals  BP (!) 219/95 (BP Location: Left arm, Patient Position: Lying)   Pulse 98   Temp 37.3 °C (99.1 °F)   Resp 18   Ht 5' 2" (1.575 m)   Wt 72.7 kg (160 lb 4.4 oz)   SpO2 100%   Breastfeeding No   BMI 29.31 kg/m²     "

## 2025-04-11 NOTE — PLAN OF CARE
04/11/25 1142   BAGLEY Message   Medicare Outpatient and Observation Notification regarding financial responsibility Given to patient/caregiver;Explained to patient/caregiver;Signed/date by patient/caregiver   Date BAGLEY was signed 04/11/25   Time BAGLEY was signed 1141

## 2025-04-11 NOTE — ED PROVIDER NOTES
Encounter Date: 4/11/2025       History     Chief Complaint   Patient presents with    Rectal Bleeding     Pt reports dark stools for a few days with bright red blood in stool tonight that prompted call to EMS      Weakness     Generalized. Caused her fall or slide herself down at home due to the weakness.      GUILLERMINA Madera is a 74 y.o. female with a past medical history of peptic ulcer disease and hypothyroidism that presented to for generalized weakness and rectal bleeding.  Patient noticed that over the past several days, she has had dark stools which she describes as tarry.  Began feeling generally weak.  Got out of bed earlier tonight and felt weak.  States that she fell.  Did not hit her head.  No LOC.  No preceding palpitations, chest pain, or shortness of breath.  Was able to get to an area where she could pull herself up and called an ambulance.  She did note that tonight, her stools were no longer dark but actually had blood in them.  Continuing to feel weak.  Endorsing left-sided abdominal pain.  Denies fever, chest pain, shortness of breath, numbness, and weakness.  No changes in urinary habits.  Patient has a history of binge drinking but no recent drinks.    Review of patient's allergies indicates:  No Known Allergies  Past Medical History:   Diagnosis Date    Encounter for blood transfusion     Hypothyroidism     Peptic ulcer with hemorrhage     Peptic ulcer, site unspecified, unspecified as acute or chronic, without hemorrhage or perforation     Peptic ulcer disease    Stroke 2017    no residual effects.     Urticaria      Past Surgical History:   Procedure Laterality Date    ESOPHAGOGASTRODUODENOSCOPY N/A 4/18/2021    Procedure: EGD (ESOPHAGOGASTRODUODENOSCOPY);  Surgeon: Crow Vigil MD;  [facility];  Service: Endoscopy;  Laterality: N/A;    ESOPHAGOGASTRODUODENOSCOPY N/A 8/31/2021    Procedure: EGD (ESOPHAGOGASTRODUODENOSCOPY);  Surgeon: Crow Vigil MD;  Location:  [facility];  Service: Endoscopy;  Laterality: N/A;     Family History   Problem Relation Name Age of Onset    Stroke Father  70    Breast cancer Paternal Aunt      Breast cancer Paternal Grandmother       Social History[1]  Review of Systems   Constitutional:  Negative for fever.   HENT:  Negative for sore throat.    Respiratory:  Negative for cough and shortness of breath.    Cardiovascular:  Negative for chest pain and leg swelling.   Gastrointestinal:  Positive for abdominal pain and blood in stool. Negative for diarrhea, nausea and vomiting.   Genitourinary:  Negative for dysuria, frequency and hematuria.   Musculoskeletal:  Negative for back pain.   Skin:  Negative for rash.   Neurological:  Negative for dizziness, weakness, numbness and headaches.   Hematological:  Does not bruise/bleed easily.       Physical Exam     Initial Vitals [04/11/25 0410]   BP Pulse Resp Temp SpO2   (!) 159/79 104 16 98.5 °F (36.9 °C) 100 %      MAP       --         Physical Exam    Nursing note and vitals reviewed.  Constitutional: She appears well-developed and well-nourished.   HENT:   Head: Normocephalic and atraumatic.   Eyes: EOM are normal. Pupils are equal, round, and reactive to light.   Neck:   Normal range of motion.  Cardiovascular:  Normal rate and regular rhythm.           Pulmonary/Chest: Breath sounds normal. No respiratory distress. She has no wheezes.   Abdominal: Abdomen is soft. She exhibits no distension. There is abdominal tenderness (Left-sided). There is no rebound.   Genitourinary: Rectum:      Guaiac result positive.   Guaiac positive stool. : Acceptable.   Genitourinary Comments: Dark stool with blood appearing to be interspersed.  No hemorrhoids appreciated.     Musculoskeletal:         General: Normal range of motion.      Cervical back: Normal range of motion.     Neurological: She is alert and oriented to person, place, and time. She has normal strength. No cranial nerve deficit or  sensory deficit. GCS score is 15. GCS eye subscore is 4. GCS verbal subscore is 5. GCS motor subscore is 6.   Skin: Capillary refill takes less than 2 seconds. No rash noted.   Psychiatric: She has a normal mood and affect. Thought content normal.         ED Course   Procedures  Labs Reviewed   COMPREHENSIVE METABOLIC PANEL - Abnormal       Result Value    Sodium 136      Potassium 4.7      Chloride 104      CO2 18 (*)     Glucose 127 (*)     BUN 43 (*)     Creatinine 1.2      Calcium 9.1      Protein Total 7.2      Albumin 4.0      Bilirubin Total 0.3      ALP 66      AST 31      ALT 19      Anion Gap 14      eGFR 47 (*)    APTT - Abnormal    PTT <21.0 (*)    CBC WITH DIFFERENTIAL - Abnormal    WBC 4.52      RBC 2.90 (*)     Hgb 8.6 (*)     Hct 24.6 (*)     MCV 85      MCH 29.7      MCHC 35.0      RDW 18.9 (*)     Platelet Count 119 (*)     MPV 11.1      Nucleated RBC 0      Neut % 73.4 (*)     Lymph % 18.1      Mono % 7.7      Eos % 0.2      Basophil % 0.4      Imm Grans % 0.2      Neut # 3.3      Lymph # 0.82 (*)     Mono # 0.35      Eos # 0.01      Baso # 0.02      Imm Grans # 0.01     ALCOHOL,MEDICAL (ETHANOL) - Abnormal    Alcohol, Serum 162 (*)    LIPASE - Normal    Lipase Level 43     PROTIME-INR - Normal    PT 11.7      INR 1.1     CBC W/ AUTO DIFFERENTIAL    Narrative:     The following orders were created for panel order CBC W/ AUTO DIFFERENTIAL.  Procedure                               Abnormality         Status                     ---------                               -----------         ------                     CBC with Differential[[id number]]       Abnormal            Final result                 Please view results for these tests on the individual orders.   TSH   MAGNESIUM   PHOSPHORUS   TYPE & SCREEN    Specimen Outdate 04/14/2025 23:59      Group & Rh O POS      Indirect Marcia NEG       EKG Readings: (Independently Interpreted)   Initial Reading: No STEMI. Rhythm: Normal Sinus Rhythm.  Ectopy: No Ectopy. Conduction: Normal. ST Segments: Normal ST Segments. T Waves: Normal. Clinical Impression: Normal Sinus Rhythm       Imaging Results    None          Medications   sodium chloride 0.9% flush 10 mL (has no administration in time range)   naloxone 0.4 mg/mL injection 0.02 mg (has no administration in time range)   glucose chewable tablet 16 g (has no administration in time range)   glucose chewable tablet 24 g (has no administration in time range)   dextrose 50% injection 12.5 g (has no administration in time range)   dextrose 50% injection 25 g (has no administration in time range)   glucagon (human recombinant) injection 1 mg (has no administration in time range)   ondansetron injection 4 mg (has no administration in time range)   prochlorperazine injection Soln 5 mg (has no administration in time range)   lactated ringers infusion (has no administration in time range)   pantoprazole injection 40 mg (has no administration in time range)   peg-electrolyte soln 420 gram SolR 4,000 mL (has no administration in time range)   ondansetron injection 4 mg (4 mg Intravenous Given 4/11/25 0504)   pantoprazole injection 80 mg (80 mg Intravenous Given 4/11/25 0504)     Medical Decision Making  74-year-old female with a past medical history of peptic ulcer disease and previous upper GI bleeds that presents emergency department for rectal bleeding and melena.  Had a presyncopal episode earlier tonight.  Initial vitals with heart rate of 104 and blood pressure 159/79.  Nontoxic female.  Minimal tenderness to palpation in the left lower quadrant of the abdomen.  No peritoneal signs.  Rectal exam with melanotic stool with blood interspersed.  Guaiac positive.  Presentation is most consistent for GI bleed.  Given 80 mg of Protonix 4 mg of Zofran.  CMP with BUN of 43.  CBC with hemoglobin of 8.6.  This has down from previous which was 10.3 1 month ago.  Coags within normal limits.  Discussed case with  "Gastroenterology Dr. Astudillo.  There was concern for possible upper GI bleed, so we will pursue upper endoscopy this morning.  Per my conversation, there are no plans currently for colonoscopy.  Admitted to Hospital Medicine.    Amount and/or Complexity of Data Reviewed  Labs: ordered.    Risk  Prescription drug management.                                      Clinical Impression:  Final diagnoses:  [K92.2] GI bleed  [K92.2] Upper GI bleed (Primary)          ED Disposition Condition    Observation                     [1]   Social History  Tobacco Use    Smoking status: Never    Smokeless tobacco: Never   Substance Use Topics    Alcohol use: Yes     Alcohol/week: 5.0 standard drinks of alcohol     Types: 5 Glasses of wine per week     Comment: stated she "binge" drinks    Drug use: Never        Michael Lowry MD  04/11/25 0742    "

## 2025-04-11 NOTE — CONSULTS
[facility]     CC: Blood in stool    HPI 74 y.o. female with a previous medical history of anemia with blood transfusions, peptic ulcer with hemorrhage, Stroke, hypothyroidism, alcohol dependence and CKD who presented to the ED for GI bleeding. Patient reports some days of dark stools and generalized weakness. She did fall yesterday but denies any injury. This morning what prompted her to present to the ED was an episode of diarrhea that was bright red blood. Denies any pain with bowel movement. She had one episode of nausea. Coagulation studies normal, CMP unremarkable but shows creatinine at baseline, CBC shows h/h of 8.6/24.6. Stool guaic positive in ED. GI consulted and plans for scope this morning. Patient admitted by hospital medicine for further evaluation and management.     FURTHER HISTORY:  Above obtained from independent review of records from admitting provider as well as from direct discussion with ED nurse who states patient awaiting bed assignment.  In addition, on my interview, I note the following:  Patient with blood in stool, recent onset, initially black and then progressed to red blood, associated with nausea and vague abdominal discomfort, with no alleviating/exacerbating factors.  She had ulcer noted on EGD from 2021.  No colonoscopy on file but states that she has had one in the past.  Denies anticoagulation.      Past Medical History:   Diagnosis Date    Encounter for blood transfusion     Hypothyroidism     Peptic ulcer with hemorrhage     Peptic ulcer, site unspecified, unspecified as acute or chronic, without hemorrhage or perforation     Peptic ulcer disease    Stroke 2017    no residual effects.     Urticaria        Past Surgical History:   Procedure Laterality Date    ESOPHAGOGASTRODUODENOSCOPY N/A 4/18/2021    Procedure: EGD (ESOPHAGOGASTRODUODENOSCOPY);  Surgeon: Crow Vigil MD;  Location: [facility];  Service: Endoscopy;  Laterality: N/A;     "ESOPHAGOGASTRODUODENOSCOPY N/A 8/31/2021    Procedure: EGD (ESOPHAGOGASTRODUODENOSCOPY);  Surgeon: Crow Vigil MD;  Location: [facility];  Service: Endoscopy;  Laterality: N/A;       Social History[1]    Family History   Problem Relation Name Age of Onset    Stroke Father  70    Breast cancer Paternal Aunt      Breast cancer Paternal Grandmother         Review of Systems  General ROS: negative for - chills, fever or weight loss  Psychological ROS: negative for - hallucination, depression or suicidal ideation  Ophthalmic ROS: negative for - blurry vision, photophobia or eye pain  ENT ROS: negative for - epistaxis, sore throat or rhinorrhea  Respiratory ROS: no cough, shortness of breath, or wheezing  Cardiovascular ROS: no chest pain or dyspnea on exertion  Gastrointestinal ROS: as above  Genito-Urinary ROS: no dysuria, trouble voiding, or hematuria  Musculoskeletal ROS: negative for - arthralgia, myalgia, weakness  Neurological ROS: no syncope or seizures; no ataxia  Dermatological ROS: negative for pruritis, rash and jaundice    Physical Examination  BP (!) 150/72   Pulse 98   Temp 98.5 °F (36.9 °C) (Oral)   Resp 18   Ht 5' 2" (1.575 m)   Wt 77.1 kg (170 lb)   SpO2 100%   BMI 31.09 kg/m²   General appearance: alert, cooperative, no distress  HENT: Normocephalic, atraumatic, neck symmetrical, no nasal discharge   Eyes: conjunctivae/corneas clear, PERRL, EOM's intact, sclera anicteric  Lungs: clear to auscultation bilaterally, no dullness to percussion bilaterally, symmetric expansion, breathing unlabored  Heart: regular rate and rhythm without rub; no displacement of the PMI   Abdomen: soft, NT  Extremities: extremities symmetric; no clubbing, cyanosis, or edema  Integument: Skin color, texture, turgor normal; no rashes; hair distrubution normal, no jaundice  Neurologic: Alert and oriented X 3, no focal sensory or motor neurologic deficits  Psychiatric: no pressured speech; normal affect; no evidence of " impaired cognition, no anxiety/depression     Labs:  Lab Results   Component Value Date    WBC 4.52 04/11/2025    HGB 8.6 (L) 04/11/2025    HCT 24.6 (L) 04/11/2025    MCV 85 04/11/2025     (L) 04/11/2025         CMP  Sodium   Date Value Ref Range Status   04/11/2025 136 136 - 145 mmol/L Final     Potassium   Date Value Ref Range Status   04/11/2025 4.7 3.5 - 5.1 mmol/L Final     Chloride   Date Value Ref Range Status   03/01/2025 110 95 - 110 mmol/L Final     CO2   Date Value Ref Range Status   04/11/2025 18 (L) 23 - 29 mmol/L Final     Glucose   Date Value Ref Range Status   03/01/2025 88 70 - 110 mg/dL Final     BUN   Date Value Ref Range Status   04/11/2025 43 (H) 8 - 23 mg/dL Final     Creatinine   Date Value Ref Range Status   04/11/2025 1.2 0.5 - 1.4 mg/dL Final     Calcium   Date Value Ref Range Status   04/11/2025 9.1 8.7 - 10.5 mg/dL Final     Total Protein   Date Value Ref Range Status   03/01/2025 6.6 6.0 - 8.4 g/dL Final     Albumin   Date Value Ref Range Status   04/11/2025 4.0 3.5 - 5.2 g/dL Final     Bilirubin Total   Date Value Ref Range Status   04/11/2025 0.3 0.1 - 1.0 mg/dL Final     Comment:     For infants and newborns, interpretation of results should be based   on gestational age, weight and in agreement with clinical   observations.    Premature Infant recommended reference ranges:   0-24 hours:  <8.0 mg/dL   24-48 hours: <12.0 mg/dL   3-5 days:    <15.0 mg/dL   6-29 days:   <15.0 mg/dL     ALP   Date Value Ref Range Status   04/11/2025 66 40 - 150 unit/L Final     AST   Date Value Ref Range Status   04/11/2025 31 11 - 45 unit/L Final     ALT   Date Value Ref Range Status   04/11/2025 19 10 - 44 unit/L Final     Anion Gap   Date Value Ref Range Status   03/01/2025 10 8 - 16 mmol/L Final     eGFR   Date Value Ref Range Status   04/11/2025 47 (L) >60 mL/min/1.73/m2 Final   03/01/2025 58 (A) >60 mL/min/1.73 m^2 Final         Imaging:  Recent abdominal x ray was independently visualized  "and reviewed by me and showed no acute process.    I have personally reviewed these images    Case discussed as above    Assessment:   Blood in stool  Nausea  Anemia  History of ulcer    Plan:  Bowel prep now  PPI  EGD/colonoscopy today  Further recommendations to follow after above.  Communication will be sent to the referring provider regarding my assessment and plan on this patient via EPIC.      Crow Marc MD  [facility]  [address], [address]  [place] [address]  Office: [phone]  Fax: [phone]         [1]   Social History  Tobacco Use    Smoking status: Never    Smokeless tobacco: Never   Substance Use Topics    Alcohol use: Yes     Alcohol/week: 5.0 standard drinks of alcohol     Types: 5 Glasses of wine per week     Comment: stated she "binge" drinks    Drug use: Never     "

## 2025-04-11 NOTE — ASSESSMENT & PLAN NOTE
Patient's hemorrhage is due to gastrointestinal bleed, this bleeding is not associated with a medication or a coagulopathy. Patients most recent Hgb, Hct, platelets, and INR are listed below.  Recent Labs     04/11/25  0431   HGB 8.6*   HCT 24.6*   *   INR 1.1     Plan  - Will trend hemoglobin/hematocrit Daily  - Will monitor and correct any coagulation defects  - Will transfuse if Hgb is <7g/dl (<8g/dl in cases of active ACS) or if patient has rapid bleeding leading to hemodynamic instability  - IV pantoprazole 40mg BID  -GI consulted and plans for EGD morning of 4/11/25  -NPO  -Coagulation studies normal  -Type and screen

## 2025-04-11 NOTE — ASSESSMENT & PLAN NOTE
Creatine stable for now. BMP reviewed- noted Estimated Creatinine Clearance: 38.3 mL/min (based on SCr of 1.2 mg/dL). according to latest data. Based on current GFR, CKD stage is stage 3 - GFR 30-59.  Monitor UOP and serial BMP and adjust therapy as needed. Renally dose meds. Avoid nephrotoxic medications and procedures.

## 2025-04-12 VITALS
DIASTOLIC BLOOD PRESSURE: 81 MMHG | WEIGHT: 160.25 LBS | BODY MASS INDEX: 29.49 KG/M2 | TEMPERATURE: 98 F | HEART RATE: 95 BPM | RESPIRATION RATE: 18 BRPM | HEIGHT: 62 IN | SYSTOLIC BLOOD PRESSURE: 188 MMHG | OXYGEN SATURATION: 99 %

## 2025-04-12 LAB
ALBUMIN SERPL-MCNC: 3.5 G/DL (ref 3.5–5.2)
ALP SERPL-CCNC: 55 UNIT/L (ref 40–150)
ALT SERPL-CCNC: 14 UNIT/L (ref 10–44)
ANION GAP (SMH): 8 MMOL/L (ref 8–16)
AST SERPL-CCNC: 21 UNIT/L (ref 11–45)
BILIRUB SERPL-MCNC: 0.5 MG/DL (ref 0.1–1)
BUN SERPL-MCNC: 33 MG/DL (ref 8–23)
CALCIUM SERPL-MCNC: 8.8 MG/DL (ref 8.7–10.5)
CHLORIDE SERPL-SCNC: 106 MMOL/L (ref 95–110)
CO2 SERPL-SCNC: 23 MMOL/L (ref 23–29)
CREAT SERPL-MCNC: 1.4 MG/DL (ref 0.5–1.4)
ERYTHROCYTE [DISTWIDTH] IN BLOOD BY AUTOMATED COUNT: 19.2 % (ref 11.5–14.5)
GFR SERPLBLD CREATININE-BSD FMLA CKD-EPI: 39 ML/MIN/1.73/M2
GLUCOSE SERPL-MCNC: 104 MG/DL (ref 70–110)
HCT VFR BLD AUTO: 20.7 % (ref 37–48.5)
HGB BLD-MCNC: 7 GM/DL (ref 12–16)
MCH RBC QN AUTO: 28.6 PG (ref 27–31)
MCHC RBC AUTO-ENTMCNC: 33.8 G/DL (ref 32–36)
MCV RBC AUTO: 85 FL (ref 82–98)
PLATELET # BLD AUTO: 87 K/UL (ref 150–450)
PMV BLD AUTO: 10.7 FL (ref 9.2–12.9)
POTASSIUM SERPL-SCNC: 3.9 MMOL/L (ref 3.5–5.1)
PROT SERPL-MCNC: 6 GM/DL (ref 6–8.4)
RBC # BLD AUTO: 2.45 M/UL (ref 4–5.4)
SODIUM SERPL-SCNC: 137 MMOL/L (ref 136–145)
WBC # BLD AUTO: 2.76 K/UL (ref 3.9–12.7)

## 2025-04-12 PROCEDURE — 63600175 PHARM REV CODE 636 W HCPCS

## 2025-04-12 PROCEDURE — 96376 TX/PRO/DX INJ SAME DRUG ADON: CPT

## 2025-04-12 PROCEDURE — G0378 HOSPITAL OBSERVATION PER HR: HCPCS

## 2025-04-12 PROCEDURE — 25000003 PHARM REV CODE 250: Performed by: HOSPITALIST

## 2025-04-12 PROCEDURE — 25000003 PHARM REV CODE 250: Performed by: STUDENT IN AN ORGANIZED HEALTH CARE EDUCATION/TRAINING PROGRAM

## 2025-04-12 PROCEDURE — 82947 ASSAY GLUCOSE BLOOD QUANT: CPT

## 2025-04-12 PROCEDURE — 25000003 PHARM REV CODE 250: Performed by: INTERNAL MEDICINE

## 2025-04-12 PROCEDURE — 85027 COMPLETE CBC AUTOMATED: CPT

## 2025-04-12 PROCEDURE — 36415 COLL VENOUS BLD VENIPUNCTURE: CPT

## 2025-04-12 RX ORDER — LEVOTHYROXINE SODIUM 100 UG/1
100 TABLET ORAL
Status: DISCONTINUED | OUTPATIENT
Start: 2025-04-13 | End: 2025-04-12 | Stop reason: HOSPADM

## 2025-04-12 RX ORDER — FOLIC ACID 1 MG/1
1 TABLET ORAL DAILY
Status: DISCONTINUED | OUTPATIENT
Start: 2025-04-12 | End: 2025-04-12 | Stop reason: HOSPADM

## 2025-04-12 RX ORDER — SUCRALFATE 1 G/1
1 TABLET ORAL
Qty: 120 TABLET | Refills: 2 | Status: SHIPPED | OUTPATIENT
Start: 2025-04-12 | End: 2025-07-11

## 2025-04-12 RX ORDER — LANOLIN ALCOHOL/MO/W.PET/CERES
100 CREAM (GRAM) TOPICAL DAILY
Qty: 30 TABLET | Refills: 2 | Status: SHIPPED | OUTPATIENT
Start: 2025-04-13 | End: 2025-07-12

## 2025-04-12 RX ORDER — THIAMINE HCL 100 MG
100 TABLET ORAL DAILY
Status: DISCONTINUED | OUTPATIENT
Start: 2025-04-12 | End: 2025-04-12 | Stop reason: HOSPADM

## 2025-04-12 RX ORDER — FOLIC ACID 1 MG/1
1 TABLET ORAL DAILY
Qty: 30 TABLET | Refills: 2 | Status: SHIPPED | OUTPATIENT
Start: 2025-04-13 | End: 2025-07-12

## 2025-04-12 RX ORDER — PANTOPRAZOLE SODIUM 40 MG/1
40 TABLET, DELAYED RELEASE ORAL DAILY
Qty: 30 TABLET | Refills: 1 | Status: SHIPPED | OUTPATIENT
Start: 2025-04-12 | End: 2025-06-11

## 2025-04-12 RX ADMIN — PANTOPRAZOLE SODIUM 40 MG: 40 INJECTION, POWDER, FOR SOLUTION INTRAVENOUS at 08:04

## 2025-04-12 RX ADMIN — AMLODIPINE BESYLATE 5 MG: 5 TABLET ORAL at 08:04

## 2025-04-12 RX ADMIN — FOLIC ACID 1 MG: 1 TABLET ORAL at 11:04

## 2025-04-12 RX ADMIN — SUCRALFATE 1 G: 1 TABLET ORAL at 06:04

## 2025-04-12 RX ADMIN — THIAMINE HCL TAB 100 MG 100 MG: 100 TAB at 11:04

## 2025-04-12 RX ADMIN — SUCRALFATE 1 G: 1 TABLET ORAL at 11:04

## 2025-04-12 NOTE — ASSESSMENT & PLAN NOTE
Creatine stable for now. BMP reviewed- noted Estimated Creatinine Clearance: 31.9 mL/min (based on SCr of 1.4 mg/dL). according to latest data. Based on current GFR, CKD stage is stage 3 - GFR 30-59.  Monitor UOP and serial BMP and adjust therapy as needed. Renally dose meds. Avoid nephrotoxic medications and procedures.

## 2025-04-12 NOTE — NURSING
Dr fritz and myself at bedside rounding on Ms Nicolasa plan of care discussed all questions answered.

## 2025-04-12 NOTE — DISCHARGE SUMMARY
[facility] Medicine  Discharge Summary      Patient Name: Nicolasa Madera  MRN: [id number]  JESUS: [id number]  Patient Class: OP- Observation  Admission Date: 4/11/2025  Hospital Length of Stay: 0 days  Discharge Date and Time: 04/12/2025 2:10 PM  Attending Physician: Gnearo Montano DO   Discharging Provider: Genaro Montano DO  Primary Care Provider: Wen Urbina MD    Primary Care Team: Networked reference to record PCT     HPI:   Nicolasa Madera is a 74 year old female with a previous medical history of anemia with blood transfusions, peptic ulcer with hemorrhage, Stroke, hypothyroidism, alcohol dependence and CKD who presented to the ED for GI bleeding. Patient reports some days of dark stools and generalized weakness. She did fall yesterday but denies any injury. This morning what prompted her to present to the ED was an episode of diarrhea that was bright red blood. Denies any pain with bowel movement. She had one episode of nausea. Coagulation studies normal, CMP unremarkable but shows creatinine at baseline, CBC shows h/h of 8.6/24.6. Stool guaic positive in ED. GI consulted and plans for scope this morning. Patient admitted by hospital medicine for further evaluation and management.     Procedure(s) (LRB):  EGD (ESOPHAGOGASTRODUODENOSCOPY) (N/A)  COLONOSCOPY (N/A)      Hospital Course:   Patient admitted with GI bleed, hematochezia.  Patient was seen and evaluated GI.  Patient had EGD and colonoscopy.  Colonoscopy unremarkable.  EGD showed erosive gastritis.  No active bleeding noted on either scope.  Hemoglobin stable.  No further signs or symptoms of bleeding noted.  Patient was seen and examined on day of discharge, she is okay for discharge at this time.  She will be discharged on Protonix b.i.d., sucralfate q.i.d..  She will have repeat EGD in 8 weeks.  Patient will be discharged with prescriptions for thiamine and folic acid as well.  Patient will follow up  outpatient with the primary care physician.     Goals of Care Treatment Preferences:  Code Status: Full Code         Consults:   Consults (From admission, onward)          Status Ordering Provider     Inpatient consult to Gastroenterology  Once        Provider:  Crow Vgiil MD    Completed VIVIEN RIVERA            Assessment & Plan  GI bleed  Patient's hemorrhage is due to gastrointestinal bleed, this bleeding is not associated with a medication or a coagulopathy. Patients most recent Hgb, Hct, platelets, and INR are listed below.  Recent Labs     04/11/25  0431 04/12/25  0524   HGB 8.6* 7.0*   HCT 24.6* 20.7*   * 87*   INR 1.1  --      Plan  - Will trend hemoglobin/hematocrit Daily  - Will monitor and correct any coagulation defects  - Will transfuse if Hgb is <7g/dl (<8g/dl in cases of active ACS) or if patient has rapid bleeding leading to hemodynamic instability  - IV pantoprazole 40mg BID  -GI consulted and plans for EGD morning of 4/11/25  -NPO  -Coagulation studies normal  -Type and screen  Hypothyroidism  Tachycardia noted    -TSH ordered and pending  -Continue levothyroxine when patient not longer NPO    Stage 3 chronic kidney disease  Creatine stable for now. BMP reviewed- noted Estimated Creatinine Clearance: 31.9 mL/min (based on SCr of 1.4 mg/dL). according to latest data. Based on current GFR, CKD stage is stage 3 - GFR 30-59.  Monitor UOP and serial BMP and adjust therapy as needed. Renally dose meds. Avoid nephrotoxic medications and procedures.  History of alcohol dependence  -CIWA Q 4 hours  -Fall precautions  -Ethanol level      Final Active Diagnoses:    Diagnosis Date Noted POA    PRINCIPAL PROBLEM:  GI bleed [K92.2] 04/11/2025 Yes    History of alcohol dependence [F10.21] 10/15/2024 Yes    Stage 3 chronic kidney disease [N18.30] 08/31/2021 Yes    Hypothyroidism [E03.9] 05/29/2020 Yes      Problems Resolved During this Admission:       Discharged Condition:  good    Disposition: Home or Self Care    Follow Up:   Contact information for follow-up providers       Wen Urbina MD. Go on 4/22/2025.    Specialty: Family Medicine  Why: hospital follow up at 9:30  Contact information:  [place] [address]  [phone]                       Contact information for after-discharge care       Home Medical Care       [facility] [place] .    Service: Home Health Services  Contact information:  [address] [facility] [place] [id number] [phone]                                 Patient Instructions:      Notify your health care provider if you experience any of the following:  increased confusion or weakness     Notify your health care provider if you experience any of the following:  persistent dizziness, light-headedness, or visual disturbances     Notify your health care provider if you experience any of the following:  worsening rash     Notify your health care provider if you experience any of the following:  severe persistent headache     Notify your health care provider if you experience any of the following:  difficulty breathing or increased cough     Notify your health care provider if you experience any of the following:  redness, tenderness, or signs of infection (pain, swelling, redness, odor or green/yellow discharge around incision site)     Notify your health care provider if you experience any of the following:  severe uncontrolled pain     Notify your health care provider if you experience any of the following:  persistent nausea and vomiting or diarrhea     Notify your health care provider if you experience any of the following:  temperature >100.4     SUBSEQUENT HOME HEALTH ORDERS     Order Specific Question Answer Comments   What Home Health Agency is the patient currently using? Other/External      Activity as tolerated       Significant Diagnostic Studies: Labs: CMP   Recent Labs   Lab 04/11/25  0431 04/12/25  0524   NA  136 137   K 4.7 3.9   CO2 18* 23   BUN 43* 33*   CREATININE 1.2 1.4   CALCIUM 9.1 8.8   ALBUMIN 4.0 3.5   BILITOT 0.3 0.5   ALKPHOS 66 55   AST 31 21   ALT 19 14    and CBC   Recent Labs   Lab 04/11/25  0431 04/12/25  0524   WBC 4.52 2.76*   HGB 8.6* 7.0*   HCT 24.6* 20.7*   * 87*       Pending Diagnostic Studies:       Procedure Component Value Units Date/Time    Specimen to Pathology - Surgery [[id number]] Collected: 04/11/25 1624    Order Status: Sent Lab Status: No result     Specimen: Tissue            Imaging Results    None         Medications:  Reconciled Home Medications:      Medication List        START taking these medications      amLODIPine 5 MG tablet  Commonly known as: NORVASC  Take 1 tablet (5 mg total) by mouth once daily.     folic acid 1 MG tablet  Commonly known as: FOLVITE  Take 1 tablet (1 mg total) by mouth once daily.  Start taking on: April 13, 2025     pantoprazole 40 MG tablet  Commonly known as: PROTONIX  Take 1 tablet (40 mg total) by mouth once daily.     sucralfate 1 gram tablet  Commonly known as: CARAFATE  Take 1 tablet (1 g total) by mouth 4 (four) times daily before meals and nightly.     thiamine 100 MG tablet  Take 1 tablet (100 mg total) by mouth once daily.  Start taking on: April 13, 2025            CONTINUE taking these medications      biotin 5,000 mcg Tbdl  Take 1 tablet by mouth once daily.     CENTRUM SILVER WOMEN ORAL  Take 1 tablet by mouth once daily.     cyanocobalamin 100 MCG tablet  Commonly known as: VITAMIN B-12  Take 100 mcg by mouth once daily.     levothyroxine 100 MCG tablet  Commonly known as: SYNTHROID  Take 1 tablet (100 mcg total) by mouth before breakfast.     VITAMIN D3 125 mcg (5,000 unit) Tab  Generic drug: cholecalciferol (vitamin D3)  Take 5,000 Units by mouth once daily.              Indwelling Lines/Drains at time of discharge:   Lines/Drains/Airways       None                   Time spent on the discharge of patient: 35 minutes          Genaro Montano DO  Department of Hospital Medicine  [facility]

## 2025-04-12 NOTE — PLAN OF CARE
[facility] - Med/Surg  Discharge Final Note    Primary Care Provider: Wen Urbina MD    Expected Discharge Date: 4/12/2025    Patient cleared for discharge from case management standpoint.    TN scheduled hospital follow ups and placed on AVS.  Home health set up for resumption of services via EP[facility].  Per patient's RN, patient will obtain [employer] transportation upon discharge.    Final Discharge Note (most recent)       Final Note - 04/12/25 1355          Final Note    Assessment Type Final Discharge Note     Anticipated Discharge Disposition Home-Health Care [facility]     What phone number can be called within the next 1-3 days to see how you are doing after discharge? [phone]     Hospital Resources/Appts/Education Provided Provided patient/caregiver with written discharge plan information;Provided education on problems/symptoms using teachback;Appointments scheduled and added to AVS;Post-Acute resouces added to AVS        Post-Acute Status    Post-Acute Authorization Home Health     Home Health Status Set-up Complete/Auth obtained     Discharge Delays Personal Transportation                     Important Message from Medicare              Follow-up providers       Wen Urbina MD   Specialty: Family Medicine   Relationship: PCP - General    [place] [address]   Phone: [phone]       Next Steps: Go on 4/22/2025    Instructions: hospital follow up at 9:30              After-discharge care                Home Medical Care       [facility] [place]   Service: Home Health Services    [address]  [place] [address]   Phone: [phone]

## 2025-04-12 NOTE — ANESTHESIA POSTPROCEDURE EVALUATION
Anesthesia Post Evaluation    Patient: Nicolasa Madera    Procedure(s) Performed: Procedure(s) (LRB):  EGD (ESOPHAGOGASTRODUODENOSCOPY) (N/A)  COLONOSCOPY (N/A)    Final Anesthesia Type: general      Patient location during evaluation: PACU  Patient participation: Yes- Able to Participate  Level of consciousness: awake  Post-procedure vital signs: reviewed and stable  Pain management: adequate  Airway patency: patent    PONV status at discharge: No PONV  Anesthetic complications: no      Cardiovascular status: blood pressure returned to baseline  Respiratory status: unassisted  Hydration status: euvolemic  Follow-up not needed.              Vitals Value Taken Time   /65 04/12/25 07:34   Temp 37.1 °C (98.8 °F) 04/12/25 07:34   Pulse 82 04/12/25 07:34   Resp 18 04/12/25 07:34   SpO2 98 % 04/12/25 07:34         Event Time   Out of Recovery 17:07:23         Pain/Lianet Score: Lianet Score: 10 (4/11/2025  4:40 PM)

## 2025-04-12 NOTE — ASSESSMENT & PLAN NOTE
Patient's hemorrhage is due to gastrointestinal bleed, this bleeding is not associated with a medication or a coagulopathy. Patients most recent Hgb, Hct, platelets, and INR are listed below.  Recent Labs     04/11/25  0431 04/12/25  0524   HGB 8.6* 7.0*   HCT 24.6* 20.7*   * 87*   INR 1.1  --      Plan  - Will trend hemoglobin/hematocrit Daily  - Will monitor and correct any coagulation defects  - Will transfuse if Hgb is <7g/dl (<8g/dl in cases of active ACS) or if patient has rapid bleeding leading to hemodynamic instability  - IV pantoprazole 40mg BID  -GI consulted and plans for EGD morning of 4/11/25  -NPO  -Coagulation studies normal  -Type and screen

## 2025-04-12 NOTE — PLAN OF CARE
Problem: Adult Inpatient Plan of Care  Goal: Plan of Care Review  4/11/2025 1905 by Wang Zuñiga RN  Outcome: Progressing  Flowsheets (Taken 4/11/2025 1905)  Plan of Care Reviewed With: patient  4/11/2025 1905 by Wang Zuñiga RN  Outcome: Progressing  Goal: Patient-Specific Goal (Individualized)  4/11/2025 1905 by Wang Zuñiga RN  Outcome: Progressing  4/11/2025 1905 by Wang Zuñiga RN  Outcome: Progressing  Goal: Absence of Hospital-Acquired Illness or Injury  4/11/2025 1905 by Wang Zuñiga RN  Outcome: Progressing  4/11/2025 1905 by Wang Zuñiga RN  Outcome: Progressing  Goal: Optimal Comfort and Wellbeing  4/11/2025 1905 by Wang Zuñiga RN  Outcome: Progressing  4/11/2025 1905 by Wang Zuñiga RN  Outcome: Progressing  Goal: Readiness for Transition of Care  Outcome: Progressing

## 2025-04-12 NOTE — PLAN OF CARE
Problem: Adult Inpatient Plan of Care  Goal: Plan of Care Review  4/12/2025 1510 by Frances Castellon, RN  Outcome: Progressing  4/12/2025 1503 by Frances Castellon, RN  Outcome: Progressing  Goal: Patient-Specific Goal (Individualized)  4/12/2025 1510 by Frances Castellon, RN  Outcome: Progressing  4/12/2025 1503 by Frnaces Castellon, RN  Outcome: Progressing  Goal: Absence of Hospital-Acquired Illness or Injury  4/12/2025 1510 by Frances Castellon, RN  Outcome: Progressing  4/12/2025 1503 by Frances Castellon, RN  Outcome: Progressing  Goal: Optimal Comfort and Wellbeing  4/12/2025 1510 by Frances Castellon, RN  Outcome: Progressing  4/12/2025 1503 by Frances Castellon, RN  Outcome: Progressing  Goal: Readiness for Transition of Care  4/12/2025 1510 by Frances Castellon, RN  Outcome: Progressing  4/12/2025 1503 by Frances Castellon, RN  Outcome: Progressing

## 2025-04-12 NOTE — NURSING
Discharge instructions given to patient, patient verbalized understanding. PIV removed with catheter intact. Patient will arrange for transportation home, Patient escorted to lobby.

## 2025-04-12 NOTE — HOSPITAL COURSE
Patient admitted with GI bleed, hematochezia.  Patient was seen and evaluated GI.  Patient had EGD and colonoscopy.  Colonoscopy unremarkable.  EGD showed erosive gastritis.  No active bleeding noted on either scope.  Hemoglobin stable.  No further signs or symptoms of bleeding noted.  Patient was seen and examined on day of discharge, she is okay for discharge at this time.  She will be discharged on Protonix b.i.d., sucralfate q.i.d..  She will have repeat EGD in 8 weeks.  Patient will be discharged with prescriptions for thiamine and folic acid as well.  Patient will follow up outpatient with the primary care physician.

## 2025-04-13 LAB
OHS QRS DURATION: 80 MS
OHS QTC CALCULATION: 417 MS

## 2025-04-14 DIAGNOSIS — E03.9 HYPOTHYROIDISM, UNSPECIFIED TYPE: ICD-10-CM

## 2025-04-14 RX ORDER — LEVOTHYROXINE SODIUM 100 UG/1
100 TABLET ORAL
Qty: 90 TABLET | Refills: 1 | Status: SHIPPED | OUTPATIENT
Start: 2025-04-14 | End: 2025-07-13

## 2025-04-14 NOTE — TELEPHONE ENCOUNTER
----- Message from Lavonne sent at 4/14/2025  9:28 AM CDT -----  Regarding: Refill  Type:  RX Refill RequestWho Called: rep from [facility] Refill or New Rx: refillRX Name and Strength: levothyroxine (SYNTHROID) 100 MCG tabletHow is the patient currently taking it? (ex. 1XDay): as directed Is this a 30 day or 90 day RX: 90Preferred Pharmacy with phone number: [facility] - [facility] [address] [facility] [address]P[place]zl: [phone] Fax: [phone] or Mail Order: mailOrdering Provider: Carlitos Would the patient rather a call back or a response via [facility]? [facility] Call Back Number: [phone] Information:  [facility] is calling because pt is almost out of her meds and needs refills sent in    Please call back to advise. Thanks!

## 2025-04-16 ENCOUNTER — RESULTS FOLLOW-UP (OUTPATIENT)
Dept: GASTROENTEROLOGY | Facility: CLINIC | Age: 75
End: 2025-04-16

## 2025-04-21 NOTE — PROGRESS NOTES
[facility] [place]   OFFICE VISIT NOTE    Patient Name: Nicolasa Madera  YOB: 1950    PRESENTING HISTORY     History of Present Illness:  Ms. Nicolasa Madera is a 74 y.o. female here for hosp follow up  Below is the discharge summary     [facility] Medicine  Discharge Summary        Patient Name: Nicolasa Madera  MRN: [id number]  JESUS: [id number]  Patient Class: OP- Observation  Admission Date: 4/11/2025  Hospital Length of Stay: 0 days  Discharge Date and Time: 04/12/2025 2:10 PM  Attending Physician: Genaro Montano DO   Discharging Provider: Genaro Montano DO  Primary Care Provider: Wen Urbina MD     Primary Care Team: Networked reference to record PCT      HPI:   Nicolasa Madera is a 74 year old female with a previous medical history of anemia with blood transfusions, peptic ulcer with hemorrhage, Stroke, hypothyroidism, alcohol dependence and CKD who presented to the ED for GI bleeding. Patient reports some days of dark stools and generalized weakness. She did fall yesterday but denies any injury. This morning what prompted her to present to the ED was an episode of diarrhea that was bright red blood. Denies any pain with bowel movement. She had one episode of nausea. Coagulation studies normal, CMP unremarkable but shows creatinine at baseline, CBC shows h/h of 8.6/24.6. Stool guaic positive in ED. GI consulted and plans for scope this morning. Patient admitted by hospital medicine for further evaluation and management.      Procedure(s) (LRB):  EGD (ESOPHAGOGASTRODUODENOSCOPY) (N/A)  COLONOSCOPY (N/A)       Hospital Course:   Patient admitted with GI bleed, hematochezia.  Patient was seen and evaluated GI.  Patient had EGD and colonoscopy.  Colonoscopy unremarkable.  EGD showed erosive gastritis.  No active bleeding noted on either scope.  Hemoglobin stable.  No further signs or symptoms of bleeding noted.  Patient was seen and  examined on day of discharge, she is okay for discharge at this time.  She will be discharged on Protonix b.i.d., sucralfate q.i.d..  She will have repeat EGD in 8 weeks.  Patient will be discharged with prescriptions for thiamine and folic acid as well.  Patient will follow up outpatient with the primary care physician.      Goals of Care Treatment Preferences:  Code Status: Full Code     Consults:   GI bleed  Patient's hemorrhage is due to gastrointestinal bleed, this bleeding is not associated with a medication or a coagulopathy. Patients most recent Hgb, Hct, platelets, and INR are listed below.         Recent Labs     04/11/25  0431 04/12/25  0524   HGB 8.6* 7.0*   HCT 24.6* 20.7*   * 87*   INR 1.1  --       Plan  - Will trend hemoglobin/hematocrit Daily  - Will monitor and correct any coagulation defects  - Will transfuse if Hgb is <7g/dl (<8g/dl in cases of active ACS) or if patient has rapid bleeding leading to hemodynamic instability  - IV pantoprazole 40mg BID  -GI consulted and plans for EGD morning of 4/11/25  -NPO  -Coagulation studies normal  -Type and screen    Hypothyroidism  Tachycardia noted     -TSH ordered and pending  -Continue levothyroxine when patient not longer NPO     Stage 3 chronic kidney disease  Creatine stable for now. BMP reviewed- noted Estimated Creatinine Clearance: 31.9 mL/min (based on SCr of 1.4 mg/dL). according to latest data. Based on current GFR, CKD stage is stage 3 - GFR 30-59.  Monitor UOP and serial BMP and adjust therapy as needed. Renally dose meds. Avoid nephrotoxic medications and procedures.    History of alcohol dependence  -CIWA Q 4 hours  -Fall precautions  -Ethanol level     Discharged Condition: good     Disposition: Home or Self Care    History of Present Illness    CHIEF COMPLAINT:  Patient presents today for follow up after hospitalization for blood in stool    GI SYMPTOMS:  She initially experienced dark blood in stool which progressed to red blood  "with diarrhea at night. Since hospital discharge, she denies any further episodes of blood in stool. She is eating and drinking without difficulty and denies nausea, vomiting, or any issues with urination or bowel movements.    RECENT HOSPITAL COURSE:  During hospitalization, upper endoscopy revealed a stomach ulcer while lower endoscopy showed no significant findings. Her alcohol level was elevated during admission. Patient denies drinking alcohol since the discharge.     Review of Systems   Constitutional:  Negative for chills, diaphoresis, fatigue and fever.   Respiratory:  Negative for cough, shortness of breath and wheezing.    Cardiovascular:  Negative for chest pain and palpitations.   Gastrointestinal:  Negative for blood in stool, constipation, diarrhea, nausea and vomiting.   Genitourinary:  Negative for bladder incontinence and hematuria.   Neurological:  Negative for coordination difficulties.   Psychiatric/Behavioral:  Negative for behavioral problems.           OBJECTIVE:   Vital Signs:  Vitals:    04/22/25 0928   BP: 130/82   Pulse: 75   SpO2: 100%   Weight: 74.5 kg (164 lb 3.9 oz)   Height: 5' 2" (1.575 m)           Physical Exam  Constitutional:       General: She is not in acute distress.     Appearance: She is not ill-appearing or toxic-appearing.   HENT:      Head: Normocephalic and atraumatic.      Mouth/Throat:      Mouth: Mucous membranes are moist.      Pharynx: Uvula midline. No pharyngeal swelling.   Cardiovascular:      Rate and Rhythm: Normal rate and regular rhythm.   Pulmonary:      Effort: Pulmonary effort is normal. No tachypnea, bradypnea, accessory muscle usage, prolonged expiration or respiratory distress.      Breath sounds: Normal breath sounds. No stridor. No wheezing, rhonchi or rales.   Abdominal:      General: Bowel sounds are normal. There is no distension.      Palpations: Abdomen is soft.      Tenderness: There is no abdominal tenderness. There is no guarding or rebound. "   Neurological:      General: No focal deficit present.      Mental Status: She is alert.   Psychiatric:         Mood and Affect: Mood normal.         Behavior: Behavior normal.         ASSESSMENT & PLAN:     Gastrointestinal hemorrhage, unspecified gastrointestinal hemorrhage type  -     CBC Without Differential; Future; Expected date: 04/22/2025  - Noted hospital findings of gastric ulcer discovered during endoscopy.  - Patient denies current symptoms of GI bleeding, with no blood in stool since discharge.  - Reports occasional mild abdominal pain, rated 1 out of 10.  - Verified patient is taking Carafate and Protonix as prescribed.    Hypothyroidism, unspecified type  - Confirmed current use of levothyroxine for thyroid condition.  - Will monitor thyroid function again in August     Stage 3a chronic kidney disease  -     Comprehensive Metabolic Panel; Future; Expected date: 04/22/2025  - Noted hospital findings of decreased renal function.  - Explained that kidney function decreases with age and discussed the importance of proper hydration and medication choices.  - Avoid nephrotoxic medication     History of stroke  - Stable  - Continue to monitor     History of alcohol dependence  - Considered impact of history of alcohol use on current health status.  - Patient had high alcohol level during recent hospital stay but reports not drinking since discharge.  - Advised to avoid alcohol consumption.    Wen Urbina MD  Family Medicine  [facility]     This note was created using [employer] voice recognition software that occasionally misinterprets phrases or words

## 2025-04-22 ENCOUNTER — LAB VISIT (OUTPATIENT)
Dept: LAB | Facility: HOSPITAL | Age: 75
End: 2025-04-22
Attending: STUDENT IN AN ORGANIZED HEALTH CARE EDUCATION/TRAINING PROGRAM
Payer: MEDICARE

## 2025-04-22 ENCOUNTER — RESULTS FOLLOW-UP (OUTPATIENT)
Dept: FAMILY MEDICINE | Facility: CLINIC | Age: 75
End: 2025-04-22

## 2025-04-22 ENCOUNTER — OFFICE VISIT (OUTPATIENT)
Dept: FAMILY MEDICINE | Facility: CLINIC | Age: 75
End: 2025-04-22
Payer: MEDICARE

## 2025-04-22 VITALS
SYSTOLIC BLOOD PRESSURE: 130 MMHG | HEART RATE: 75 BPM | DIASTOLIC BLOOD PRESSURE: 82 MMHG | WEIGHT: 164.25 LBS | HEIGHT: 62 IN | BODY MASS INDEX: 30.22 KG/M2 | OXYGEN SATURATION: 100 %

## 2025-04-22 DIAGNOSIS — N18.31 STAGE 3A CHRONIC KIDNEY DISEASE: ICD-10-CM

## 2025-04-22 DIAGNOSIS — Z86.73 HISTORY OF STROKE: ICD-10-CM

## 2025-04-22 DIAGNOSIS — K92.2 GASTROINTESTINAL HEMORRHAGE, UNSPECIFIED GASTROINTESTINAL HEMORRHAGE TYPE: ICD-10-CM

## 2025-04-22 DIAGNOSIS — F10.21 HISTORY OF ALCOHOL DEPENDENCE: ICD-10-CM

## 2025-04-22 DIAGNOSIS — E03.9 HYPOTHYROIDISM, UNSPECIFIED TYPE: ICD-10-CM

## 2025-04-22 DIAGNOSIS — K92.2 GASTROINTESTINAL HEMORRHAGE, UNSPECIFIED GASTROINTESTINAL HEMORRHAGE TYPE: Primary | ICD-10-CM

## 2025-04-22 DIAGNOSIS — D64.9 LOW HEMOGLOBIN: Primary | ICD-10-CM

## 2025-04-22 LAB
ALBUMIN SERPL BCP-MCNC: 3.7 G/DL (ref 3.5–5.2)
ALP SERPL-CCNC: 64 UNIT/L (ref 40–150)
ALT SERPL W/O P-5'-P-CCNC: 18 UNIT/L (ref 10–44)
ANION GAP (OHS): 9 MMOL/L (ref 8–16)
AST SERPL-CCNC: 22 UNIT/L (ref 11–45)
BILIRUB SERPL-MCNC: 0.3 MG/DL (ref 0.1–1)
BUN SERPL-MCNC: 17 MG/DL (ref 8–23)
CALCIUM SERPL-MCNC: 9.4 MG/DL (ref 8.7–10.5)
CHLORIDE SERPL-SCNC: 108 MMOL/L (ref 95–110)
CO2 SERPL-SCNC: 23 MMOL/L (ref 23–29)
CREAT SERPL-MCNC: 1.2 MG/DL (ref 0.5–1.4)
ERYTHROCYTE [DISTWIDTH] IN BLOOD BY AUTOMATED COUNT: 19.8 % (ref 11.5–14.5)
GFR SERPLBLD CREATININE-BSD FMLA CKD-EPI: 47 ML/MIN/1.73/M2
GLUCOSE SERPL-MCNC: 89 MG/DL (ref 70–110)
HCT VFR BLD AUTO: 24.9 % (ref 37–48.5)
HGB BLD-MCNC: 7.9 GM/DL (ref 12–16)
MCH RBC QN AUTO: 28.3 PG (ref 27–31)
MCHC RBC AUTO-ENTMCNC: 31.7 G/DL (ref 32–36)
MCV RBC AUTO: 89 FL (ref 82–98)
PLATELET # BLD AUTO: 323 K/UL (ref 150–450)
PMV BLD AUTO: 10.3 FL (ref 9.2–12.9)
POTASSIUM SERPL-SCNC: 4.7 MMOL/L (ref 3.5–5.1)
PROT SERPL-MCNC: 7 GM/DL (ref 6–8.4)
RBC # BLD AUTO: 2.79 M/UL (ref 4–5.4)
SODIUM SERPL-SCNC: 140 MMOL/L (ref 136–145)
WBC # BLD AUTO: 4.91 K/UL (ref 3.9–12.7)

## 2025-04-22 PROCEDURE — 36415 COLL VENOUS BLD VENIPUNCTURE: CPT | Mod: PO

## 2025-04-22 PROCEDURE — 99999 PR PBB SHADOW E&M-EST. PATIENT-LVL IV: CPT | Mod: PBBFAC,,, | Performed by: STUDENT IN AN ORGANIZED HEALTH CARE EDUCATION/TRAINING PROGRAM

## 2025-04-22 PROCEDURE — 3075F SYST BP GE 130 - 139MM HG: CPT | Mod: CPTII,S$GLB,, | Performed by: STUDENT IN AN ORGANIZED HEALTH CARE EDUCATION/TRAINING PROGRAM

## 2025-04-22 PROCEDURE — G2211 COMPLEX E/M VISIT ADD ON: HCPCS | Mod: S$GLB,,, | Performed by: STUDENT IN AN ORGANIZED HEALTH CARE EDUCATION/TRAINING PROGRAM

## 2025-04-22 PROCEDURE — 1160F RVW MEDS BY RX/DR IN RCRD: CPT | Mod: CPTII,S$GLB,, | Performed by: STUDENT IN AN ORGANIZED HEALTH CARE EDUCATION/TRAINING PROGRAM

## 2025-04-22 PROCEDURE — 99214 OFFICE O/P EST MOD 30 MIN: CPT | Mod: S$GLB,,, | Performed by: STUDENT IN AN ORGANIZED HEALTH CARE EDUCATION/TRAINING PROGRAM

## 2025-04-22 PROCEDURE — 3079F DIAST BP 80-89 MM HG: CPT | Mod: CPTII,S$GLB,, | Performed by: STUDENT IN AN ORGANIZED HEALTH CARE EDUCATION/TRAINING PROGRAM

## 2025-04-22 PROCEDURE — 1159F MED LIST DOCD IN RCRD: CPT | Mod: CPTII,S$GLB,, | Performed by: STUDENT IN AN ORGANIZED HEALTH CARE EDUCATION/TRAINING PROGRAM

## 2025-04-22 PROCEDURE — 3288F FALL RISK ASSESSMENT DOCD: CPT | Mod: CPTII,S$GLB,, | Performed by: STUDENT IN AN ORGANIZED HEALTH CARE EDUCATION/TRAINING PROGRAM

## 2025-04-22 PROCEDURE — 80053 COMPREHEN METABOLIC PANEL: CPT

## 2025-04-22 PROCEDURE — 1126F AMNT PAIN NOTED NONE PRSNT: CPT | Mod: CPTII,S$GLB,, | Performed by: STUDENT IN AN ORGANIZED HEALTH CARE EDUCATION/TRAINING PROGRAM

## 2025-04-22 PROCEDURE — 1100F PTFALLS ASSESS-DOCD GE2>/YR: CPT | Mod: CPTII,S$GLB,, | Performed by: STUDENT IN AN ORGANIZED HEALTH CARE EDUCATION/TRAINING PROGRAM

## 2025-04-22 PROCEDURE — 85027 COMPLETE CBC AUTOMATED: CPT

## 2025-04-23 ENCOUNTER — PATIENT OUTREACH (OUTPATIENT)
Dept: ADMINISTRATIVE | Facility: OTHER | Age: 75
End: 2025-04-23
Payer: MEDICARE

## 2025-04-23 NOTE — PROGRESS NOTES
CHW - Case Closure    This Community Health Worker spoke to patient via telephone today.   Pt/Caregiver reported: Exhausted Resources.  Pt/Caregiver denied any additional needs at this time and agrees with episode closure at this time.  Provided patient with Community Health Worker's contact information and encouraged him/her to contact this Community Health Worker if additional needs arise.

## 2025-04-24 ENCOUNTER — EXTERNAL HOME HEALTH (OUTPATIENT)
Dept: HOME HEALTH SERVICES | Facility: HOSPITAL | Age: 75
End: 2025-04-24
Payer: MEDICARE

## 2025-05-21 ENCOUNTER — TELEPHONE (OUTPATIENT)
Dept: FAMILY MEDICINE | Facility: CLINIC | Age: 75
End: 2025-05-21
Payer: MEDICARE

## 2025-06-06 ENCOUNTER — ANESTHESIA EVENT (OUTPATIENT)
Dept: ENDOSCOPY | Facility: HOSPITAL | Age: 75
End: 2025-06-06
Payer: MEDICARE

## 2025-06-06 ENCOUNTER — ANESTHESIA (OUTPATIENT)
Dept: ENDOSCOPY | Facility: HOSPITAL | Age: 75
End: 2025-06-06
Payer: MEDICARE

## 2025-06-06 ENCOUNTER — HOSPITAL ENCOUNTER (OUTPATIENT)
Facility: HOSPITAL | Age: 75
Discharge: HOME | End: 2025-06-06
Attending: INTERNAL MEDICINE | Admitting: INTERNAL MEDICINE
Payer: MEDICARE

## 2025-06-06 VITALS
HEART RATE: 64 BPM | WEIGHT: 170 LBS | OXYGEN SATURATION: 100 % | TEMPERATURE: 98 F | HEIGHT: 62 IN | SYSTOLIC BLOOD PRESSURE: 180 MMHG | DIASTOLIC BLOOD PRESSURE: 80 MMHG | RESPIRATION RATE: 14 BRPM | BODY MASS INDEX: 31.28 KG/M2

## 2025-06-06 DIAGNOSIS — K25.9 GASTRIC ULCER: ICD-10-CM

## 2025-06-06 DIAGNOSIS — K44.9 HIATAL HERNIA: ICD-10-CM

## 2025-06-06 DIAGNOSIS — K25.9 GASTRIC ULCER, UNSPECIFIED CHRONICITY, UNSPECIFIED WHETHER GASTRIC ULCER HEMORRHAGE OR PERFORATION PRESENT: Primary | ICD-10-CM

## 2025-06-06 PROCEDURE — 37000009 HC ANESTHESIA EA ADD 15 MINS: Performed by: INTERNAL MEDICINE

## 2025-06-06 PROCEDURE — 43239 EGD BIOPSY SINGLE/MULTIPLE: CPT | Mod: ,,, | Performed by: INTERNAL MEDICINE

## 2025-06-06 PROCEDURE — 27201012 HC FORCEPS, HOT/COLD, DISP: Performed by: INTERNAL MEDICINE

## 2025-06-06 PROCEDURE — 37000008 HC ANESTHESIA 1ST 15 MINUTES: Performed by: INTERNAL MEDICINE

## 2025-06-06 PROCEDURE — 88312 SPECIAL STAINS GROUP 1: CPT | Mod: TC | Performed by: PATHOLOGY

## 2025-06-06 PROCEDURE — 63600175 PHARM REV CODE 636 W HCPCS: Performed by: NURSE ANESTHETIST, CERTIFIED REGISTERED

## 2025-06-06 PROCEDURE — 43239 EGD BIOPSY SINGLE/MULTIPLE: CPT | Performed by: INTERNAL MEDICINE

## 2025-06-06 PROCEDURE — 25000003 PHARM REV CODE 250: Performed by: INTERNAL MEDICINE

## 2025-06-06 RX ORDER — PROPOFOL 10 MG/ML
VIAL (ML) INTRAVENOUS
Status: DISCONTINUED | OUTPATIENT
Start: 2025-06-06 | End: 2025-06-06

## 2025-06-06 RX ORDER — SUCRALFATE 1 G/1
1 TABLET ORAL
Qty: 360 TABLET | Refills: 0 | Status: SHIPPED | OUTPATIENT
Start: 2025-06-06 | End: 2025-09-04

## 2025-06-06 RX ORDER — SODIUM CHLORIDE 9 MG/ML
INJECTION, SOLUTION INTRAVENOUS CONTINUOUS
Status: DISCONTINUED | OUTPATIENT
Start: 2025-06-06 | End: 2025-06-06 | Stop reason: HOSPADM

## 2025-06-06 RX ORDER — PANTOPRAZOLE SODIUM 40 MG/1
40 TABLET, DELAYED RELEASE ORAL 2 TIMES DAILY
Qty: 180 TABLET | Refills: 3 | Status: SHIPPED | OUTPATIENT
Start: 2025-06-06 | End: 2026-06-06

## 2025-06-06 RX ORDER — LIDOCAINE HYDROCHLORIDE 20 MG/ML
INJECTION INTRAVENOUS
Status: DISCONTINUED | OUTPATIENT
Start: 2025-06-06 | End: 2025-06-06

## 2025-06-06 RX ADMIN — PROPOFOL 80 MG: 10 INJECTION, EMULSION INTRAVENOUS at 11:06

## 2025-06-06 RX ADMIN — SODIUM CHLORIDE: 9 INJECTION, SOLUTION INTRAVENOUS at 10:06

## 2025-06-06 RX ADMIN — PROPOFOL 20 MG: 10 INJECTION, EMULSION INTRAVENOUS at 11:06

## 2025-06-06 RX ADMIN — LIDOCAINE HYDROCHLORIDE 75 MG: 20 INJECTION, SOLUTION INTRAVENOUS at 11:06

## 2025-06-06 NOTE — TRANSFER OF CARE
"Anesthesia Transfer of Care Note    Patient: Nicolasa Madera    Procedure(s) Performed: Procedure(s) (LRB):  EGD (ESOPHAGOGASTRODUODENOSCOPY) (N/A)    Patient location: PACU    Anesthesia Type: general    Transport from OR: Transported from OR on room air with adequate spontaneous ventilation    Post pain: adequate analgesia    Post assessment: no apparent anesthetic complications and tolerated procedure well    Post vital signs: stable    Level of consciousness: oriented, alert and awake    Nausea/Vomiting: no nausea/vomiting    Complications: none    Transfer of care protocol was followed      Last vitals: Visit Vitals  BP (!) 198/91 (BP Location: Left arm, Patient Position: Lying)   Pulse 71   Temp 36.6 °C (97.9 °F) (Skin)   Resp 16   Ht 5' 2" (1.575 m)   Wt 77.1 kg (170 lb)   SpO2 100%   Breastfeeding No   BMI 31.09 kg/m²     "

## 2025-06-06 NOTE — H&P
CC: Follow up of PUD/erosive gastritis    74 year old female with above. States that symptoms are absent, no alleviating/exacerbating factors. No bleeding or weight loss.     ROS:  No headache, no fever/chills, no chest pain/SOB, no nausea/vomiting/diarrhea/constipation/GI bleeding/abdominal pain, no dysuria/hematuria except where otherwise indicated above.    VSSAF   Exam:   Alert and oriented x 3; no apparent distress   PERRLA, sclera anicteric  CV: Regular rate/rhythm, normal PMI   Lungs: Clear bilaterally with no wheeze/rales   Abdomen: Soft, NT/ND, normal bowel sounds   Ext: No cyanosis, clubbing     Impression:   As above    Plan:   Proceed with endoscopy. Further recs to follow.

## 2025-06-06 NOTE — ANESTHESIA PREPROCEDURE EVALUATION
06/06/2025  Nicolasa Madera is a 74 y.o., female.      Pre-op Assessment          Review of Systems  Renal/:  Chronic Renal Disease        Kidney Function/Disease             Hepatic/GI:   PUD,           Peptic Ulcer Disease          Neurological:   CVA                       CVA - Cerebrovasular Accident                 Endocrine:   Hypothyroidism       Hypothyroidism              Physical Exam  General: Well nourished        Anesthesia Plan  Type of Anesthesia, risks & benefits discussed:    Anesthesia Type: Gen Natural Airway  Intra-op Monitoring Plan: Standard ASA Monitors  Induction:  IV  Informed Consent: Informed consent signed with the Patient and all parties understand the risks and agree with anesthesia plan.  All questions answered.   ASA Score: 3  Day of Surgery Review of History & Physical: H&P Update referred to the surgeon/provider.    Ready For Surgery From Anesthesia Perspective.     .

## 2025-06-06 NOTE — PROVATION PATIENT INSTRUCTIONS
Discharge Summary/Instructions after an Endoscopic Procedure  Patient Name: Nicolasa Aguirre  Patient MRN: [id number]  Patient YOB: 1950 Friday, June 6, 2025  Crow Marc MD  Dear patient,  As a result of recent federal legislation (The Federal Cures Act), you may   receive lab or pathology results from your procedure in your [facility]   account before your physician is able to contact you. Your physician or   their representative will relay the results to you with their   recommendations at their soonest availability.  Thank you,  RESTRICTIONS:  During your procedure today, you received medications for sedation.  These   medications may affect your judgment, balance and coordination.  Therefore,   for 24 hours, you have the following restrictions:   - DO NOT drive a car, operate machinery, make legal/financial decisions,   sign important papers or drink alcohol.    ACTIVITY:  Today: no heavy lifting, straining or running due to procedural   sedation/anesthesia.  The following day: return to full activity including work.  DIET:  Eat and drink normally unless instructed otherwise.     TREATMENT FOR COMMON SIDE EFFECTS:  - Mild abdominal pain, nausea, belching, bloating or excessive gas:  rest,   eat lightly and use a heating pad.  - Sore Throat: treat with throat lozenges and/or gargle with warm salt   water.  - Because air was used during the procedure, expelling large amounts of air   from your rectum or belching is normal.  - If a bowel prep was taken, you may not have a bowel movement for 1-3 days.    This is normal.  SYMPTOMS TO WATCH FOR AND REPORT TO YOUR PHYSICIAN:  1. Abdominal pain or bloating, other than gas cramps.  2. Chest pain.  3. Back pain.  4. Signs of infection such as: chills or fever occurring within 24 hours   after the procedure.  5. Rectal bleeding, which would show as bright red, maroon, or black stools.   (A tablespoon of blood from the rectum is not serious, especially  if   hemorrhoids are present.)  6. Vomiting.  7. Weakness or dizziness.  GO DIRECTLY TO THE NEAREST EMERGENCY ROOM IF YOU HAVE ANY OF THE FOLLOWING:      Difficulty breathing              Chills and/or fever over 101 F   Persistent vomiting and/or vomiting blood   Severe abdominal pain   Severe chest pain   Black, tarry stools   Bleeding- more than one tablespoon   Any other symptom or condition that you feel may need urgent attention  Your doctor recommends these additional instructions:  If any biopsies were taken, your doctors clinic will contact you in 1 to 2   weeks with any results.  - Patient has a contact number available for emergencies.  The signs and   symptoms of potential delayed complications were discussed with the   patient.  Return to normal activities tomorrow.  Written discharge   instructions were provided to the patient.   - Resume previous diet.   - Continue present medications.   - No aspirin, ibuprofen, naproxen, or other non-steroidal anti-inflammatory   drugs.   - Await pathology results.   - Repeat upper endoscopy in 8 weeks to check healing.   - Discharge patient to home (ambulatory).   - Follow an antireflux regimen.   - Use Protonix (pantoprazole) 40 mg PO BID.   - Use sucralfate tablets 1 gram PO QID.   - Return to GI office after studies are complete.  For questions, problems or results please call your physician - Crow Marc MD at Work:  [phone].  [facility], EMERGENCY ROOM PHONE NUMBER: [phone]  IF A COMPLICATION OR EMERGENCY SITUATION ARISES AND YOU ARE UNABLE TO REACH   YOUR PHYSICIAN - GO DIRECTLY TO THE EMERGENCY ROOM.  Crow Marc MD  6/6/2025 11:18:29 AM  This report has been verified and signed electronically.  Dear patient,  As a result of recent federal legislation (The Federal Cures Act), you may   receive lab or pathology results from your procedure in your [facility]   account before your physician is able to contact you. Your physician or    their representative will relay the results to you with their   recommendations at their soonest availability.  Thank you,  PROVATION

## 2025-06-09 ENCOUNTER — RESULTS FOLLOW-UP (OUTPATIENT)
Dept: GASTROENTEROLOGY | Facility: CLINIC | Age: 75
End: 2025-06-09

## 2025-06-09 NOTE — ANESTHESIA POSTPROCEDURE EVALUATION
Anesthesia Post Evaluation    Patient: Nicolasa Madera    Procedure(s) Performed: Procedure(s) (LRB):  EGD (ESOPHAGOGASTRODUODENOSCOPY) (N/A)    Final Anesthesia Type: general      Patient location during evaluation: PACU  Patient participation: Yes- Able to Participate  Level of consciousness: awake and alert  Post-procedure vital signs: reviewed and stable  Pain management: adequate  Airway patency: patent    PONV status at discharge: No PONV  Anesthetic complications: no      Cardiovascular status: blood pressure returned to baseline  Respiratory status: unassisted and room air  Hydration status: euvolemic  Follow-up not needed.              Vitals Value Taken Time   /80 06/06/25 11:39   Temp 36.6 °C (97.9 °F) 06/06/25 11:20   Pulse 62 06/06/25 11:43   Resp 11 06/06/25 11:43   SpO2 100 % 06/06/25 11:43   Vitals shown include unfiled device data.      Event Time   Out of Recovery 12:00:43         Pain/Lianet Score: No data recorded

## 2025-06-13 ENCOUNTER — TELEPHONE (OUTPATIENT)
Dept: GASTROENTEROLOGY | Facility: CLINIC | Age: 75
End: 2025-06-13
Payer: MEDICARE

## 2025-06-13 DIAGNOSIS — K25.9 GASTRIC ULCER, UNSPECIFIED CHRONICITY, UNSPECIFIED WHETHER GASTRIC ULCER HEMORRHAGE OR PERFORATION PRESENT: Primary | ICD-10-CM

## 2025-06-13 NOTE — TELEPHONE ENCOUNTER
Copied from CRM #[id number]. Topic: General Inquiry - Return Call  >> Jun 13, 2025  3:38 PM Kvng wrote:  Type:  Patient Returning Call    Who Called:  pt  Who Left Message for Patient:  nurse  Does the patient know what this is regarding?:  [phone]   Best Call Back Number:  [phone]   Additional Information:  thanks

## 2025-06-13 NOTE — TELEPHONE ENCOUNTER
Patient notified and understands biopsy results, continue current medications and repeat 8 week EGD scheduled 8/1/25

## 2025-06-16 ENCOUNTER — LAB VISIT (OUTPATIENT)
Dept: LAB | Facility: HOSPITAL | Age: 75
End: 2025-06-16
Attending: STUDENT IN AN ORGANIZED HEALTH CARE EDUCATION/TRAINING PROGRAM
Payer: MEDICARE

## 2025-06-16 DIAGNOSIS — R79.89 ABNORMAL CBC: ICD-10-CM

## 2025-06-16 DIAGNOSIS — D64.9 LOW HEMOGLOBIN: ICD-10-CM

## 2025-06-16 DIAGNOSIS — R74.01 ELEVATION OF LEVELS OF LIVER TRANSAMINASE LEVELS: ICD-10-CM

## 2025-06-16 DIAGNOSIS — R79.89 LFT ELEVATION: ICD-10-CM

## 2025-06-16 LAB
ABSOLUTE EOSINOPHIL (OHS): 0.04 K/UL
ABSOLUTE MONOCYTE (OHS): 0.33 K/UL (ref 0.3–1)
ABSOLUTE NEUTROPHIL COUNT (OHS): 2.88 K/UL (ref 1.8–7.7)
ALBUMIN SERPL BCP-MCNC: 4 G/DL (ref 3.5–5.2)
ALP SERPL-CCNC: 81 UNIT/L (ref 40–150)
ALT SERPL W/O P-5'-P-CCNC: 13 UNIT/L (ref 10–44)
ANION GAP (OHS): 12 MMOL/L (ref 8–16)
ANISOCYTOSIS BLD QL SMEAR: SLIGHT
AST SERPL-CCNC: 17 UNIT/L (ref 11–45)
BASOPHILS # BLD AUTO: 0.05 K/UL
BASOPHILS NFR BLD AUTO: 1.1 %
BILIRUB SERPL-MCNC: 0.3 MG/DL (ref 0.1–1)
BUN SERPL-MCNC: 21 MG/DL (ref 8–23)
CALCIUM SERPL-MCNC: 9.4 MG/DL (ref 8.7–10.5)
CHLORIDE SERPL-SCNC: 109 MMOL/L (ref 95–110)
CO2 SERPL-SCNC: 19 MMOL/L (ref 23–29)
CREAT SERPL-MCNC: 1.1 MG/DL (ref 0.5–1.4)
DACRYOCYTES BLD QL SMEAR: NORMAL
ERYTHROCYTE [DISTWIDTH] IN BLOOD BY AUTOMATED COUNT: 25.3 % (ref 11.5–14.5)
GFR SERPLBLD CREATININE-BSD FMLA CKD-EPI: 52 ML/MIN/1.73/M2
GLUCOSE SERPL-MCNC: 93 MG/DL (ref 70–110)
HAV IGM SERPL QL IA: NORMAL
HBV CORE IGM SERPL QL IA: NORMAL
HBV SURFACE AG SERPL QL IA: NORMAL
HCT VFR BLD AUTO: 24.1 % (ref 37–48.5)
HCV AB SERPL QL IA: NORMAL
HGB BLD-MCNC: 7.6 GM/DL (ref 12–16)
HYPOCHROMIA BLD QL SMEAR: NORMAL
IMM GRANULOCYTES # BLD AUTO: 0.04 K/UL (ref 0–0.04)
IMM GRANULOCYTES NFR BLD AUTO: 0.9 % (ref 0–0.5)
LYMPHOCYTES # BLD AUTO: 1.09 K/UL (ref 1–4.8)
MCH RBC QN AUTO: 22.7 PG (ref 27–31)
MCHC RBC AUTO-ENTMCNC: 31.5 G/DL (ref 32–36)
MCV RBC AUTO: 72 FL (ref 82–98)
NUCLEATED RBC (/100WBC) (OHS): 0 /100 WBC
OVALOCYTES BLD QL SMEAR: NORMAL
PLATELET # BLD AUTO: 276 K/UL (ref 150–450)
PLATELET BLD QL SMEAR: NORMAL
PMV BLD AUTO: 10.8 FL (ref 9.2–12.9)
POIKILOCYTOSIS BLD QL SMEAR: SLIGHT
POLYCHROMASIA BLD QL SMEAR: NORMAL
POTASSIUM SERPL-SCNC: 5 MMOL/L (ref 3.5–5.1)
PROT SERPL-MCNC: 7 GM/DL (ref 6–8.4)
RBC # BLD AUTO: 3.35 M/UL (ref 4–5.4)
RELATIVE EOSINOPHIL (OHS): 0.9 %
RELATIVE LYMPHOCYTE (OHS): 24.6 % (ref 18–48)
RELATIVE MONOCYTE (OHS): 7.4 % (ref 4–15)
RELATIVE NEUTROPHIL (OHS): 65.1 % (ref 38–73)
SCHISTOCYTES BLD QL SMEAR: NORMAL
SODIUM SERPL-SCNC: 140 MMOL/L (ref 136–145)
TARGETS BLD QL SMEAR: NORMAL
WBC # BLD AUTO: 4.43 K/UL (ref 3.9–12.7)

## 2025-06-16 PROCEDURE — 36415 COLL VENOUS BLD VENIPUNCTURE: CPT | Mod: PO

## 2025-06-16 PROCEDURE — 85025 COMPLETE CBC W/AUTO DIFF WBC: CPT

## 2025-06-16 PROCEDURE — 80053 COMPREHEN METABOLIC PANEL: CPT

## 2025-06-16 PROCEDURE — 80074 ACUTE HEPATITIS PANEL: CPT

## 2025-06-17 ENCOUNTER — RESULTS FOLLOW-UP (OUTPATIENT)
Dept: FAMILY MEDICINE | Facility: CLINIC | Age: 75
End: 2025-06-17

## 2025-06-17 ENCOUNTER — TELEPHONE (OUTPATIENT)
Dept: FAMILY MEDICINE | Facility: CLINIC | Age: 75
End: 2025-06-17
Payer: MEDICARE

## 2025-06-17 DIAGNOSIS — D50.9 MICROCYTIC ANEMIA: Primary | ICD-10-CM

## 2025-06-17 RX ORDER — FERROUS SULFATE 325(65) MG
325 TABLET ORAL EVERY OTHER DAY
Qty: 45 TABLET | Refills: 3 | Status: SHIPPED | OUTPATIENT
Start: 2025-06-17

## 2025-06-17 NOTE — TELEPHONE ENCOUNTER
Copied from CRM #[id number]. Topic: General Inquiry - Return Call  >> Jun 17, 2025  2:30 PM Reina wrote:  ,Type:  Patient Returning Call    Who Called:  Patient  Who Left Message for Patient:  Kelsey  Does the patient know what this is regarding?:  No idea / maybe labs    Would the patient rather a call back or a response via [facility]?  Call back  Best Call Back Number:  [phone]    Additional Information:   States she is returning a missed call - please call back - thank you

## 2025-07-01 ENCOUNTER — OFFICE VISIT (OUTPATIENT)
Dept: FAMILY MEDICINE | Facility: CLINIC | Age: 75
End: 2025-07-01
Payer: MEDICARE

## 2025-07-01 ENCOUNTER — LAB VISIT (OUTPATIENT)
Dept: LAB | Facility: HOSPITAL | Age: 75
End: 2025-07-01
Payer: MEDICARE

## 2025-07-01 VITALS
RESPIRATION RATE: 12 BRPM | OXYGEN SATURATION: 98 % | DIASTOLIC BLOOD PRESSURE: 80 MMHG | HEIGHT: 62 IN | TEMPERATURE: 98 F | SYSTOLIC BLOOD PRESSURE: 132 MMHG | WEIGHT: 161.63 LBS | HEART RATE: 78 BPM | BODY MASS INDEX: 29.74 KG/M2

## 2025-07-01 DIAGNOSIS — K92.2 GASTROINTESTINAL HEMORRHAGE, UNSPECIFIED GASTROINTESTINAL HEMORRHAGE TYPE: ICD-10-CM

## 2025-07-01 DIAGNOSIS — D50.9 MICROCYTIC ANEMIA: Primary | ICD-10-CM

## 2025-07-01 DIAGNOSIS — Z87.440 HISTORY OF UTI: ICD-10-CM

## 2025-07-01 DIAGNOSIS — F10.11 HISTORY OF ALCOHOL ABUSE: ICD-10-CM

## 2025-07-01 DIAGNOSIS — D50.9 MICROCYTIC ANEMIA: ICD-10-CM

## 2025-07-01 DIAGNOSIS — E03.9 HYPOTHYROIDISM, UNSPECIFIED TYPE: ICD-10-CM

## 2025-07-01 DIAGNOSIS — K29.70 GASTRITIS, PRESENCE OF BLEEDING UNSPECIFIED, UNSPECIFIED CHRONICITY, UNSPECIFIED GASTRITIS TYPE: ICD-10-CM

## 2025-07-01 LAB
ABSOLUTE EOSINOPHIL (OHS): 0.04 K/UL
ABSOLUTE MONOCYTE (OHS): 0.5 K/UL (ref 0.3–1)
ABSOLUTE NEUTROPHIL COUNT (OHS): 3.36 K/UL (ref 1.8–7.7)
ALBUMIN SERPL BCP-MCNC: 4.1 G/DL (ref 3.5–5.2)
ALP SERPL-CCNC: 98 UNIT/L (ref 40–150)
ALT SERPL W/O P-5'-P-CCNC: 12 UNIT/L (ref 10–44)
ANION GAP (OHS): 11 MMOL/L (ref 8–16)
ANISOCYTOSIS BLD QL SMEAR: SLIGHT
AST SERPL-CCNC: 18 UNIT/L (ref 11–45)
BASOPHILS # BLD AUTO: 0.03 K/UL
BASOPHILS NFR BLD AUTO: 0.6 %
BILIRUB SERPL-MCNC: 0.2 MG/DL (ref 0.1–1)
BUN SERPL-MCNC: 25 MG/DL (ref 8–23)
CALCIUM SERPL-MCNC: 9.6 MG/DL (ref 8.7–10.5)
CHLORIDE SERPL-SCNC: 105 MMOL/L (ref 95–110)
CO2 SERPL-SCNC: 22 MMOL/L (ref 23–29)
CREAT SERPL-MCNC: 1.3 MG/DL (ref 0.5–1.4)
ERYTHROCYTE [DISTWIDTH] IN BLOOD BY AUTOMATED COUNT: 27.9 % (ref 11.5–14.5)
FERRITIN SERPL-MCNC: 20 NG/ML (ref 20–300)
GFR SERPLBLD CREATININE-BSD FMLA CKD-EPI: 43 ML/MIN/1.73/M2
GLUCOSE SERPL-MCNC: 92 MG/DL (ref 70–110)
HCT VFR BLD AUTO: 27 % (ref 37–48.5)
HGB BLD-MCNC: 8.4 GM/DL (ref 12–16)
HYPOCHROMIA BLD QL SMEAR: NORMAL
IMM GRANULOCYTES # BLD AUTO: 0.01 K/UL (ref 0–0.04)
IMM GRANULOCYTES NFR BLD AUTO: 0.2 % (ref 0–0.5)
IRON SATN MFR SERPL: 46 % (ref 20–50)
IRON SERPL-MCNC: 229 UG/DL (ref 30–160)
LYMPHOCYTES # BLD AUTO: 1.34 K/UL (ref 1–4.8)
MCH RBC QN AUTO: 22.4 PG (ref 27–31)
MCHC RBC AUTO-ENTMCNC: 31.1 G/DL (ref 32–36)
MCV RBC AUTO: 72 FL (ref 82–98)
NUCLEATED RBC (/100WBC) (OHS): 0 /100 WBC
OVALOCYTES BLD QL SMEAR: NORMAL
PLATELET # BLD AUTO: 219 K/UL (ref 150–450)
PLATELET BLD QL SMEAR: NORMAL
PMV BLD AUTO: ABNORMAL FL
POIKILOCYTOSIS BLD QL SMEAR: SLIGHT
POLYCHROMASIA BLD QL SMEAR: NORMAL
POTASSIUM SERPL-SCNC: 5.6 MMOL/L (ref 3.5–5.1)
PROT SERPL-MCNC: 7.4 GM/DL (ref 6–8.4)
RBC # BLD AUTO: 3.75 M/UL (ref 4–5.4)
RELATIVE EOSINOPHIL (OHS): 0.8 %
RELATIVE LYMPHOCYTE (OHS): 25.4 % (ref 18–48)
RELATIVE MONOCYTE (OHS): 9.5 % (ref 4–15)
RELATIVE NEUTROPHIL (OHS): 63.5 % (ref 38–73)
SODIUM SERPL-SCNC: 138 MMOL/L (ref 136–145)
TARGETS BLD QL SMEAR: NORMAL
TIBC SERPL-MCNC: 493 UG/DL (ref 250–450)
TRANSFERRIN SERPL-MCNC: 333 MG/DL (ref 200–375)
TSH SERPL-ACNC: 2.64 UIU/ML (ref 0.4–4)
WBC # BLD AUTO: 5.28 K/UL (ref 3.9–12.7)

## 2025-07-01 PROCEDURE — 99214 OFFICE O/P EST MOD 30 MIN: CPT | Mod: S$GLB,,,

## 2025-07-01 PROCEDURE — 3075F SYST BP GE 130 - 139MM HG: CPT | Mod: CPTII,S$GLB,,

## 2025-07-01 PROCEDURE — 82040 ASSAY OF SERUM ALBUMIN: CPT

## 2025-07-01 PROCEDURE — 84466 ASSAY OF TRANSFERRIN: CPT

## 2025-07-01 PROCEDURE — 82728 ASSAY OF FERRITIN: CPT

## 2025-07-01 PROCEDURE — 36415 COLL VENOUS BLD VENIPUNCTURE: CPT | Mod: PO

## 2025-07-01 PROCEDURE — 1160F RVW MEDS BY RX/DR IN RCRD: CPT | Mod: CPTII,S$GLB,,

## 2025-07-01 PROCEDURE — 1159F MED LIST DOCD IN RCRD: CPT | Mod: CPTII,S$GLB,,

## 2025-07-01 PROCEDURE — 99999 PR PBB SHADOW E&M-EST. PATIENT-LVL IV: CPT | Mod: PBBFAC,,,

## 2025-07-01 PROCEDURE — 84443 ASSAY THYROID STIM HORMONE: CPT

## 2025-07-01 PROCEDURE — 3079F DIAST BP 80-89 MM HG: CPT | Mod: CPTII,S$GLB,,

## 2025-07-01 PROCEDURE — 85025 COMPLETE CBC W/AUTO DIFF WBC: CPT

## 2025-07-01 PROCEDURE — G2211 COMPLEX E/M VISIT ADD ON: HCPCS | Mod: S$GLB,,,

## 2025-07-01 PROCEDURE — 3288F FALL RISK ASSESSMENT DOCD: CPT | Mod: CPTII,S$GLB,,

## 2025-07-01 PROCEDURE — 1101F PT FALLS ASSESS-DOCD LE1/YR: CPT | Mod: CPTII,S$GLB,,

## 2025-07-01 RX ORDER — LEVOTHYROXINE SODIUM 100 UG/1
100 TABLET ORAL
Qty: 90 TABLET | Refills: 1 | Status: CANCELLED | OUTPATIENT
Start: 2025-07-01 | End: 2025-09-29

## 2025-07-01 NOTE — PROGRESS NOTES
Subjective:       Patient ID:  [id number]     Chief Complaint: Follow-up, Medication Refill, and Thyroid Problem      History of Present Illness    Ms. Madera presents today for follow up of anemia and thyroid abnormalities. She reports intermittent abdominal pain described as hunger pain, predominantly occurring in the morning but not daily. Symptoms are transient, with pain present this morning that suddenly resolved. Recent endoscopy showed a hiatal hernia and an unhealed gastric ulcer without active bleeding. Biopsies were negative for bacteria. She denies blood in stool, black stools, or hematemesis. Bowel movements are irregular. She continues Carafate for GI symptoms though unsure of its effectiveness, Protonix, and Tylenol as recommended while avoiding ibuprofen. She reports minimal alcohol consumption, having almost completely stopped intake.   No other concerns.         Past Medical History:   Diagnosis Date    Encounter for blood transfusion     Hypothyroidism     Peptic ulcer with hemorrhage     Peptic ulcer, site unspecified, unspecified as acute or chronic, without hemorrhage or perforation     Peptic ulcer disease    Stroke 2017    no residual effects.     Urticaria       Active Problem List with Overview Notes    Diagnosis Date Noted    GI bleed 04/11/2025    Concussion without loss of consciousness 03/01/2025    Hypophosphatemia 10/23/2024    E coli bacteremia 10/23/2024    Debility 10/17/2024    History of alcohol dependence 10/15/2024    Urinary tract infection without hematuria 08/31/2021    Stage 3 chronic kidney disease 08/31/2021    Anemia 08/30/2021    Hypothyroidism 05/29/2020      Review of patient's allergies indicates:  No Known Allergies    Current Medications[1]    Lab Results   Component Value Date    WBC 4.43 06/16/2025    HGB 7.6 (L) 06/16/2025    HCT 24.1 (L) 06/16/2025     06/16/2025    CHOL 232 (H) 02/28/2025    TRIG 79 02/28/2025    HDL 94 (H) 02/28/2025    ALT 13  06/16/2025    AST 17 06/16/2025     06/16/2025    K 5.0 06/16/2025     06/16/2025    CREATININE 1.1 06/16/2025    BUN 21 06/16/2025    CO2 19 (L) 06/16/2025    TSH 8.570 (H) 04/11/2025    INR 1.1 04/11/2025    HGBA1C 4.9 02/17/2025       Review of Systems   Constitutional:  Negative for fatigue and fever.   HENT: Negative.  Negative for congestion, sneezing and sore throat.    Eyes: Negative.    Respiratory:  Negative for cough, shortness of breath and wheezing.    Cardiovascular:  Negative for chest pain, palpitations and leg swelling.   Gastrointestinal:  Positive for abdominal pain. Negative for nausea and vomiting.   Genitourinary: Negative.    Musculoskeletal: Negative.  Negative for arthralgias.   Skin: Negative.  Negative for rash.   Neurological:  Negative for dizziness, weakness, light-headedness, numbness and headaches.   Hematological: Negative.    Psychiatric/Behavioral: Negative.         Objective:      Physical Exam  Constitutional:       Appearance: Normal appearance.   HENT:      Head: Normocephalic and atraumatic.      Nose: Nose normal.   Eyes:      Extraocular Movements: Extraocular movements intact.   Cardiovascular:      Rate and Rhythm: Normal rate and regular rhythm.   Pulmonary:      Effort: Pulmonary effort is normal.      Breath sounds: Normal breath sounds.   Abdominal:      General: Abdomen is flat. Bowel sounds are normal.      Palpations: Abdomen is soft.      Tenderness: There is no abdominal tenderness.   Musculoskeletal:         General: Normal range of motion.      Cervical back: Normal range of motion.   Skin:     General: Skin is warm and dry.   Neurological:      General: No focal deficit present.      Mental Status: She is alert and oriented to person, place, and time.   Psychiatric:         Mood and Affect: Mood normal.         Assessment:       1. Microcytic anemia    2. Hypothyroidism, unspecified type    3. Gastrointestinal hemorrhage, unspecified  gastrointestinal hemorrhage type    4. History of alcohol abuse    5. Gastritis, presence of bleeding unspecified, unspecified chronicity, unspecified gastritis type        Plan:       Assessment & Plan    IMPRESSION:  - Reviewed recent lab results showing significant anemia and slightly abnormal thyroid function.  - Noted ongoing intermittent abdominal discomfort, described as hunger-like pain.  - Considered recent endoscopy findings of hiatal hernia and unhealed gastric ulcer.  - Acknowledged significant reduction in alcohol use, which should help manage stomach issues.       Nicolasa was seen today for follow-up, medication refill and thyroid problem.    Diagnoses and all orders for this visit:    Microcytic anemia  -     CBC Auto Differential; Future  -     Iron and TIBC; Future  -     Ferritin; Future    Hypothyroidism, unspecified type  -     TSH; Future  - will send refill pending TSH labs     Gastrointestinal hemorrhage, unspecified gastrointestinal hemorrhage type  History of alcohol abuse  Gastritis, presence of bleeding unspecified, unspecified chronicity, unspecified gastritis type  - continue current regimen  - avoid red sauce foods, ETOH, and acidic foods  - remain upright for 2 hours after eating   - keep upcoming EGD, continue to f/u with GI               Future Appointments       Date Provider Specialty Appt Notes    7/1/2025  Lab .    2/9/2026 Wen Urbina MD Family Medicine f/u               Portions of this note may have been dictated using voice recognition software and may contain dictation related errors in spelling / grammar / syntax not discovered on text review.     This note was generated with the assistance of ambient listening technology. Verbal consent was obtained by the patient and accompanying visitor(s) for the recording of patient appointment to facilitate this note. I attest to having reviewed and edited the generated note for accuracy, though some syntax or spelling errors may  persist. Please contact the author of this note for any clarification.       Amanda Parkinson PA-C         [1]   Current Outpatient Medications:     amLODIPine (NORVASC) 5 MG tablet, Take 1 tablet (5 mg total) by mouth once daily., Disp: 90 tablet, Rfl: 3    biotin 5,000 mcg TbDL, Take 1 tablet by mouth once daily., Disp: , Rfl:     cholecalciferol, vitamin D3, (VITAMIN D3) 125 mcg (5,000 unit) Tab, Take 5,000 Units by mouth once daily., Disp: , Rfl:     cyanocobalamin (VITAMIN B-12) 100 MCG tablet, Take 100 mcg by mouth once daily., Disp: , Rfl:     ferrous sulfate (FEOSOL) 325 mg (65 mg iron) Tab tablet, Take 1 tablet (325 mg total) by mouth every other day., Disp: 45 tablet, Rfl: 3    folic acid (FOLVITE) 1 MG tablet, Take 1 tablet (1 mg total) by mouth once daily., Disp: 30 tablet, Rfl: 2    levothyroxine (SYNTHROID) 100 MCG tablet, Take 1 tablet (100 mcg total) by mouth before breakfast., Disp: 90 tablet, Rfl: 1    multivit-min/iron/folic/lutein (CENTRUM SILVER WOMEN ORAL), Take 1 tablet by mouth once daily., Disp: , Rfl:     pantoprazole (PROTONIX) 40 MG tablet, Take 1 tablet (40 mg total) by mouth 2 (two) times daily., Disp: 180 tablet, Rfl: 3    sucralfate (CARAFATE) 1 gram tablet, Take 1 tablet (1 g total) by mouth 4 (four) times daily before meals and nightly., Disp: 120 tablet, Rfl: 2    sucralfate (CARAFATE) 1 gram tablet, Take 1 tablet (1 g total) by mouth 4 (four) times daily before meals and nightly., Disp: 360 tablet, Rfl: 0    thiamine 100 MG tablet, Take 1 tablet (100 mg total) by mouth once daily., Disp: 30 tablet, Rfl: 2

## 2025-07-02 ENCOUNTER — DOCUMENT SCAN (OUTPATIENT)
Dept: HOME HEALTH SERVICES | Facility: HOSPITAL | Age: 75
End: 2025-07-02
Payer: MEDICARE

## 2025-07-02 ENCOUNTER — RESULTS FOLLOW-UP (OUTPATIENT)
Dept: FAMILY MEDICINE | Facility: CLINIC | Age: 75
End: 2025-07-02

## 2025-07-02 DIAGNOSIS — E87.5 HYPERKALEMIA: ICD-10-CM

## 2025-07-02 DIAGNOSIS — D64.9 ANEMIA, UNSPECIFIED TYPE: Primary | ICD-10-CM

## 2025-07-03 ENCOUNTER — TELEPHONE (OUTPATIENT)
Dept: FAMILY MEDICINE | Facility: CLINIC | Age: 75
End: 2025-07-03
Payer: MEDICARE

## 2025-07-03 ENCOUNTER — TELEPHONE (OUTPATIENT)
Dept: HEMATOLOGY/ONCOLOGY | Facility: CLINIC | Age: 75
End: 2025-07-03
Payer: MEDICARE

## 2025-07-03 NOTE — TELEPHONE ENCOUNTER
----- Message from Amanda Parkinson PA-C sent at 7/2/2025  9:09 AM CDT -----  Please notify patient that her hemoglobin has improved. However, her iron is elevated. Her potassium is also elevated. I would like her to repeat her potassium today or tomorrow to make sure it goes   down. Also, Dr. Urbina recommends she see hematology for further assessment of her low hgb and elevated iron.  Thanks,  Amanda  ----- Message -----  From: Lab, Background User  Sent: 7/1/2025   6:36 PM CDT  To: Amanda Parkinson PA-C

## 2025-07-03 NOTE — NURSING
Oncology Navigation   Intake  Date of Diagnosis: 07/02/25  Cancer Type: Benign hem  Type of Referral: Internal  Date of Referral: 07/02/25  Initial Nurse Navigator Contact: 07/03/25  Referral to Initial Contact Timeline (days): 1     Treatment                              Acuity      Follow Up  No follow-ups on file.     This RN Nurse Navigator called the pt to schedule a new patient Hematology/Oncology clinic appointment as requested from the providers referral. A voicemail message was left with a direct number [phone] for the patient to call back to schedule the appointment

## 2025-07-03 NOTE — TELEPHONE ENCOUNTER
Good afternoon,    Mrs. Parkinson placed a hematology referral. Can someone please contact patient to schedule?    Thank you  Kelsey Jordan LPN

## 2025-07-08 ENCOUNTER — LAB VISIT (OUTPATIENT)
Dept: LAB | Facility: HOSPITAL | Age: 75
End: 2025-07-08
Payer: MEDICARE

## 2025-07-08 ENCOUNTER — TELEPHONE (OUTPATIENT)
Dept: HEMATOLOGY/ONCOLOGY | Facility: CLINIC | Age: 75
End: 2025-07-08
Payer: MEDICARE

## 2025-07-08 DIAGNOSIS — E87.5 HYPERKALEMIA: ICD-10-CM

## 2025-07-08 LAB
ANION GAP (SMH): 9 MMOL/L (ref 8–16)
BUN SERPL-MCNC: 26 MG/DL (ref 8–23)
CALCIUM SERPL-MCNC: 9.9 MG/DL (ref 8.7–10.5)
CHLORIDE SERPL-SCNC: 104 MMOL/L (ref 95–110)
CO2 SERPL-SCNC: 22 MMOL/L (ref 23–29)
CREAT SERPL-MCNC: 1.2 MG/DL (ref 0.5–1.4)
GFR SERPLBLD CREATININE-BSD FMLA CKD-EPI: 47 ML/MIN/1.73/M2
GLUCOSE SERPL-MCNC: 104 MG/DL (ref 70–110)
POTASSIUM SERPL-SCNC: 4.9 MMOL/L (ref 3.5–5.1)
SODIUM SERPL-SCNC: 135 MMOL/L (ref 136–145)

## 2025-07-08 PROCEDURE — 36415 COLL VENOUS BLD VENIPUNCTURE: CPT

## 2025-07-08 PROCEDURE — 82374 ASSAY BLOOD CARBON DIOXIDE: CPT

## 2025-07-08 NOTE — TELEPHONE ENCOUNTER
This RN Nurse Navigator called the pt to schedule a new patient Hematology/Oncology clinic appointment as requested from the providers referral. A voicemail message was left with a direct number [phone] for the patient to call back to schedule the appointment

## 2025-07-09 ENCOUNTER — RESULTS FOLLOW-UP (OUTPATIENT)
Dept: FAMILY MEDICINE | Facility: CLINIC | Age: 75
End: 2025-07-09
Payer: MEDICARE

## 2025-07-09 ENCOUNTER — TELEPHONE (OUTPATIENT)
Dept: HEMATOLOGY/ONCOLOGY | Facility: CLINIC | Age: 75
End: 2025-07-09
Payer: MEDICARE

## 2025-07-09 DIAGNOSIS — E87.1 HYPONATREMIA: Primary | ICD-10-CM

## 2025-07-09 NOTE — TELEPHONE ENCOUNTER
This RN Nurse Navigator called the pt to schedule a new patient Hematology/Oncology clinic appointment as requested from the providers referral. A voicemail message was left with a direct number [phone] for the patient to call back to schedule the appointment

## 2025-07-10 ENCOUNTER — TELEPHONE (OUTPATIENT)
Facility: CLINIC | Age: 75
End: 2025-07-10
Payer: MEDICARE

## 2025-07-10 ENCOUNTER — TELEPHONE (OUTPATIENT)
Dept: FAMILY MEDICINE | Facility: CLINIC | Age: 75
End: 2025-07-10
Payer: MEDICARE

## 2025-07-10 NOTE — NURSING
Oncology Navigation   Intake  Date of Diagnosis: 07/02/25  Cancer Type: Benign hem  Type of Referral: Internal  Date of Referral: 07/02/25  Initial Nurse Navigator Contact: 07/04/25  Referral to Initial Contact Timeline (days): 2  First Appointment Available: 07/16/25  Appointment Date: 07/16/25  First Available Date vs. Scheduled Date (days): 0  Reason if booked > 7 days after scheduling: Specific provider / access; Other  Other reason booked > 7 days: took time for the pt to return Navigator's messages to schedule     Treatment                              Acuity      Follow Up  No follow-ups on file.     Pt scheduled for new pt Hematology/Oncology clinic appt as requested in referral received in workqueue. Appt date, time and location reviewed with the pt. No questions at this time.

## 2025-07-10 NOTE — TELEPHONE ENCOUNTER
This RN Nurse Navigator called the pt to schedule a new patient Hematology/Oncology clinic appointment as requested from the providers referral. A voicemail message was left with a direct number [phone] for the patient to call back to schedule the appointment

## 2025-07-10 NOTE — TELEPHONE ENCOUNTER
----- Message from Amanda Parkinson PA-C sent at 7/9/2025  2:56 PM CDT -----  Please notify patient that her sodium is a little low. I would like her to repeat her labs in a few days to monitor.  Amanda Mukherjee   ----- Message -----  From: Lab, Background User  Sent: 7/8/2025   9:26 AM CDT  To: Amanda Parkinson PA-C

## 2025-07-10 NOTE — TELEPHONE ENCOUNTER
Copied from CRM #[id number]. Topic: General Inquiry - Patient Advice  >> Jul 10, 2025  3:43 PM Kiah wrote:  Type:  Needs Medical Advice    Who Called: pt   Would the patient rather a call back or a response via [facility]? Call back   Best Call Back Number: [phone]     Additional Information: pt is asking for a call back from the nurse she had just got off the phone with

## 2025-07-12 ENCOUNTER — LAB VISIT (OUTPATIENT)
Dept: LAB | Facility: HOSPITAL | Age: 75
End: 2025-07-12
Payer: MEDICARE

## 2025-07-12 DIAGNOSIS — E87.1 HYPONATREMIA: ICD-10-CM

## 2025-07-12 LAB
ANION GAP (OHS): 10 MMOL/L (ref 8–16)
BUN SERPL-MCNC: 20 MG/DL (ref 8–23)
CALCIUM SERPL-MCNC: 9 MG/DL (ref 8.7–10.5)
CHLORIDE SERPL-SCNC: 107 MMOL/L (ref 95–110)
CO2 SERPL-SCNC: 22 MMOL/L (ref 23–29)
CREAT SERPL-MCNC: 1.1 MG/DL (ref 0.5–1.4)
GFR SERPLBLD CREATININE-BSD FMLA CKD-EPI: 52 ML/MIN/1.73/M2
GLUCOSE SERPL-MCNC: 77 MG/DL (ref 70–110)
POTASSIUM SERPL-SCNC: 4.9 MMOL/L (ref 3.5–5.1)
SODIUM SERPL-SCNC: 139 MMOL/L (ref 136–145)

## 2025-07-12 PROCEDURE — 80048 BASIC METABOLIC PNL TOTAL CA: CPT

## 2025-07-12 PROCEDURE — 36415 COLL VENOUS BLD VENIPUNCTURE: CPT | Mod: PO

## 2025-07-25 DIAGNOSIS — E03.9 HYPOTHYROIDISM, UNSPECIFIED TYPE: ICD-10-CM

## 2025-07-25 RX ORDER — LEVOTHYROXINE SODIUM 100 UG/1
100 TABLET ORAL
Qty: 90 TABLET | Refills: 1 | Status: SHIPPED | OUTPATIENT
Start: 2025-07-25 | End: 2025-10-23

## 2025-07-28 ENCOUNTER — TELEPHONE (OUTPATIENT)
Facility: CLINIC | Age: 75
End: 2025-07-28
Payer: MEDICARE

## 2025-07-30 ENCOUNTER — TELEPHONE (OUTPATIENT)
Facility: CLINIC | Age: 75
End: 2025-07-30
Payer: MEDICARE

## 2025-08-01 ENCOUNTER — ANESTHESIA (OUTPATIENT)
Dept: ENDOSCOPY | Facility: HOSPITAL | Age: 75
End: 2025-08-01
Payer: MEDICARE

## 2025-08-01 ENCOUNTER — HOSPITAL ENCOUNTER (OUTPATIENT)
Facility: HOSPITAL | Age: 75
Discharge: HOME | End: 2025-08-01
Attending: INTERNAL MEDICINE | Admitting: INTERNAL MEDICINE
Payer: MEDICARE

## 2025-08-01 ENCOUNTER — ANESTHESIA EVENT (OUTPATIENT)
Dept: ENDOSCOPY | Facility: HOSPITAL | Age: 75
End: 2025-08-01
Payer: MEDICARE

## 2025-08-01 DIAGNOSIS — K25.9 GASTRIC ULCER: ICD-10-CM

## 2025-08-01 PROCEDURE — 43239 EGD BIOPSY SINGLE/MULTIPLE: CPT | Performed by: INTERNAL MEDICINE

## 2025-08-01 PROCEDURE — 43239 EGD BIOPSY SINGLE/MULTIPLE: CPT | Mod: ,,, | Performed by: INTERNAL MEDICINE

## 2025-08-01 PROCEDURE — 88305 TISSUE EXAM BY PATHOLOGIST: CPT | Mod: TC | Performed by: PATHOLOGY

## 2025-08-01 PROCEDURE — 27201012 HC FORCEPS, HOT/COLD, DISP: Performed by: INTERNAL MEDICINE

## 2025-08-01 PROCEDURE — 25000003 PHARM REV CODE 250: Performed by: INTERNAL MEDICINE

## 2025-08-01 PROCEDURE — 37000008 HC ANESTHESIA 1ST 15 MINUTES: Performed by: INTERNAL MEDICINE

## 2025-08-01 PROCEDURE — 63600175 PHARM REV CODE 636 W HCPCS: Performed by: NURSE ANESTHETIST, CERTIFIED REGISTERED

## 2025-08-01 RX ORDER — SODIUM CHLORIDE 9 MG/ML
INJECTION, SOLUTION INTRAVENOUS CONTINUOUS
Status: DISCONTINUED | OUTPATIENT
Start: 2025-08-01 | End: 2025-08-01 | Stop reason: HOSPADM

## 2025-08-01 RX ORDER — PROPOFOL 10 MG/ML
VIAL (ML) INTRAVENOUS
Status: DISCONTINUED | OUTPATIENT
Start: 2025-08-01 | End: 2025-08-01

## 2025-08-01 RX ORDER — LIDOCAINE HYDROCHLORIDE 20 MG/ML
INJECTION INTRAVENOUS
Status: DISCONTINUED | OUTPATIENT
Start: 2025-08-01 | End: 2025-08-01

## 2025-08-01 RX ADMIN — PROPOFOL 80 MG: 10 INJECTION, EMULSION INTRAVENOUS at 08:08

## 2025-08-01 RX ADMIN — LIDOCAINE HYDROCHLORIDE 75 MG: 20 INJECTION, SOLUTION INTRAVENOUS at 08:08

## 2025-08-01 RX ADMIN — SODIUM CHLORIDE: 9 INJECTION, SOLUTION INTRAVENOUS at 08:08

## 2025-08-01 NOTE — PLAN OF CARE
Vss, denise po fluids, denies pain, ambulates easily. IV removed, catheter intact. Discharge instructions provided and states understanding. States ready to go home.  Discharged from facility with family per wheelchair.    14:10-14:33 Chart reviewed. Pt encountered in  modified (L) sideying in bed,  may be seen by Physical Therapist as confirmed with Nurse. Patient denied pain and agreed to try to get up +IV LUE

## 2025-08-01 NOTE — H&P
CC: Follow up of gastric ulcer    74 year old female with above. States that symptoms are absent, no alleviating/exacerbating factors. No bleeding or weight loss.     ROS:  No headache, no fever/chills, no chest pain/SOB, no nausea/vomiting/diarrhea/constipation/GI bleeding/abdominal pain, no dysuria/hematuria except where otherwise indicated above.    VSSAF   Exam:   Alert and oriented x 3; no apparent distress   PERRLA, sclera anicteric  CV: Regular rate/rhythm, normal PMI   Lungs: Clear bilaterally with no wheeze/rales   Abdomen: Soft, NT/ND, normal bowel sounds   Ext: No cyanosis, clubbing     Impression:   As above    Plan:   Proceed with endoscopy. Further recs to follow.

## 2025-08-01 NOTE — ANESTHESIA POSTPROCEDURE EVALUATION
Anesthesia Post Evaluation    Patient: Nicolasa Madera    Procedure(s) Performed: Procedure(s) (LRB):  EGD (ESOPHAGOGASTRODUODENOSCOPY) (N/A)    Final Anesthesia Type: general      Patient location during evaluation: PACU  Patient participation: Yes- Able to Participate  Level of consciousness: awake  Post-procedure vital signs: reviewed and stable  Pain management: adequate  Airway patency: patent    PONV status at discharge: No PONV  Anesthetic complications: no      Cardiovascular status: blood pressure returned to baseline  Respiratory status: unassisted  Hydration status: euvolemic  Follow-up not needed.              Vitals Value Taken Time   /70 08/01/25 08:55   Temp 36.5 °C (97.7 °F) 08/01/25 08:55   Pulse 70 08/01/25 09:22   Resp 27 08/01/25 09:22   SpO2 98 % 08/01/25 09:22   Vitals shown include unfiled device data.      Event Time   Out of Recovery 09:27:54         Pain/Lianet Score: Lianet Score: 10 (8/1/2025  9:15 AM)

## 2025-08-01 NOTE — ANESTHESIA PREPROCEDURE EVALUATION
08/01/2025  Nicolasa Madera is a 74 y.o., female.      Pre-op Assessment    I have reviewed the Patient Summary Reports.    I have reviewed the NPO Status.   I have reviewed the Medications.     Review of Systems  Anesthesia Hx:  No problems with previous Anesthesia                Social:  Alcohol Use       Hematology/Oncology:       -- Anemia:                                  Cardiovascular:  Cardiovascular Normal                                              Pulmonary:  Pulmonary Normal                       Renal/:  Chronic Renal Disease, CKD        Kidney Function/Disease             Hepatic/GI:   PUD,     GI Bleed in past      Peptic Ulcer Disease          Neurological:   CVA                       CVA - Cerebrovasular Accident                 Endocrine:   Hypothyroidism       Hypothyroidism              Physical Exam  General: Well nourished    Airway:  Mallampati: II   Mouth Opening: Normal  TM Distance: Normal  Neck ROM: Normal ROM        Anesthesia Plan  Type of Anesthesia, risks & benefits discussed:    Anesthesia Type: Gen Natural Airway  Intra-op Monitoring Plan: Standard ASA Monitors  Induction:  IV  Informed Consent: Informed consent signed with the Patient and all parties understand the risks and agree with anesthesia plan.  All questions answered.   ASA Score: 3    Ready For Surgery From Anesthesia Perspective.     .

## 2025-08-01 NOTE — PROVATION PATIENT INSTRUCTIONS
Discharge Summary/Instructions after an Endoscopic Procedure  Patient Name: Nicolasa Aguirre  Patient MRN: [id number]  Patient YOB: 1950 Friday, August 1, 2025  Crow Marc MD  Dear patient,  As a result of recent federal legislation (The Federal Cures Act), you may   receive lab or pathology results from your procedure in your [facility]   account before your physician is able to contact you. Your physician or   their representative will relay the results to you with their   recommendations at their soonest availability.  Thank you,  RESTRICTIONS:  During your procedure today, you received medications for sedation.  These   medications may affect your judgment, balance and coordination.  Therefore,   for 24 hours, you have the following restrictions:   - DO NOT drive a car, operate machinery, make legal/financial decisions,   sign important papers or drink alcohol.    ACTIVITY:  Today: no heavy lifting, straining or running due to procedural   sedation/anesthesia.  The following day: return to full activity including work.  DIET:  Eat and drink normally unless instructed otherwise.     TREATMENT FOR COMMON SIDE EFFECTS:  - Mild abdominal pain, nausea, belching, bloating or excessive gas:  rest,   eat lightly and use a heating pad.  - Sore Throat: treat with throat lozenges and/or gargle with warm salt   water.  - Because air was used during the procedure, expelling large amounts of air   from your rectum or belching is normal.  - If a bowel prep was taken, you may not have a bowel movement for 1-3 days.    This is normal.  SYMPTOMS TO WATCH FOR AND REPORT TO YOUR PHYSICIAN:  1. Abdominal pain or bloating, other than gas cramps.  2. Chest pain.  3. Back pain.  4. Signs of infection such as: chills or fever occurring within 24 hours   after the procedure.  5. Rectal bleeding, which would show as bright red, maroon, or black stools.   (A tablespoon of blood from the rectum is not serious,  especially if   hemorrhoids are present.)  6. Vomiting.  7. Weakness or dizziness.  GO DIRECTLY TO THE NEAREST EMERGENCY ROOM IF YOU HAVE ANY OF THE FOLLOWING:      Difficulty breathing              Chills and/or fever over 101 F   Persistent vomiting and/or vomiting blood   Severe abdominal pain   Severe chest pain   Black, tarry stools   Bleeding- more than one tablespoon   Any other symptom or condition that you feel may need urgent attention  Your doctor recommends these additional instructions:  If any biopsies were taken, your doctors clinic will contact you in 1 to 2   weeks with any results.  - Patient has a contact number available for emergencies.  The signs and   symptoms of potential delayed complications were discussed with the   patient.  Return to normal activities tomorrow.  Written discharge   instructions were provided to the patient.   - Resume previous diet.   - Continue present medications.   - No aspirin, ibuprofen, naproxen, or other non-steroidal anti-inflammatory   drugs.   - Await pathology results.   - Discharge patient to home (ambulatory).   - Follow an antireflux regimen.   - Return to GI office after studies are complete.  For questions, problems or results please call your physician - Crow Marc MD at Work:  [phone].  [facility], EMERGENCY ROOM PHONE NUMBER: [phone]  IF A COMPLICATION OR EMERGENCY SITUATION ARISES AND YOU ARE UNABLE TO REACH   YOUR PHYSICIAN - GO DIRECTLY TO THE EMERGENCY ROOM.  Crow Marc MD  8/1/2025 8:39:13 AM  This report has been verified and signed electronically.  Dear patient,  As a result of recent federal legislation (The Federal Cures Act), you may   receive lab or pathology results from your procedure in your [facility]   account before your physician is able to contact you. Your physician or   their representative will relay the results to you with their   recommendations at their soonest availability.  Thank  you,  PROVATION

## 2025-08-01 NOTE — TRANSFER OF CARE
Anesthesia Transfer of Care Note    Patient: Nicolasa Madera    Procedure(s) Performed: Procedure(s) (LRB):  EGD (ESOPHAGOGASTRODUODENOSCOPY) (N/A)    Patient location: PACU    Anesthesia Type: general    Transport from OR: Transported from OR on room air with adequate spontaneous ventilation    Post pain: adequate analgesia    Post assessment: no apparent anesthetic complications and tolerated procedure well    Post vital signs: stable    Level of consciousness: awake, alert and oriented    Nausea/Vomiting: no nausea/vomiting    Complications: none    Transfer of care protocol was followed      Last vitals: Visit Vitals  BP (!) 190/81 (BP Location: Left arm, Patient Position: Lying)   Pulse 75   Temp 36.5 °C (97.7 °F) (Skin)   Resp 16   Ht 5' (1.524 m)   Wt 73 kg (161 lb)   SpO2 98%   Breastfeeding No   BMI 31.44 kg/m²

## 2025-08-04 VITALS
WEIGHT: 161 LBS | OXYGEN SATURATION: 97 % | BODY MASS INDEX: 31.61 KG/M2 | RESPIRATION RATE: 22 BRPM | DIASTOLIC BLOOD PRESSURE: 70 MMHG | HEIGHT: 60 IN | HEART RATE: 68 BPM | TEMPERATURE: 98 F | SYSTOLIC BLOOD PRESSURE: 149 MMHG

## 2025-08-06 NOTE — PROGRESS NOTES
[facility] HEMATOLOGY/ONCOLOGY CONSULTATION    Subjective:       Patient ID: Nicolasa Madera is a 74 y.o. female.     Chief Complaint: Anemia Work Up      HPI  Patient presents to clinic as a new patient referral by Dr. Urbina for a work up of anemia.   She had a EGD and Colonoscopy done in in April with Dr Vigil. She was found to have a bleeding ulcer that was cauterized. She had a follow up scope on 8/1 that indicated a  large healed ulcer was found in the gastric antrum without continued bleeding.     Over the last 5 months her Hbg has dropped to has low as 7 without receiving blood transfusions. Her Iron stores have been depleted with a ferritin of 20ng/mL, Iron saturation of 46, and IBCT of 493.    She has been taking oral iron since June with no improvement.     She is fatigued and becomes lightheaded / dizzy with exertion but denies SOB or Chest pains.     She is also noted to have CKD with elevated BP and would require a work up from Nephology.     She denies personal history of cancers or blood disorders. Her brother had lung cancer. Paternal aunt, and paternal grandma had breast cancer.     She denies alcohol, tobacco or drug use.       Past Medical History:   Diagnosis Date    Encounter for blood transfusion     Hypothyroidism     Peptic ulcer with hemorrhage     Peptic ulcer, site unspecified, unspecified as acute or chronic, without hemorrhage or perforation     Peptic ulcer disease    Stroke 2017    no residual effects.     Urticaria        Past Surgical History:   Procedure Laterality Date    COLONOSCOPY N/A 4/11/2025    Procedure: COLONOSCOPY;  Surgeon: Crow Vigil MD;  Location: [facility];  Service: Endoscopy;  Laterality: N/A;    ESOPHAGOGASTRODUODENOSCOPY N/A 4/18/2021    Procedure: EGD (ESOPHAGOGASTRODUODENOSCOPY);  Surgeon: Crow Vigil MD;  Location: [facility];  Service: Endoscopy;  Laterality: N/A;    ESOPHAGOGASTRODUODENOSCOPY N/A 8/31/2021    Procedure: EGD  "(ESOPHAGOGASTRODUODENOSCOPY);  Surgeon: Crow Vigil MD;  Location: [facility] ENDO;  Service: Endoscopy;  Laterality: N/A;    ESOPHAGOGASTRODUODENOSCOPY N/A 4/11/2025    Procedure: EGD (ESOPHAGOGASTRODUODENOSCOPY);  Surgeon: Crow Vigil MD;  Location: [facility];  Service: Endoscopy;  Laterality: N/A;    ESOPHAGOGASTRODUODENOSCOPY N/A 6/6/2025    Procedure: EGD (ESOPHAGOGASTRODUODENOSCOPY);  Surgeon: Crow Vigil MD;  Location: [facility];  Service: Endoscopy;  Laterality: N/A;    ESOPHAGOGASTRODUODENOSCOPY N/A 8/1/2025    Procedure: EGD (ESOPHAGOGASTRODUODENOSCOPY);  Surgeon: Crow Vigil MD;  Location: [facility];  Service: Endoscopy;  Laterality: N/A;       Social History     Socioeconomic History    Marital status:    Tobacco Use    Smoking status: Never    Smokeless tobacco: Never   Substance and Sexual Activity    Alcohol use: Yes     Alcohol/week: 5.0 standard drinks of alcohol     Types: 5 Glasses of wine per week     Comment: stated she "binge" drinks    Drug use: Never    Sexual activity: Not Currently     Social Drivers of Health     Financial Resource Strain: Medium Risk (10/28/2024)    Overall Financial Resource Strain (CARDIA)     Difficulty of Paying Living Expenses: Somewhat hard   Food Insecurity: Food Insecurity Present (10/28/2024)    Hunger Vital Sign     Worried About Running Out of Food in the Last Year: Often true     Ran Out of Food in the Last Year: Often true   Transportation Needs: Unmet Transportation Needs (10/28/2024)    TRANSPORTATION NEEDS     Transportation : Yes, it has kept me from medical appointments or from getting my medications.   Physical Activity: Sufficiently Active (10/18/2024)    Exercise Vital Sign     Days of Exercise per Week: 6 days     Minutes of Exercise per Session: 60 min   Stress: Stress Concern Present (10/28/2024)    [employer] [place] [employer] - Occupational Stress Questionnaire     Feeling of Stress : To some extent " "  Housing Stability: Unknown (10/28/2024)    Housing Stability Vital Sign     Unable to Pay for Housing in the Last Year: No     Homeless in the Last Year: No       Family History   Problem Relation Name Age of Onset    Stroke Father  70    Breast cancer Paternal Aunt      Breast cancer Paternal Grandmother         Review of patient's allergies indicates:  No Known Allergies  Current Medications[1]    All medications and past history have been reviewed.    Review of Systems   Constitutional:  Positive for fatigue.   HENT: Negative.     Eyes: Negative.    Respiratory: Negative.     Cardiovascular: Negative.    Gastrointestinal: Negative.    Endocrine: Negative.    Genitourinary: Negative.    Musculoskeletal: Negative.    Skin:  Positive for pallor.   Allergic/Immunologic: Negative.    Neurological:  Positive for dizziness and light-headedness.   Hematological: Negative.    Psychiatric/Behavioral: Negative.         Objective:        BP (!) 169/78 (BP Location: Left arm, Patient Position: Sitting)   Pulse 75   Temp 97.6 °F (36.4 °C) (Temporal)   Ht 5' 2" (1.575 m) Comment: per the pt  Wt 72.3 kg (159 lb 6.4 oz)   SpO2 99%   BMI 29.15 kg/m²     Physical Exam  Constitutional:       Appearance: Normal appearance.   HENT:      Head: Normocephalic.      Nose: Nose normal.      Mouth/Throat:      Mouth: Mucous membranes are moist.   Cardiovascular:      Rate and Rhythm: Normal rate and regular rhythm.      Pulses: Normal pulses.      Heart sounds: Normal heart sounds.   Pulmonary:      Effort: Pulmonary effort is normal. No respiratory distress.      Breath sounds: Normal breath sounds. No stridor.   Abdominal:      General: Abdomen is flat.   Musculoskeletal:         General: Normal range of motion.      Cervical back: Normal range of motion.   Skin:     General: Skin is warm and dry.      Capillary Refill: Capillary refill takes 2 to 3 seconds.      Coloration: Skin is pale.   Neurological:      General: No focal " deficit present.      Mental Status: She is alert and oriented to person, place, and time.   Psychiatric:         Mood and Affect: Mood normal.           Lab:  Lab Results   Component Value Date    WBC 3.71 (L) 08/07/2025    HGB 10.0 (L) 08/07/2025    HCT 31.5 (L) 08/07/2025    MCV 73 (L) 08/07/2025     08/07/2025     Lab Results   Component Value Date    FERRITIN 20.0 07/01/2025     Lab Results   Component Value Date    IRON 229 (H) 07/01/2025    TRANSFERRIN 333 07/01/2025    TIBC 493 (H) 07/01/2025    LABIRON 46 07/01/2025    FESATURATED 11 (L) 02/17/2025        CMP  Sodium   Date Value Ref Range Status   07/12/2025 139 136 - 145 mmol/L Final   07/08/2025 135 (L) 136 - 145 mmol/L Final     Potassium   Date Value Ref Range Status   07/12/2025 4.9 3.5 - 5.1 mmol/L Final   07/08/2025 4.9 3.5 - 5.1 mmol/L Final     Chloride   Date Value Ref Range Status   07/12/2025 107 95 - 110 mmol/L Final   07/08/2025 104 95 - 110 mmol/L Final     CO2   Date Value Ref Range Status   07/12/2025 22 (L) 23 - 29 mmol/L Final   07/08/2025 22 (L) 23 - 29 mmol/L Final     Glucose   Date Value Ref Range Status   07/12/2025 77 70 - 110 mg/dL Final   07/08/2025 104 70 - 110 mg/dL Final     BUN   Date Value Ref Range Status   07/12/2025 20 8 - 23 mg/dL Final     Creatinine   Date Value Ref Range Status   07/12/2025 1.1 0.5 - 1.4 mg/dL Final     Calcium   Date Value Ref Range Status   07/12/2025 9.0 8.7 - 10.5 mg/dL Final   07/08/2025 9.9 8.7 - 10.5 mg/dL Final     Protein Total   Date Value Ref Range Status   07/01/2025 7.4 6.0 - 8.4 gm/dL Final   04/12/2025 6.0 6.0 - 8.4 gm/dL Final     Albumin   Date Value Ref Range Status   07/01/2025 4.1 3.5 - 5.2 g/dL Final   04/12/2025 3.5 3.5 - 5.2 g/dL Final     Bilirubin Total   Date Value Ref Range Status   07/01/2025 0.2 0.1 - 1.0 mg/dL Final     Comment:     For infants and newborns, interpretation of results should be based   on gestational age, weight and in agreement with clinical    observations.    Premature Infant recommended reference ranges:   0-24 hours:  <8.0 mg/dL   24-48 hours: <12.0 mg/dL   3-5 days:    <15.0 mg/dL   6-29 days:   <15.0 mg/dL   04/12/2025 0.5 0.1 - 1.0 mg/dL Final     Comment:     For infants and newborns, interpretation of results should be based   on gestational age, weight and in agreement with clinical   observations.    Premature Infant recommended reference ranges:   0-24 hours:  <8.0 mg/dL   24-48 hours: <12.0 mg/dL   3-5 days:    <15.0 mg/dL   6-29 days:   <15.0 mg/dL     ALP   Date Value Ref Range Status   07/01/2025 98 40 - 150 unit/L Final   04/12/2025 55 40 - 150 unit/L Final     AST   Date Value Ref Range Status   07/01/2025 18 11 - 45 unit/L Final   04/12/2025 21 11 - 45 unit/L Final     ALT   Date Value Ref Range Status   07/01/2025 12 10 - 44 unit/L Final   04/12/2025 14 10 - 44 unit/L Final     Anion Gap   Date Value Ref Range Status   07/12/2025 10 8 - 16 mmol/L Final     eGFR   Date Value Ref Range Status   07/12/2025 52 (L) >60 mL/min/1.73/m2 Final     Comment:     Estimated GFR calculated using the CKD-EPI creatinine (2021) equation.   03/01/2025 58 (A) >60 mL/min/1.73 m^2 Final       Radiology/Diagnostic Studies:  MRI Brain Without Contrast  Narrative: EXAMINATION:  MRI BRAIN WITHOUT CONTRAST    CLINICAL HISTORY:  Neuro deficit, acute, stroke suspected;neuro deficit;.    TECHNIQUE:  Multiplanar multisequence MR imaging of the brain was performed without contrast    COMPARISON:  Head CT 02/28/2025    FINDINGS:  Brain: No abnormal restricted diffusion or acute infarct.  No mass, hemorrhage, or midline shift/space-occupying extra-axial fluid collections.  Scattered T2/FLAIR hyperintensities within the cerebral hemispheric white matter and central efla consistent with mild to moderate sequela of chronic small vessel ischemic changes as well as moderate global parenchymal volume loss.  Midline Structures: The midline structures of the brain are  normal.  Ventricles: The ventricles, sulci and cisterns are stable in size and configuration.  Vasculature: The vascular flow voids at the base of the brain are within normal limits.  Calvarium: The visualized osseous structures are unremarkable.  Sinuses: Mild scattered mucosal thickening within the maxillary sinuses.  Orbits: The orbits and globes are unremarkable.  Mastoids: The mastoid air cells are adequately aerated.  Extracranial soft tissues: The visualized extracranial soft tissues are unremarkable.  Impression: 1. No acute process.  2. Chronic small vessel ischemic and involutional changes as described above.    Electronically signed by: Ck Ackerman  Date:    02/28/2025  Time:    16:44  CT Head Without Contrast  Narrative: EXAMINATION:  CT HEAD WITHOUT CONTRAST    CLINICAL HISTORY:  Neuro deficit, acute, stroke suspected;    TECHNIQUE:  Low dose axial CT images obtained throughout the head without intravenous contrast. Sagittal and coronal reconstructions were performed.    COMPARISON:  Head CT 10/17/2024.    FINDINGS:  Brain: There is no evidence of a mass, edema, midline shift, or intracranial hemorrhage. No extra-axial fluid collection.  Grossly stable findings of chronic small vessel ischemic and involutional changes.  No CT evidence of an acute major vascular territorial infarct.    Ventricles: The ventricles, sulci, and cisterns are within normal limits.    Skull: The osseous structures are unremarkable in appearance.    Extracranial soft tissues: Limited imaging is within normal limits.    Other: The visualized portions of the sinuses, orbits, and mastoid air cells are unremarkable.  Impression: 1. No acute intracranial CT findings.    Electronically signed by: Ck Ackerman  Date:    02/28/2025  Time:    15:09    Upper GI endoscopy 8/1/2025    Impression:     - Normal esophagus.                          - Z-line regular, 36 cm from the incisors.                          - Medium-sized hiatal hernia.                           - Scar in the gastric antrum. Biopsied.                          - Normal examined duodenum.     Recommendation:        - Patient has a contact number available for                          emergencies. The signs and symptoms of potential                          delayed complications were discussed with the                          patient. Return to normal activities tomorrow.                          Written discharge instructions were provided to                          the patient.                          - Resume previous diet.                          - Continue present medications.                          - No aspirin, ibuprofen, naproxen, or other                          non-steroidal anti-inflammatory drugs.                          - Await pathology results.                          - Discharge patient to home (ambulatory).                          - Follow an antireflux regimen.                          - Return to GI office after studies are complete.     Colonoscopy 4/11/2025:    Impression:            - Non-bleeding internal hemorrhoids.                          - Diverticulosis in the sigmoid colon.                          - The rectum, descending colon, transverse colon,                          ascending colon, cecum, ileocecal valve and                          appendiceal orifice are normal.                          - The examined portion of the ileum was normal.                          - No specimens collected.   Recommendation:        - [facility].                          - Resume previous diet.                          - Continue present medications.                          - No repeat colonoscopy due to age and the absence                          of colonic polyps.                          - Return to GI clinic after studies are complete.     EGD 4/11/2025:    Impression:            - Normal esophagus.                           - Z-line regular, 37 cm from the incisors.                          - Small hiatal hernia.                          - Gastritis. Biopsied.                          - Normal examined duodenum.   Recommendation:        - Return patient to [facility] for [facility] care.                          - Resume previous diet.                          - Continue present medications.                          - No aspirin, ibuprofen, naproxen, or other                          non-steroidal anti-inflammatory drugs.                          - Await pathology results.                          - Repeat upper endoscopy in 8 weeks to check                          healing.                          - Follow an antireflux regimen.                          - Use Protonix (pantoprazole) 40 mg PO BID.                          - Use sucralfate tablets 1 gram PO QID.                          - Perform a colonoscopy today.                          - Return to GI office after studies are complete     All lab results and imaging results have been reviewed and discussed with the patient.     Assessment/Plan   1. Iron deficiency anemia due to chronic blood loss  Assessment & Plan:  Transfuse with injectafer x2 to restore iron saturation   Repeat CBC and Ferritin in 6 weeks   Iron Consent form obtained   Continue to follow with GI for monitoring of hx of bleeding ulcers     Orders:  -     CBC w/ DIFF; Future; Expected date: 09/08/2025  -     FERRITIN; Future; Expected date: 09/08/2025  -     CMP; Future; Expected date: 09/08/2025  -     CBC w/ DIFF; Future; Expected date: 08/07/2025  -     FERRITIN; Future; Expected date: 08/07/2025    2. Stage 3a chronic kidney disease  -     Ambulatory referral/consult to Nephrology; Future; Expected date: 08/14/2025    3. Gastrointestinal hemorrhage associated with intestinal diverticulosis  Assessment & Plan:  Transfuse with injectafer x2 to restore iron saturation   Repeat CBC and Ferritin in 6 weeks    Iron Consent form obtained   Continue to follow with GI for monitoring of hx of bleeding ulcers       Other orders  -     acetaminophen tablet 650 mg  -     famotidine (PF) injection 20 mg  -     methylPREDNISolone sodium succinate injection 40 mg  -     ferric carboxymaltose (Injectafer) injection 750 mg  -     EPINEPHrine (EPIPEN) 0.3 mg/0.3 mL pen injection 0.3 mg  -     hydrocortisone sodium succinate injection 100 mg  -     0.9% NaCl 250 mL flush bag  -     sodium chloride 0.9% flush 10 mL  -     heparin, porcine (PF) 100 unit/mL injection flush 500 Units  -     alteplase injection 2 mg           The plan was discussed with the patient and all questions/concerns have been answered to the patient's satisfaction.            Electronically signed by: Alisa Moreno MSN, APRN, FNP-C         [1]   Current Outpatient Medications:     biotin 5,000 mcg TbDL, Take 1 tablet by mouth once daily., Disp: , Rfl:     cholecalciferol, vitamin D3, (VITAMIN D3) 125 mcg (5,000 unit) Tab, Take 5,000 Units by mouth once daily., Disp: , Rfl:     cyanocobalamin (VITAMIN B-12) 100 MCG tablet, Take 100 mcg by mouth once daily., Disp: , Rfl:     ferrous sulfate (FEOSOL) 325 mg (65 mg iron) Tab tablet, Take 1 tablet (325 mg total) by mouth every other day., Disp: 45 tablet, Rfl: 3    folic acid (FOLVITE) 1 MG tablet, Take 1 tablet (1 mg total) by mouth once daily., Disp: 30 tablet, Rfl: 2    levothyroxine (SYNTHROID) 100 MCG tablet, Take 1 tablet (100 mcg total) by mouth before breakfast., Disp: 90 tablet, Rfl: 1    multivit-min/iron/folic/lutein (CENTRUM SILVER WOMEN ORAL), Take 1 tablet by mouth once daily., Disp: , Rfl:     pantoprazole (PROTONIX) 40 MG tablet, Take 1 tablet (40 mg total) by mouth 2 (two) times daily., Disp: 180 tablet, Rfl: 3    amLODIPine (NORVASC) 5 MG tablet, Take 1 tablet (5 mg total) by mouth once daily. (Patient not taking: Reported on 8/7/2025), Disp: 90 tablet, Rfl: 3    sucralfate (CARAFATE) 1 gram tablet,  Take 1 tablet (1 g total) by mouth 4 (four) times daily before meals and nightly. (Patient not taking: Reported on 8/7/2025), Disp: 360 tablet, Rfl: 0

## 2025-08-07 ENCOUNTER — LAB VISIT (OUTPATIENT)
Dept: LAB | Facility: HOSPITAL | Age: 75
End: 2025-08-07
Payer: MEDICARE

## 2025-08-07 ENCOUNTER — OFFICE VISIT (OUTPATIENT)
Facility: CLINIC | Age: 75
End: 2025-08-07
Payer: MEDICARE

## 2025-08-07 VITALS
WEIGHT: 159.38 LBS | SYSTOLIC BLOOD PRESSURE: 169 MMHG | DIASTOLIC BLOOD PRESSURE: 78 MMHG | HEIGHT: 62 IN | HEART RATE: 75 BPM | OXYGEN SATURATION: 99 % | BODY MASS INDEX: 29.33 KG/M2 | TEMPERATURE: 98 F

## 2025-08-07 DIAGNOSIS — N18.31 STAGE 3A CHRONIC KIDNEY DISEASE: ICD-10-CM

## 2025-08-07 DIAGNOSIS — K57.91 GASTROINTESTINAL HEMORRHAGE ASSOCIATED WITH INTESTINAL DIVERTICULOSIS: ICD-10-CM

## 2025-08-07 DIAGNOSIS — D50.0 IRON DEFICIENCY ANEMIA DUE TO CHRONIC BLOOD LOSS: ICD-10-CM

## 2025-08-07 DIAGNOSIS — D50.0 IRON DEFICIENCY ANEMIA DUE TO CHRONIC BLOOD LOSS: Primary | ICD-10-CM

## 2025-08-07 LAB
ABSOLUTE EOSINOPHIL (SMH): 0.04 K/UL
ABSOLUTE MONOCYTE (SMH): 0.35 K/UL (ref 0.3–1)
ABSOLUTE NEUTROPHIL COUNT (SMH): 2.2 K/UL (ref 1.8–7.7)
ANISOCYTOSIS BLD QL SMEAR: SLIGHT
BASOPHILS # BLD AUTO: 0.06 K/UL
BASOPHILS NFR BLD AUTO: 1.6 %
DACRYOCYTES BLD QL SMEAR: NORMAL
ERYTHROCYTE [DISTWIDTH] IN BLOOD BY AUTOMATED COUNT: 27.5 % (ref 11.5–14.5)
FERRITIN SERPL-MCNC: 25.1 NG/ML (ref 20–300)
HCT VFR BLD AUTO: 31.5 % (ref 37–48.5)
HGB BLD-MCNC: 10 GM/DL (ref 12–16)
HYPOCHROMIA BLD QL SMEAR: NORMAL
IMM GRANULOCYTES # BLD AUTO: 0.01 K/UL (ref 0–0.04)
IMM GRANULOCYTES NFR BLD AUTO: 0.3 % (ref 0–0.5)
LYMPHOCYTES # BLD AUTO: 1.05 K/UL (ref 1–4.8)
MCH RBC QN AUTO: 23.2 PG (ref 27–31)
MCHC RBC AUTO-ENTMCNC: 31.7 G/DL (ref 32–36)
MCV RBC AUTO: 73 FL (ref 82–98)
NUCLEATED RBC (/100WBC) (SMH): 0 /100 WBC
OVALOCYTES BLD QL SMEAR: NORMAL
PLATELET # BLD AUTO: 244 K/UL (ref 150–450)
PLATELET BLD QL SMEAR: NORMAL
PMV BLD AUTO: ABNORMAL FL
POIKILOCYTOSIS BLD QL SMEAR: SLIGHT
RBC # BLD AUTO: 4.31 M/UL (ref 4–5.4)
RELATIVE EOSINOPHIL (SMH): 1.1 % (ref 0–8)
RELATIVE LYMPHOCYTE (SMH): 28.3 % (ref 18–48)
RELATIVE MONOCYTE (SMH): 9.4 % (ref 4–15)
RELATIVE NEUTROPHIL (SMH): 59.3 % (ref 38–73)
SCHISTOCYTES BLD QL SMEAR: NORMAL
TARGETS BLD QL SMEAR: NORMAL
WBC # BLD AUTO: 3.71 K/UL (ref 3.9–12.7)

## 2025-08-07 PROCEDURE — 36415 COLL VENOUS BLD VENIPUNCTURE: CPT

## 2025-08-07 PROCEDURE — 82728 ASSAY OF FERRITIN: CPT

## 2025-08-07 PROCEDURE — 85025 COMPLETE CBC W/AUTO DIFF WBC: CPT

## 2025-08-07 PROCEDURE — 99999 PR PBB SHADOW E&M-EST. PATIENT-LVL IV: CPT | Mod: PBBFAC,,,

## 2025-08-07 RX ORDER — HEPARIN 100 UNIT/ML
500 SYRINGE INTRAVENOUS
OUTPATIENT
Start: 2025-08-07

## 2025-08-07 RX ORDER — ACETAMINOPHEN 325 MG/1
650 TABLET ORAL
Status: CANCELLED | OUTPATIENT
Start: 2025-08-07 | End: 2025-08-07

## 2025-08-07 RX ORDER — SODIUM CHLORIDE 0.9 % (FLUSH) 0.9 %
10 SYRINGE (ML) INJECTION
Status: CANCELLED | OUTPATIENT
Start: 2025-08-07

## 2025-08-07 RX ORDER — SODIUM CHLORIDE 0.9 % (FLUSH) 0.9 %
10 SYRINGE (ML) INJECTION
OUTPATIENT
Start: 2025-08-07

## 2025-08-07 RX ORDER — HEPARIN 100 UNIT/ML
500 SYRINGE INTRAVENOUS
Status: CANCELLED | OUTPATIENT
Start: 2025-08-07

## 2025-08-07 RX ORDER — FAMOTIDINE 10 MG/ML
20 INJECTION, SOLUTION INTRAVENOUS
Status: CANCELLED | OUTPATIENT
Start: 2025-08-07 | End: 2025-08-07

## 2025-08-07 RX ORDER — EPINEPHRINE 0.3 MG/.3ML
0.3 INJECTION SUBCUTANEOUS ONCE AS NEEDED
OUTPATIENT
Start: 2025-08-07

## 2025-08-07 RX ORDER — EPINEPHRINE 0.3 MG/.3ML
0.3 INJECTION SUBCUTANEOUS ONCE AS NEEDED
Status: CANCELLED | OUTPATIENT
Start: 2025-08-07

## 2025-08-07 RX ORDER — FAMOTIDINE 10 MG/ML
20 INJECTION, SOLUTION INTRAVENOUS
OUTPATIENT
Start: 2025-08-07 | End: 2025-08-07

## 2025-08-07 NOTE — ASSESSMENT & PLAN NOTE
Transfuse with injectafer x2 to restore iron saturation   Repeat CBC and Ferritin in 6 weeks   Iron Consent form obtained   Continue to follow with GI for monitoring of hx of bleeding ulcers

## 2025-08-12 ENCOUNTER — TELEPHONE (OUTPATIENT)
Facility: CLINIC | Age: 75
End: 2025-08-12
Payer: MEDICARE

## 2025-08-18 ENCOUNTER — TELEPHONE (OUTPATIENT)
Dept: INFUSION THERAPY | Facility: HOSPITAL | Age: 75
End: 2025-08-18

## 2025-08-21 ENCOUNTER — INFUSION (OUTPATIENT)
Dept: INFUSION THERAPY | Facility: HOSPITAL | Age: 75
End: 2025-08-21
Payer: MEDICARE

## 2025-08-21 VITALS
HEIGHT: 62 IN | DIASTOLIC BLOOD PRESSURE: 91 MMHG | RESPIRATION RATE: 16 BRPM | OXYGEN SATURATION: 100 % | WEIGHT: 159.31 LBS | SYSTOLIC BLOOD PRESSURE: 190 MMHG | BODY MASS INDEX: 29.32 KG/M2 | HEART RATE: 74 BPM | TEMPERATURE: 98 F

## 2025-08-21 DIAGNOSIS — D50.0 IRON DEFICIENCY ANEMIA DUE TO CHRONIC BLOOD LOSS: Primary | ICD-10-CM

## 2025-08-21 PROCEDURE — 25000003 PHARM REV CODE 250

## 2025-08-21 PROCEDURE — 63600175 PHARM REV CODE 636 W HCPCS

## 2025-08-21 PROCEDURE — 96365 THER/PROPH/DIAG IV INF INIT: CPT

## 2025-08-21 RX ORDER — SODIUM CHLORIDE 0.9 % (FLUSH) 0.9 %
10 SYRINGE (ML) INJECTION
OUTPATIENT
Start: 2025-08-21

## 2025-08-21 RX ORDER — EPINEPHRINE 0.3 MG/.3ML
0.3 INJECTION SUBCUTANEOUS ONCE AS NEEDED
OUTPATIENT
Start: 2025-08-21 | End: 2037-01-17

## 2025-08-21 RX ORDER — METHYLPREDNISOLONE SOD SUCC 125 MG
40 VIAL (EA) INJECTION
OUTPATIENT
Start: 2025-08-21 | End: 2025-08-21

## 2025-08-21 RX ORDER — FAMOTIDINE 10 MG/ML
20 INJECTION, SOLUTION INTRAVENOUS
OUTPATIENT
Start: 2025-08-21 | End: 2025-08-21

## 2025-08-21 RX ORDER — SODIUM CHLORIDE 0.9 % (FLUSH) 0.9 %
10 SYRINGE (ML) INJECTION
Status: DISCONTINUED | OUTPATIENT
Start: 2025-08-21 | End: 2025-08-21 | Stop reason: HOSPADM

## 2025-08-21 RX ORDER — HEPARIN 100 UNIT/ML
500 SYRINGE INTRAVENOUS
Status: DISCONTINUED | OUTPATIENT
Start: 2025-08-21 | End: 2025-08-21 | Stop reason: HOSPADM

## 2025-08-21 RX ORDER — HEPARIN 100 UNIT/ML
500 SYRINGE INTRAVENOUS
OUTPATIENT
Start: 2025-08-21

## 2025-08-21 RX ADMIN — IRON SUCROSE 200 MG: 20 INJECTION, SOLUTION INTRAVENOUS at 02:08

## 2025-08-28 ENCOUNTER — INFUSION (OUTPATIENT)
Dept: INFUSION THERAPY | Facility: HOSPITAL | Age: 75
End: 2025-08-28
Payer: MEDICARE

## 2025-08-28 VITALS
DIASTOLIC BLOOD PRESSURE: 76 MMHG | SYSTOLIC BLOOD PRESSURE: 154 MMHG | HEIGHT: 62 IN | TEMPERATURE: 98 F | WEIGHT: 160 LBS | HEART RATE: 71 BPM | OXYGEN SATURATION: 98 % | RESPIRATION RATE: 16 BRPM | BODY MASS INDEX: 29.44 KG/M2

## 2025-08-28 DIAGNOSIS — D50.0 IRON DEFICIENCY ANEMIA DUE TO CHRONIC BLOOD LOSS: Primary | ICD-10-CM

## 2025-08-28 PROCEDURE — 96365 THER/PROPH/DIAG IV INF INIT: CPT

## 2025-08-28 PROCEDURE — 25000003 PHARM REV CODE 250

## 2025-08-28 PROCEDURE — 63600175 PHARM REV CODE 636 W HCPCS

## 2025-08-28 RX ORDER — SODIUM CHLORIDE 0.9 % (FLUSH) 0.9 %
10 SYRINGE (ML) INJECTION
OUTPATIENT
Start: 2025-08-28

## 2025-08-28 RX ORDER — METHYLPREDNISOLONE SOD SUCC 125 MG
40 VIAL (EA) INJECTION
Status: DISCONTINUED | OUTPATIENT
Start: 2025-08-28 | End: 2025-08-28 | Stop reason: HOSPADM

## 2025-08-28 RX ORDER — HEPARIN 100 UNIT/ML
500 SYRINGE INTRAVENOUS
Status: DISCONTINUED | OUTPATIENT
Start: 2025-08-28 | End: 2025-08-28 | Stop reason: HOSPADM

## 2025-08-28 RX ORDER — FAMOTIDINE 10 MG/ML
20 INJECTION, SOLUTION INTRAVENOUS
Status: DISCONTINUED | OUTPATIENT
Start: 2025-08-28 | End: 2025-08-28 | Stop reason: HOSPADM

## 2025-08-28 RX ORDER — METHYLPREDNISOLONE SOD SUCC 125 MG
40 VIAL (EA) INJECTION
OUTPATIENT
Start: 2025-08-28 | End: 2025-08-28

## 2025-08-28 RX ORDER — EPINEPHRINE 0.3 MG/.3ML
0.3 INJECTION SUBCUTANEOUS ONCE AS NEEDED
OUTPATIENT
Start: 2025-08-28 | End: 2037-01-24

## 2025-08-28 RX ORDER — SODIUM CHLORIDE 0.9 % (FLUSH) 0.9 %
10 SYRINGE (ML) INJECTION
Status: DISCONTINUED | OUTPATIENT
Start: 2025-08-28 | End: 2025-08-28 | Stop reason: HOSPADM

## 2025-08-28 RX ORDER — FAMOTIDINE 10 MG/ML
20 INJECTION, SOLUTION INTRAVENOUS
OUTPATIENT
Start: 2025-08-28 | End: 2025-08-28

## 2025-08-28 RX ORDER — HEPARIN 100 UNIT/ML
500 SYRINGE INTRAVENOUS
OUTPATIENT
Start: 2025-08-28

## 2025-08-28 RX ADMIN — IRON SUCROSE 200 MG: 20 INJECTION, SOLUTION INTRAVENOUS at 10:08

## 2025-09-04 ENCOUNTER — INFUSION (OUTPATIENT)
Dept: INFUSION THERAPY | Facility: HOSPITAL | Age: 75
End: 2025-09-04
Payer: MEDICARE

## 2025-09-04 VITALS
HEART RATE: 86 BPM | DIASTOLIC BLOOD PRESSURE: 77 MMHG | SYSTOLIC BLOOD PRESSURE: 146 MMHG | TEMPERATURE: 98 F | WEIGHT: 158.75 LBS | OXYGEN SATURATION: 98 % | BODY MASS INDEX: 29.03 KG/M2

## 2025-09-04 DIAGNOSIS — D50.0 IRON DEFICIENCY ANEMIA DUE TO CHRONIC BLOOD LOSS: Primary | ICD-10-CM

## 2025-09-04 PROCEDURE — 25000003 PHARM REV CODE 250

## 2025-09-04 PROCEDURE — 96365 THER/PROPH/DIAG IV INF INIT: CPT

## 2025-09-04 PROCEDURE — 63600175 PHARM REV CODE 636 W HCPCS

## 2025-09-04 RX ORDER — EPINEPHRINE 0.3 MG/.3ML
0.3 INJECTION SUBCUTANEOUS ONCE AS NEEDED
OUTPATIENT
Start: 2025-09-04 | End: 2037-01-30

## 2025-09-04 RX ORDER — SODIUM CHLORIDE 0.9 % (FLUSH) 0.9 %
10 SYRINGE (ML) INJECTION
Status: DISCONTINUED | OUTPATIENT
Start: 2025-09-04 | End: 2025-09-04 | Stop reason: HOSPADM

## 2025-09-04 RX ORDER — HEPARIN 100 UNIT/ML
500 SYRINGE INTRAVENOUS
Status: DISCONTINUED | OUTPATIENT
Start: 2025-09-04 | End: 2025-09-04 | Stop reason: HOSPADM

## 2025-09-04 RX ORDER — SODIUM CHLORIDE 0.9 % (FLUSH) 0.9 %
10 SYRINGE (ML) INJECTION
OUTPATIENT
Start: 2025-09-04

## 2025-09-04 RX ORDER — HEPARIN 100 UNIT/ML
500 SYRINGE INTRAVENOUS
OUTPATIENT
Start: 2025-09-04

## 2025-09-04 RX ORDER — METHYLPREDNISOLONE SOD SUCC 125 MG
40 VIAL (EA) INJECTION
OUTPATIENT
Start: 2025-09-04 | End: 2025-09-04

## 2025-09-04 RX ORDER — FAMOTIDINE 10 MG/ML
20 INJECTION, SOLUTION INTRAVENOUS
OUTPATIENT
Start: 2025-09-04 | End: 2025-09-04

## 2025-09-04 RX ADMIN — IRON SUCROSE 200 MG: 20 INJECTION, SOLUTION INTRAVENOUS at 09:09
